# Patient Record
Sex: MALE | Race: WHITE | Employment: FULL TIME | URBAN - METROPOLITAN AREA
[De-identification: names, ages, dates, MRNs, and addresses within clinical notes are randomized per-mention and may not be internally consistent; named-entity substitution may affect disease eponyms.]

---

## 2017-01-22 ASSESSMENT — ACTIVITIES OF DAILY LIVING (ADL)
ARE_THERE_FIREARMS_IN_YOUR_HOME?: Y
DO_MEMBERS_OF_YOUR_HOUSEHOLD_USE_SAFETY_HELMETS?: Y
ARE_THERE_SMOKE_DETECTORS_IN_YOUR_HOME?: Y
ARE_THERE_CARBON_MONOXIDE_DETECTORS_IN_YOUR_HOME?: Y
DO_MEMBERS_OF_YOUR_HOUSEHOLD_USE_SUNSCREEN?: Y
DO_MEMBERS_OF_YOUR_HOUSEHOLD_WEAR_SEAT_BELTS?: Y

## 2017-01-23 ENCOUNTER — HOSPITAL ENCOUNTER (OUTPATIENT)
Dept: PHYSICAL THERAPY | Facility: CLINIC | Age: 37
Setting detail: THERAPIES SERIES
End: 2017-01-23
Attending: INTERNAL MEDICINE
Payer: COMMERCIAL

## 2017-01-23 ENCOUNTER — OFFICE VISIT (OUTPATIENT)
Dept: PULMONOLOGY | Facility: CLINIC | Age: 37
End: 2017-01-23
Attending: INTERNAL MEDICINE
Payer: COMMERCIAL

## 2017-01-23 VITALS
OXYGEN SATURATION: 96 % | WEIGHT: 205.25 LBS | HEART RATE: 105 BPM | DIASTOLIC BLOOD PRESSURE: 74 MMHG | BODY MASS INDEX: 25.87 KG/M2 | SYSTOLIC BLOOD PRESSURE: 106 MMHG | RESPIRATION RATE: 16 BRPM

## 2017-01-23 DIAGNOSIS — E84.0 CYSTIC FIBROSIS WITH PULMONARY MANIFESTATIONS (H): Primary | ICD-10-CM

## 2017-01-23 DIAGNOSIS — R73.02 GLUCOSE INTOLERANCE (IMPAIRED GLUCOSE TOLERANCE): Primary | ICD-10-CM

## 2017-01-23 DIAGNOSIS — E84.0 CYSTIC FIBROSIS WITH PULMONARY MANIFESTATIONS (H): ICD-10-CM

## 2017-01-23 LAB
EXPTIME-PRE: 12.97 SEC
FEF2575-%PRED-PRE: 24 %
FEF2575-PRE: 1.2 L/SEC
FEF2575-PRED: 4.86 L/SEC
FEFMAX-%PRED-PRE: 82 %
FEFMAX-PRE: 9.39 L/SEC
FEFMAX-PRED: 11.34 L/SEC
FEV1-%PRED-PRE: 65 %
FEV1-PRE: 3.28 L
FEV1FEV6-PRE: 58 %
FEV1FEV6-PRED: 83 %
FEV1FVC-PRE: 52 %
FEV1FVC-PRED: 81 %
FIFMAX-PRE: 9.63 L/SEC
FVC-%PRED-PRE: 100 %
FVC-PRE: 6.28 L
FVC-PRED: 6.27 L

## 2017-01-23 PROCEDURE — 87070 CULTURE OTHR SPECIMN AEROBIC: CPT | Performed by: INTERNAL MEDICINE

## 2017-01-23 PROCEDURE — 99212 OFFICE O/P EST SF 10 MIN: CPT | Mod: ZF

## 2017-01-23 PROCEDURE — 87186 SC STD MICRODIL/AGAR DIL: CPT | Performed by: INTERNAL MEDICINE

## 2017-01-23 PROCEDURE — 87107 FUNGI IDENTIFICATION MOLD: CPT | Performed by: INTERNAL MEDICINE

## 2017-01-23 PROCEDURE — 87077 CULTURE AEROBIC IDENTIFY: CPT | Performed by: INTERNAL MEDICINE

## 2017-01-23 RX ORDER — TOBRAMYCIN INHALATION SOLUTION 300 MG/5ML
300 INHALANT RESPIRATORY (INHALATION) 2 TIMES DAILY
Qty: 300 ML | Refills: 5 | Status: SHIPPED | OUTPATIENT
Start: 2017-01-23 | End: 2017-02-16

## 2017-01-23 ASSESSMENT — PAIN SCALES - GENERAL: PAINLEVEL: NO PAIN (0)

## 2017-01-23 NOTE — MR AVS SNAPSHOT
After Visit Summary   1/23/2017    Telly Leal    MRN: [id number]           Patient Information     Date Of Birth          1980        Visit Information        Provider Department      1/23/2017 2:20 PM Rocael Miller MD [facility]        Today's Diagnoses     Glucose intolerance (impaired glucose tolerance)    -  1     Cystic fibrosis with pulmonary manifestations (H)           Care Instructions    Cystic Fibrosis Self-Care Plan       MRN: [id number]   Clinic Date: January 23, 2017   Patient: Telly Leal     Annual Studies:   IGG   Date Value Ref Range Status   12/11/2015 1160 695 - 1620 mg/dL Final     INSULIN   Date Value Ref Range Status   12/16/2016 73 mU/L Final     Comment:     Reference Range:  0-20     There are no preventive care reminders to display for this patient.      Pulmonary Function Tests  FEV1: amount of air you can blow out in 1 second  FVC: total amount of air you can take in and blow out    Your Goals:         PFT 1/23/2017   FVC 6.28   FEV1 3.28   FVC% 100   FEV1% 65          Airway Clearance: The Most Important Way to Keep Your Lungs Healthy  Vest Settings:    Hill-Rom Frequencies: 8, 9, 10 Pressure 10 Then, Frequencies 18, 19, 20 Pressure 6      RespirTech: Quick Start with Pressure of     Do each frequency for 5 minutes; Deflate vest after each frequency & cough 3 times before beginning the next setting.    Vest and Neb Therapy should be done 2-3 times/day.    Good Nutrition Can Improve Lung Function and Overall Health     Take ALL of your vitamins with food     Take 1/2 of your enzymes before EVERY meal/snack and the other 1/2 mid-meal/snack    Wt Readings from Last 3 Encounters:   12/16/16 90.719 kg (200 lb)   10/18/16 91.5 kg (201 lb 11.5 oz)   08/16/16 91.2 kg (201 lb 1 oz)       There is no weight on file to calculate BMI.         National CF Foundation Recommendations for BMI in CF Adults: Women: at least 22  Men: at least 23        Controlling Blood Sugars Helps Prevent Lung Infections & Improves Nutrition  Test blood sugar:     In the morning before eating (goal is )     2 hours after a meal (goal is less than 150)     When pre-meal glucose is greater than 150 add correction     At bedtime (if less than 100 eat a snack with 15 grams of carbohydrates  Last A1C Results:   HEMOGLOBIN A1C   Date Value Ref Range Status   12/16/2016 6.2* 4.3 - 6.0 % Final         If diabetic, measure A1C every 6 months. Goal: Under 7%    Staying Healthy    Research:  If you are interested in learning about research opportunities or have questions, please contact Evie Fairbanks at [phone] or [email or web address].      CF Foundation:  Compass is a personalized resource service to help you with the insurance, financial, legal and other issues you are facing.  It's free, confidential and available to anyone with CF.  Ask your  for more information or contact Compass directly at [facility] ([phone]) or compass@cff.org, or learn more at cff.org/compass.          RECOMMENDATIONS:   Check blood sugars for the next week at the following times: fasting, before meals, 2 hours after meals and before bed. Record results. A prescription for more test strips has been sent to your local pharmacy.     YOUR GOAL:      CF Nurse Line:  Brice Reveles: [phone]   Sary Hassan, RT: [phone]     Leona Marie: [phone] or Karissa Ashraf, Dieticians: [phone]   Mercedes Brooks, Diabetes Nurse: [phone]      Yani Georges: [phone] or Yoselin Abel [phone], Social Workers   [email or web address]                   Follow-ups after your visit        Follow-up notes from your care team     Return in about 2 months (around 3/23/2017).      Your next 10 appointments already scheduled     Apr 03, 2017  1:30 PM   PFT VISIT with  PFL D   M [facility] Pulmonary Function Testing ([facility])    [address]  [address] [facility] [id number] [phone]            Apr 03, 2017  2:20 PM   (Arrive by 2:05 PM)   RETURN CYSTIC FIBROSIS VISIT with Rocael Miller MD   [facility] ([facility])    [address] [id number] [phone]              Future tests that were ordered for you today     Open Future Orders        Priority Expected Expires Ordered    RESPIRATORY FLOW VOLUME LOOP Routine 3/24/2017 7/22/2017 1/23/2017    RESPIRATORY FLOW VOLUME LOOP Routine 3/24/2017 7/22/2017 1/23/2017            Who to contact     If you have questions or need follow up information about today's clinic visit or your schedule please contact [facility] directly at [phone].  Normal or non-critical lab and imaging results will be communicated to you by MyChart, letter or phone within 4 business days after the clinic has received the results. If you do not hear from us within 7 days, please contact the clinic through [employer] or phone. If you have a critical or abnormal lab result, we will notify you by phone as soon as possible.  Submit refill requests through [email or web address] or call your pharmacy and they will forward the refill request to us. Please allow 3 business days for your refill to be completed.          Additional Information About Your Visit        MyChart Information     [email or web address] gives you secure access to your electronic health record. If you see a primary care provider, you can also send messages to your care team and make appointments. If you have questions, please call your primary care clinic.  If you do not have a primary care provider, please call [phone] and they will assist you.        Care EveryWhere ID     This is your Care EveryWhere ID. This could be used by other organizations to access your [facility] medical records  [id number]        Your Vitals  Were     Pulse Respirations Pulse Oximetry             105 16 96%          Blood Pressure from Last 3 Encounters:   01/23/17 106/74   12/16/16 130/72   10/18/16 132/81    Weight from Last 3 Encounters:   01/23/17 93.1 kg (205 lb 4 oz)   12/16/16 90.719 kg (200 lb)   10/18/16 91.5 kg (201 lb 11.5 oz)              We Performed the Following     Cystic Fibrosis Culture Aerob Bacterial          Today's Medication Changes          These changes are accurate as of: 1/23/17  4:54 PM.  If you have any questions, ask your nurse or doctor.               Start taking these medicines.        Dose/Directions    blood glucose monitoring test strip   Commonly known as:  no brand specified   Used for:  Cystic fibrosis with pulmonary manifestations (H), Glucose intolerance (impaired glucose tolerance)   Started by:  Rocael Miller MD        Use to test blood sugars 4-6 times daily or as directed.   Quantity:  100 strip   Refills:  1       tobramycin (PF) 300 MG/5ML neb solution   Commonly known as:  SOO   Used for:  Cystic fibrosis with pulmonary manifestations (H)   Replaces:  tobramycin 28 MG in caps   Started by:  Rocael Miller MD        Dose:  300 mg   Take 5 mLs (300 mg) by nebulization 2 times daily   Quantity:  300 mL   Refills:  5         Stop taking these medicines if you haven't already. Please contact your care team if you have questions.     tobramycin 28 MG in caps   Commonly known as:  SOO PODHALER   Replaced by:  tobramycin (PF) 300 MG/5ML neb solution   Stopped by:  Rocael Miller MD                Where to get your medicines      These medications were sent to [facility] [address]  [address], [facility] [address]     Phone:  [phone]    - tobramycin (PF) 300 MG/5ML neb solution      These medications were sent to [facility] [address] [place], [place] - [address] AT [facility] [address]  [address],  [facility] [address]    Hours:  24-hours Phone:  [phone]    - blood glucose monitoring test strip             Primary Care Provider Office Phone # Fax #    Adriel Corrigan [phone]       [facility] [address] [place] [address]  [facility] [address]        Thank you!     Thank you for choosing [facility]  for your care. Our goal is always to provide you with excellent care. Hearing back from our patients is one way we can continue to improve our services. Please take a few minutes to complete the written survey that you may receive in the mail after your visit with us. Thank you!             Your Updated Medication List - Protect others around you: Learn how to safely use, store and throw away your medicines at www.disposemymeds.org.          This list is accurate as of: 1/23/17  4:54 PM.  Always use your most recent med list.                   Brand Name Dispense Instructions for use    acetylcysteine 20 % injection    MUCOMYST    180 mL    Inhale 2 mLs into the lungs 3 times daily       * albuterol 108 (90 BASE) MCG/ACT Inhaler    albuterol    1 Inhaler    Inhale 1-2 puffs into the lungs every 6 hours as needed for shortness of breath / dyspnea Every 4-6 hours as needed       * albuterol (2.5 MG/3ML) 0.083% neb solution     360 mL    Take 1 vial (2.5 mg) by nebulization 3 times daily       azithromycin 250 MG tablet    ZITHROMAX    30 tablet    Take 1 tablet (250 mg) by mouth daily       aztreonam 200 MG/ML    AZACTAM    56 each    Take 5 mLs (1 g) by nebulization 2 times daily       aztreonam lysine 75 MG Solr    CAYSTON    90 mL    Take 1 mL (75 mg) by nebulization 3 times daily 28days on 28days off       blood glucose monitoring test strip    no brand specified    100 strip    Use to test blood sugars 4-6 times daily or as directed.       Cholecalciferol 4000 UNITS Caps     90 capsule    Take 1 capsule by mouth daily       ciprofloxacin 750 MG  tablet    CIPRO    56 tablet    Take 1 tablet (750 mg) by mouth 2 times daily       CREON 15946 UNITS Cpep per EC capsule   Generic drug:  amylase-lipase-protease     1000 capsule    Take 5-6 capsules (120,000-144,000 Units) by mouth See Admin Instructions 5-6 with meals, 1-5 with snacks,7-8 with tube feedings       cromolyn 20 MG/2ML neb solution    INTAL    180 mL    Take 2 mLs (20 mg) by nebulization 3 times daily 1 vial three times daily       fluticasone 50 MCG/ACT spray    FLONASE    1 Package    Spray 2 sprays into both nostrils as needed       MEPHYTON 5 MG tablet   Generic drug:  phytonadione     12 tablet    TAKE 1 TABLET (5 MG) BY MOUTH 3 TIMES A WEEK       MULTIVITAMINS PO      Take 1 tablet by mouth daily.       NUTREN 2.0      Take 4-5 Cans by mouth. 168/ml/hr 7 days/week       oseltamivir 75 MG capsule    TAMIFLU    10 capsule    Take 1 capsule (75 mg) by mouth 2 times daily For 5 days       ranitidine 150 MG tablet    ZANTAC    180 tablet    Take 1 tablet (150 mg) by mouth 2 times daily       tobramycin (PF) 300 MG/5ML neb solution    SOO    300 mL    Take 5 mLs (300 mg) by nebulization 2 times daily       water for injection sterile Soln     250 mL    Inject 4 mLs as directed 2 times daily       * Notice:  This list has 2 medication(s) that are the same as other medications prescribed for you. Read the directions carefully, and ask your doctor or other care provider to review them with you.

## 2017-01-23 NOTE — PATIENT INSTRUCTIONS
Cystic Fibrosis Self-Care Plan       MRN: [id number]   Clinic Date: January 23, 2017   Patient: Telly Leal     Annual Studies:   IGG   Date Value Ref Range Status   12/11/2015 1160 695 - 1620 mg/dL Final     INSULIN   Date Value Ref Range Status   12/16/2016 73 mU/L Final     Comment:     Reference Range:  0-20     There are no preventive care reminders to display for this patient.      Pulmonary Function Tests  FEV1: amount of air you can blow out in 1 second  FVC: total amount of air you can take in and blow out    Your Goals:         PFT 1/23/2017   FVC 6.28   FEV1 3.28   FVC% 100   FEV1% 65          Airway Clearance: The Most Important Way to Keep Your Lungs Healthy  Vest Settings:    Hill-Rom Frequencies: 8, 9, 10 Pressure 10 Then, Frequencies 18, 19, 20 Pressure 6      RespirTech: Quick Start with Pressure of     Do each frequency for 5 minutes; Deflate vest after each frequency & cough 3 times before beginning the next setting.    Vest and Neb Therapy should be done 2-3 times/day.    Good Nutrition Can Improve Lung Function and Overall Health     Take ALL of your vitamins with food     Take 1/2 of your enzymes before EVERY meal/snack and the other 1/2 mid-meal/snack    Wt Readings from Last 3 Encounters:   12/16/16 90.719 kg (200 lb)   10/18/16 91.5 kg (201 lb 11.5 oz)   08/16/16 91.2 kg (201 lb 1 oz)       There is no weight on file to calculate BMI.         National CF Foundation Recommendations for BMI in CF Adults: Women: at least 22 Men: at least 23        Controlling Blood Sugars Helps Prevent Lung Infections & Improves Nutrition  Test blood sugar:     In the morning before eating (goal is )     2 hours after a meal (goal is less than 150)     When pre-meal glucose is greater than 150 add correction     At bedtime (if less than 100 eat a snack with 15 grams of carbohydrates  Last A1C Results:   HEMOGLOBIN A1C   Date Value Ref Range Status   12/16/2016 6.2* 4.3 - 6.0 % Final         If  diabetic, measure A1C every 6 months. Goal: Under 7%    Staying Healthy    Research:  If you are interested in learning about research opportunities or have questions, please contact Evie Fairbanks at [phone] or [email or web address].      CF Foundation:  Compass is a personalized resource service to help you with the insurance, financial, legal and other issues you are facing.  It's free, confidential and available to anyone with CF.  Ask your  for more information or contact Compass directly at [phone] ([phone]) or compass@[email or web address], or learn more at cff.org/compass.          RECOMMENDATIONS:   Check blood sugars for the next week at the following times: fasting, before meals, 2 hours after meals and before bed. Record results. A prescription for more test strips has been sent to your local pharmacy.     YOUR GOAL:      CF Nurse Line:  Brice Reveles: [phone]   Sary Hassan, RT: [phone]     Leona Marie: [phone] or Karissa Ashraf, Dieticians: [phone]   Mercedes Brooks, Diabetes Nurse: [phone]      Yani Georges: [phone] or Yoselin Abel [phone], Social Workers   [email or web address]

## 2017-01-23 NOTE — Clinical Note
1/23/2017       RE: Telly Leal  [address] [address]     Dear Colleague,    Thank you for referring your patient, Telly Leal, to the [facility] at [facility]. Please see a copy of my visit note below.    Reason for Visit  Telly Leal is a 35 year old year old male who is being seen for Cystic Fibrosis    CF HPI  The patient was seen and examined by Rocael Miller   Patient last seen 3 months ago at which time at baseline. Reports feeling at baseline although notes less exercise than usual and also drop in vest to about 10 times per week. Stopped cromolyn last visit and feels perhaps not as good since then. No formal exercise but on feet at work, going for walks and baseline exertional dyspnea. Volume and color of sputum at baseline. No hemoptysis or chest pain.      Current Outpatient Prescriptions   Medication     tobramycin, PF, (SOO) 300 MG/5ML neb solution     blood glucose monitoring (NO BRAND SPECIFIED) test strip     CREON 57257 UNITS CPEP     albuterol (ALBUTEROL) 108 (90 BASE) MCG/ACT inhaler     albuterol (2.5 MG/3ML) 0.083% nebulizer solution     azithromycin (ZITHROMAX) 250 MG tablet     acetylcysteine (MUCOMYST) 20 % nebulizer solution     MEPHYTON 5 MG tablet     aztreonam (AZACTAM) 200 MG/ML     water for injection sterile SOLN     cromolyn (INTAL) 20 MG/2ML nebulizer solution     ranitidine (ZANTAC) 150 MG tablet     Cholecalciferol 4000 UNITS CAPS     fluticasone (FLONASE) 50 MCG/ACT nasal spray     Multiple Vitamin (MULTIVITAMINS PO)     Nutritional Supplements (NUTREN 2.0) LIQD     aztreonam lysine (CAYSTON) 75 MG SOLR     oseltamivir (TAMIFLU) 75 MG capsule     ciprofloxacin (CIPRO) 750 MG tablet     No current facility-administered medications for this visit.     No Known Allergies  Past Medical History   Diagnosis Date     Cystic fibrosis with pulmonary manifestations (H)       GERD (gastroesophageal reflux disease)      Malnutrition (H)      Exocrine pancreatic insufficiency      Chronic sinusitis      Hemoptysis        Past Surgical History   Procedure Laterality Date     Tubes       Lobectomy       right upper lobe       Social History     Social History     Marital Status: Single     Spouse Name: N/A     Number of Children: N/A     Years of Education: N/A     Occupational History      [employer]     Social History Main Topics     Smoking status: Never Smoker      Smokeless tobacco: Never Used     Alcohol Use: 0.0 oz/week     0 Standard drinks or equivalent per week     Drug Use: Not on file     Sexual Activity: Not on file     Other Topics Concern     Not on file     Social History Narrative    Patient works for the [employer] as an .  He does not have stable living conditions at this time.  He has a Thai guillen.   Update: living with wife Bre in [facility].    ROS Pulmonary  Const: no fever, chills, fatigue. ENT: no sinus pain or drainage. GI: no diarrhea, constipation, grease in stools, or gerd sx with h2 blocker. Using 2 cans TF nightly.    A complete ROS was otherwise negative except as noted in the HPI.  /74 mmHg  Pulse 105  Resp 16  Wt 93.1 kg (205 lb 4 oz)  SpO2 96%  Exam:   GENERAL APPEARANCE: Well developed, well nourished, alert, and in no apparent distress.  EYES: anicteric  HENT: Nasal mucosa with no edema and no hyperemia. No nasal polyps.  EARS: Canals clear, TMs with normal landmarks and light reflex bilaterally   MOUTH: Oral mucosa is moist, without any lesions, no tonsillar enlargement, no oropharyngeal exudate.  NECK: supple, no masses, no thyromegaly.  LYMPHATICS: No significant  cervical, or supraclavicular nodes.  RESP:  good air flow throughout except right base as before.  Clear in all lung fields.  CV: Normal S1, S2, regular rhythm, normal rate. No murmur.  No rub. No gallop. No LE edema.   ABDOMEN:  Bowel  sounds normal, soft, nontender, no HSM or masses.   MS: extremities normal. No cyanosis.  SKIN: no rash on limited exam. PEG site without erythema or discharge. Chest scar like lesion upper sternum unchanged. Diffuse freckles.   NEURO: Mentation intact, speech normal, normal gait and stance  PSYCH: mentation appears normal. and affect normal/bright  Results:  Recent Results (from the past 168 hour(s))   General PFT Lab (Please always keep checked)    Collection Time: 01/23/17  1:30 PM   Result Value Ref Range    FVC-Pred 6.27 L    FVC-Pre 6.28 L    FVC-%Pred-Pre 100 %    FEV1-Pre 3.28 L    FEV1-%Pred-Pre 65 %    FEV1FVC-Pred 81 %    FEV1FVC-Pre 52 %    FEFMax-Pred 11.34 L/sec    FEFMax-Pre 9.39 L/sec    FEFMax-%Pred-Pre 82 %    FEF2575-Pred 4.86 L/sec    FEF2575-Pre 1.20 L/sec    TNI0954-%Pred-Pre 24 %    ExpTime-Pre 12.97 sec    FIFMax-Pre 9.63 L/sec    FEV1FEV6-Pred 83 %    FEV1FEV6-Pre 58 %     Spirometry interpretation: personally reviewed. Valid maneuver. Moderate obstruction. FEV1 without significant change from last visit. Within baseline of past 2 years    Assessment and plan:  1. CF lung disease: PFTs down only slightly and feeling well with good clinical course over past several months but consensus is for patient to resume vest bid consistently. He would like to switch back to alice nebs from [facility] and new Rx sent. He has a new treadmill and encouraged him to start regular use. Favor keeping him off cromolyn to promote better adherence to other CF meds. Tolerating reconstituted aztreonam bid alternating with Alice inhaler on chronic azithromycin.   2. Skin lesion: looks like old scar without clear hx of trauma. Patient has not scheduled derm eval - also helpful to get derm input on need for annual skin exams given large numerous freckles.  3.  Pancreatic insufficiency with a history of malnutrition: Adequate enzyme replacement by history. Some concern excess calories could be contributing to glucose  intolerance.  BMI of 25.9 and cutting back on TF a reasonable option although patient reticent. Will revisit over time.    4. CF sinus disease: at baseline not needing nasal steroid.  5. GERD: Infrequent symptoms. Adequate control with ranitidine.   6. Low vitamin D level: On replacement.  7. Impaired glucose tolerance: 2 hour glucose 160 on GTT and A1C higher than previous at 6.2. Pt will do glucose monitoring qid for a week. Leona, nutrition, consulted and reviewed cutting out simple sugars in diet. This, along with exercise may help. A lot of inertia with pursuing insulin a couple years ago when glucose intolerance also an issue.     8. Healthcare maintenance: Annuals due December, 2016. Obtained flu vaccine at work.   9. Elevated triglycerides: normal in December  10.Family planning: he and wife, Bre, are expecting in late August.  Patient will follow up in 2 months   40 minutes spent in conjunction with this visit with > 50% of time spent counseling and coordinating care of following issues: adherence to airway clearance regimen and lung treatments, status of pancreatic insufficiency and history of malnutrition including counseling on use of tube feeds, control of sinus disease, control of GERD, evaluation of skin lesion on chest, family planning, impaired glucose intolerance.    Rocael Miller

## 2017-01-23 NOTE — NURSING NOTE
Chief Complaint   Patient presents with     Cystic Fibrosis     Patient is being seen for CF follow up      Milagros Hernandez CMA at 1:54 PM on 1/23/2017

## 2017-01-24 NOTE — PROGRESS NOTES
Reason for Visit  Telly Leal is a 35 year old year old male who is being seen for Cystic Fibrosis    CF HPI  The patient was seen and examined by Rocael Miller   Patient last seen 3 months ago at which time at baseline. Reports feeling at baseline although notes less exercise than usual and also drop in vest to about 10 times per week. Stopped cromolyn last visit and feels perhaps not as good since then. No formal exercise but on feet at work, going for walks and baseline exertional dyspnea. Volume and color of sputum at baseline. No hemoptysis or chest pain.      Current Outpatient Prescriptions   Medication     tobramycin, PF, (SOO) 300 MG/5ML neb solution     blood glucose monitoring (NO BRAND SPECIFIED) test strip     CREON 52220 UNITS CPEP     albuterol (ALBUTEROL) 108 (90 BASE) MCG/ACT inhaler     albuterol (2.5 MG/3ML) 0.083% nebulizer solution     azithromycin (ZITHROMAX) 250 MG tablet     acetylcysteine (MUCOMYST) 20 % nebulizer solution     MEPHYTON 5 MG tablet     aztreonam (AZACTAM) 200 MG/ML     water for injection sterile SOLN     cromolyn (INTAL) 20 MG/2ML nebulizer solution     ranitidine (ZANTAC) 150 MG tablet     Cholecalciferol 4000 UNITS CAPS     fluticasone (FLONASE) 50 MCG/ACT nasal spray     Multiple Vitamin (MULTIVITAMINS PO)     Nutritional Supplements (NUTREN 2.0) LIQD     aztreonam lysine (CAYSTON) 75 MG SOLR     oseltamivir (TAMIFLU) 75 MG capsule     ciprofloxacin (CIPRO) 750 MG tablet     No current facility-administered medications for this visit.     No Known Allergies  Past Medical History   Diagnosis Date     Cystic fibrosis with pulmonary manifestations (H)      GERD (gastroesophageal reflux disease)      Malnutrition (H)      Exocrine pancreatic insufficiency      Chronic sinusitis      Hemoptysis        Past Surgical History   Procedure Laterality Date     Tubes       Lobectomy       right upper lobe       Social History     Social History     Marital Status: Single      Spouse Name: N/A     Number of Children: N/A     Years of Education: N/A     Occupational History      [employer]     Social History Main Topics     Smoking status: Never Smoker      Smokeless tobacco: Never Used     Alcohol Use: 0.0 oz/week     0 Standard drinks or equivalent per week     Drug Use: Not on file     Sexual Activity: Not on file     Other Topics Concern     Not on file     Social History Narrative    Patient works for the [employer] as an .  He does not have stable living conditions at this time.  He has a Malagasy guillen.   Update: living with wife Bre in [facility].    ROS Pulmonary  Const: no fever, chills, fatigue. ENT: no sinus pain or drainage. GI: no diarrhea, constipation, grease in stools, or gerd sx with h2 blocker. Using 2 cans TF nightly.    A complete ROS was otherwise negative except as noted in the HPI.  /74 mmHg  Pulse 105  Resp 16  Wt 93.1 kg (205 lb 4 oz)  SpO2 96%  Exam:   GENERAL APPEARANCE: Well developed, well nourished, alert, and in no apparent distress.  EYES: anicteric  HENT: Nasal mucosa with no edema and no hyperemia. No nasal polyps.  EARS: Canals clear, TMs with normal landmarks and light reflex bilaterally   MOUTH: Oral mucosa is moist, without any lesions, no tonsillar enlargement, no oropharyngeal exudate.  NECK: supple, no masses, no thyromegaly.  LYMPHATICS: No significant  cervical, or supraclavicular nodes.  RESP:  good air flow throughout except right base as before.  Clear in all lung fields.  CV: Normal S1, S2, regular rhythm, normal rate. No murmur.  No rub. No gallop. No LE edema.   ABDOMEN:  Bowel sounds normal, soft, nontender, no HSM or masses.   MS: extremities normal. No cyanosis.  SKIN: no rash on limited exam. PEG site without erythema or discharge. Chest scar like lesion upper sternum unchanged. Diffuse freckles.   NEURO: Mentation intact, speech normal, normal gait and stance  PSYCH: mentation appears  normal. and affect normal/bright  Results:  Recent Results (from the past 168 hour(s))   General PFT Lab (Please always keep checked)    Collection Time: 01/23/17  1:30 PM   Result Value Ref Range    FVC-Pred 6.27 L    FVC-Pre 6.28 L    FVC-%Pred-Pre 100 %    FEV1-Pre 3.28 L    FEV1-%Pred-Pre 65 %    FEV1FVC-Pred 81 %    FEV1FVC-Pre 52 %    FEFMax-Pred 11.34 L/sec    FEFMax-Pre 9.39 L/sec    FEFMax-%Pred-Pre 82 %    FEF2575-Pred 4.86 L/sec    FEF2575-Pre 1.20 L/sec    MBH8358-%Pred-Pre 24 %    ExpTime-Pre 12.97 sec    FIFMax-Pre 9.63 L/sec    FEV1FEV6-Pred 83 %    FEV1FEV6-Pre 58 %     Spirometry interpretation: personally reviewed. Valid maneuver. Moderate obstruction. FEV1 without significant change from last visit. Within baseline of past 2 years    Assessment and plan:  1. CF lung disease: PFTs down only slightly and feeling well with good clinical course over past several months but consensus is for patient to resume vest bid consistently. [facility]. He has a new treadmill and encouraged him to start regular use. Favor keeping him off cromolyn to promote better adherence to other CF meds. Tolerating reconstituted aztreonam bid alternating with Alice inhaler on chronic azithromycin.   2. Skin lesion: looks like old scar without clear hx of trauma. Patient has not scheduled derm eval - also helpful to get derm input on need for annual skin exams given large numerous freckles.  3.  Pancreatic insufficiency with a history of malnutrition: Adequate enzyme replacement by history. Some concern excess calories could be contributing to glucose intolerance.  BMI of 25.9 and cutting back on TF a reasonable option although patient reticent. Will revisit over time.    4. CF sinus disease: at baseline not needing nasal steroid.  5. GERD: Infrequent symptoms. Adequate control with ranitidine.   6. Low vitamin D level: On replacement.  7. Impaired glucose tolerance:  2 hour glucose 160 on GTT and A1C higher than previous at 6.2. Pt will do glucose monitoring qid for a week. Leona, nutrition, consulted and reviewed cutting out simple sugars in diet. This, along with exercise may help. A lot of inertia with pursuing insulin a couple years ago when glucose intolerance also an issue.     8. Healthcare maintenance: Annuals due December, 2016. Obtained flu vaccine at work.   9. Elevated triglycerides: normal in December  10.Family planning: he and wife, Bre, are expecting in late August.  Patient will follow up in 2 months   40 minutes spent in conjunction with this visit with > 50% of time spent counseling and coordinating care of following issues: adherence to airway clearance regimen and lung treatments, status of pancreatic insufficiency and history of malnutrition including counseling on use of tube feeds, control of sinus disease, control of GERD, evaluation of skin lesion on chest, family planning, impaired glucose intolerance.    Rocael Miller

## 2017-01-24 NOTE — PROGRESS NOTES
Nutrition Services - Brief Note  Visited with Telly per provider request regarding tube feeding regimen and OGTT.     Nutrition History: Pt continues to eat 2 meals per day + snacks, typically skips breakfast in the morning. Previously reliant on G-tube feeds but has been working on cutting back TF with ability to maintain weight >200 lb in the last year. Currently infuses 2 cans Nutren 2.0 overnight @ 135 ml/hr x ~4 hrs that provides 1000 kcal/day, 42 gm protein. If PO intake is poor, pt infuses 3 cans (rare). Pt has been encouraged at previous visits to further decrease TF to 1 can per night and monitor weight trends as pt acknowledges that if he does less TF, he eats more during the day.     Wt Readings from Last 10 Encounters:   01/23/17 93.1 kg (205 lb 4 oz)   12/16/16 90.719 kg (200 lb)   10/18/16 91.5 kg (201 lb 11.5 oz)   08/16/16 91.2 kg (201 lb 1 oz)   06/06/16 92.5 kg (203 lb 14.8 oz)   04/04/16 95 kg (209 lb 7 oz)   12/11/15 92.987 kg (205 lb)   11/03/15 93.2 kg (205 lb 7.5 oz)   10/12/15 92.625 kg (204 lb 3.2 oz)   09/22/15 93.6 kg (206 lb 5.6 oz)     Labs:  Impaired glucose tolerance with annual studies Dec 2016. HgA1c 6.2 (H), trending up. Per provider, pt to monitor BGs for 1 week.     Intervention  1) Reviewed oral intake and ability to further decrease TFs. Discussed ability to make up for TFs overnight with oral intake during the day and importance of not skipping meals. Encouraged pt to decrease infusion to 1 can overnight and monitor weight trends at home.   2) Discussed impaired glucose tolerance and reviewed tips to avoid large spikes in blood sugar. Encouraged pt to avoid excessive simple sugars (pop, cereal in the evening, etc) and to spread carbohydrates throughout the day. Encouraged balancing meals/snacks with CHO+PRO+FAT and replacing refined grains with whole grains. Pt acknowledges that he has been educated on these recommendations in the past.     CF dietitian will continue to  follow.     Leona Marie (Radha) RD, LD  Pager [phone]

## 2017-01-25 NOTE — PROGRESS NOTES
01/23/17 1500   Quick Adds   Type of Visit Initial PT Evaluation  (Screen only)   Self-Care   Regular Exercise no   Activity/Exercise/Self-Care Comment States he used to be involved in sports (hockey) but recently is not engaged in formal aerobic ex program.  Used to exercise with .  Would like to increase participation in aerobic and resistance activity.  Only occasionally engages in recreational sports.     Functional Level Prior   Ambulation 0-->independent   Transferring 0-->independent   Toileting 0-->independent   Bathing 0-->independent   Dressing 0-->independent   Eating 0-->independent   Communication 0-->understands/communicates without difficulty   Swallowing 0-->swallows foods/liquids without difficulty   Cognition 0 - no cognition issues reported   Fall history within last six months no   Which of the above functional risks had a recent onset or change? none   General Information   Referring Physician Rocael Miller   General Observations Pt is very pleasant.     Cognitive Status Examination   Orientation orientation to person, place and time   Level of Consciousness alert   Follows Commands and Answers Questions 100% of the time   Personal Safety and Judgment intact   Memory intact   Posture    Posture Not impaired   Posture Comments In sitting and standing, pt's posture is appropriate.  Discussed correct sitting and standing posture.  Pt understands   Range of Motion (ROM)   ROM Comment Functional shoulder ROM performed without impairmet: standing B shoulder flexion and ABD with appropriate lumbar curve (no excessive lordosis present).  Forward bend with knee locked in full extension: ~7inch from floor to finger tips.      Strength   Strength Comments Plank test: 1.5 min.  PT max out test.   Total Evaluation Time   Total Evaluation Time (Minutes) 15

## 2017-01-31 LAB
BACTERIA SPEC CULT: ABNORMAL
MICRO REPORT STATUS: ABNORMAL
MICROORGANISM SPEC CULT: ABNORMAL
MICROORGANISM SPEC CULT: ABNORMAL
SPECIMEN SOURCE: ABNORMAL

## 2017-04-03 ENCOUNTER — OFFICE VISIT (OUTPATIENT)
Dept: PHARMACY | Facility: CLINIC | Age: 37
End: 2017-04-03
Payer: COMMERCIAL

## 2017-04-03 ENCOUNTER — OFFICE VISIT (OUTPATIENT)
Dept: PULMONOLOGY | Facility: CLINIC | Age: 37
End: 2017-04-03
Attending: INTERNAL MEDICINE
Payer: COMMERCIAL

## 2017-04-03 VITALS
HEART RATE: 101 BPM | TEMPERATURE: 97.8 F | DIASTOLIC BLOOD PRESSURE: 85 MMHG | BODY MASS INDEX: 25.93 KG/M2 | RESPIRATION RATE: 16 BRPM | HEIGHT: 75 IN | SYSTOLIC BLOOD PRESSURE: 125 MMHG | WEIGHT: 208.56 LBS | OXYGEN SATURATION: 94 %

## 2017-04-03 DIAGNOSIS — E84.9 CF (CYSTIC FIBROSIS) (H): Primary | ICD-10-CM

## 2017-04-03 DIAGNOSIS — E84.0 CYSTIC FIBROSIS WITH PULMONARY MANIFESTATIONS (H): Primary | ICD-10-CM

## 2017-04-03 DIAGNOSIS — E84.0 CYSTIC FIBROSIS WITH PULMONARY MANIFESTATIONS (H): ICD-10-CM

## 2017-04-03 LAB
EXPTIME-PRE: 12.06 SEC
FEF2575-%PRED-PRE: 22 %
FEF2575-PRE: 1.11 L/SEC
FEF2575-PRED: 4.85 L/SEC
FEFMAX-%PRED-PRE: 81 %
FEFMAX-PRE: 9.21 L/SEC
FEFMAX-PRED: 11.34 L/SEC
FEV1-%PRED-PRE: 62 %
FEV1-PRE: 3.16 L
FEV1FEV6-PRE: 57 %
FEV1FEV6-PRED: 83 %
FEV1FVC-PRE: 51 %
FEV1FVC-PRED: 81 %
FIFMAX-PRE: 10.4 L/SEC
FVC-%PRED-PRE: 97 %
FVC-PRE: 6.13 L
FVC-PRED: 6.26 L

## 2017-04-03 PROCEDURE — 87107 FUNGI IDENTIFICATION MOLD: CPT | Performed by: INTERNAL MEDICINE

## 2017-04-03 PROCEDURE — 99605 MTMS BY PHARM NP 15 MIN: CPT | Performed by: PHARMACIST

## 2017-04-03 PROCEDURE — 87077 CULTURE AEROBIC IDENTIFY: CPT | Performed by: INTERNAL MEDICINE

## 2017-04-03 PROCEDURE — 97803 MED NUTRITION INDIV SUBSEQ: CPT | Mod: ZF | Performed by: DIETITIAN, REGISTERED

## 2017-04-03 PROCEDURE — 87070 CULTURE OTHR SPECIMN AEROBIC: CPT | Performed by: INTERNAL MEDICINE

## 2017-04-03 PROCEDURE — 87186 SC STD MICRODIL/AGAR DIL: CPT | Performed by: INTERNAL MEDICINE

## 2017-04-03 PROCEDURE — 99212 OFFICE O/P EST SF 10 MIN: CPT | Mod: ZF

## 2017-04-03 ASSESSMENT — ACTIVITIES OF DAILY LIVING (ADL)
ARE_THERE_FIREARMS_IN_YOUR_HOME?: Y
ARE_THERE_CARBON_MONOXIDE_DETECTORS_IN_YOUR_HOME?: Y
DO_MEMBERS_OF_YOUR_HOUSEHOLD_WEAR_SEAT_BELTS?: Y
ARE_THERE_SMOKE_DETECTORS_IN_YOUR_HOME?: Y
DO_MEMBERS_OF_YOUR_HOUSEHOLD_USE_SUNSCREEN?: Y
DO_MEMBERS_OF_YOUR_HOUSEHOLD_USE_SAFETY_HELMETS?: Y

## 2017-04-03 ASSESSMENT — PAIN SCALES - GENERAL: PAINLEVEL: NO PAIN (0)

## 2017-04-03 NOTE — PROGRESS NOTES
Reason for Visit  Telly Leal is a 35 year old year old male who is being seen for Cystic Fibrosis (Follow up on Yobany and his CF)    CF HPI  The patient was seen and examined by Rocael Miller   Patient has felt near baseline from resp standpoint since last visit in January. Had a 4-day stretch where he cut back vest to daily a month ago but otherwise consistent bid vest. Had a URI a month ago as well but resolved quickly. Currently frequency of cough and volume of sputum at baseline with usual yellow color. No hemoptysis or chest pain. Not getting to gym to workout but waking dog and reports baseline exercise tolerance. Using cromolyn most days. Currently on aztreonam reconstituted.     Current Outpatient Prescriptions   Medication     tobramycin, PF, (SOO) 300 MG/5ML neb solution     blood glucose monitoring (NO BRAND SPECIFIED) test strip     CREON 72715 UNITS CPEP     albuterol (ALBUTEROL) 108 (90 BASE) MCG/ACT inhaler     albuterol (2.5 MG/3ML) 0.083% nebulizer solution     azithromycin (ZITHROMAX) 250 MG tablet     acetylcysteine (MUCOMYST) 20 % nebulizer solution     MEPHYTON 5 MG tablet     aztreonam (AZACTAM) 200 MG/ML     water for injection sterile SOLN     cromolyn (INTAL) 20 MG/2ML nebulizer solution     ranitidine (ZANTAC) 150 MG tablet     Cholecalciferol 4000 UNITS CAPS     oseltamivir (TAMIFLU) 75 MG capsule     Multiple Vitamin (MULTIVITAMINS PO)     Nutritional Supplements (NUTREN 2.0) LIQD     fluticasone (FLONASE) 50 MCG/ACT nasal spray     No current facility-administered medications for this visit.      No Known Allergies  Past Medical History:   Diagnosis Date     Chronic sinusitis      Cystic fibrosis with pulmonary manifestations (H)      Exocrine pancreatic insufficiency      GERD (gastroesophageal reflux disease)      Hemoptysis      Malnutrition (H)        Past Surgical History:   Procedure Laterality Date     LOBECTOMY      right upper lobe     TUBES         Social History  "    Social History     Marital status: Single     Spouse name: N/A     Number of children: N/A     Years of education: N/A     Occupational History      [employer]     Social History Main Topics     Smoking status: Never Smoker     Smokeless tobacco: Never Used     Alcohol use 0.0 oz/week     0 Standard drinks or equivalent per week     Drug use: Not on file     Sexual activity: Not on file     Other Topics Concern     Not on file     Social History Narrative    Patient works for the [employer] as an .  He does not have stable living conditions at this time.  He has a Uzbek guillen.   Update: living with wife Bre in [facility].    ROS Pulmonary  Const: no fever, chills, fatigue. ENT: no sinus pain or drainage. GI: no diarrhea, constipation, grease in stools, or gerd sx with h2 blocker. Using 2 cans TF nightly.  Used 1 can for a 2-week period since last visit. Musc: intermittent sore neck.  A complete ROS was otherwise negative except as noted in the HPI.  /85  Pulse 101  Temp 97.8  F (36.6  C) (Oral)  Resp 16  Ht 1.895 m (6' 2.6\")  Wt 94.6 kg (208 lb 8.9 oz)  SpO2 94%  BMI 26.35 kg/m2  Exam:   GENERAL APPEARANCE: Well developed, well nourished, alert, and in no apparent distress.  EYES: anicteric  HENT: Nasal mucosa with no edema and no hyperemia. No nasal polyps.  EARS: Canals clear, TMs with normal landmarks and light reflex on right, occluded by cerumen on left.  MOUTH: Oral mucosa is moist, without any lesions, no tonsillar enlargement, no oropharyngeal exudate.  NECK: supple, no masses, no thyromegaly.  LYMPHATICS: No significant  cervical, or supraclavicular nodes.  RESP:  good air flow throughout except right base as before.  Clear in all lung fields. Normal chest wall excursion.  CV: Normal S1, S2, regular rhythm, normal rate. No murmur.  No rub. No gallop. No LE edema.   ABDOMEN:  Bowel sounds normal, soft, nontender, no HSM or masses.   MS: extremities " normal. No cyanosis.  SKIN:  PEG site without erythema or discharge. Chest scar-like lesion upper sternum unchanged. Diffuse freckles.   NEURO: Mentation intact, speech normal, normal gait and stance  PSYCH: mentation appears normal. and affect normal/bright  Results:  Recent Results (from the past 168 hour(s))   General PFT Lab (Please always keep checked)    Collection Time: 04/03/17  1:26 PM   Result Value Ref Range    FVC-Pred 6.26 L    FVC-Pre 6.13 L    FVC-%Pred-Pre 97 %    FEV1-Pre 3.16 L    FEV1-%Pred-Pre 62 %    FEV1FVC-Pred 81 %    FEV1FVC-Pre 51 %    FEFMax-Pred 11.34 L/sec    FEFMax-Pre 9.21 L/sec    FEFMax-%Pred-Pre 81 %    FEF2575-Pred 4.85 L/sec    FEF2575-Pre 1.11 L/sec    [id number]-%Pred-Pre 22 %    ExpTime-Pre 12.06 sec    FIFMax-Pre 10.40 L/sec    FEV1FEV6-Pred 83 %    FEV1FEV6-Pre 57 %     Spirometry interpretation: personally reviewed. Valid maneuver. Moderate obstruction. FEV1 down over 5% over past couple visits. Slightly below baseline.   Sputum last visit: PA x 2    Assessment and plan:  1. Mild exacerbation of CF lung disease: PFTs down again slightly but still no overt symptoms to indicate a more severe exacerbation. Will defer abx in favor of close follow up and ongoing aggressive airway clearance. Will see if he can escalate frequency and intensity of exercise. Advised him to call cf office if any worsening of his sx in which case will start oral abx. Continue alternating alice and reconstituted aztreonam nebs along with chronic azithro.   2. Impaired glucose tolerance: 2 hour glucose 12/16 160 on GTT and A1C higher than previous at 6.2. Leona, nutrition, consulted and will review blood sugar monitoring. Previouisly reviewed cutting out simple sugars in diet. A lot of inertia with pursuing insulin a couple years ago when glucose intolerance also an issue.    3.  Pancreatic insufficiency with a history of malnutrition: Adequate enzyme replacement by history. Ongoing concern excess calories  could be contributing to glucose intolerance.  BMI up further to 26.3 but patient remains reticent to cut back TF until after assured not dropping over summer months which had been an in issue again. Will plan on cutting TF if weigh up further today.  4. CF sinus disease: at baseline not needing nasal steroid.  5. GERD: Infrequent symptoms. Adequate control with ranitidine.   6. Low vitamin D level: On replacement.  7. Skin lesion: looks like old scar without clear hx of trauma. Patient has not scheduled derm eval - also helpful to get derm input on need for annual skin exams given large numerous freckles. Reminded him to consider this again today.    8. Healthcare maintenance: Annuals due December, 2017.   9. Family planning: he and wife, Bre, are expecting in late August.  Patient will follow up in 6 weeks.   40 minutes spent in conjunction with this visit with > 50% of time spent counseling and coordinating care of following issues: adherence to airway clearance regimen and lung treatments, status of pancreatic insufficiency and history of malnutrition including counseling on use of tube feeds, control of sinus disease, control of GERD, evaluation of skin lesion on chest, family planning, impaired glucose intolerance.    Rocael Miller

## 2017-04-03 NOTE — MR AVS SNAPSHOT
After Visit Summary   4/3/2017    Telly Leal    MRN: [id number]           Patient Information     Date Of Birth          1980        Visit Information        Provider Department      4/3/2017 2:00 PM Nelida Gordillo RPH [facility]        Today's Diagnoses     CF (cystic fibrosis) (H)    -  1      Care Instructions    Continue current medications.        Follow-ups after your visit        Your next 10 appointments already scheduled     May 09, 2017  1:30 PM CDT   PFT VISIT with EVAN PFL B   [facility] Pulmonary Function Testing ([facility])    [address] [place] [id number] [phone]            May 09, 2017  2:20 PM CDT   (Arrive by 2:05 PM)   RETURN CYSTIC FIBROSIS VISIT with Rocael Miller MD   [facility] ([facility])    [address] [place] [id number] [phone]              Future tests that were ordered for you today     Open Future Orders        Priority Expected Expires Ordered    RESPIRATORY FLOW VOLUME LOOP Routine 5/3/2017 9/30/2017 4/3/2017            Who to contact     If you have questions or need follow up information about today's clinic visit or your schedule please contact [facility] CYSTIC FIBROSIS [facility] ADULT CLINIC directly at [phone].  Normal or non-critical lab and imaging results will be communicated to you by MyChart, letter or phone within 4 business days after the clinic has received the results. If you do not hear from us within 7 days, please contact the clinic through MyChart or phone. If you have a critical or abnormal lab result, we will notify you by phone as soon as possible.  Submit refill requests through [employer] or call your pharmacy and they will forward the refill request to us. Please allow 3 business days for your refill to be completed.           Additional Information About Your Visit        "[employer]hart Information     [employer] gives you secure access to your electronic health record. If you see a primary care provider, you can also send messages to your care team and make appointments. If you have questions, please call your primary care clinic.  If you do not have a primary care provider, please call [phone] and they will assist you.        Care EveryWhere ID     This is your Care EveryWhere ID. This could be used by other organizations to access your [place] medical records  [id number]         Blood Pressure from Last 3 Encounters:   04/03/17 125/85   01/23/17 106/74   12/16/16 130/72    Weight from Last 3 Encounters:   04/03/17 208 lb 8.9 oz (94.6 kg)   01/23/17 205 lb 4 oz (93.1 kg)   12/16/16 200 lb (90.7 kg)              Today, you had the following     No orders found for display       Primary Care Provider Office Phone # Fax #    Adriel Corrigan [phone]       [facility] [address] [place] [id number]        Thank you!     Thank you for choosing [facility]ST[facility]IBROSIS [facility]  for your care. Our goal is always to provide you with excellent care. Hearing back from our patients is one way we can continue to improve our services. Please take a few minutes to complete the written survey that you may receive in the mail after your visit with us. Thank you!             Your Updated Medication List - Protect others around you: Learn how to safely use, store and throw away your medicines at www.disposemymeds.org.          This list is accurate as of: 4/3/17  4:17 PM.  Always use your most recent med list.                   Brand Name Dispense Instructions for use    acetylcysteine 20 % nebulizer solution    MUCOMYST    180 mL    Inhale 2 mLs into the lungs 3 times daily       * albuterol 108 (90 BASE) MCG/ACT Inhaler    albuterol    1 Inhaler    Inhale 1-2 puffs into the  lungs every 6 hours as needed for shortness of breath / dyspnea Every 4-6 hours as needed       * albuterol (2.5 MG/3ML) 0.083% neb solution     360 mL    Take 1 vial (2.5 mg) by nebulization 3 times daily       azithromycin 250 MG tablet    ZITHROMAX    30 tablet    Take 1 tablet (250 mg) by mouth daily       aztreonam 200 MG/ML    AZACTAM    56 each    Take 5 mLs (1 g) by nebulization 2 times daily       blood glucose monitoring test strip    no brand specified    100 strip    Use to test blood sugars 4-6 times daily or as directed.       Cholecalciferol 4000 UNITS Caps     90 capsule    Take 1 capsule by mouth daily       CREON 35745 UNITS Cpep per EC capsule   Generic drug:  amylase-lipase-protease     1000 capsule    Take 5-6 capsules (120,000-144,000 Units) by mouth See Admin Instructions 5-6 with meals, 1-5 with snacks,7-8 with tube feedings       cromolyn 20 MG/2ML neb solution    INTAL    180 mL    Take 2 mLs (20 mg) by nebulization 3 times daily 1 vial three times daily       fluticasone 50 MCG/ACT spray    FLONASE    1 Package    Spray 2 sprays into both nostrils as needed       MEPHYTON 5 MG tablet   Generic drug:  phytonadione     12 tablet    TAKE 1 TABLET (5 MG) BY MOUTH 3 TIMES A WEEK       MULTIVITAMINS PO      Take 1 tablet by mouth daily.       NUTREN 2.0      Take 4-5 Cans by mouth. 168/ml/hr 7 days/week       oseltamivir 75 MG capsule    TAMIFLU    10 capsule    Take 1 capsule (75 mg) by mouth 2 times daily For 5 days       ranitidine 150 MG tablet    ZANTAC    180 tablet    Take 1 tablet (150 mg) by mouth 2 times daily       tobramycin (PF) 300 MG/5ML neb solution    SOO    300 mL    Take 5 mLs (300 mg) by nebulization 2 times daily       water for injection sterile Soln     250 mL    Inject 4 mLs as directed 2 times daily       * Notice:  This list has 2 medication(s) that are the same as other medications prescribed for you. Read the directions carefully, and ask your doctor or other care  provider to review them with you.

## 2017-04-03 NOTE — LETTER
"4/3/2017       RE: Telly Leal  [address] [address]     Dear Colleague,    Thank you for referring your patient, Telly Leal, to the [facility] at [facility]. Please see a copy of my visit note below.    CF Annual Nutrition Assessment    Reason for Assessment  Assessed during [facility] r/t increased nutrition risk with diagnosis of CF per protocol    Nutrition Significant PMH  Mild Lung Disease   Pancreatic Insufficient  G-tube for nutrition support  Impaired Glucose Tolerance    Social Assessment  Living situation: with wife  Work/School/Disability: full-time for [place] [employer]  Food Insecurity:  None    Anthropometric Assessment  Height: 189.5 cm  IBW based on BMI 22 for females and 23 for males per CF Foundation recs: 82.8 kg  Today's Weight: 94.6 kg (actual weight)   %IBW: 114%  BMI: Body mass index is  Body mass index is 26.35 kg/(m^2).   Current weight is considered: Current weight is considered \"overweight\" per general BMI standards, however above goal and at a good weight for CF.    Weight ranges 200-210 lbs. Pt states he tends to gain more weight during the winter and loses during the summer with increased activity.     Wt Readings from Last 5 Encounters:   04/03/17 94.6 kg (208 lb 8.9 oz)   01/23/17 93.1 kg (205 lb 4 oz)   12/16/16 90.7 kg (200 lb)   10/18/16 91.5 kg (201 lb 11.5 oz)   08/16/16 91.2 kg (201 lb 1 oz)     Physical Activity/Exercise: during summer plans to increase walking, golfing, and some running    MALNUTRITION  % Intake:  No decreased intake noted  % Weight Loss:  None noted  Subcutaneous Fat Loss:  None observed  Muscle Loss:  None observed  Handgrip Strength:  Not Applicable  Fluid Accumulation/Edema:  None noted  Malnutrition Diagnosis: Patient does not meet two of the above criteria necessary for diagnosing malnutrition     Pancreatic Enzymes  Brand:  Creon " 24561  Dosin with meals, 1-5 with snacks + 11 w/ tube feeding (2 cans)  Are you taking enzymes as recommended:Yes     High dose providing 2021 units lipase/kg/meal which is within the recommended range per CF Foundation to inhibit fibrosing colonopathy    Signs of Malabsorption:  No  Enzyme Program:  None    Diet History and Assessment  Diet Preferences/Allergies/Intolerances: Regular diet  Appetite Stimulant Rx:  No  Intake Recall/Comments:  Eating 2-3 meals/day + snacks. Appetite is fair but eats consistently. Does not think he would be able to eat enough to maintain weight without use of TF. Due to impaired glucose tolerance, pt makes greater effort to combine simple sugars and soda with meals and to balance CHO intake throughout the day.     B: skips or small snack (fruit or muffin)  L: peanut butter and jelly sandwich, chips, cookie  D: protein with starch and vegetable  Eats out 3-4x/week, such as [employer], Noodles, or [employer]    Calcium: gallon whole milk/week + yogurt, string cheese  Salt: likely adequate from foods  Hydration: Water, Gatorade, whole milk; drinks Regular Coke with meals    Supplements:  No    Enteral Nutrition  Type of Feeding Tube: G-tube (button)  Formula: Nutren 2.0  Rate/Frequency: 135 ml/hr x 2 cans nightly (~4 hrs infusion time)  Nutrition: 500 mls, 1000 kcals, 42 g protein, 46 g fat, 108 g CHO  Enzymes: taking 11 caps before infusion, providing 5739 units lipase/gm fat in feeds which is above recommended ranges with decrease in TF volume.     Pt has been working on titrating TF volume and increasing oral intake. Since previous RD visit, pt tried infusing 1 can overnight for 1-2 weeks but lately has increased back to 2 cans.     Estimated Energy and Protein Needs  Estimation based on weight maintenance with Mild lung disease and pancreatic insufficient.    BEE: 2100   1841-9922 kcals/day =  175-200% BEE  140-185 g protein/day = 1.5-2 g/kg    Laboratory Assessment    Vitamin  A   Date Value Ref Range Status   12/16/2016 0.54  Final     Comment:     Reference range: 0.30 to 1.20  Unit: mg/L       Vitamin D Deficiency screening   Date Value Ref Range Status   12/16/2016 35 20 - 75 ug/L Final     Comment:     Season, race, dietary intake, and treatment affect the concentration of   25-hydroxy-Vitamin D. Values may decrease during winter months and increase   during summer months. Values 20-29 ug/L may indicate Vitamin D insufficiency   and values <20 ug/L may indicate Vitamin D deficiency.   Vitamin D determination is routinely performed by an immunoassay specific for   25 hydroxyvitamin D3.  If an individual is on vitamin D2 (ergocalciferol)   supplementation, please specify 25 OH vitamin D2 and D3 level determination   by   LCMSMS test VITD23.       Vitamin E   Date Value Ref Range Status   12/16/2016 8.2  Final     Comment:     Reference range: 5.5 to 18.0  Unit: mg/L  (Note)  Test developed and characteristics determined by [employer]. See Compliance Statement B: [email or web address]       Iron   Date Value Ref Range Status   12/16/2016 117 35 - 180 ug/dL Final     Cholesterol   Date Value Ref Range Status   12/16/2016 145 <200 mg/dL Final     Triglycerides   Date Value Ref Range Status   12/16/2016 110 <150 mg/dL Final     HDL Cholesterol   Date Value Ref Range Status   12/16/2016 34 (L) >39 mg/dL Final     LDL Cholesterol Calculated   Date Value Ref Range Status   12/16/2016 89 <100 mg/dL Final     Comment:     Desirable:       <100 mg/dl     VLDL-Cholesterol   Date Value Ref Range Status   12/23/2014 33 (H) 0 - 30 mg/dL Final     Cholesterol/HDL Ratio   Date Value Ref Range Status   12/23/2014 3.9 0.0 - 5.0 Final     Current Vitamin/Mineral Prescription R/T deficiency/malabsorption: Multivitamin and Vitamin D 2000 units, Vitamin E 400 units  Comments:  Variable vitamin/mineral intake, typically 50% of the time    FOLLOW-UP FROM RECENT VISITS  See intake section above    NUTRITION  DIAGNOSIS  Impaired nutrient utilization r/t CF pancreatic insufficiency and  CF hypermetabolism as evidenced by pt requires enzyme replacement therapy, enteral nutrition support, and high kcal/protein diet to maintain nutrition and health status.   INTERVENTIONS/RECOMMENDATIONS  1) Reviewed weight trends, PO, and TF use. Discussed ability to maintain weight with titration of TF and encouraged pt to continue experimenting further and either decrease to just 1 can per night or infuse 2 cans for 3-4 nights/week and allow for some days off. Encouraged pt to monitor changes in oral intake and appetite with decreasing TF. Pt voiced concerns that during the summer he typically does not eat as much and becomes more active, tends to lose weight. Will continue to monitor TF infusion/intake for ability to further adjust regimen.   2) Provided information about [facility] enzyme program and benefits including co-pay assistance, nutrition supplements, and CF-specific MVIs. Educated pt how to use the program and enroll online. Encouraged pt to contact RD if any questions/concerns with using the program.     Patient Understanding: Good  Expected Compliance: Good  Follow-Up Plans: Per protocol    GOALS:  1) Maintain BMI 23-26 kg/m2  2) Take enzymes and vitamins as prescribed  3) Increase PO intake with decrease in TF    FOLLOW-UP/MONITORING:  Visit patient within 6-12 month(s) or sooner to check-in TF decrease during summer, changes in weight/PO    Time Spent In Face-to-Face Patient Interactions: 15 minutes    Leona Marie RD, LD  Pager [phone]      Reason for Visit  Telly Leal is a 35 year old year old male who is being seen for Cystic Fibrosis (Follow up on Yobany and his CF)    CF HPI  The patient was seen and examined by Rocael Miller   Patient has felt near baseline from resp standpoint since last visit in January. Had a 4-day stretch where he cut back vest to daily a month ago but otherwise consistent  bid vest. Had a URI a month ago as well but resolved quickly. Currently frequency of cough and volume of sputum at baseline with usual yellow color. No hemoptysis or chest pain. Not getting to gym to workout but waking dog and reports baseline exercise tolerance. Using cromolyn most days. Currently on aztreonam reconstituted.     Current Outpatient Prescriptions   Medication     tobramycin, PF, (SOO) 300 MG/5ML neb solution     blood glucose monitoring (NO BRAND SPECIFIED) test strip     CREON 49179 UNITS CPEP     albuterol (ALBUTEROL) 108 (90 BASE) MCG/ACT inhaler     albuterol (2.5 MG/3ML) 0.083% nebulizer solution     azithromycin (ZITHROMAX) 250 MG tablet     acetylcysteine (MUCOMYST) 20 % nebulizer solution     MEPHYTON 5 MG tablet     aztreonam (AZACTAM) 200 MG/ML     water for injection sterile SOLN     cromolyn (INTAL) 20 MG/2ML nebulizer solution     ranitidine (ZANTAC) 150 MG tablet     Cholecalciferol 4000 UNITS CAPS     oseltamivir (TAMIFLU) 75 MG capsule     Multiple Vitamin (MULTIVITAMINS PO)     Nutritional Supplements (NUTREN 2.0) LIQD     fluticasone (FLONASE) 50 MCG/ACT nasal spray     No current facility-administered medications for this visit.      No Known Allergies  Past Medical History:   Diagnosis Date     Chronic sinusitis      Cystic fibrosis with pulmonary manifestations (H)      Exocrine pancreatic insufficiency      GERD (gastroesophageal reflux disease)      Hemoptysis      Malnutrition (H)        Past Surgical History:   Procedure Laterality Date     LOBECTOMY      right upper lobe     TUBES         Social History     Social History     Marital status: Single     Spouse name: N/A     Number of children: N/A     Years of education: N/A     Occupational History      [employer]     Social History Main Topics     Smoking status: Never Smoker     Smokeless tobacco: Never Used     Alcohol use 0.0 oz/week     0 Standard drinks or equivalent per week     Drug use: Not on  "file     Sexual activity: Not on file     Other Topics Concern     Not on file     Social History Narrative    Patient works for the [employer] as an .  He does not have stable living conditions at this time.  He has a Amharic guillen.   Update: [place] [facility].    ROS Pulmonary  Const: no fever, chills, fatigue. ENT: no sinus pain or drainage. GI: no diarrhea, constipation, grease in stools, or gerd sx with h2 blocker. Using 2 cans TF nightly.  Used 1 can for a 2-week period since last visit. Musc: intermittent sore neck.  A complete ROS was otherwise negative except as noted in the HPI.  /85  Pulse 101  Temp 97.8  F (36.6  C) (Oral)  Resp 16  Ht 1.895 m (6' 2.6\")  Wt 94.6 kg (208 lb 8.9 oz)  SpO2 94%  BMI 26.35 kg/m2  Exam:   GENERAL APPEARANCE: Well developed, well nourished, alert, and in no apparent distress.  EYES: anicteric  HENT: Nasal mucosa with no edema and no hyperemia. No nasal polyps.  EARS: Canals clear, TMs with normal landmarks and light reflex on right, occluded by cerumen on left.  MOUTH: Oral mucosa is moist, without any lesions, no tonsillar enlargement, no oropharyngeal exudate.  NECK: supple, no masses, no thyromegaly.  LYMPHATICS: No significant  cervical, or supraclavicular nodes.  RESP:  good air flow throughout except right base as before.  Clear in all lung fields. Normal chest wall excursion.  CV: Normal S1, S2, regular rhythm, normal rate. No murmur.  No rub. No gallop. No LE edema.   ABDOMEN:  Bowel sounds normal, soft, nontender, no HSM or masses.   MS: extremities normal. No cyanosis.  SKIN:  PEG site without erythema or discharge. Chest scar-like lesion upper sternum unchanged. Diffuse freckles.   NEURO: Mentation intact, speech normal, normal gait and stance  PSYCH: mentation appears normal. and affect normal/bright  Results:  Recent Results (from the past 168 hour(s))   General PFT Lab (Please always keep checked)    Collection " Time: 04/03/17  1:26 PM   Result Value Ref Range    FVC-Pred 6.26 L    FVC-Pre 6.13 L    FVC-%Pred-Pre 97 %    FEV1-Pre 3.16 L    FEV1-%Pred-Pre 62 %    FEV1FVC-Pred 81 %    FEV1FVC-Pre 51 %    FEFMax-Pred 11.34 L/sec    FEFMax-Pre 9.21 L/sec    FEFMax-%Pred-Pre 81 %    FEF2575-Pred 4.85 L/sec    FEF2575-Pre 1.11 L/sec    SZF1418-%Pred-Pre 22 %    ExpTime-Pre 12.06 sec    FIFMax-Pre 10.40 L/sec    FEV1FEV6-Pred 83 %    FEV1FEV6-Pre 57 %     Spirometry interpretation: personally reviewed. Valid maneuver. Moderate obstruction. FEV1 down over 5% over past couple visits. Slightly below baseline.   Sputum last visit: PA x 2    Assessment and plan:  1. Mild exacerbation of CF lung disease: PFTs down again slightly but still no overt symptoms to indicate a more severe exacerbation. Will defer abx in favor of close follow up and ongoing aggressive airway clearance. Will see if he can escalate frequency and intensity of exercise. Advised him to call cf office if any worsening of his sx in which case will start oral abx. Continue alternating alice and reconstituted aztreonam nebs along with chronic azithro.   2. Impaired glucose tolerance: 2 hour glucose 12/16 160 on GTT and A1C higher than previous at 6.2. Leona, nutrition, consulted and will review blood sugar monitoring. Previouisly reviewed cutting out simple sugars in diet. A lot of inertia with pursuing insulin a couple years ago when glucose intolerance also an issue.    3.  Pancreatic insufficiency with a history of malnutrition: Adequate enzyme replacement by history. Ongoing concern excess calories could be contributing to glucose intolerance.  BMI up further to 26.3 but patient remains reticent to cut back TF until after assured not dropping over summer months which had been an in issue again. Will plan on cutting TF if weigh up further today.  4. CF sinus disease: at baseline not needing nasal steroid.  5. GERD: Infrequent symptoms. Adequate control with  ranitidine.   6. Low vitamin D level: On replacement.  7. Skin lesion: looks like old scar without clear hx of trauma. Patient has not scheduled derm eval - also helpful to get derm input on need for annual skin exams given large numerous freckles. Reminded him to consider this again today.    8. Healthcare maintenance: Annuals due December, 2017.   9. Family planning: he and wife, Bre, are expecting in late August.  Patient will follow up in 6 weeks.   40 minutes spent in conjunction with this visit with > 50% of time spent counseling and coordinating care of following issues: adherence to airway clearance regimen and lung treatments, status of pancreatic insufficiency and history of malnutrition including counseling on use of tube feeds, control of sinus disease, control of GERD, evaluation of skin lesion on chest, family planning, impaired glucose intolerance.          Again, thank you for allowing me to participate in the care of your patient.      Sincerely,    Rocael Miller MD

## 2017-04-03 NOTE — MR AVS SNAPSHOT
After Visit Summary   4/3/2017    Telly Leal    MRN: [id number]           Patient Information     Date Of Birth          1980        Visit Information        Provider Department      4/3/2017 2:20 PM Rocael Miller MD [facility]        Today's Diagnoses     Cystic fibrosis with pulmonary manifestations (H)    -  1       Follow-ups after your visit        Follow-up notes from your care team     Return in about 6 weeks (around 5/15/2017).      Your next 10 appointments already scheduled     May 09, 2017  1:30 PM CDT   PFT VISIT with  PFL B   [facility] ([facility])    [address] [place] [address] [id number] [phone]            May 09, 2017  2:20 PM CDT   (Arrive by 2:05 PM)   RETURN CYSTIC FIBROSIS VISIT with Rocael Miller MD   [facility] ([facility])    [address] [place] [id number] [phone]              Future tests that were ordered for you today     Open Future Orders        Priority Expected Expires Ordered    RESPIRATORY FLOW VOLUME LOOP Routine 5/3/2017 9/30/2017 4/3/2017            Who to contact     If you have questions or need follow up information about today's clinic visit or your schedule please contact [facility] directly at [phone].  Normal or non-critical lab and imaging results will be communicated to you by MyChart, letter or phone within 4 business days after the clinic has received the results. If you do not hear from us within 7 days, please contact the clinic through MyChart or phone. If you have a critical or abnormal lab result, we will notify you by phone as soon as possible.  Submit refill requests through [employer] or call your pharmacy and they will forward the refill request to us. Please allow 3 business days for  "your refill to be completed.          Additional Information About Your Visit        [email or web address] Information     [employer] gives you secure access to your electronic health record. If you see a primary care provider, you can also send messages to your care team and make appointments. If you have questions, please call your primary care clinic.  If you do not have a primary care provider, please call [phone] and they will assist you.        Care EveryWhere ID     This is your Care EveryWhere ID. This could be used by other organizations to access your [facility] medical records  [id number]        Your Vitals Were     Pulse Temperature Respirations Height Pulse Oximetry BMI (Body Mass Index)    101 97.8  F (36.6  C) (Oral) 16 1.895 m (6' 2.6\") 94% 26.35 kg/m2       Blood Pressure from Last 3 Encounters:   04/03/17 125/85   01/23/17 106/74   12/16/16 130/72    Weight from Last 3 Encounters:   04/03/17 94.6 kg (208 lb 8.9 oz)   01/23/17 93.1 kg (205 lb 4 oz)   12/16/16 90.7 kg (200 lb)               Primary Care Provider Office Phone # Fax #    Adriel Corrigan [phone]       [facility] [address] [place] [phone]        Thank you!     Thank you for choosing [facility]  for your care. Our goal is always to provide you with excellent care. Hearing back from our patients is one way we can continue to improve our services. Please take a few minutes to complete the written survey that you may receive in the mail after your visit with us. Thank you!             Your Updated Medication List - Protect others around you: Learn how to safely use, store and throw away your medicines at www.disposemymeds.org.          This list is accurate as of: 4/3/17  3:13 PM.  Always use your most recent med list.                   Brand Name Dispense Instructions for use    acetylcysteine 20 % nebulizer solution    MUCOMYST    180 mL    Inhale 2 mLs into the lungs " 3 times daily       * albuterol 108 (90 BASE) MCG/ACT Inhaler    albuterol    1 Inhaler    Inhale 1-2 puffs into the lungs every 6 hours as needed for shortness of breath / dyspnea Every 4-6 hours as needed       * albuterol (2.5 MG/3ML) 0.083% neb solution     360 mL    Take 1 vial (2.5 mg) by nebulization 3 times daily       azithromycin 250 MG tablet    ZITHROMAX    30 tablet    Take 1 tablet (250 mg) by mouth daily       aztreonam 200 MG/ML    AZACTAM    56 each    Take 5 mLs (1 g) by nebulization 2 times daily       blood glucose monitoring test strip    no brand specified    100 strip    Use to test blood sugars 4-6 times daily or as directed.       Cholecalciferol 4000 UNITS Caps     90 capsule    Take 1 capsule by mouth daily       CREON 63726 UNITS Cpep per EC capsule   Generic drug:  amylase-lipase-protease     1000 capsule    Take 5-6 capsules (120,000-144,000 Units) by mouth See Admin Instructions 5-6 with meals, 1-5 with snacks,7-8 with tube feedings       cromolyn 20 MG/2ML neb solution    INTAL    180 mL    Take 2 mLs (20 mg) by nebulization 3 times daily 1 vial three times daily       fluticasone 50 MCG/ACT spray    FLONASE    1 Package    Spray 2 sprays into both nostrils as needed       MEPHYTON 5 MG tablet   Generic drug:  phytonadione     12 tablet    TAKE 1 TABLET (5 MG) BY MOUTH 3 TIMES A WEEK       MULTIVITAMINS PO      Take 1 tablet by mouth daily.       NUTREN 2.0      Take 4-5 Cans by mouth. 168/ml/hr 7 days/week       oseltamivir 75 MG capsule    TAMIFLU    10 capsule    Take 1 capsule (75 mg) by mouth 2 times daily For 5 days       ranitidine 150 MG tablet    ZANTAC    180 tablet    Take 1 tablet (150 mg) by mouth 2 times daily       tobramycin (PF) 300 MG/5ML neb solution    SOO    300 mL    Take 5 mLs (300 mg) by nebulization 2 times daily       water for injection sterile Soln     250 mL    Inject 4 mLs as directed 2 times daily       * Notice:  This list has 2 medication(s) that are  the same as other medications prescribed for you. Read the directions carefully, and ask your doctor or other care provider to review them with you.

## 2017-04-03 NOTE — PROGRESS NOTES
SUBJECTIVE/OBJECTIVE:                                                    Telly Leal is a 36 year old male coming in for an initial visit for Medication Therapy Management.  He was referred to me from Dr. Miller.     Chief Complaint: routine clinic follow-up    Allergies/ADRs: Reviewed in Epic  Tobacco: No tobacco use   Alcohol: not currently using  PMH: Reviewed in Epic    Medication Adherence: no issues reported.  Patient uses [facility] for his tobramycin nebs and [facility] for all his other mediations.  Denies any barriers to medication access at this time.    CF:    Chronic inhaled medications include: albuterol, acetylcysteine, and cromolyn nebs BID; alternates tobramycin and aztreonam every other month, currently on aztreonam (prefers injectable aztreonam over [place]).  He has used fluticasone nasal spray in the past for sinusitis but is not currently using  Chronic oral medications include: azithromycin, mephyton (3 times weekly), ranitidine, mutivitamin, cholecalciferol, Creon  Pulmonary symptoms are stable  PFTs are decreased, FEV1 trending slightly down but symptomatically stable  Cultures: Sputum cultures positive for Pseudomonas  Exacerbation status: no exacerbation      Current labs include:  BP Readings from Last 3 Encounters:   04/03/17 125/85   01/23/17 106/74   12/16/16 130/72     Today's Vitals: There were no vitals taken for this visit.  See provider note for vitals  Lab Results   Component Value Date    A1C 6.2 12/16/2016   .  Lab Results   Component Value Date    CHOL 145 12/16/2016     Lab Results   Component Value Date    TRIG 110 12/16/2016     Lab Results   Component Value Date    HDL 34 12/16/2016     Lab Results   Component Value Date    LDL 89 12/16/2016       Liver Function Studies -   Recent Labs   Lab Test  12/16/16   0808   01/14/11   1006   PROTTOTAL  7.7   < >  7.8   ALBUMIN  3.9   < >  4.1   BILITOTAL   --    --   1.2   ALKPHOS  84   <  >  74   AST  32   < >  37   ALT   --    --   46    < > = values in this interval not displayed.       Lab Results   Component Value Date    UCRR 175 12/16/2016    MICROL 14 12/16/2016    UMALCR 8.06 12/16/2016       Last Basic Metabolic Panel:  Lab Results   Component Value Date     12/16/2016      Lab Results   Component Value Date    POTASSIUM 3.7 12/16/2016     Lab Results   Component Value Date    CHLORIDE 107 12/16/2016     Lab Results   Component Value Date    BUN 18 12/16/2016     Lab Results   Component Value Date    CR 0.92 12/16/2016     GFR Estimate   Date Value Ref Range Status   12/16/2016 >90  Non  GFR Calc   >60 mL/min/1.7m2 Final   12/11/2015 >90  Non  GFR Calc   >60 mL/min/1.7m2 Final   12/23/2014 87 >60 mL/min/1.7m2 Final     Comment:     Non  GFR Calc     GFR Estimate If Black   Date Value Ref Range Status   12/16/2016 >90   GFR Calc   >60 mL/min/1.7m2 Final   12/11/2015 >90   GFR Calc   >60 mL/min/1.7m2 Final   12/23/2014 >90   GFR Calc   >60 mL/min/1.7m2 Final     TSH   Date Value Ref Range Status   10/26/2009 3.10 0.4 - 5.0 mU/L Final   ]    Most Recent Immunizations   Administered Date(s) Administered     Influenza (IIV3) 10/26/2012     TDAP Vaccine (Boostrix) 10/26/2012     ASSESSMENT:                                                       Current medications were reviewed today.     Medication Adherence: no issues identified    CF: Stable. Patient would benefit from no medication changes at this time.  Patient advised to increase airway clearance given slight downward recent trend in PFTs.      PLAN:                                                      Continue current medications, no changes recommended at this time.    I spent 15 minutes with this patient today. A copy of the visit note was provided to the patient's primary care provider.    Will follow up in 1 year or sooner if  needed    The patient declined a summary of these recommendations as an after visit summary.     Cecilia Gordillo, PharmD  CF Medication Therapy Management Pharmacist  [phone]  [email or web address]

## 2017-04-03 NOTE — NURSING NOTE
Chief Complaint   Patient presents with     Cystic Fibrosis     Follow up on Yobany and his CF     Alex Iglesias CMA at 1:45 PM on 4/3/2017

## 2017-04-04 NOTE — PROGRESS NOTES
"CF Annual Nutrition Assessment    Reason for Assessment  Assessed during [facility]. [facility] r/t increased nutrition risk with diagnosis of CF per protocol    Nutrition Significant PMH  Mild Lung Disease   Pancreatic Insufficient  G-tube for nutrition support  Impaired Glucose Tolerance    Social Assessment  Living situation: with wife  Work/School/Disability: full-time for [place] [employer]  Food Insecurity:  None    Anthropometric Assessment  Height: 189.5 cm  IBW based on BMI 22 for females and 23 for males per CF Foundation recs: 82.8 kg  Today's Weight: 94.6 kg (actual weight)   %IBW: 114%  BMI: Body mass index is  Body mass index is 26.35 kg/(m^2).   Current weight is considered: Current weight is considered \"overweight\" per general BMI standards, however above goal and at a good weight for CF.    Weight ranges 200-210 lbs. Pt states he tends to gain more weight during the winter and loses during the summer with increased activity.     Wt Readings from Last 5 Encounters:   17 94.6 kg (208 lb 8.9 oz)   17 93.1 kg (205 lb 4 oz)   16 90.7 kg (200 lb)   10/18/16 91.5 kg (201 lb 11.5 oz)   16 91.2 kg (201 lb 1 oz)     Physical Activity/Exercise: during summer plans to increase walking, golfing, and some running    MALNUTRITION  % Intake:  No decreased intake noted  % Weight Loss:  None noted  Subcutaneous Fat Loss:  None observed  Muscle Loss:  None observed  Handgrip Strength:  Not Applicable  Fluid Accumulation/Edema:  None noted  Malnutrition Diagnosis: Patient does not meet two of the above criteria necessary for diagnosing malnutrition     Pancreatic Enzymes  Brand:  Creon [id number]  Dosin with meals, 1-5 with snacks + 11 w/ tube feeding (2 cans)  Are you taking enzymes as recommended:Yes     High dose providing 2021 units lipase/kg/meal which is within the recommended range per CF Foundation to inhibit fibrosing colonopathy    Signs of Malabsorption:  No  Enzyme Program:  " None    Diet History and Assessment  Diet Preferences/Allergies/Intolerances: Regular diet  Appetite Stimulant Rx:  No  Intake Recall/Comments:  Eating 2-3 meals/day + snacks. Appetite is fair but eats consistently. Does not think he would be able to eat enough to maintain weight without use of TF. Due to impaired glucose tolerance, pt makes greater effort to combine simple sugars and soda with meals and to balance CHO intake throughout the day.     B: skips or small snack (fruit or muffin)  L: peanut butter and jelly sandwich, chips, cookie  D: protein with starch and vegetable  Eats out 3-4x/week, such as [employer], Noodles, or [employer]    Calcium: gallon whole milk/week + yogurt, string cheese  Salt: likely adequate from foods  Hydration: Water, Gatorade, whole milk; drinks Regular Coke with meals    Supplements:  No    Enteral Nutrition  Type of Feeding Tube: G-tube (button)  Formula: Nutren 2.0  Rate/Frequency: 135 ml/hr x 2 cans nightly (~4 hrs infusion time)  Nutrition: 500 mls, 1000 kcals, 42 g protein, 46 g fat, 108 g CHO  Enzymes: taking 11 caps before infusion, providing 5739 units lipase/gm fat in feeds which is above recommended ranges with decrease in TF volume.     Pt has been working on titrating TF volume and increasing oral intake. Since previous RD visit, pt tried infusing 1 can overnight for 1-2 weeks but lately has increased back to 2 cans.     Estimated Energy and Protein Needs  Estimation based on weight maintenance with Mild lung disease and pancreatic insufficient.    BEE: 2100   7484-4598 kcals/day =  175-200% BEE  140-185 g protein/day = 1.5-2 g/kg    Laboratory Assessment    Vitamin A   Date Value Ref Range Status   12/16/2016 0.54  Final     Comment:     Reference range: 0.30 to 1.20  Unit: mg/L       Vitamin D Deficiency screening   Date Value Ref Range Status   12/16/2016 35 20 - 75 ug/L Final     Comment:     Season, race, dietary intake, and treatment affect the concentration  of   25-hydroxy-Vitamin D. Values may decrease during winter months and increase   during summer months. Values 20-29 ug/L may indicate Vitamin D insufficiency   and values <20 ug/L may indicate Vitamin D deficiency.   Vitamin D determination is routinely performed by an immunoassay specific for   25 hydroxyvitamin D3.  If an individual is on vitamin D2 (ergocalciferol)   supplementation, please specify 25 OH vitamin D2 and D3 level determination   by   LCMSMS test VITD23.       Vitamin E   Date Value Ref Range Status   12/16/2016 8.2  Final     Comment:     Reference range: 5.5 to 18.0  Unit: mg/L  (Note)  Test developed and characteristics determined by [employer]. See Compliance Statement B: [employer]/CS       Iron   Date Value Ref Range Status   12/16/2016 117 35 - 180 ug/dL Final     Cholesterol   Date Value Ref Range Status   12/16/2016 145 <200 mg/dL Final     Triglycerides   Date Value Ref Range Status   12/16/2016 110 <150 mg/dL Final     HDL Cholesterol   Date Value Ref Range Status   12/16/2016 34 (L) >39 mg/dL Final     LDL Cholesterol Calculated   Date Value Ref Range Status   12/16/2016 89 <100 mg/dL Final     Comment:     Desirable:       <100 mg/dl     VLDL-Cholesterol   Date Value Ref Range Status   12/23/2014 33 (H) 0 - 30 mg/dL Final     Cholesterol/HDL Ratio   Date Value Ref Range Status   12/23/2014 3.9 0.0 - 5.0 Final     Current Vitamin/Mineral Prescription R/T deficiency/malabsorption: Multivitamin and Vitamin D 2000 units, Vitamin E 400 units  Comments:  Variable vitamin/mineral intake, typically 50% of the time    FOLLOW-UP FROM RECENT VISITS  See intake section above    NUTRITION DIAGNOSIS  Impaired nutrient utilization r/t CF pancreatic insufficiency and  CF hypermetabolism as evidenced by pt requires enzyme replacement therapy, enteral nutrition support, and high kcal/protein diet to maintain nutrition and health status.   INTERVENTIONS/RECOMMENDATIONS  1) Reviewed weight  trends, PO, and TF use. Discussed ability to maintain weight with titration of TF and encouraged pt to continue experimenting further and either decrease to just 1 can per night or infuse 2 cans for 3-4 nights/week and allow for some days off. Encouraged pt to monitor changes in oral intake and appetite with decreasing TF. Pt voiced concerns that during the summer he typically does not eat as much and becomes more active, tends to lose weight. Will continue to monitor TF infusion/intake for ability to further adjust regimen.   2) Provided information about [facility] Forward enzyme program and benefits including co-pay assistance, nutrition supplements, and CF-specific MVIs. Educated pt how to use the program and enroll online. Encouraged pt to contact RD if any questions/concerns with using the program.     Patient Understanding: Good  Expected Compliance: Good  Follow-Up Plans: Per protocol    GOALS:  1) Maintain BMI 23-26 kg/m2  2) Take enzymes and vitamins as prescribed  3) Increase PO intake with decrease in TF    FOLLOW-UP/MONITORING:  Visit patient within 6-12 month(s) or sooner to check-in TF decrease during summer, changes in weight/PO    Time Spent In Face-to-Face Patient Interactions: 15 minutes    Leona Marie RD, LD  Pager [phone]

## 2017-05-16 DIAGNOSIS — E84.0 CYSTIC FIBROSIS WITH PULMONARY MANIFESTATIONS (H): ICD-10-CM

## 2017-05-16 RX ORDER — CROMOLYN SODIUM 20 MG/2ML
20 INHALANT INTRABRONCHIAL 3 TIMES DAILY
Qty: 180 ML | Refills: 12 | Status: SHIPPED | OUTPATIENT
Start: 2017-05-16 | End: 2017-11-13

## 2017-08-27 ASSESSMENT — ACTIVITIES OF DAILY LIVING (ADL)
ARE_THERE_FIREARMS_IN_YOUR_HOME?: Y
ARE_THERE_SMOKE_DETECTORS_IN_YOUR_HOME?: Y
DO_MEMBERS_OF_YOUR_HOUSEHOLD_WEAR_SEAT_BELTS?: Y
DO_MEMBERS_OF_YOUR_HOUSEHOLD_USE_SUNSCREEN?: Y
DO_MEMBERS_OF_YOUR_HOUSEHOLD_USE_SAFETY_HELMETS?: Y
ARE_THERE_CARBON_MONOXIDE_DETECTORS_IN_YOUR_HOME?: Y

## 2017-08-28 ENCOUNTER — OFFICE VISIT (OUTPATIENT)
Dept: PULMONOLOGY | Facility: CLINIC | Age: 37
End: 2017-08-28
Attending: INTERNAL MEDICINE
Payer: COMMERCIAL

## 2017-08-28 VITALS
HEART RATE: 94 BPM | RESPIRATION RATE: 18 BRPM | SYSTOLIC BLOOD PRESSURE: 137 MMHG | BODY MASS INDEX: 25.07 KG/M2 | WEIGHT: 201.6 LBS | DIASTOLIC BLOOD PRESSURE: 87 MMHG | HEIGHT: 75 IN | TEMPERATURE: 97.9 F | OXYGEN SATURATION: 95 %

## 2017-08-28 DIAGNOSIS — E84.9 CF (CYSTIC FIBROSIS) (H): Primary | ICD-10-CM

## 2017-08-28 DIAGNOSIS — E84.0 CYSTIC FIBROSIS WITH PULMONARY MANIFESTATIONS (H): ICD-10-CM

## 2017-08-28 DIAGNOSIS — E84.0 CYSTIC FIBROSIS WITH PULMONARY MANIFESTATIONS (H): Primary | ICD-10-CM

## 2017-08-28 LAB
EXPTIME-PRE: 13.59 SEC
FEF2575-%PRED-PRE: 20 %
FEF2575-PRE: 0.98 L/SEC
FEF2575-PRED: 4.83 L/SEC
FEFMAX-%PRED-PRE: 75 %
FEFMAX-PRE: 8.53 L/SEC
FEFMAX-PRED: 11.34 L/SEC
FEV1-%PRED-PRE: 63 %
FEV1-PRE: 3.18 L
FEV1FEV6-PRE: 60 %
FEV1FEV6-PRED: 82 %
FEV1FVC-PRE: 52 %
FEV1FVC-PRED: 81 %
FIFMAX-PRE: 9.97 L/SEC
FVC-%PRED-PRE: 97 %
FVC-PRE: 6.13 L
FVC-PRED: 6.26 L

## 2017-08-28 PROCEDURE — 99212 OFFICE O/P EST SF 10 MIN: CPT | Mod: ZF

## 2017-08-28 PROCEDURE — 87077 CULTURE AEROBIC IDENTIFY: CPT | Performed by: INTERNAL MEDICINE

## 2017-08-28 PROCEDURE — 87186 SC STD MICRODIL/AGAR DIL: CPT | Performed by: INTERNAL MEDICINE

## 2017-08-28 PROCEDURE — 87107 FUNGI IDENTIFICATION MOLD: CPT | Performed by: INTERNAL MEDICINE

## 2017-08-28 PROCEDURE — 87070 CULTURE OTHR SPECIMN AEROBIC: CPT | Performed by: INTERNAL MEDICINE

## 2017-08-28 ASSESSMENT — PAIN SCALES - GENERAL: PAINLEVEL: NO PAIN (0)

## 2017-08-28 NOTE — NURSING NOTE
Respiratory Therapist Note:    Vest    Brand: Hill-Rom - Model 105   Settings: Hill Rom: Frequencies 8, 9, 10 at pressure 10 then frequencies 18, 19, 20 at pressure 6.   Cough Pause: Yes. Frequency:  Duration:  Vest Garment Size: Adult Medium   Last Fitting Date: 2017   Frequency of therapy: 2 times per day   Concerns: none    Alternative Airway Clearance:     Nebulized Medications   Bronchodilators: Albuterol   Mucolytic: Mucomyst   Antibiotics: SOO and Aztreonam   Additional Inhaled Medications: 0.9% Normal Saline    Review Cleaning: Yes. Countertop bottle sterilizer.    Education and Transition Information   Correct order of inhaled medications: Yes   Mechanism of Action of inhaled medications: Yes   Frequency of inhaled medications: Yes   Dosage of inhaled medications: Yes   Other:     Home Care   Nebulizer Compressor    Year Purchased: 2016   Home Care Company:     [facility], Phone: [phone], Fax: [phone]    Oxygen:     [facility]   Site:    Date Completed:     Other Comments:     Goals   Next Visit: keep up the good work   Next Year: as above

## 2017-08-28 NOTE — MR AVS SNAPSHOT
After Visit Summary   8/28/2017    Telly Leal    MRN: [id number]           Patient Information     Date Of Birth          1980        Visit Information        Provider Department      8/28/2017 1:40 PM Rocael Miller MD [facility]        Today's Diagnoses     Cystic fibrosis with pulmonary manifestations (H)    -  1      Care Instructions    Cystic Fibrosis Self-Care Plan  1. Continue vest twice a day or once a day plus 30 minutes of aerobic exercise  2. As time allows, do vest twice a day plus aerobic exercise.  3. Call if increasing lung symptoms - will give cipro if this happens  4. Come back to clinic in 2 months       MRN: [id number]   Clinic Date: August 28, 2017   Patient: Telly Leal     Annual Studies:   IGG   Date Value Ref Range Status   12/11/2015 1160 695 - 1620 mg/dL Final     Insulin   Date Value Ref Range Status   12/16/2016 73 mU/L Final     Comment:     Reference Range:  0-20     There are no preventive care reminders to display for this patient.      Pulmonary Function Tests  FEV1: amount of air you can blow out in 1 second  FVC: total amount of air you can take in and blow out    Your Goals:         PFT Latest Ref Rng & Units 8/28/2017   FVC L 6.13   FEV1 L 3.18   FVC% % 97   FEV1% % 63          Airway Clearance: The Most Important Way to Keep Your Lungs Healthy  Vest Settings:    Hill-Rom Frequencies: 8, 9, 10 Pressure 10 Then, Frequencies 18, 19, 20 Pressure 6      RespirTech: Quick Start with Pressure of     Do each frequency for 5 minutes; Deflate vest after each frequency & cough 3 times before beginning the next setting.    Vest and Neb Therapy should be done 2-3 times/day.    Good Nutrition Can Improve Lung Function and Overall Health     Take ALL of your vitamins with food     Take 1/2 of your enzymes before EVERY meal/snack and the other 1/2 mid-meal/snack    Wt Readings from Last 3 Encounters:   04/03/17 94.6  kg (208 lb 8.9 oz)   01/23/17 93.1 kg (205 lb 4 oz)   12/16/16 90.7 kg (200 lb)       There is no height or weight on file to calculate BMI.         National CF Foundation Recommendations for BMI in CF Adults: Women: at least 22 Men: at least 23        Controlling Blood Sugars Helps Prevent Lung Infections & Improves Nutrition  Test blood sugar:     In the morning before eating (goal is )     2 hours after a meal (goal is less than 150)     When pre-meal glucose is greater than 150 add correction     At bedtime (if less than 100 eat a snack with 15 grams of carbohydrates  Last A1C Results:   Hemoglobin A1C   Date Value Ref Range Status   12/16/2016 6.2 (H) 4.3 - 6.0 % Final         If diabetic, measure A1C every 6 months. Goal: Under 7%    Staying Healthy    Research:  If you are interested in learning about research opportunities or have questions, please contact Evie Fairbanks at [phone] or [email or web address].      CF Foundation:  Compass is a personalized resource service to help you with the insurance, financial, legal and other issues you are facing.  It's free, confidential and available to anyone with CF.  Ask your  for more information or contact Compass directly at [facility] ([phone]) or compass@cff.org, or learn more at [email or web address].[email or web address].          RECOMMENDATIONS:     YOUR GOAL:      CF Nurse Line:  Brice Reveles: [phone]   Sary Hassan, RT: [phone]     Leona Marie: [phone] or Karissa Ashraf, Dieticians: [phone]   Mercedes Brooks, Diabetes Nurse: [phone]      Yani Georges: [phone] or Yoselin Abel [phone], Social Workers   [email or web address]                   Follow-ups after your visit        Follow-up notes from your care team     Return in about 2 months (around 10/28/2017).      Your next 10 appointments already scheduled     Nov 13, 2017  1:30 PM CST   CF LOOP with UC PFL CF   [facility] Pulmonary Function Testing ([facility])    [address]  [address] [facility] [id number] [phone]            Nov 13, 2017  2:20 PM CST   (Arrive by 2:05 PM)   RETURN CYSTIC FIBROSIS VISIT with Rocael Miller MD   [facility] ([facility])    [address] [id number] [phone]              Future tests that were ordered for you today     Open Future Orders        Priority Expected Expires Ordered    RESPIRATORY FLOW VOLUME LOOP Routine 10/27/2017 2/24/2018 8/28/2017            Who to contact     If you have questions or need follow up information about today's clinic visit or your schedule please contact [facility] directly at [phone].  Normal or non-critical lab and imaging results will be communicated to you by [employer], letter or phone within 4 business days after the clinic has received the results. If you do not hear from us within 7 days, please contact the clinic through [employer] or phone. If you have a critical or abnormal lab result, we will notify you by phone as soon as possible.  Submit refill requests through [employer] or call your pharmacy and they will forward the refill request to us. Please allow 3 business days for your refill to be completed.          Additional Information About Your Visit        [employer] Information     [employer] gives you secure access to your electronic health record. If you see a primary care provider, you can also send messages to your care team and make appointments. If you have questions, please call your primary care clinic.  If you do not have a primary care provider, please call [phone] and they will assist you.        Care EveryWhere ID     This is your Care EveryWhere ID. This could be used by other organizations to access your [place] medical records  [id number]        Your Vitals Were     Pulse Temperature Respirations  "Height Pulse Oximetry BMI (Body Mass Index)    94 97.9  F (36.6  C) (Oral) 18 1.897 m (6' 2.69\") 95% 25.41 kg/m2       Blood Pressure from Last 3 Encounters:   08/28/17 137/87   04/03/17 125/85   01/23/17 106/74    Weight from Last 3 Encounters:   08/28/17 91.4 kg (201 lb 9.6 oz)   04/03/17 94.6 kg (208 lb 8.9 oz)   01/23/17 93.1 kg (205 lb 4 oz)               Primary Care Provider Office Phone # Fax #    Adriel Corrigan [phone]       [address] [address]        Equal Access to Services     MATTHEW HURD : Geno root Soisela, waaxda luqadaha, qaybta kaalmada adeegyada, james jhaveri . So [facility] [phone].    ATENCIÓN: Si habla español, tiene a cardozo disposición servicios gratuitos de asistencia lingüística. [facility] [phone].    We comply with applicable federal civil rights laws and [place] laws. We do not discriminate on the basis of race, color, national origin, age, disability sex, sexual orientation or gender identity.            Thank you!     Thank you for choosing [facility]  for your care. Our goal is always to provide you with excellent care. Hearing back from our patients is one way we can continue to improve our services. Please take a few minutes to complete the written survey that you may receive in the mail after your visit with us. Thank you!             Your Updated Medication List - Protect others around you: Learn how to safely use, store and throw away your medicines at www.disposemymeds.org.          This list is accurate as of: 8/28/17  2:18 PM.  Always use your most recent med list.                   Brand Name Dispense Instructions for use Diagnosis    acetylcysteine 20 % nebulizer solution    MUCOMYST    180 mL    Inhale 2 mLs into the lungs 3 times daily    Cystic fibrosis with pulmonary manifestations (H), Vitamin B12 deficiency disease       * albuterol 108 (90 BASE) " MCG/ACT Inhaler    PROAIR HFA    1 Inhaler    Inhale 1-2 puffs into the lungs every 6 hours as needed for shortness of breath / dyspnea Every 4-6 hours as needed    Cystic fibrosis with pulmonary manifestations (H), Vitamin B12 deficiency disease       * albuterol (2.5 MG/3ML) 0.083% neb solution     360 mL    Take 1 vial (2.5 mg) by nebulization 3 times daily    Cystic fibrosis with pulmonary manifestations (H)       azithromycin 250 MG tablet    ZITHROMAX    30 tablet    Take 1 tablet (250 mg) by mouth daily    CF (cystic fibrosis) (H)       aztreonam 200 MG/ML    AZACTAM    56 each    Take 5 mLs (1 g) by nebulization 2 times daily    Cystic fibrosis with pulmonary manifestations (H)       blood glucose monitoring test strip    no brand specified    100 strip    Use to test blood sugars 4-6 times daily or as directed.    Cystic fibrosis with pulmonary manifestations (H), Glucose intolerance (impaired glucose tolerance)       Cholecalciferol 4000 UNITS Caps     90 capsule    Take 1 capsule by mouth daily    Cystic fibrosis with pulmonary manifestations (H)       CREON 34642 UNITS Cpep per EC capsule   Generic drug:  amylase-lipase-protease     1000 capsule    Take 5-6 capsules (120,000-144,000 Units) by mouth See Admin Instructions 5-6 with meals, 1-5 with snacks,7-8 with tube feedings    Cystic fibrosis with pulmonary manifestations (H)       cromolyn 20 MG/2ML neb solution    INTAL    180 mL    Take 2 mLs (20 mg) by nebulization 3 times daily 1 vial three times daily    Cystic fibrosis with pulmonary manifestations (H)       fluticasone 50 MCG/ACT spray    FLONASE    16 g    Spray 2 sprays into both nostrils as needed    Cystic fibrosis with pulmonary manifestations (H)       MEPHYTON 5 MG tablet   Generic drug:  phytonadione     12 tablet    TAKE 1 TABLET (5 MG) BY MOUTH 3 TIMES A WEEK    CF (cystic fibrosis) (H), Exocrine pancreatic insufficiency       MULTIVITAMINS PO      Take 1 tablet by mouth daily.         NUTREN 2.0      Take 4-5 Cans by mouth. 168/ml/hr 7 days/week        oseltamivir 75 MG capsule    TAMIFLU    10 capsule    Take 1 capsule (75 mg) by mouth 2 times daily For 5 days    Cystic fibrosis with pulmonary manifestations (H), Vitamin B12 deficiency disease       ranitidine 150 MG tablet    ZANTAC    180 tablet    Take 1 tablet (150 mg) by mouth 2 times daily    GERD (gastroesophageal reflux disease)       tobramycin (PF) 300 MG/5ML neb solution    SOO    300 mL    Take 5 mLs (300 mg) by nebulization 2 times daily    Cystic fibrosis with pulmonary manifestations (H)       water for injection sterile Soln     250 mL    Inject 4 mLs as directed 2 times daily    Cystic fibrosis with pulmonary manifestations (H)       * Notice:  This list has 2 medication(s) that are the same as other medications prescribed for you. Read the directions carefully, and ask your doctor or other care provider to review them with you.

## 2017-08-28 NOTE — PATIENT INSTRUCTIONS
Cystic Fibrosis Self-Care Plan  1. Continue vest twice a day or once a day plus 30 minutes of aerobic exercise  2. As time allows, do vest twice a day plus aerobic exercise.  3. Call if increasing lung symptoms - will give cipro if this happens  4. Come back to clinic in 2 months       MRN: [id number]   Clinic Date: August 28, 2017   Patient: Telly Leal     Annual Studies:   IGG   Date Value Ref Range Status   12/11/2015 1160 695 - 1620 mg/dL Final     Insulin   Date Value Ref Range Status   12/16/2016 73 mU/L Final     Comment:     Reference Range:  0-20     There are no preventive care reminders to display for this patient.      Pulmonary Function Tests  FEV1: amount of air you can blow out in 1 second  FVC: total amount of air you can take in and blow out    Your Goals:         PFT Latest Ref Rng & Units 8/28/2017   FVC L 6.13   FEV1 L 3.18   FVC% % 97   FEV1% % 63          Airway Clearance: The Most Important Way to Keep Your Lungs Healthy  Vest Settings:    Hill-Rom Frequencies: 8, 9, 10 Pressure 10 Then, Frequencies 18, 19, 20 Pressure 6      RespirTech: Quick Start with Pressure of     Do each frequency for 5 minutes; Deflate vest after each frequency & cough 3 times before beginning the next setting.    Vest and Neb Therapy should be done 2-3 times/day.    Good Nutrition Can Improve Lung Function and Overall Health     Take ALL of your vitamins with food     Take 1/2 of your enzymes before EVERY meal/snack and the other 1/2 mid-meal/snack    Wt Readings from Last 3 Encounters:   04/03/17 94.6 kg (208 lb 8.9 oz)   01/23/17 93.1 kg (205 lb 4 oz)   12/16/16 90.7 kg (200 lb)       There is no height or weight on file to calculate BMI.         National CF Foundation Recommendations for BMI in CF Adults: Women: at least 22 Men: at least 23        Controlling Blood Sugars Helps Prevent Lung Infections & Improves Nutrition  Test blood sugar:     In the morning before eating (goal is )     2 hours  after a meal (goal is less than 150)     When pre-meal glucose is greater than 150 add correction     At bedtime (if less than 100 eat a snack with 15 grams of carbohydrates  Last A1C Results:   Hemoglobin A1C   Date Value Ref Range Status   12/16/2016 6.2 (H) 4.3 - 6.0 % Final         If diabetic, measure A1C every 6 months. Goal: Under 7%    Staying Healthy    Research:  If you are interested in learning about research opportunities or have questions, please contact Evie Fairbanks at [phone] or [email or web address].       Foundation:  Compass is a personalized resource service to help you with the insurance, financial, legal and other issues you are facing.  It's free, confidential and available to anyone with CF.  Ask your  for more information or contact Compass directly at 9[facility] ([phone]) or compass@cff.org, or learn more at cff.org/compass.          RECOMMENDATIONS:     YOUR GOAL:      CF Nurse Line:  Brice Reveles: [phone]   Sary Hassan, RT: [phone]     Leona Marie: [phone] or Karissa Ashraf, [employer]: [phone]   Mercedes Brooks, Diabetes Nurse: [phone]      Yani Georges: [phone] or Yoselin Abel [phone], Social Workers   [email or web address]

## 2017-08-28 NOTE — NURSING NOTE
Chief Complaint   Patient presents with     Cystic Fibrosis     RETURN vISIT FOR F/U TODAY   Alex Iglesias CMA at 1:15 PM on 8/28/2017

## 2017-08-28 NOTE — LETTER
8/28/2017       RE: Telly Leal  [address] [address]     Dear Colleague,    Thank you for referring your patient, Telly Leal, to the [facility] at [facility]. Please see a copy of my visit note below.    Reason for Visit  Telly Leal is a 35 year old year old male who is being seen for Cystic Fibrosis (RETURN vISIT FOR F/U TODAY)    CF HPI  The patient was seen and examined by Rocael Miller   Patient with some blood streaking in his sputum 1 week ago. Not feeling ill at the time with baseline frequency of cough and sputum production. Lasted a few days and resolved since switched from Soo to aztreonam nebs.  Reports missing vest for a few days in a row last week in conjunction with birth of his daughter but otherwise completing vest bid 5 to 6 days per week and otherwise always at least once a day. Getting out for walks this summer with good exercise tolerance. Currently frequency of cough and volume of sputum at baseline with usual yellow color. No chest pain. Currently on aztreonam reconstituted.     Current Outpatient Prescriptions   Medication     fluticasone (FLONASE) 50 MCG/ACT spray     ranitidine (ZANTAC) 150 MG tablet     cromolyn (INTAL) 20 MG/2ML neb solution     tobramycin, PF, (SOO) 300 MG/5ML neb solution     blood glucose monitoring (NO BRAND SPECIFIED) test strip     CREON 19737 UNITS CPEP     albuterol (ALBUTEROL) 108 (90 BASE) MCG/ACT inhaler     albuterol (2.5 MG/3ML) 0.083% nebulizer solution     azithromycin (ZITHROMAX) 250 MG tablet     acetylcysteine (MUCOMYST) 20 % nebulizer solution     MEPHYTON 5 MG tablet     aztreonam (AZACTAM) 200 MG/ML     water for injection sterile SOLN     Cholecalciferol 4000 UNITS CAPS     oseltamivir (TAMIFLU) 75 MG capsule     Multiple Vitamin (MULTIVITAMINS PO)     Nutritional Supplements (NUTREN 2.0) LIQD     No current facility-administered  "medications for this visit.      No Known Allergies  Past Medical History:   Diagnosis Date     Chronic sinusitis      Cystic fibrosis with pulmonary manifestations (H)      Exocrine pancreatic insufficiency      GERD (gastroesophageal reflux disease)      Hemoptysis      Malnutrition (H)        Past Surgical History:   Procedure Laterality Date     LOBECTOMY      right upper lobe     TUBES         Social History     Social History     Marital status: Single     Spouse name: N/A     Number of children: N/A     Years of education: N/A     Occupational History      [employer]     Social History Main Topics     Smoking status: Never Smoker     Smokeless tobacco: Never Used     Alcohol use 0.0 oz/week     0 Standard drinks or equivalent per week     Drug use: Not on file     Sexual activity: Not on file     Other Topics Concern     Not on file     Social History Narrative    Patient works for the [employer] as an .  He does not have stable living conditions at this time.  He has a Divehi guillen.   Update: living with wife Bre in [facility]. Birth of daughter Nieves Rebolledo August, 2017    ROS Pulmonary  Const: no fever, chills, fatigue. ENT: increased congestion x 2 weeks improving with resumption of flonase. GI: no diarrhea, constipation, grease in stools, or gerd sx with h2 blocker. Using 2 cans TF nightly.  Musc: low back sore past couple days  A complete ROS was otherwise negative except as noted in the HPI.  /87  Pulse 94  Temp 97.9  F (36.6  C) (Oral)  Resp 18  Ht 1.897 m (6' 2.69\")  Wt 91.4 kg (201 lb 9.6 oz)  SpO2 95%  BMI 25.41 kg/m2  Exam:   GENERAL APPEARANCE: Well developed, well nourished, alert, and in no apparent distress.  EYES: anicteric  HENT: Nasal mucosa with no edema and no hyperemia. No nasal polyps.  EARS: TMs with normal landmarks and light reflex on right, occluded by cerumen on left. Unchanged.  MOUTH: Oral mucosa is moist, without any " lesions, no tonsillar enlargement, no oropharyngeal exudate.  NECK: supple, no masses, no thyromegaly.  LYMPHATICS: No significant  cervical, or supraclavicular nodes.  RESP:  good air flow throughout.  Clear in all lung fields. Normal chest wall excursion.  CV: Normal S1, S2, regular rhythm, normal rate. No murmur.  No rub. No gallop. No LE edema.   ABDOMEN:  Bowel sounds normal, soft, nontender, no HSM or masses.   MS: extremities normal. No cyanosis.  SKIN:  PEG site without erythema or discharge. Chest scar-like lesion upper sternum unchanged. Diffuse freckles.   NEURO: Mentation intact, speech normal, normal gait and stance  PSYCH: mentation appears normal. and affect normal/bright  Results:  Recent Results (from the past 168 hour(s))   General PFT Lab (Please always keep checked)    Collection Time: 08/28/17 12:56 PM   Result Value Ref Range    FVC-Pred 6.26 L    FVC-Pre 6.13 L    FVC-%Pred-Pre 97 %    FEV1-Pre 3.18 L    FEV1-%Pred-Pre 63 %    FEV1FVC-Pred 81 %    FEV1FVC-Pre 52 %    FEFMax-Pred 11.34 L/sec    FEFMax-Pre 8.53 L/sec    FEFMax-%Pred-Pre 75 %    FEF2575-Pred 4.83 L/sec    FEF2575-Pre 0.98 L/sec    CFG4698-%Pred-Pre 20 %    ExpTime-Pre 13.59 sec    FIFMax-Pre 9.97 L/sec    FEV1FEV6-Pred 82 %    FEV1FEV6-Pre 60 %     Spirometry interpretation: personally reviewed. Valid maneuver. Moderate obstruction. FEV1 unchanged from 4/17 but remains down over 5% over past couple visits. Slightly below baseline.   Sputum last visit: PA x 2    Assessment and plan:  1. Mild exacerbation of CF lung disease: PFTs remain down slightly but still no overt symptoms to indicate a more severe exacerbation. Will defer abx in favor of more frequent intense aerobic exercise. In past, cipro in these situations hasn't translated into sustained improvement in PFTs but will have low threshold to tx if worsens. Continue alternating alice and reconstituted aztreonam nebs along with chronic azithro. Sary, RT, consulted today to  review ACT. We discussed at length importance of establishing a routine for his airway clearance now that a  at home which could make this more challenging.  2. Impaired glucose tolerance: 2 hour glucose  160 on GTT and A1C higher than previous at 6.2 in . Currently no symptoms to suggest hyperglycemia but possible this could be contributing to slightly lower PFTs. Previouisly reviewed cutting out simple sugars in diet. A lot of inertia with pursuing insulin a couple years ago when glucose intolerance also an issue. Repeat oral GTT and A1C in December.  3.  Pancreatic insufficiency with a history of malnutrition: Adequate enzyme replacement by history. Ongoing concern excess calories could be contributing to glucose intolerance.  BMI down today with weight loss of 7 lb. Typically drops a little weight in summer. Pt has been reluctant to cut back TF although recently will miss occasionally and not as worried about it. Will plan on cutting TF if weigh up further next visit.   4. CF sinus disease: mild flare possibly related to allergic rhinitis. Will see how he responds to flonase.  5. GERD: Adequate control with ranitidine.   6. Low vitamin D level: On replacement.  7. Skin lesions: Seen by outside Dermatologist since last visit. Central chest scar could be treated by steroid injections per pt but not actively pursuing. Plan in place for annual exam of his many freckles.  8. Healthcare maintenance: Annuals due . Will plan on giving flu vaccine next visit.  9. Family planning: he and wife Bre now with  at home.   Patient will follow up in 2 months  40 minutes spent in conjunction with this visit with > 50% of time spent counseling and coordinating care of following issues: adherence to airway clearance regimen and lung treatments, status of pancreatic insufficiency and history of malnutrition including counseling on use of tube feeds, control of sinus disease, control of GERD,  evaluation of skin lesion on chest, impaired glucose intolerance.            Again, thank you for allowing me to participate in the care of your patient.      Sincerely,    Rocael Miller MD

## 2017-08-28 NOTE — PROGRESS NOTES
Reason for Visit  eTlly Leal is a 35 year old year old male who is being seen for Cystic Fibrosis (RETURN vISIT FOR F/U TODAY)    CF HPI  The patient was seen and examined by Rocael Miller   Patient with some blood streaking in his sputum 1 week ago. Not feeling ill at the time with baseline frequency of cough and sputum production. Lasted a few days and resolved since switched from Soo to aztreonam nebs.  Reports missing vest for a few days in a row last week in conjunction with birth of his daughter but otherwise completing vest bid 5 to 6 days per week and otherwise always at least once a day. Getting out for walks this summer with good exercise tolerance. Currently frequency of cough and volume of sputum at baseline with usual yellow color. No chest pain. Currently on aztreonam reconstituted.     Current Outpatient Prescriptions   Medication     fluticasone (FLONASE) 50 MCG/ACT spray     ranitidine (ZANTAC) 150 MG tablet     cromolyn (INTAL) 20 MG/2ML neb solution     tobramycin, PF, (SOO) 300 MG/5ML neb solution     blood glucose monitoring (NO BRAND SPECIFIED) test strip     CREON 94307 UNITS CPEP     albuterol (ALBUTEROL) 108 (90 BASE) MCG/ACT inhaler     albuterol (2.5 MG/3ML) 0.083% nebulizer solution     azithromycin (ZITHROMAX) 250 MG tablet     acetylcysteine (MUCOMYST) 20 % nebulizer solution     MEPHYTON 5 MG tablet     aztreonam (AZACTAM) 200 MG/ML     water for injection sterile SOLN     Cholecalciferol 4000 UNITS CAPS     oseltamivir (TAMIFLU) 75 MG capsule     Multiple Vitamin (MULTIVITAMINS PO)     Nutritional Supplements (NUTREN 2.0) LIQD     No current facility-administered medications for this visit.      No Known Allergies  Past Medical History:   Diagnosis Date     Chronic sinusitis      Cystic fibrosis with pulmonary manifestations (H)      Exocrine pancreatic insufficiency      GERD (gastroesophageal reflux disease)      Hemoptysis      Malnutrition (H)        Past Surgical  "History:   Procedure Laterality Date     LOBECTOMY      right upper lobe     TUBES         Social History     Social History     Marital status: Single     Spouse name: N/A     Number of children: N/A     Years of education: N/A     Occupational History      [employer]     Social History Main Topics     Smoking status: Never Smoker     Smokeless tobacco: Never Used     Alcohol use 0.0 oz/week     0 Standard drinks or equivalent per week     Drug use: Not on file     Sexual activity: Not on file     Other Topics Concern     Not on file     Social History Narrative    Patient works for the [employer] as an .  He does not have stable living conditions at this time.  He has a Ukrainian guillen.   Update: living with wife Bre in [facility]. Birth of daughter Nieves Rebolledo August, 2017    ROS Pulmonary  Const: no fever, chills, fatigue. ENT: increased congestion x 2 weeks improving with resumption of flonase. GI: no diarrhea, constipation, grease in stools, or gerd sx with h2 blocker. Using 2 cans TF nightly.  Musc: low back sore past couple days  A complete ROS was otherwise negative except as noted in the HPI.  /87  Pulse 94  Temp 97.9  F (36.6  C) (Oral)  Resp 18  Ht 1.897 m (6' 2.69\")  Wt 91.4 kg (201 lb 9.6 oz)  SpO2 95%  BMI 25.41 kg/m2  Exam:   GENERAL APPEARANCE: Well developed, well nourished, alert, and in no apparent distress.  EYES: anicteric  HENT: Nasal mucosa with no edema and no hyperemia. No nasal polyps.  EARS: TMs with normal landmarks and light reflex on right, occluded by cerumen on left. Unchanged.  MOUTH: Oral mucosa is moist, without any lesions, no tonsillar enlargement, no oropharyngeal exudate.  NECK: supple, no masses, no thyromegaly.  LYMPHATICS: No significant  cervical, or supraclavicular nodes.  RESP:  good air flow throughout.  Clear in all lung fields. Normal chest wall excursion.  CV: Normal S1, S2, regular rhythm, normal rate. No murmur. "  No rub. No gallop. No LE edema.   ABDOMEN:  Bowel sounds normal, soft, nontender, no HSM or masses.   MS: extremities normal. No cyanosis.  SKIN:  PEG site without erythema or discharge. Chest scar-like lesion upper sternum unchanged. Diffuse freckles.   NEURO: Mentation intact, speech normal, normal gait and stance  PSYCH: mentation appears normal. and affect normal/bright  Results:  Recent Results (from the past 168 hour(s))   General PFT Lab (Please always keep checked)    Collection Time: 17 12:56 PM   Result Value Ref Range    FVC-Pred 6.26 L    FVC-Pre 6.13 L    FVC-%Pred-Pre 97 %    FEV1-Pre 3.18 L    FEV1-%Pred-Pre 63 %    FEV1FVC-Pred 81 %    FEV1FVC-Pre 52 %    FEFMax-Pred 11.34 L/sec    FEFMax-Pre 8.53 L/sec    FEFMax-%Pred-Pre 75 %    FEF2575-Pred 4.83 L/sec    FEF2575-Pre 0.98 L/sec    DQQ7538-%Pred-Pre 20 %    ExpTime-Pre 13.59 sec    FIFMax-Pre 9.97 L/sec    FEV1FEV6-Pred 82 %    FEV1FEV6-Pre 60 %     Spirometry interpretation: personally reviewed. Valid maneuver. Moderate obstruction. FEV1 unchanged from  but remains down over 5% over past couple visits. Slightly below baseline.   Sputum last visit: PA x 2    Assessment and plan:  1. Mild exacerbation of CF lung disease: PFTs remain down slightly but still no overt symptoms to indicate a more severe exacerbation. Will defer abx in favor of more frequent intense aerobic exercise. In past, cipro in these situations hasn't translated into sustained improvement in PFTs but will have low threshold to tx if worsens. Continue alternating alice and reconstituted aztreonam nebs along with chronic azithro. Sary, RT, consulted today to review ACT. We discussed at length importance of establishing a routine for his airway clearance now that a  at home which could make this more challenging.  2. Impaired glucose tolerance: 2 hour glucose  160 on GTT and A1C higher than previous at 6.2 in . Currently no symptoms to suggest hyperglycemia  but possible this could be contributing to slightly lower PFTs. Previouisly reviewed cutting out simple sugars in diet. A lot of inertia with pursuing insulin a couple years ago when glucose intolerance also an issue. Repeat oral GTT and A1C in December.  3.  Pancreatic insufficiency with a history of malnutrition: Adequate enzyme replacement by history. Ongoing concern excess calories could be contributing to glucose intolerance.  BMI down today with weight loss of 7 lb. Typically drops a little weight in summer. Pt has been reluctant to cut back TF although recently will miss occasionally and not as worried about it. Will plan on cutting TF if weigh up further next visit.   4. CF sinus disease: mild flare possibly related to allergic rhinitis. Will see how he responds to flonase.  5. GERD: Adequate control with ranitidine.   6. Low vitamin D level: On replacement.  7. Skin lesions: Seen by outside Dermatologist since last visit. Central chest scar could be treated by steroid injections per pt but not actively pursuing. Plan in place for annual exam of his many freckles.  8. Healthcare maintenance: Annuals due . Will plan on giving flu vaccine next visit.  9. Family planning: he and wife Bre now with  at home.   Patient will follow up in 2 months  40 minutes spent in conjunction with this visit with > 50% of time spent counseling and coordinating care of following issues: adherence to airway clearance regimen and lung treatments, status of pancreatic insufficiency and history of malnutrition including counseling on use of tube feeds, control of sinus disease, control of GERD, evaluation of skin lesion on chest, impaired glucose intolerance.    Rocael Miller

## 2017-09-05 LAB
BACTERIA SPEC CULT: ABNORMAL
SPECIMEN SOURCE: ABNORMAL

## 2017-09-11 DIAGNOSIS — E84.0 CYSTIC FIBROSIS WITH PULMONARY MANIFESTATIONS (H): ICD-10-CM

## 2017-09-11 RX ORDER — AZTREONAM 1 G/1
1 INJECTION, POWDER, LYOPHILIZED, FOR SOLUTION INTRAMUSCULAR; INTRAVENOUS 2 TIMES DAILY
Qty: 56 EACH | Refills: 6 | Status: SHIPPED | OUTPATIENT
Start: 2017-09-11 | End: 2017-11-13

## 2017-09-18 ENCOUNTER — MYC REFILL (OUTPATIENT)
Dept: PULMONOLOGY | Facility: CLINIC | Age: 37
End: 2017-09-18

## 2017-09-18 DIAGNOSIS — E84.9 CF (CYSTIC FIBROSIS) (H): ICD-10-CM

## 2017-09-19 RX ORDER — AZITHROMYCIN 250 MG/1
250 TABLET, FILM COATED ORAL DAILY
Qty: 30 TABLET | Refills: 11 | Status: SHIPPED | OUTPATIENT
Start: 2017-09-19 | End: 2017-10-26

## 2017-09-19 NOTE — TELEPHONE ENCOUNTER
Message from m2fxt:  Original authorizing provider: MD Telly Alvarado would like a refill of the following medications:  azithromycin (ZITHROMAX) 250 MG tablet [Rocael Miller MD]    Preferred pharmacy: [facility] [address] [place] [address] [address] AT [facility] [address]    Comment:

## 2017-10-26 DIAGNOSIS — E53.8 VITAMIN B12 DEFICIENCY DISEASE: ICD-10-CM

## 2017-10-26 DIAGNOSIS — E84.0 CYSTIC FIBROSIS WITH PULMONARY MANIFESTATIONS (H): ICD-10-CM

## 2017-10-26 DIAGNOSIS — E84.9 CF (CYSTIC FIBROSIS) (H): ICD-10-CM

## 2017-10-26 RX ORDER — ALBUTEROL SULFATE 0.83 MG/ML
2.5 SOLUTION RESPIRATORY (INHALATION) 3 TIMES DAILY
Qty: 360 ML | Refills: 11 | Status: SHIPPED | OUTPATIENT
Start: 2017-10-26 | End: 2017-11-13

## 2017-10-26 RX ORDER — AZITHROMYCIN 250 MG/1
250 TABLET, FILM COATED ORAL DAILY
Qty: 30 TABLET | Refills: 11 | Status: SHIPPED | OUTPATIENT
Start: 2017-10-26 | End: 2017-11-13

## 2017-10-26 RX ORDER — ACETYLCYSTEINE 200 MG/ML
2 SOLUTION ORAL; RESPIRATORY (INHALATION) 3 TIMES DAILY
Qty: 180 ML | Refills: 12 | Status: SHIPPED | OUTPATIENT
Start: 2017-10-26 | End: 2017-11-13

## 2017-10-26 NOTE — TELEPHONE ENCOUNTER
Drug: Acetylcysteine  Last Fill Date: 8/16/2017  Quantity: 180        Drug: Albuterol neb  Last Fill Date: 8/16/2017  Quantity: 360        Drug: Azithromycin  Last Fill Date: 8/16/2017  Quantity: 30

## 2017-10-27 ENCOUNTER — TELEPHONE (OUTPATIENT)
Dept: PULMONOLOGY | Facility: CLINIC | Age: 37
End: 2017-10-27

## 2017-10-27 NOTE — TELEPHONE ENCOUNTER
[facility] Prior Authorization Team   Phone: [phone]  Fax: [phone]    PA Initiation    Medication: aztreonam 200 MG/ML   Insurance Company: [employer] - Phone [phone] Fax [phone]  [facility] the [facility] - [place], [place] - [address]  Filling Pharmacy Phone: [phone]  Filling Pharmacy Fax: [phone]  Start Date: 10/27/2017

## 2017-11-01 NOTE — TELEPHONE ENCOUNTER
[facility] Prior Authorization Team   Phone: [phone]  Fax: [phone]    Prior Authorization Approval    Authorization Effective Date: 10/31/2017  Authorization Expiration Date: 10/31/2018  Medication: aztreonam 200 MG/ML   Approved Dose/Quantity: Take 5 mLs (1 g) by nebulization 2 times daily / #56  Reference #: CMM KEY#: [id number]   Insurance Company: [employer] - Phone [phone] Fax [phone]  Expected CoPay: $0.00     CoPay Card Available: No   Foundation Assistance Needed: N/A  Which Pharmacy is filling the prescription (Not needed for infusion/clinic administered): [facility] - [place], [place] - [facility] [address]  Pharmacy Notified: Yes  Patient Notified: Yes

## 2017-11-13 ENCOUNTER — ALLIED HEALTH/NURSE VISIT (OUTPATIENT)
Dept: CARE COORDINATION | Facility: CLINIC | Age: 37
End: 2017-11-13

## 2017-11-13 ENCOUNTER — OFFICE VISIT (OUTPATIENT)
Dept: PULMONOLOGY | Facility: CLINIC | Age: 37
End: 2017-11-13
Attending: INTERNAL MEDICINE
Payer: COMMERCIAL

## 2017-11-13 VITALS
OXYGEN SATURATION: 94 % | BODY MASS INDEX: 25.16 KG/M2 | HEIGHT: 75 IN | RESPIRATION RATE: 18 BRPM | HEART RATE: 97 BPM | SYSTOLIC BLOOD PRESSURE: 112 MMHG | DIASTOLIC BLOOD PRESSURE: 79 MMHG | WEIGHT: 202.38 LBS

## 2017-11-13 DIAGNOSIS — K86.81 EXOCRINE PANCREATIC INSUFFICIENCY: ICD-10-CM

## 2017-11-13 DIAGNOSIS — E53.8 VITAMIN B12 DEFICIENCY DISEASE: ICD-10-CM

## 2017-11-13 DIAGNOSIS — E84.0 CYSTIC FIBROSIS WITH PULMONARY MANIFESTATIONS (H): ICD-10-CM

## 2017-11-13 DIAGNOSIS — R73.02 GLUCOSE INTOLERANCE (IMPAIRED GLUCOSE TOLERANCE): ICD-10-CM

## 2017-11-13 DIAGNOSIS — E84.9 CF (CYSTIC FIBROSIS) (H): ICD-10-CM

## 2017-11-13 DIAGNOSIS — E84.9 CF (CYSTIC FIBROSIS) (H): Primary | ICD-10-CM

## 2017-11-13 DIAGNOSIS — Z13.9 RISK AND FUNCTIONAL ASSESSMENT: Primary | ICD-10-CM

## 2017-11-13 DIAGNOSIS — K21.9 GASTROESOPHAGEAL REFLUX DISEASE WITHOUT ESOPHAGITIS: ICD-10-CM

## 2017-11-13 LAB
EXPTIME-PRE: 12.77 SEC
FEF2575-%PRED-PRE: 20 %
FEF2575-PRE: 1.01 L/SEC
FEF2575-PRED: 4.82 L/SEC
FEFMAX-%PRED-PRE: 79 %
FEFMAX-PRE: 9.01 L/SEC
FEFMAX-PRED: 11.33 L/SEC
FEV1-%PRED-PRE: 60 %
FEV1-PRE: 3.06 L
FEV1FEV6-PRE: 58 %
FEV1FEV6-PRED: 82 %
FEV1FVC-PRE: 51 %
FEV1FVC-PRED: 81 %
FIFMAX-PRE: 10.6 L/SEC
FVC-%PRED-PRE: 96 %
FVC-PRE: 6.05 L
FVC-PRED: 6.25 L

## 2017-11-13 PROCEDURE — 87186 SC STD MICRODIL/AGAR DIL: CPT | Performed by: INTERNAL MEDICINE

## 2017-11-13 PROCEDURE — 87070 CULTURE OTHR SPECIMN AEROBIC: CPT | Performed by: INTERNAL MEDICINE

## 2017-11-13 PROCEDURE — 87107 FUNGI IDENTIFICATION MOLD: CPT | Performed by: INTERNAL MEDICINE

## 2017-11-13 PROCEDURE — 99212 OFFICE O/P EST SF 10 MIN: CPT | Mod: ZF

## 2017-11-13 PROCEDURE — 87077 CULTURE AEROBIC IDENTIFY: CPT | Performed by: INTERNAL MEDICINE

## 2017-11-13 RX ORDER — ACETYLCYSTEINE 200 MG/ML
2 SOLUTION ORAL; RESPIRATORY (INHALATION) 3 TIMES DAILY
Qty: 180 ML | Refills: 12 | Status: SHIPPED | OUTPATIENT
Start: 2017-11-13 | End: 2018-11-19

## 2017-11-13 RX ORDER — OSELTAMIVIR PHOSPHATE 75 MG/1
75 CAPSULE ORAL 2 TIMES DAILY
Qty: 10 CAPSULE | Refills: 1 | Status: SHIPPED | OUTPATIENT
Start: 2017-11-13 | End: 2019-11-26

## 2017-11-13 RX ORDER — CIPROFLOXACIN 750 MG/1
750 TABLET, FILM COATED ORAL 2 TIMES DAILY
Qty: 42 TABLET | Refills: 0 | Status: SHIPPED | OUTPATIENT
Start: 2017-11-13 | End: 2017-12-04

## 2017-11-13 RX ORDER — ALBUTEROL SULFATE 90 UG/1
1-2 AEROSOL, METERED RESPIRATORY (INHALATION) EVERY 6 HOURS PRN
Qty: 1 INHALER | Refills: 12 | Status: SHIPPED | OUTPATIENT
Start: 2017-11-13 | End: 2023-06-29

## 2017-11-13 RX ORDER — AZTREONAM 1 G/1
1 INJECTION, POWDER, LYOPHILIZED, FOR SOLUTION INTRAMUSCULAR; INTRAVENOUS 2 TIMES DAILY
Qty: 56 EACH | Refills: 6 | Status: SHIPPED | OUTPATIENT
Start: 2017-11-13 | End: 2020-01-13

## 2017-11-13 RX ORDER — FLUTICASONE PROPIONATE 50 MCG
2 SPRAY, SUSPENSION (ML) NASAL PRN
Qty: 16 G | Refills: 3 | Status: SHIPPED | OUTPATIENT
Start: 2017-11-13 | End: 2019-10-14

## 2017-11-13 RX ORDER — PANCRELIPASE 24000; 76000; 120000 [USP'U]/1; [USP'U]/1; [USP'U]/1
5-6 CAPSULE, DELAYED RELEASE PELLETS ORAL SEE ADMIN INSTRUCTIONS
Qty: 1000 CAPSULE | Refills: 11 | Status: SHIPPED | OUTPATIENT
Start: 2017-11-13 | End: 2018-02-01

## 2017-11-13 RX ORDER — PHYTONADIONE 5 MG/1
TABLET ORAL
Qty: 12 TABLET | Refills: 11 | Status: SHIPPED | OUTPATIENT
Start: 2017-11-13 | End: 2023-06-26

## 2017-11-13 RX ORDER — CROMOLYN SODIUM 20 MG/2ML
20 INHALANT INTRABRONCHIAL 3 TIMES DAILY
Qty: 180 ML | Refills: 12 | Status: SHIPPED | OUTPATIENT
Start: 2017-11-13 | End: 2018-11-19

## 2017-11-13 RX ORDER — AZITHROMYCIN 250 MG/1
250 TABLET, FILM COATED ORAL DAILY
Qty: 30 TABLET | Refills: 11 | Status: SHIPPED | OUTPATIENT
Start: 2017-11-13 | End: 2018-11-19

## 2017-11-13 RX ORDER — ALBUTEROL SULFATE 0.83 MG/ML
2.5 SOLUTION RESPIRATORY (INHALATION) 3 TIMES DAILY
Qty: 360 ML | Refills: 11 | Status: SHIPPED | OUTPATIENT
Start: 2017-11-13 | End: 2018-11-19

## 2017-11-13 ASSESSMENT — PAIN SCALES - GENERAL: PAINLEVEL: NO PAIN (0)

## 2017-11-13 NOTE — NURSING NOTE
Chief Complaint   Patient presents with     Cystic Fibrosis     Follow up on Yobany and his CF     Alex Iglesias CMA at 2:18 PM on 11/13/2017

## 2017-11-13 NOTE — PATIENT INSTRUCTIONS
1. Ciprofloxacin twice a day for 3 weeks  2. Stop azithromycin while on cipro  3. Extra vest and treadmill as time permits  4. Begin Tamiflu if you have influenza (flu) symptoms. This is usually fever above 100.5, body aches and increased cough.  If you start tamiflu, please call the CF office.      Cystic Fibrosis Self-Care Plan    RECOMMENDATIONS:   Congratulations Dad!    YOUR GOAL:    CF Nurse Line:  Brice Reveles: [phone]   Sary Hassan, RT: [phone]     Leona Marie: [phone] or Karissa Ashraf Dieticians: [phone]   Mercedes Brooks, Diabetes Nurse: [phone]      Yani Georges: [phone] or Yoselin Abel [phone], Social Workers   [email or web address]     MRN: [id number]   Clinic Date: November 13, 2017   Patient: Telly Leal     Annual Studies:   IGG   Date Value Ref Range Status   12/11/2015 1160 695 - 1620 mg/dL Final     Insulin   Date Value Ref Range Status   12/16/2016 73 mU/L Final     Comment:     Reference Range:  0-20     There are no preventive care reminders to display for this patient.      Pulmonary Function Tests  FEV1: amount of air you can blow out in 1 second  FVC: total amount of air you can take in and blow out    Your Goals:         PFT Latest Ref Rng & Units 11/13/2017   FVC L 6.05   FEV1 L 3.06   FVC% % 96   FEV1% % 60          Airway Clearance: The Most Important Way to Keep Your Lungs Healthy  Vest Settings:    Hill-Rom Frequencies: 8, 9, 10 Pressure 10 Then, Frequencies 18, 19, 20 Pressure 6      RespirTech: Quick Start with Pressure of     Do each frequency for 5 minutes; Deflate vest after each frequency & cough 3 times before beginning the next setting.    Vest and Neb Therapy should be done 2-3 times/day.    Good Nutrition Can Improve Lung Function and Overall Health     Take ALL of your vitamins with food     Take 1/2 of your enzymes before EVERY meal/snack and the other 1/2 mid-meal/snack    Wt Readings from Last 3  Encounters:   11/13/17 91.8 kg (202 lb 6.1 oz)   08/28/17 91.4 kg (201 lb 9.6 oz)   04/03/17 94.6 kg (208 lb 8.9 oz)       Body mass index is 25.51 kg/(m^2).         National CF Foundation Recommendations for BMI in CF Adults: Women: at least 22 Men: at least 23        Controlling Blood Sugars Helps Prevent Lung Infections & Improves Nutrition  Test blood sugar:     In the morning before eating (goal is )     2 hours after a meal (goal is less than 150)     When pre-meal glucose is greater than 150 add correction     At bedtime (if less than 100 eat a snack with 15 grams of carbohydrates  Last A1C Results:   Hemoglobin A1C   Date Value Ref Range Status   12/16/2016 6.2 (H) 4.3 - 6.0 % Final         If diabetic, measure A1C every 6 months. Goal: Under 7%    Staying Healthy    Research:  If you are interested in learning about research opportunities or have questions, please contact Evie Fairbanks at [phone] or [email or web address].      CF Foundation:  Compass is a personalized resource service to help you with the insurance, financial, legal and other issues you are facing.  It's free, confidential and available to anyone with CF.  Ask your  for more information or contact Compass directly at 957-CLAKPMH ([phone]) or compass@[email or web address], or learn more at cff.[email or web address].

## 2017-11-13 NOTE — MR AVS SNAPSHOT
After Visit Summary   11/13/2017    Telly Leal    MRN: [id number]           Patient Information     Date Of Birth          1980        Visit Information        Provider Department      11/13/2017 2:20 PM Rocael Miller MD [facility]        Today's Diagnoses     Cystic fibrosis with pulmonary manifestations (H)          Care Instructions    Cystic Fibrosis Self-Care Plan    RECOMMENDATIONS:   Congratulations Dad!    YOUR GOAL:    CF Nurse Line:  Brice Reveles: [phone]   Sary Hassan, RT: [phone]     Leona Marie: [phone] or Karissa Ashraf, Dieticians: [phone]   Mercedes Brooks, Diabetes Nurse: [phone]      Yani Georges: [phone] or Yoselin Abel [phone], Social Workers   [email or web address]     MRN: [id number]   Clinic Date: November 13, 2017   Patient: Telly Leal     Annual Studies:   IGG   Date Value Ref Range Status   12/11/2015 1160 695 - 1620 mg/dL Final     Insulin   Date Value Ref Range Status   12/16/2016 73 mU/L Final     Comment:     Reference Range:  0-20     There are no preventive care reminders to display for this patient.      Pulmonary Function Tests  FEV1: amount of air you can blow out in 1 second  FVC: total amount of air you can take in and blow out    Your Goals:         PFT Latest Ref Rng & Units 11/13/2017   FVC L 6.05   FEV1 L 3.06   FVC% % 96   FEV1% % 60          Airway Clearance: The Most Important Way to Keep Your Lungs Healthy  Vest Settings:    Hill-Rom Frequencies: 8, 9, 10 Pressure 10 Then, Frequencies 18, 19, 20 Pressure 6      RespirTech: Quick Start with Pressure of     Do each frequency for 5 minutes; Deflate vest after each frequency & cough 3 times before beginning the next setting.    Vest and Neb Therapy should be done 2-3 times/day.    Good Nutrition Can Improve Lung Function and Overall Health     Take ALL of your vitamins with food     Take  1/2 of your enzymes before EVERY meal/snack and the other 1/2 mid-meal/snack    Wt Readings from Last 3 Encounters:   11/13/17 91.8 kg (202 lb 6.1 oz)   08/28/17 91.4 kg (201 lb 9.6 oz)   04/03/17 94.6 kg (208 lb 8.9 oz)       Body mass index is 25.51 kg/(m^2).         National CF Foundation Recommendations for BMI in CF Adults: Women: at least 22 Men: at least 23        Controlling Blood Sugars Helps Prevent Lung Infections & Improves Nutrition  Test blood sugar:     In the morning before eating (goal is )     2 hours after a meal (goal is less than 150)     When pre-meal glucose is greater than 150 add correction     At bedtime (if less than 100 eat a snack with 15 grams of carbohydrates  Last A1C Results:   Hemoglobin A1C   Date Value Ref Range Status   12/16/2016 6.2 (H) 4.3 - 6.0 % Final         If diabetic, measure A1C every 6 months. Goal: Under 7%    Staying Healthy    Research:  If you are interested in learning about research opportunities or have questions, please contact Evie Fairbanks at [phone] or [email or web address].      CF Foundation:  Compass is a personalized resource service to help you with the insurance, financial, legal and other issues you are facing.  It's free, confidential and available to anyone with CF.  Ask your  for more information or contact Compass directly at [facility] ([phone]) or compass@cff.org, or learn more at cff.org/compass.                                 Follow-ups after your visit        Who to contact     If you have questions or need follow up information about today's clinic visit or your schedule please contact [facility] directly at [phone].  Normal or non-critical lab and imaging results will be communicated to you by MyChart, letter or phone within 4 business days after the clinic has received the results. If you do not hear from us within 7 days, please contact the clinic through MyChart or phone. If you  "have a critical or abnormal lab result, we will notify you by phone as soon as possible.  Submit refill requests through [employer] or call your pharmacy and they will forward the refill request to us. Please allow 3 business days for your refill to be completed.          Additional Information About Your Visit        Viamediahart Information     [employer] gives you secure access to your electronic health record. If you see a primary care provider, you can also send messages to your care team and make appointments. If you have questions, please call your primary care clinic.  If you do not have a primary care provider, please call [phone] and they will assist you.        Care EveryWhere ID     This is your Care EveryWhere ID. This could be used by other organizations to access your [place] medical records  [id number]        Your Vitals Were     Pulse Respirations Height Pulse Oximetry BMI (Body Mass Index)       97 18 1.897 m (6' 2.69\") 94% 25.51 kg/m2        Blood Pressure from Last 3 Encounters:   11/13/17 112/79   08/28/17 137/87   04/03/17 125/85    Weight from Last 3 Encounters:   11/13/17 91.8 kg (202 lb 6.1 oz)   08/28/17 91.4 kg (201 lb 9.6 oz)   04/03/17 94.6 kg (208 lb 8.9 oz)              We Performed the Following     Cystic Fibrosis Culture Aerob Bacterial        Primary Care Provider    Physician No Ref-Primary       NO REF-PRIMARY PHYSICIAN        Equal Access to Services     [facility]: Hadii aad ku hadasho Soomaali, waaxda luqadaha, qaybta kaalmada adeegyada, james jhaveri . So [facility] [phone].    ATENCIÓN: Si habla español, tiene a cardozo disposición servicios gratuitos de asistencia lingüística. Llame al [phone].    We comply with applicable federal civil rights laws and [place] laws. We do not discriminate on the basis of race, color, national origin, age, disability, sex, sexual orientation, or gender identity.            Thank you!     Thank you for choosing [facility] " [facility]  for your care. Our goal is always to provide you with excellent care. Hearing back from our patients is one way we can continue to improve our services. Please take a few minutes to complete the written survey that you may receive in the mail after your visit with us. Thank you!             Your Updated Medication List - Protect others around you: Learn how to safely use, store and throw away your medicines at www.disposemymeds.org.          This list is accurate as of: 11/13/17  2:49 PM.  Always use your most recent med list.                   Brand Name Dispense Instructions for use Diagnosis    acetylcysteine 20 % nebulizer solution    MUCOMYST    180 mL    Inhale 2 mLs into the lungs 3 times daily    Cystic fibrosis with pulmonary manifestations (H), Vitamin B12 deficiency disease       * albuterol 108 (90 BASE) MCG/ACT Inhaler    PROAIR HFA    1 Inhaler    Inhale 1-2 puffs into the lungs every 6 hours as needed for shortness of breath / dyspnea Every 4-6 hours as needed    Cystic fibrosis with pulmonary manifestations (H), Vitamin B12 deficiency disease       * albuterol (2.5 MG/3ML) 0.083% neb solution     360 mL    Take 1 vial (2.5 mg) by nebulization 3 times daily    Cystic fibrosis with pulmonary manifestations (H)       azithromycin 250 MG tablet    ZITHROMAX    30 tablet    Take 1 tablet (250 mg) by mouth daily    CF (cystic fibrosis) (H)       aztreonam 200 MG/ML    AZACTAM    56 each    Take 5 mLs (1 g) by nebulization 2 times daily    Cystic fibrosis with pulmonary manifestations (H)       blood glucose monitoring test strip    no brand specified    100 strip    Use to test blood sugars 4-6 times daily or as directed.    Cystic fibrosis with pulmonary manifestations (H), Glucose intolerance (impaired glucose tolerance)       Cholecalciferol 4000 UNITS Caps     90 capsule    Take 1 capsule by mouth daily    Cystic fibrosis with pulmonary manifestations (H)        CREON 00462-55347 UNITS Cpep per EC capsule   Generic drug:  amylase-lipase-protease     1000 capsule    Take 5-6 capsules (120,000-144,000 Units) by mouth See Admin Instructions 5-6 with meals, 1-5 with snacks,7-8 with tube feedings    Cystic fibrosis with pulmonary manifestations (H)       cromolyn 20 MG/2ML neb solution    INTAL    180 mL    Take 2 mLs (20 mg) by nebulization 3 times daily 1 vial three times daily    Cystic fibrosis with pulmonary manifestations (H)       fluticasone 50 MCG/ACT spray    FLONASE    16 g    Spray 2 sprays into both nostrils as needed    Cystic fibrosis with pulmonary manifestations (H)       MEPHYTON 5 MG tablet   Generic drug:  phytonadione     12 tablet    TAKE 1 TABLET (5 MG) BY MOUTH 3 TIMES A WEEK    CF (cystic fibrosis) (H), Exocrine pancreatic insufficiency       MULTIVITAMINS PO      Take 1 tablet by mouth daily.        NUTREN 2.0      Take 4-5 Cans by mouth. 168/ml/hr 7 days/week        oseltamivir 75 MG capsule    TAMIFLU    10 capsule    Take 1 capsule (75 mg) by mouth 2 times daily For 5 days    Cystic fibrosis with pulmonary manifestations (H), Vitamin B12 deficiency disease       ranitidine 150 MG tablet    ZANTAC    180 tablet    Take 1 tablet (150 mg) by mouth 2 times daily    GERD (gastroesophageal reflux disease)       tobramycin (PF) 300 MG/5ML neb solution    SOO    300 mL    Take 5 mLs (300 mg) by nebulization 2 times daily    Cystic fibrosis with pulmonary manifestations (H)       water for injection sterile Soln     250 mL    Inject 4 mLs as directed 2 times daily    Cystic fibrosis with pulmonary manifestations (H)       * Notice:  This list has 2 medication(s) that are the same as other medications prescribed for you. Read the directions carefully, and ask your doctor or other care provider to review them with you.

## 2017-11-13 NOTE — LETTER
"11/13/2017       RE: Telly Leal  [address] [address]     Dear Colleague,    Thank you for referring your patient, Telly Leal, to the [facility] FOR LUNG SCIENCE AND HEALTH at [facility]. Please see a copy of my visit note below.    Reason for Visit  Telly Leal is a 35 year old year old male who is being seen for Cystic Fibrosis (Follow up on Yobany and his CF)    CF HPI  The patient was seen and examined by Rocael Miller   Patient with mild exacerbation last visit after missing some therapy with birth of daughter. Today reports good adherence to his regimen with vest at least 11 times per week. Recently vest not generating as much pressure and \"sputtering\".  Sputum volume and color at baseline. No dyspnea with ADLs. No hemoptysis or chest pain.     Current Outpatient Prescriptions   Medication     ciprofloxacin (CIPRO) 750 MG tablet     acetylcysteine (MUCOMYST) 20 % nebulizer solution     albuterol (2.5 MG/3ML) 0.083% neb solution     albuterol (PROAIR HFA) 108 (90 BASE) MCG/ACT Inhaler     azithromycin (ZITHROMAX) 250 MG tablet     aztreonam (AZACTAM) 200 MG/ML     blood glucose monitoring (NO BRAND SPECIFIED) test strip     CREON 33970-75425 UNITS CPEP per EC capsule     cromolyn (INTAL) 20 MG/2ML neb solution     fluticasone (FLONASE) 50 MCG/ACT spray     phytonadione (MEPHYTON) 5 MG tablet     oseltamivir (TAMIFLU) 75 MG capsule     ranitidine (ZANTAC) 150 MG tablet     water for injection sterile SOLN     tobramycin, PF, (SOO) 300 MG/5ML neb solution     Cholecalciferol 4000 UNITS CAPS     Multiple Vitamin (MULTIVITAMINS PO)     Nutritional Supplements (NUTREN 2.0) LIQD     [DISCONTINUED] albuterol (2.5 MG/3ML) 0.083% neb solution     [DISCONTINUED] albuterol (ALBUTEROL) 108 (90 BASE) MCG/ACT inhaler     [DISCONTINUED] oseltamivir (TAMIFLU) 75 MG capsule     No current facility-administered medications for this visit.  " "    No Known Allergies  Past Medical History:   Diagnosis Date     Chronic sinusitis      Cystic fibrosis with pulmonary manifestations (H)      Exocrine pancreatic insufficiency      GERD (gastroesophageal reflux disease)      Hemoptysis      Malnutrition (H)        Past Surgical History:   Procedure Laterality Date     LOBECTOMY      right upper lobe     TUBES         Social History     Social History     Marital status: Single     Spouse name: N/A     Number of children: N/A     Years of education: N/A     Occupational History      [employer]     Social History Main Topics     Smoking status: Never Smoker     Smokeless tobacco: Never Used     Alcohol use 0.0 oz/week     0 Standard drinks or equivalent per week     Drug use: Not on file     Sexual activity: Not on file     Other Topics Concern     Not on file     Social History Narrative    Patient works for the [employer] as an .  He does not have stable living conditions at this time.  He has a Turkish guillen.   Update: living with wife Bre in [facility]. Birth of daughter Nieves Rebolledo August, 2017    ROS Pulmonary  Const: no fever, chills, fatigue. ENT: no sinus pain or drainage. GI: no diarrhea, constipation, grease in stools, or gerd sx with h2 blocker. Using 2 cans TF nightly.  Endo: no polyuria or polydipsia.  A complete ROS was otherwise negative except as noted in the HPI.  /79  Pulse 97  Resp 18  Ht 1.897 m (6' 2.69\")  Wt 91.8 kg (202 lb 6.1 oz)  SpO2 94%  BMI 25.51 kg/m2  Exam:   GENERAL APPEARANCE: Well developed, well nourished, alert, and in no apparent distress.  EYES: anicteric  HENT: Nasal mucosa with no edema and no hyperemia. No nasal polyps.  EARS: TMs with normal landmarks and light reflex   MOUTH: Oral mucosa is moist, without any lesions, no tonsillar enlargement, no oropharyngeal exudate.  NECK: supple, no masses, no thyromegaly.  LYMPHATICS: No significant  cervical, or supraclavicular " nodes.  RESP:  good air flow throughout.  Clear in all lung fields except rhonchi at right posterior base. Normal chest wall excursion.  CV: Normal S1, S2, regular rhythm, normal rate. No murmur.  No rub. No gallop. No LE edema.   ABDOMEN:  Bowel sounds normal, soft, nontender, no HSM or masses.   MS: extremities normal. No cyanosis.  SKIN:  PEG site without erythema or discharge. Chest scar-like lesion upper sternum unchanged. Diffuse freckles.   NEURO: Mentation intact, speech normal, normal gait and stance  PSYCH: mentation appears normal. and affect normal/bright  Results:  Recent Results (from the past 168 hour(s))   General PFT Lab (Please always keep checked)    Collection Time: 11/13/17  1:17 PM   Result Value Ref Range    FVC-Pred 6.25 L    FVC-Pre 6.05 L    FVC-%Pred-Pre 96 %    FEV1-Pre 3.06 L    FEV1-%Pred-Pre 60 %    FEV1FVC-Pred 81 %    FEV1FVC-Pre 51 %    FEFMax-Pred 11.33 L/sec    FEFMax-Pre 9.01 L/sec    FEFMax-%Pred-Pre 79 %    FEF2575-Pred 4.82 L/sec    FEF2575-Pre 1.01 L/sec    RRX2891-%Pred-Pre 20 %    ExpTime-Pre 12.77 sec    FIFMax-Pre 10.60 L/sec    FEV1FEV6-Pred 82 %    FEV1FEV6-Pre 58 %     Spirometry interpretation: personally reviewed. Valid maneuver. Moderate obstruction. FEV1 down slightly now about 10% below recent best.   Sputum last visit: PA x 2    Assessment and plan:  1. Mild exacerbation of CF lung disease: Continues to feel well despite further decline in PFTs. Already with reasonably good adherence. Therefore favor trial of cipro. Discussed possibility to IV abx if PFTs continue downward trend. Sary, RT, consulted and she will facilitate replacement of what sounds like malfunctioning vest. Continue alternating alice and reconstituted aztreonam nebs along with chronic azithro (but will hold azithro while on cipro). Encouraged him to follow through on plan to get treadmill placed in insulated garage so he can more easily keep up with aerobic exercise to augment airway clearance.    2. Impaired glucose tolerance: 2 hour glucose 12/16 160 on GTT and A1C higher than previous at 6.2. Still no symptoms to suggest hyperglycemia but possible this could be contributing to slightly lower PFTs. Previouisly reviewed cutting out simple sugars in diet. A lot of inertia with pursuing insulin a couple years ago when glucose intolerance also an issue. Repeat oral GTT and A1C in December.  3.  Pancreatic insufficiency with a history of malnutrition: Adequate enzyme replacement by history. On chronic tube feeds with decline in volume over past few years due to generous weight. Today weight stable, at target. Continue 2 cans per night.  4. CF sinus disease: has flonase prn - more prominent when seasonal allergies present.  5. GERD: Adequate control with ranitidine.   6. Low vitamin D level: On replacement.  7. Skin lesions: Seen by outside Dermatologist. Central chest scar could be treated by steroid injections per pt but not actively pursuing. Plan in place for annual exam of his many freckles.  8. Healthcare maintenance: Annuals due December, 2017. Ordered today. Flu vaccine given today. Changed most of Rx to [facility] per pt request.   9. Family planning: he and wife Bre now with 2 month girl at home and adjusting well.   Patient will follow up in 1 month  40 minutes spent in conjunction with this visit with > 50% of time spent counseling and coordinating care of following issues: treatment of CF respiratory exacerbation, adherence to airway clearance regimen and lung treatments, status of pancreatic insufficiency and history of malnutrition including counseling on use of tube feeds, control of sinus disease, control of GERD, impaired glucose intolerance], HCM.    Rocael Miller

## 2017-11-14 NOTE — PROGRESS NOTES
"Reason for Visit  Telly Leal is a 35 year old year old male who is being seen for Cystic Fibrosis (Follow up on Yobany and his CF)    CF HPI  The patient was seen and examined by Rocael Miller   Patient with mild exacerbation last visit after missing some therapy with birth of daughter. Today reports good adherence to his regimen with vest at least 11 times per week. Recently vest not generating as much pressure and \"sputtering\".  Sputum volume and color at baseline. No dyspnea with ADLs. No hemoptysis or chest pain.     Current Outpatient Prescriptions   Medication     ciprofloxacin (CIPRO) 750 MG tablet     acetylcysteine (MUCOMYST) 20 % nebulizer solution     albuterol (2.5 MG/3ML) 0.083% neb solution     albuterol (PROAIR HFA) 108 (90 BASE) MCG/ACT Inhaler     azithromycin (ZITHROMAX) 250 MG tablet     aztreonam (AZACTAM) 200 MG/ML     blood glucose monitoring (NO BRAND SPECIFIED) test strip     CREON [id number] UNITS CPEP per EC capsule     cromolyn (INTAL) 20 MG/2ML neb solution     fluticasone (FLONASE) 50 MCG/ACT spray     phytonadione (MEPHYTON) 5 MG tablet     oseltamivir (TAMIFLU) 75 MG capsule     ranitidine (ZANTAC) 150 MG tablet     water for injection sterile SOLN     tobramycin, PF, (SOO) 300 MG/5ML neb solution     Cholecalciferol 4000 UNITS CAPS     Multiple Vitamin (MULTIVITAMINS PO)     Nutritional Supplements (NUTREN 2.0) LIQD     [DISCONTINUED] albuterol (2.5 MG/3ML) 0.083% neb solution     [DISCONTINUED] albuterol (ALBUTEROL) 108 (90 BASE) MCG/ACT inhaler     [DISCONTINUED] oseltamivir (TAMIFLU) 75 MG capsule     No current facility-administered medications for this visit.      No Known Allergies  Past Medical History:   Diagnosis Date     Chronic sinusitis      Cystic fibrosis with pulmonary manifestations (H)      Exocrine pancreatic insufficiency      GERD (gastroesophageal reflux disease)      Hemoptysis      Malnutrition (H)        Past Surgical History:   Procedure " "Laterality Date     LOBECTOMY      right upper lobe     TUBES         Social History     Social History     Marital status: Single     Spouse name: N/A     Number of children: N/A     Years of education: N/A     Occupational History      [employer]     Social History Main Topics     Smoking status: Never Smoker     Smokeless tobacco: Never Used     Alcohol use 0.0 oz/week     0 Standard drinks or equivalent per week     Drug use: Not on file     Sexual activity: Not on file     Other Topics Concern     Not on file     Social History Narrative    Patient works for the [employer] as an .  He does not have stable living conditions at this time.  He has a Swedish guillen.   Update: living with wife Bre in [facility]. Birth of daughter Nieves Rebolledo August, 2017    ROS Pulmonary  Const: no fever, chills, fatigue. ENT: no sinus pain or drainage. GI: no diarrhea, constipation, grease in stools, or gerd sx with h2 blocker. Using 2 cans TF nightly.  Endo: no polyuria or polydipsia.  A complete ROS was otherwise negative except as noted in the HPI.  /79  Pulse 97  Resp 18  Ht 1.897 m (6' 2.69\")  Wt 91.8 kg (202 lb 6.1 oz)  SpO2 94%  BMI 25.51 kg/m2  Exam:   GENERAL APPEARANCE: Well developed, well nourished, alert, and in no apparent distress.  EYES: anicteric  HENT: Nasal mucosa with no edema and no hyperemia. No nasal polyps.  EARS: TMs with normal landmarks and light reflex   MOUTH: Oral mucosa is moist, without any lesions, no tonsillar enlargement, no oropharyngeal exudate.  NECK: supple, no masses, no thyromegaly.  LYMPHATICS: No significant  cervical, or supraclavicular nodes.  RESP:  good air flow throughout.  Clear in all lung fields except rhonchi at right posterior base. Normal chest wall excursion.  CV: Normal S1, S2, regular rhythm, normal rate. No murmur.  No rub. No gallop. No LE edema.   ABDOMEN:  Bowel sounds normal, soft, nontender, no HSM or masses.   MS: " extremities normal. No cyanosis.  SKIN:  PEG site without erythema or discharge. Chest scar-like lesion upper sternum unchanged. Diffuse freckles.   NEURO: Mentation intact, speech normal, normal gait and stance  PSYCH: mentation appears normal. and affect normal/bright  Results:  Recent Results (from the past 168 hour(s))   General PFT Lab (Please always keep checked)    Collection Time: 11/13/17  1:17 PM   Result Value Ref Range    FVC-Pred 6.25 L    FVC-Pre 6.05 L    FVC-%Pred-Pre 96 %    FEV1-Pre 3.06 L    FEV1-%Pred-Pre 60 %    FEV1FVC-Pred 81 %    FEV1FVC-Pre 51 %    FEFMax-Pred 11.33 L/sec    FEFMax-Pre 9.01 L/sec    FEFMax-%Pred-Pre 79 %    FEF2575-Pred 4.82 L/sec    FEF2575-Pre 1.01 L/sec    TKR1294-%Pred-Pre 20 %    ExpTime-Pre 12.77 sec    FIFMax-Pre 10.60 L/sec    FEV1FEV6-Pred 82 %    FEV1FEV6-Pre 58 %     Spirometry interpretation: personally reviewed. Valid maneuver. Moderate obstruction. FEV1 down slightly now about 10% below recent best.   Sputum last visit: PA x 2    Assessment and plan:  1. Mild exacerbation of CF lung disease: Continues to feel well despite further decline in PFTs. Already with reasonably good adherence. Therefore favor trial of cipro. Discussed possibility to IV abx if PFTs continue downward trend. Sary, RT, consulted and she will facilitate replacement of what sounds like malfunctioning vest. Continue alternating alice and reconstituted aztreonam nebs along with chronic azithro (but will hold azithro while on cipro). Encouraged him to follow through on plan to get treadmill placed in insulated garage so he can more easily keep up with aerobic exercise to augment airway clearance.   2. Impaired glucose tolerance: 2 hour glucose 12/16 160 on GTT and A1C higher than previous at 6.2. Still no symptoms to suggest hyperglycemia but possible this could be contributing to slightly lower PFTs. Previouisly reviewed cutting out simple sugars in diet. A lot of inertia with pursuing insulin a  couple years ago when glucose intolerance also an issue. Repeat oral GTT and A1C in December.  3.  Pancreatic insufficiency with a history of malnutrition: Adequate enzyme replacement by history. On chronic tube feeds with decline in volume over past few years due to generous weight. Today weight stable, at target. Continue 2 cans per night.  4. CF sinus disease: has flonase prn - more prominent when seasonal allergies present.  5. GERD: Adequate control with ranitidine.   6. Low vitamin D level: On replacement.  7. Skin lesions: Seen by outside Dermatologist. Central chest scar could be treated by steroid injections per pt but not actively pursuing. Plan in place for annual exam of his many freckles.  8. Healthcare maintenance: Annuals due December, 2017. Ordered today. Flu vaccine given today. Changed most of Rx to [facility] per pt request.   9. Family planning: he and wife Bre now with 2 month girl at home and adjusting well.   Patient will follow up in 1 month  40 minutes spent in conjunction with this visit with > 50% of time spent counseling and coordinating care of following issues: treatment of CF respiratory exacerbation, adherence to airway clearance regimen and lung treatments, status of pancreatic insufficiency and history of malnutrition including counseling on use of tube feeds, control of sinus disease, control of GERD, impaired glucose intolerance], HCM.    Rocael Miller

## 2017-11-15 ASSESSMENT — ANXIETY QUESTIONNAIRES
1. FEELING NERVOUS, ANXIOUS, OR ON EDGE: NOT AT ALL
7. FEELING AFRAID AS IF SOMETHING AWFUL MIGHT HAPPEN: NOT AT ALL
2. NOT BEING ABLE TO STOP OR CONTROL WORRYING: NOT AT ALL
6. BECOMING EASILY ANNOYED OR IRRITABLE: NOT AT ALL
5. BEING SO RESTLESS THAT IT IS HARD TO SIT STILL: NOT AT ALL
3. WORRYING TOO MUCH ABOUT DIFFERENT THINGS: NOT AT ALL
GAD7 TOTAL SCORE: 0

## 2017-11-15 ASSESSMENT — PATIENT HEALTH QUESTIONNAIRE - PHQ9
5. POOR APPETITE OR OVEREATING: NOT AT ALL
SUM OF ALL RESPONSES TO PHQ QUESTIONS 1-9: 0

## 2017-11-15 NOTE — PROGRESS NOTES
[facility], Adult Program   Annual Psychosocial Assessment     Presenting Information:  TELLY is a 37-year-old man with cystic fibrosis, presenting in CF clinic for a regular follow up with primary CF provider, Dr Hung Miller.  Met with Telly for annual psychosocial assessment.    Living situation:   Telly is currently living with his wife, Bre, and daughter, Nieves Rebolledo who was born in August 2017, in [place] in a home that they own. They also have 1 dog. He denies concerns about his living situation.     Family Constellation:   Telly was initially raised by both biological parents, who  when he was around 14 years old. He subsequently lived with his mother but maintained regular contact with his father. Telly has an older brother and older sister. His mother has a house in [place], [place] and [place] in [place]. His father, brother and sister live in [place]. His siblings are both  and each has a ~6-year-old child. Yobany's extended family also lives in the [place] area and operate a large local [email or web address]. 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     Support Network:   Telly describes strong social support. His immediate and extended family are involved and supportive and have been especially helpful with his new daughter. He draws additional support from friends and co-workers. He is happy to be  and he describes a close relationship with Bre. They met on [email or web address]. They were  May 16, 2015, at [facility], with a reception at [employer] Yobany does not stay in contact with anyone who has CF, but he knew other people with CF when he was growing up.     Adjustment to Illness:   Yobany was diagnosed with CF during infancy. He remembers struggling with weight issues as a child. He notes significant past health events in 2000, when he got a G-tube, but continued to struggle with gaining  "weight, and in 2003, when he experienced a pneumothorax and lobectomy. Since that event, he notes that he has successfully gained weight.     Yobany describes his current health status as \"feeling fine.\" Clinically, he has mild lung disease, a history of malnutrition and glucose intolerance. He denies any physical health symptoms that interfere with his daily activities. He generally completes 2 vest treatments a day. He used to exercise through playing hockey and softball, however since the birth of his daughter he has exercised less. He is hoping to  hockey again in the winter. He has done nightly tube-feedings for many years and denies related concerns.       Advance Directive:   This SW reviewed Advance Directive education. He requested SW send a copy of both the long and short version of the HCD via email following this appointment.    Education;   Yobany completed a Bachelor's Degree in Accounting and Business Administration at [employer] in 2007. He does not currently plan to pursue further education.     Employment:   Yobany works full-time as an  for the [employer]. He has 6 weeks of parental leave as a part of his benefits package. Now that his wife has returned to work (today was her first day back after maternity leave) he plans to spread his time out by taking every Friday off for the next 7 months to spend with his daughter. He enjoys his job overall. He has access to full benefits.     Yobany's wife Bre works as a  at a company called [employer].    Mental Health/Coping:   Yobany coped with depressive/anxious symptoms in 2013, due to stressful transitions. He did brief counseling, which he did not find especially helpful.  The issues then resolved.  Yobany denies any current symptoms indicative of mood, anxiety, or other mental health disorder. He describes his current mood as \"great\" recently. His ARCHANA-7 score today was a 0 and his PHQ-9 score was also " a 0, indicating no symptoms of anxiety and depression. Yobany describes his current stress level as manageable. He generally ayana with stressors through working out, walking, going for a drive or distracting himself. Yobany was raised Taoist and describes ariana as important to him, noting that his brother in law is a .     Chemical Health   Yobany denies use of tobacco or illicit drugs. He drinks 1-2 drinks per month at most. He does not have any history of chemical dependency or psychosocial impairment related to substance use.     Finances:   Yobany is able to afford his daily living expenses and denies any financial concerns.  Insurance:   Yobany has employer-based insurance (BCBS). He notes that costs are affordable for him and he denies related concerns.     Recreation/Leisure Interests:   Telly enjoys playing hockey, working out, golfing, spending time with family/friends, and traveling.     Intervention:   -Psychosocial assessment   -Resources provided: HCD    Assessment:   Yobany was open in his responses. He remains psychosocially stable overall with access to relevant resources and supports.  He is excited about being a new father. He is considering completing a health care directive and asked for a copy today. He is comfortable in his job and is stable from a financial and insurance perspective. No concerns expressed/noted.     PLAN OF INTERVENTION:   - Complete psychosocial assessment annually.   - Continue to follow for any psychosocial needs as they arise.      OANH Sharma, [facility]  Adult Cystic Fibrosis   (Pager) [phone]  (Phone) [phone]

## 2017-11-15 NOTE — PROGRESS NOTES
Nutrition Note    Reason for Visit:  F/u on tube feeds, weight trends    Pt noted some weight loss in early summer down to 91 kg d/t busy schedule with new baby at home and tends to be more active during the summer. Has since maintained x 3 months.     Pt reports he continues to infuse 2 cans TF every night although will occasionally shut off early or miss a night. PO intake remains at baseline.      Wt Readings from Last 5 Encounters:   11/13/17 91.8 kg (202 lb 6.1 oz)   08/28/17 91.4 kg (201 lb 9.6 oz)   04/03/17 94.6 kg (208 lb 8.9 oz)   01/23/17 93.1 kg (205 lb 4 oz)   12/16/16 90.7 kg (200 lb)     Interventions/Recommendations:  1) Plan to continue with current regimen at this time. Reinforced goal for weight maintenance at this time, rather than additional gains. If weight trends >94 kg, would recommend decreasing TF infusion to 2 cans for 3x/week rather than daily.     Will follow.       Leona Marie RD, LD  Cystic Fibrosis/Lung Transplant Dietitian  Pager [phone]  On weekends/holidays contact coverage RD at [phone] (inpatient use only)

## 2017-11-16 ASSESSMENT — ANXIETY QUESTIONNAIRES: GAD7 TOTAL SCORE: 0

## 2017-11-21 LAB
BACTERIA SPEC CULT: ABNORMAL
SPECIMEN SOURCE: ABNORMAL

## 2018-01-12 DIAGNOSIS — E84.9 CF (CYSTIC FIBROSIS) (H): Primary | ICD-10-CM

## 2018-01-22 ENCOUNTER — RADIANT APPOINTMENT (OUTPATIENT)
Dept: GENERAL RADIOLOGY | Facility: CLINIC | Age: 38
End: 2018-01-22
Attending: INTERNAL MEDICINE
Payer: COMMERCIAL

## 2018-01-22 ENCOUNTER — INFUSION THERAPY VISIT (OUTPATIENT)
Dept: INFUSION THERAPY | Facility: CLINIC | Age: 38
End: 2018-01-22
Attending: INTERNAL MEDICINE
Payer: COMMERCIAL

## 2018-01-22 ENCOUNTER — OFFICE VISIT (OUTPATIENT)
Dept: PULMONOLOGY | Facility: CLINIC | Age: 38
End: 2018-01-22
Attending: INTERNAL MEDICINE
Payer: COMMERCIAL

## 2018-01-22 ENCOUNTER — RADIANT APPOINTMENT (OUTPATIENT)
Dept: BONE DENSITY | Facility: CLINIC | Age: 38
End: 2018-01-22
Attending: INTERNAL MEDICINE
Payer: COMMERCIAL

## 2018-01-22 VITALS
WEIGHT: 202.6 LBS | HEART RATE: 92 BPM | SYSTOLIC BLOOD PRESSURE: 124 MMHG | OXYGEN SATURATION: 96 % | BODY MASS INDEX: 25.54 KG/M2 | DIASTOLIC BLOOD PRESSURE: 87 MMHG | TEMPERATURE: 97.5 F

## 2018-01-22 VITALS
OXYGEN SATURATION: 96 % | BODY MASS INDEX: 25.54 KG/M2 | HEART RATE: 92 BPM | TEMPERATURE: 97.5 F | DIASTOLIC BLOOD PRESSURE: 87 MMHG | WEIGHT: 202.6 LBS | SYSTOLIC BLOOD PRESSURE: 124 MMHG

## 2018-01-22 DIAGNOSIS — E84.0 CYSTIC FIBROSIS WITH PULMONARY MANIFESTATIONS (H): ICD-10-CM

## 2018-01-22 DIAGNOSIS — E84.0 CYSTIC FIBROSIS WITH PULMONARY MANIFESTATIONS (H): Primary | ICD-10-CM

## 2018-01-22 DIAGNOSIS — E84.9 CF (CYSTIC FIBROSIS) (H): ICD-10-CM

## 2018-01-22 DIAGNOSIS — E84.9 CF (CYSTIC FIBROSIS) (H): Primary | ICD-10-CM

## 2018-01-22 LAB
ALBUMIN SERPL-MCNC: 3.8 G/DL (ref 3.4–5)
ALBUMIN UR-MCNC: NEGATIVE MG/DL
ALP SERPL-CCNC: 73 U/L (ref 40–150)
ALT SERPL W P-5'-P-CCNC: 47 U/L (ref 0–70)
ANION GAP SERPL CALCULATED.3IONS-SCNC: 7 MMOL/L (ref 3–14)
APPEARANCE UR: CLEAR
AST SERPL W P-5'-P-CCNC: 31 U/L (ref 0–45)
BASOPHILS # BLD AUTO: 0 10E9/L (ref 0–0.2)
BASOPHILS NFR BLD AUTO: 0.5 %
BILIRUB UR QL STRIP: NEGATIVE
BUN SERPL-MCNC: 19 MG/DL (ref 7–30)
CALCIUM SERPL-MCNC: 8.8 MG/DL (ref 8.5–10.1)
CHLORIDE SERPL-SCNC: 108 MMOL/L (ref 94–109)
CHOLEST SERPL-MCNC: 126 MG/DL
CO2 SERPL-SCNC: 25 MMOL/L (ref 20–32)
COLOR UR AUTO: YELLOW
CREAT SERPL-MCNC: 0.78 MG/DL (ref 0.66–1.25)
CREAT UR-MCNC: 135 MG/DL
DEPRECATED CALCIDIOL+CALCIFEROL SERPL-MC: 28 UG/L (ref 20–75)
DIFFERENTIAL METHOD BLD: NORMAL
EOSINOPHIL # BLD AUTO: 0.2 10E9/L (ref 0–0.7)
EOSINOPHIL NFR BLD AUTO: 2.4 %
ERYTHROCYTE [DISTWIDTH] IN BLOOD BY AUTOMATED COUNT: 12.6 % (ref 10–15)
ERYTHROCYTE [SEDIMENTATION RATE] IN BLOOD BY WESTERGREN METHOD: 5 MM/H (ref 0–15)
EXPTIME-PRE: 13.36 SEC
FEF2575-%PRED-PRE: 23 %
FEF2575-PRE: 1.15 L/SEC
FEF2575-PRED: 4.81 L/SEC
FEFMAX-%PRED-PRE: 79 %
FEFMAX-PRE: 8.96 L/SEC
FEFMAX-PRED: 11.33 L/SEC
FEV1-%PRED-PRE: 62 %
FEV1-PRE: 3.15 L
FEV1FEV6-PRE: 59 %
FEV1FEV6-PRED: 82 %
FEV1FVC-PRE: 52 %
FEV1FVC-PRED: 81 %
FIFMAX-PRE: 9.46 L/SEC
FVC-%PRED-PRE: 96 %
FVC-PRE: 6.02 L
FVC-PRED: 6.25 L
GFR SERPL CREATININE-BSD FRML MDRD: >90 ML/MIN/1.7M2
GLUCOSE BLDC GLUCOMTR-MCNC: 121 MG/DL (ref 70–99)
GLUCOSE SERPL-MCNC: 106 MG/DL (ref 70–99)
GLUCOSE SERPL-MCNC: 107 MG/DL (ref 70–99)
GLUCOSE SERPL-MCNC: 127 MG/DL (ref 70–99)
GLUCOSE SERPL-MCNC: 161 MG/DL (ref 70–99)
GLUCOSE SERPL-MCNC: 200 MG/DL (ref 70–99)
GLUCOSE SERPL-MCNC: 205 MG/DL (ref 70–99)
GLUCOSE UR STRIP-MCNC: 50 MG/DL
HBA1C MFR BLD: 5.9 % (ref 4.3–6)
HCT VFR BLD AUTO: 45.5 % (ref 40–53)
HDLC SERPL-MCNC: 35 MG/DL
HGB BLD-MCNC: 15.3 G/DL (ref 13.3–17.7)
HGB UR QL STRIP: NEGATIVE
IMM GRANULOCYTES # BLD: 0 10E9/L (ref 0–0.4)
IMM GRANULOCYTES NFR BLD: 0.2 %
INR PPP: 1.02 (ref 0.86–1.14)
INSULIN SERPL-ACNC: 107 MU/L (ref 3–25)
INSULIN SERPL-ACNC: 33.2 MU/L (ref 3–25)
INSULIN SERPL-ACNC: 59.1 MU/L (ref 3–25)
INSULIN SERPL-ACNC: 97.6 MU/L (ref 3–25)
INSULIN SERPL-ACNC: NORMAL MU/L (ref 3–25)
IRON SERPL-MCNC: 72 UG/DL (ref 35–180)
KETONES UR STRIP-MCNC: NEGATIVE MG/DL
LDLC SERPL CALC-MCNC: 70 MG/DL
LEUKOCYTE ESTERASE UR QL STRIP: NEGATIVE
LYMPHOCYTES # BLD AUTO: 1.9 10E9/L (ref 0.8–5.3)
LYMPHOCYTES NFR BLD AUTO: 30.6 %
MAGNESIUM SERPL-MCNC: 2.2 MG/DL (ref 1.6–2.3)
MCH RBC QN AUTO: 30 PG (ref 26.5–33)
MCHC RBC AUTO-ENTMCNC: 33.6 G/DL (ref 31.5–36.5)
MCV RBC AUTO: 89 FL (ref 78–100)
MICROALBUMIN UR-MCNC: 6 MG/L
MICROALBUMIN/CREAT UR: 4.4 MG/G CR (ref 0–17)
MONOCYTES # BLD AUTO: 0.4 10E9/L (ref 0–1.3)
MONOCYTES NFR BLD AUTO: 6.2 %
MUCOUS THREADS #/AREA URNS LPF: PRESENT /LPF
NEUTROPHILS # BLD AUTO: 3.8 10E9/L (ref 1.6–8.3)
NEUTROPHILS NFR BLD AUTO: 60.1 %
NITRATE UR QL: NEGATIVE
NONHDLC SERPL-MCNC: 91 MG/DL
NRBC # BLD AUTO: 0 10*3/UL
NRBC BLD AUTO-RTO: 0 /100
PH UR STRIP: 6 PH (ref 5–7)
PHOSPHATE SERPL-MCNC: 3.9 MG/DL (ref 2.5–4.5)
PLATELET # BLD AUTO: 267 10E9/L (ref 150–450)
POTASSIUM SERPL-SCNC: 3.9 MMOL/L (ref 3.4–5.3)
PROT SERPL-MCNC: 7.6 G/DL (ref 6.8–8.8)
RBC # BLD AUTO: 5.1 10E12/L (ref 4.4–5.9)
RBC #/AREA URNS AUTO: 0 /HPF (ref 0–2)
SODIUM SERPL-SCNC: 139 MMOL/L (ref 133–144)
SOURCE: ABNORMAL
SP GR UR STRIP: 1.02 (ref 1–1.03)
TRIGL SERPL-MCNC: 104 MG/DL
UROBILINOGEN UR STRIP-MCNC: 0 MG/DL (ref 0–2)
WBC # BLD AUTO: 6.3 10E9/L (ref 4–11)
WBC #/AREA URNS AUTO: <1 /HPF (ref 0–2)

## 2018-01-22 PROCEDURE — 82947 ASSAY GLUCOSE BLOOD QUANT: CPT | Mod: 91 | Performed by: INTERNAL MEDICINE

## 2018-01-22 PROCEDURE — 82306 VITAMIN D 25 HYDROXY: CPT | Performed by: INTERNAL MEDICINE

## 2018-01-22 PROCEDURE — 85652 RBC SED RATE AUTOMATED: CPT | Performed by: INTERNAL MEDICINE

## 2018-01-22 PROCEDURE — 85610 PROTHROMBIN TIME: CPT | Performed by: INTERNAL MEDICINE

## 2018-01-22 PROCEDURE — 83735 ASSAY OF MAGNESIUM: CPT | Performed by: INTERNAL MEDICINE

## 2018-01-22 PROCEDURE — 84155 ASSAY OF PROTEIN SERUM: CPT | Performed by: INTERNAL MEDICINE

## 2018-01-22 PROCEDURE — 25000125 ZZHC RX 250: Mod: ZF | Performed by: INTERNAL MEDICINE

## 2018-01-22 PROCEDURE — 83540 ASSAY OF IRON: CPT | Performed by: INTERNAL MEDICINE

## 2018-01-22 PROCEDURE — 84590 ASSAY OF VITAMIN A: CPT | Performed by: INTERNAL MEDICINE

## 2018-01-22 PROCEDURE — 85025 COMPLETE CBC W/AUTO DIFF WBC: CPT | Performed by: INTERNAL MEDICINE

## 2018-01-22 PROCEDURE — 80061 LIPID PANEL: CPT | Performed by: INTERNAL MEDICINE

## 2018-01-22 PROCEDURE — 82043 UR ALBUMIN QUANTITATIVE: CPT | Performed by: INTERNAL MEDICINE

## 2018-01-22 PROCEDURE — 84403 ASSAY OF TOTAL TESTOSTERONE: CPT | Performed by: INTERNAL MEDICINE

## 2018-01-22 PROCEDURE — 83525 ASSAY OF INSULIN: CPT | Performed by: INTERNAL MEDICINE

## 2018-01-22 PROCEDURE — 83036 HEMOGLOBIN GLYCOSYLATED A1C: CPT | Performed by: INTERNAL MEDICINE

## 2018-01-22 PROCEDURE — 82785 ASSAY OF IGE: CPT | Performed by: INTERNAL MEDICINE

## 2018-01-22 PROCEDURE — 84450 TRANSFERASE (AST) (SGOT): CPT | Performed by: INTERNAL MEDICINE

## 2018-01-22 PROCEDURE — 84446 ASSAY OF VITAMIN E: CPT | Performed by: INTERNAL MEDICINE

## 2018-01-22 PROCEDURE — 81001 URINALYSIS AUTO W/SCOPE: CPT | Performed by: INTERNAL MEDICINE

## 2018-01-22 PROCEDURE — 82962 GLUCOSE BLOOD TEST: CPT

## 2018-01-22 PROCEDURE — 80069 RENAL FUNCTION PANEL: CPT | Performed by: INTERNAL MEDICINE

## 2018-01-22 PROCEDURE — 84460 ALANINE AMINO (ALT) (SGPT): CPT | Performed by: INTERNAL MEDICINE

## 2018-01-22 PROCEDURE — 84075 ASSAY ALKALINE PHOSPHATASE: CPT | Performed by: INTERNAL MEDICINE

## 2018-01-22 RX ADMIN — ALCOHOL 75 G: 65 GEL TOPICAL at 08:54

## 2018-01-22 ASSESSMENT — PAIN SCALES - GENERAL: PAINLEVEL: NO PAIN (0)

## 2018-01-22 NOTE — LETTER
1/22/2018       RE: Telly Leal  [address] [address]     Dear Colleague,    Thank you for referring your patient, Telly Leal, to the [facility] at [facility]. Please see a copy of my visit note below.    Reason for Visit  Telly Leal is a 35 year old year old male who is being seen for Cystic Fibrosis (Patient is being seen for CF follow up)    CF HPI  The patient was seen and examined by Rocael Miller   Patient with persistent drop in PFTs without increase in symptoms which prompted cipro treatment last visit in November. Does not recall feeling any better with this. Currenlty with baseline volume and color of sputum. No hemoptysis. Vest 11 to 14 times per week. No regular exercise but uses stairs at work. No dyspnea with ADLs. No hemoptysis or chest pain.     Current Outpatient Prescriptions   Medication     colistimethate/colistin-base activity (COLYMYCIN) 150 mg/2mL SOLR neb solution     acetylcysteine (MUCOMYST) 20 % nebulizer solution     albuterol (2.5 MG/3ML) 0.083% neb solution     albuterol (PROAIR HFA) 108 (90 BASE) MCG/ACT Inhaler     azithromycin (ZITHROMAX) 250 MG tablet     aztreonam (AZACTAM) 200 MG/ML     blood glucose monitoring (NO BRAND SPECIFIED) test strip     CREON 11005-22854 UNITS CPEP per EC capsule     cromolyn (INTAL) 20 MG/2ML neb solution     fluticasone (FLONASE) 50 MCG/ACT spray     phytonadione (MEPHYTON) 5 MG tablet     oseltamivir (TAMIFLU) 75 MG capsule     ranitidine (ZANTAC) 150 MG tablet     water for injection sterile SOLN     tobramycin, PF, (SOO) 300 MG/5ML neb solution     Cholecalciferol 4000 UNITS CAPS     Multiple Vitamin (MULTIVITAMINS PO)     Nutritional Supplements (NUTREN 2.0) LIQD     No current facility-administered medications for this visit.      No Known Allergies  Past Medical History:   Diagnosis Date     Chronic sinusitis      Cystic fibrosis  with pulmonary manifestations (H)      Exocrine pancreatic insufficiency      GERD (gastroesophageal reflux disease)      Hemoptysis      Malnutrition (H)        Past Surgical History:   Procedure Laterality Date     LOBECTOMY      right upper lobe     TUBES         Social History     Social History     Marital status: Single     Spouse name: N/A     Number of children: N/A     Years of education: N/A     Occupational History      [employer]     Social History Main Topics     Smoking status: Never Smoker     Smokeless tobacco: Never Used     Alcohol use 0.0 oz/week     0 Standard drinks or equivalent per week     Drug use: Not on file     Sexual activity: Not on file     Other Topics Concern     Not on file     Social History Narrative    Patient works for the [employer] as an .  He does not have stable living conditions at this time.  He has a [employer].   Update: living with wife Bre in [facility]. Birth of daughter Nieves Rebolledo August, 2017    ROS Pulmonary  Const: no fever, chills, fatigue. ENT: no sinus pain or drainage. GI: no diarrhea, constipation, grease in stools, or gerd sx with h2 blocker. Using 2 cans TF nightly.  Endo: no polyuria or polydipsia.  A complete ROS was otherwise negative except as noted in the HPI.  /87  Pulse 92  Temp 97.5  F (36.4  C) (Oral)  Wt 91.9 kg (202 lb 9.6 oz)  SpO2 96%  BMI 25.54 kg/m2  Exam:   GENERAL APPEARANCE: Well developed, well nourished, alert, and in no apparent distress.  EYES: anicteric  HENT: Nasal mucosa with no edema and no hyperemia. No nasal polyps.  EARS: TMs with normal landmarks and light reflex   MOUTH: Oral mucosa is moist, without any lesions, no tonsillar enlargement, no oropharyngeal exudate.  NECK: supple, no masses, no thyromegaly.  LYMPHATICS: No significant  cervical, or supraclavicular nodes.  RESP:  good air flow throughout.  Clear in all lung fields except rhonchi at right posterior base.  Normal chest wall excursion.  CV: Normal S1, S2, regular rhythm, normal rate. No murmur.  No rub. No gallop. No LE edema.   ABDOMEN:  Bowel sounds normal, soft, nontender, no HSM or masses.   MS: extremities normal. No cyanosis.  SKIN:  PEG site without erythema or discharge. Chest scar-like lesion upper sternum unchanged. Diffuse freckles.   NEURO: Mentation intact, speech normal, normal gait and stance  PSYCH: mentation appears normal. and affect normal/bright  Results:  Recent Results (from the past 168 hour(s))   ALT    Collection Time: 01/22/18  8:45 AM   Result Value Ref Range    ALT 47 0 - 70 U/L   Basic metabolic panel    Collection Time: 01/22/18  8:45 AM   Result Value Ref Range    Sodium 139 133 - 144 mmol/L    Potassium 3.9 3.4 - 5.3 mmol/L    Chloride 108 94 - 109 mmol/L    Carbon Dioxide 25 20 - 32 mmol/L    Anion Gap 7 3 - 14 mmol/L    Glucose 107 (H) 70 - 99 mg/dL    Urea Nitrogen 19 7 - 30 mg/dL    Creatinine 0.78 0.66 - 1.25 mg/dL    GFR Estimate >90 >60 mL/min/1.7m2    GFR Estimate If Black >90 >60 mL/min/1.7m2    Calcium 8.8 8.5 - 10.1 mg/dL   Lipid Profile    Collection Time: 01/22/18  8:45 AM   Result Value Ref Range    Cholesterol 126 <200 mg/dL    Triglycerides 104 <150 mg/dL    HDL Cholesterol 35 (L) >39 mg/dL    LDL Cholesterol Calculated 70 <100 mg/dL    Non HDL Cholesterol 91 <130 mg/dL   Albumin level    Collection Time: 01/22/18  8:45 AM   Result Value Ref Range    Albumin 3.8 3.4 - 5.0 g/dL   Alkaline phosphatase    Collection Time: 01/22/18  8:45 AM   Result Value Ref Range    Alkaline Phosphatase 73 40 - 150 U/L   AST    Collection Time: 01/22/18  8:45 AM   Result Value Ref Range    AST 31 0 - 45 U/L   Iron    Collection Time: 01/22/18  8:45 AM   Result Value Ref Range    Iron 72 35 - 180 ug/dL   Hemoglobin A1c    Collection Time: 01/22/18  8:45 AM   Result Value Ref Range    Hemoglobin A1C 5.9 4.3 - 6.0 %   INR    Collection Time: 01/22/18  8:45 AM   Result Value Ref Range    INR  1.02 0.86 - 1.14   Magnesium    Collection Time: 01/22/18  8:45 AM   Result Value Ref Range    Magnesium 2.2 1.6 - 2.3 mg/dL   Phosphorus    Collection Time: 01/22/18  8:45 AM   Result Value Ref Range    Phosphorus 3.9 2.5 - 4.5 mg/dL   Protein total    Collection Time: 01/22/18  8:45 AM   Result Value Ref Range    Protein Total 7.6 6.8 - 8.8 g/dL   Vitamin D Deficiency    Collection Time: 01/22/18  8:45 AM   Result Value Ref Range    Vitamin D Deficiency screening 28 20 - 75 ug/L   CBC with platelets differential    Collection Time: 01/22/18  8:45 AM   Result Value Ref Range    WBC 6.3 4.0 - 11.0 10e9/L    RBC Count 5.10 4.4 - 5.9 10e12/L    Hemoglobin 15.3 13.3 - 17.7 g/dL    Hematocrit 45.5 40.0 - 53.0 %    MCV 89 78 - 100 fl    MCH 30.0 26.5 - 33.0 pg    MCHC 33.6 31.5 - 36.5 g/dL    RDW 12.6 10.0 - 15.0 %    Platelet Count 267 150 - 450 10e9/L    Diff Method Automated Method     % Neutrophils 60.1 %    % Lymphocytes 30.6 %    % Monocytes 6.2 %    % Eosinophils 2.4 %    % Basophils 0.5 %    % Immature Granulocytes 0.2 %    Nucleated RBCs 0 0 /100    Absolute Neutrophil 3.8 1.6 - 8.3 10e9/L    Absolute Lymphocytes 1.9 0.8 - 5.3 10e9/L    Absolute Monocytes 0.4 0.0 - 1.3 10e9/L    Absolute Eosinophils 0.2 0.0 - 0.7 10e9/L    Absolute Basophils 0.0 0.0 - 0.2 10e9/L    Abs Immature Granulocytes 0.0 0 - 0.4 10e9/L    Absolute Nucleated RBC 0.0    Erythrocyte sedimentation rate auto    Collection Time: 01/22/18  8:45 AM   Result Value Ref Range    Sed Rate 5 0 - 15 mm/h   Glucose in a Series: Draw Time Zero    Collection Time: 01/22/18  8:45 AM   Result Value Ref Range    Glucose 106 (H) 70 - 99 mg/dL   Insulin in a Series: Draw Time Zero    Collection Time: 01/22/18  8:45 AM   Result Value Ref Range    Insulin Unsatisfactory specimen - hemolyzed 3 - 25 mU/L   Glucose in a Series: Draw +30 minutes    Collection Time: 01/22/18  9:25 AM   Result Value Ref Range    Glucose 161 (H) 70 - 99 mg/dL   Insulin in a Series:  Draw +30 minutes    Collection Time: 01/22/18  9:25 AM   Result Value Ref Range    Insulin 33.2 (H) 3 - 25 mU/L   Glucose in a Series: Draw +60 minutes    Collection Time: 01/22/18  9:55 AM   Result Value Ref Range    Glucose 205 (H) 70 - 99 mg/dL   Insulin in a Series: Draw +60 minutes    Collection Time: 01/22/18  9:55 AM   Result Value Ref Range    Insulin 59.1 (H) 3 - 25 mU/L   Glucose in a Series: Draw +90 minutes    Collection Time: 01/22/18 10:25 AM   Result Value Ref Range    Glucose 200 (H) 70 - 99 mg/dL   Insulin in a Series: Draw +90 minutes    Collection Time: 01/22/18 10:25 AM   Result Value Ref Range    Insulin 97.6 (H) 3 - 25 mU/L   Routine UA with microscopic    Collection Time: 01/22/18 10:55 AM   Result Value Ref Range    Color Urine Yellow     Appearance Urine Clear     Glucose Urine 50 (A) NEG^Negative mg/dL    Bilirubin Urine Negative NEG^Negative    Ketones Urine Negative NEG^Negative mg/dL    Specific Gravity Urine 1.019 1.003 - 1.035    Blood Urine Negative NEG^Negative    pH Urine 6.0 5.0 - 7.0 pH    Protein Albumin Urine Negative NEG^Negative mg/dL    Urobilinogen mg/dL 0.0 0.0 - 2.0 mg/dL    Nitrite Urine Negative NEG^Negative    Leukocyte Esterase Urine Negative NEG^Negative    Source Midstream Urine     WBC Urine <1 0 - 2 /HPF    RBC Urine 0 0 - 2 /HPF    Mucous Urine Present (A) NEG^Negative /LPF   Albumin Random Urine Quantitative with Creat Ratio    Collection Time: 01/22/18 10:55 AM   Result Value Ref Range    Creatinine Urine 135 mg/dL    Albumin Urine mg/L 6 mg/L    Albumin Urine mg/g Cr 4.40 0 - 17 mg/g Cr   Glucose in a Series: Draw +120 minutes    Collection Time: 01/22/18 10:55 AM   Result Value Ref Range    Glucose 127 (H) 70 - 99 mg/dL   Insulin in a Series: Draw +120 minutes    Collection Time: 01/22/18 10:55 AM   Result Value Ref Range    Insulin 107.0 (H) 3 - 25 mU/L   Glucose by meter    Collection Time: 01/22/18 10:58 AM   Result Value Ref Range    Glucose 121 (H) 70 -  99 mg/dL   General PFT Lab (Please always keep checked)    Collection Time: 01/22/18 12:05 PM   Result Value Ref Range    FVC-Pred 6.25 L    FVC-Pre 6.02 L    FVC-%Pred-Pre 96 %    FEV1-Pre 3.15 L    FEV1-%Pred-Pre 62 %    FEV1FVC-Pred 81 %    FEV1FVC-Pre 52 %    FEFMax-Pred 11.33 L/sec    FEFMax-Pre 8.96 L/sec    FEFMax-%Pred-Pre 79 %    FEF2575-Pred 4.81 L/sec    FEF2575-Pre 1.15 L/sec    CRF6742-%Pred-Pre 23 %    ExpTime-Pre 13.36 sec    FIFMax-Pre 9.46 L/sec    FEV1FEV6-Pred 82 %    FEV1FEV6-Pre 59 %     Spirometry interpretation: personally reviewed. Valid maneuver. Moderate obstruction. FEV1 up slightly and at baseline of past year but 5 to 10% below 2 years ago.   Sputum last visit: PA x 3, aspergillus  Chest x-ray today personally reviewed. Mild volume loss right s/p upper lobectomy. Mild bronchiectatic changes with mild mucus plugging. No interval change.    Assessment and plan:  1. CF lung disease: No overt change with trial of cipro. Still feels well. Will defer IV abx. This may represent new baseline. Chest film argues against a lot of retained secretions. Will try coly nebs instead of aztreonam nebs as feels the latter don't work as well. Will continue alternating with alice nebs and stay on chronic azithromycin. Doing a good job with vest therapy. Encouraged him to follow through on plan to get treadmill placed in insulated garage so he can more easily keep up with aerobic exercise to augment airway clearance. Did not feel symbicort helped.  2. Impaired glucose tolerance: Fasting > 100 and peak > 200 and 2 hour value OK. A1C under 6. Consensus is to defer another evaluation in diabetes clinic given limited follow through/interest on patients part now and in past.   3.  Pancreatic insufficiency with a history of malnutrition: Adequate enzyme replacement by history. On chronic tube feeds with decline in volume over past few years due to generous weight. Today weight stable, at target. Continue 2 cans per  night.  4. CF sinus disease: has flonase prn - more prominent when seasonal allergies present.  5. GERD: Adequate control with ranitidine.   6. Low vitamin D level: On replacement but has not been taking. Encouraged him to resume this. Level from today pending.  7. Skin lesions: Seen by outside Dermatologist. Central chest scar could be treated by steroid injections per pt but not actively pursuing. Plan in place for annual exam of his many freckles.  8. Healthcare maintenance: Annuals due January, 2018. Ordered today. Flu vaccine given.   9. Family planning: he and wife Bre now with 5 month girl Nieves and adjusting well.   Patient will follow up in 2 months  40 minutes spent in conjunction with this visit with > 50% of time spent counseling and coordinating care of above issues as well as detailed review of his annual studies with patient.     Rocael Miller

## 2018-01-22 NOTE — PROGRESS NOTES
Reason for Visit  Telly Leal is a 35 year old year old male who is being seen for Cystic Fibrosis (Patient is being seen for CF follow up)    CF HPI  The patient was seen and examined by Rocael Miller   Patient with persistent drop in PFTs without increase in symptoms which prompted cipro treatment last visit in November. Does not recall feeling any better with this. Currenlty with baseline volume and color of sputum. No hemoptysis. Vest 11 to 14 times per week. No regular exercise but uses stairs at work. No dyspnea with ADLs. No hemoptysis or chest pain.     Current Outpatient Prescriptions   Medication     colistimethate/colistin-base activity (COLYMYCIN) 150 mg/2mL SOLR neb solution     acetylcysteine (MUCOMYST) 20 % nebulizer solution     albuterol (2.5 MG/3ML) 0.083% neb solution     albuterol (PROAIR HFA) 108 (90 BASE) MCG/ACT Inhaler     azithromycin (ZITHROMAX) 250 MG tablet     aztreonam (AZACTAM) 200 MG/ML     blood glucose monitoring (NO BRAND SPECIFIED) test strip     CREON 55000-09767 UNITS CPEP per EC capsule     cromolyn (INTAL) 20 MG/2ML neb solution     fluticasone (FLONASE) 50 MCG/ACT spray     phytonadione (MEPHYTON) 5 MG tablet     oseltamivir (TAMIFLU) 75 MG capsule     ranitidine (ZANTAC) 150 MG tablet     water for injection sterile SOLN     tobramycin, PF, (SOO) 300 MG/5ML neb solution     Cholecalciferol 4000 UNITS CAPS     Multiple Vitamin (MULTIVITAMINS PO)     Nutritional Supplements (NUTREN 2.0) LIQD     No current facility-administered medications for this visit.      No Known Allergies  Past Medical History:   Diagnosis Date     Chronic sinusitis      Cystic fibrosis with pulmonary manifestations (H)      Exocrine pancreatic insufficiency      GERD (gastroesophageal reflux disease)      Hemoptysis      Malnutrition (H)        Past Surgical History:   Procedure Laterality Date     LOBECTOMY      right upper lobe     TUBES         Social History     Social History      Marital status: Single     Spouse name: N/A     Number of children: N/A     Years of education: N/A     Occupational History      [employer]     Social History Main Topics     Smoking status: Never Smoker     Smokeless tobacco: Never Used     Alcohol use 0.0 oz/week     0 Standard drinks or equivalent per week     Drug use: Not on file     Sexual activity: Not on file     Other Topics Concern     Not on file     Social History Narrative    Patient works for the [employer] as an .  He does not have stable living conditions at this time.  He has a [place] guillen.   Update: living with wife Bre in [facility]. Birth of daughter Nieves Rebolledo August, 2017    ROS Pulmonary  Const: no fever, chills, fatigue. ENT: no sinus pain or drainage. GI: no diarrhea, constipation, grease in stools, or gerd sx with h2 blocker. Using 2 cans TF nightly.  Endo: no polyuria or polydipsia.  A complete ROS was otherwise negative except as noted in the HPI.  /87  Pulse 92  Temp 97.5  F (36.4  C) (Oral)  Wt 91.9 kg (202 lb 9.6 oz)  SpO2 96%  BMI 25.54 kg/m2  Exam:   GENERAL APPEARANCE: Well developed, well nourished, alert, and in no apparent distress.  EYES: anicteric  HENT: Nasal mucosa with no edema and no hyperemia. No nasal polyps.  EARS: TMs with normal landmarks and light reflex   MOUTH: Oral mucosa is moist, without any lesions, no tonsillar enlargement, no oropharyngeal exudate.  NECK: supple, no masses, no thyromegaly.  LYMPHATICS: No significant  cervical, or supraclavicular nodes.  RESP:  good air flow throughout.  Clear in all lung fields except rhonchi at right posterior base. Normal chest wall excursion.  CV: Normal S1, S2, regular rhythm, normal rate. No murmur.  No rub. No gallop. No LE edema.   ABDOMEN:  Bowel sounds normal, soft, nontender, no HSM or masses.   MS: extremities normal. No cyanosis.  SKIN:  PEG site without erythema or discharge. Chest scar-like lesion upper  sternum unchanged. Diffuse freckles.   NEURO: Mentation intact, speech normal, normal gait and stance  PSYCH: mentation appears normal. and affect normal/bright  Results:  Recent Results (from the past 168 hour(s))   ALT    Collection Time: 01/22/18  8:45 AM   Result Value Ref Range    ALT 47 0 - 70 U/L   Basic metabolic panel    Collection Time: 01/22/18  8:45 AM   Result Value Ref Range    Sodium 139 133 - 144 mmol/L    Potassium 3.9 3.4 - 5.3 mmol/L    Chloride 108 94 - 109 mmol/L    Carbon Dioxide 25 20 - 32 mmol/L    Anion Gap 7 3 - 14 mmol/L    Glucose 107 (H) 70 - 99 mg/dL    Urea Nitrogen 19 7 - 30 mg/dL    Creatinine 0.78 0.66 - 1.25 mg/dL    GFR Estimate >90 >60 mL/min/1.7m2    GFR Estimate If Black >90 >60 mL/min/1.7m2    Calcium 8.8 8.5 - 10.1 mg/dL   Lipid Profile    Collection Time: 01/22/18  8:45 AM   Result Value Ref Range    Cholesterol 126 <200 mg/dL    Triglycerides 104 <150 mg/dL    HDL Cholesterol 35 (L) >39 mg/dL    LDL Cholesterol Calculated 70 <100 mg/dL    Non HDL Cholesterol 91 <130 mg/dL   Albumin level    Collection Time: 01/22/18  8:45 AM   Result Value Ref Range    Albumin 3.8 3.4 - 5.0 g/dL   Alkaline phosphatase    Collection Time: 01/22/18  8:45 AM   Result Value Ref Range    Alkaline Phosphatase 73 40 - 150 U/L   AST    Collection Time: 01/22/18  8:45 AM   Result Value Ref Range    AST 31 0 - 45 U/L   Iron    Collection Time: 01/22/18  8:45 AM   Result Value Ref Range    Iron 72 35 - 180 ug/dL   Hemoglobin A1c    Collection Time: 01/22/18  8:45 AM   Result Value Ref Range    Hemoglobin A1C 5.9 4.3 - 6.0 %   INR    Collection Time: 01/22/18  8:45 AM   Result Value Ref Range    INR 1.02 0.86 - 1.14   Magnesium    Collection Time: 01/22/18  8:45 AM   Result Value Ref Range    Magnesium 2.2 1.6 - 2.3 mg/dL   Phosphorus    Collection Time: 01/22/18  8:45 AM   Result Value Ref Range    Phosphorus 3.9 2.5 - 4.5 mg/dL   Protein total    Collection Time: 01/22/18  8:45 AM   Result Value Ref  Range    Protein Total 7.6 6.8 - 8.8 g/dL   Vitamin D Deficiency    Collection Time: 01/22/18  8:45 AM   Result Value Ref Range    Vitamin D Deficiency screening 28 20 - 75 ug/L   CBC with platelets differential    Collection Time: 01/22/18  8:45 AM   Result Value Ref Range    WBC 6.3 4.0 - 11.0 10e9/L    RBC Count 5.10 4.4 - 5.9 10e12/L    Hemoglobin 15.3 13.3 - 17.7 g/dL    Hematocrit 45.5 40.0 - 53.0 %    MCV 89 78 - 100 fl    MCH 30.0 26.5 - 33.0 pg    MCHC 33.6 31.5 - 36.5 g/dL    RDW 12.6 10.0 - 15.0 %    Platelet Count 267 150 - 450 10e9/L    Diff Method Automated Method     % Neutrophils 60.1 %    % Lymphocytes 30.6 %    % Monocytes 6.2 %    % Eosinophils 2.4 %    % Basophils 0.5 %    % Immature Granulocytes 0.2 %    Nucleated RBCs 0 0 /100    Absolute Neutrophil 3.8 1.6 - 8.3 10e9/L    Absolute Lymphocytes 1.9 0.8 - 5.3 10e9/L    Absolute Monocytes 0.4 0.0 - 1.3 10e9/L    Absolute Eosinophils 0.2 0.0 - 0.7 10e9/L    Absolute Basophils 0.0 0.0 - 0.2 10e9/L    Abs Immature Granulocytes 0.0 0 - 0.4 10e9/L    Absolute Nucleated RBC 0.0    Erythrocyte sedimentation rate auto    Collection Time: 01/22/18  8:45 AM   Result Value Ref Range    Sed Rate 5 0 - 15 mm/h   Glucose in a Series: Draw Time Zero    Collection Time: 01/22/18  8:45 AM   Result Value Ref Range    Glucose 106 (H) 70 - 99 mg/dL   Insulin in a Series: Draw Time Zero    Collection Time: 01/22/18  8:45 AM   Result Value Ref Range    Insulin Unsatisfactory specimen - hemolyzed 3 - 25 mU/L   Glucose in a Series: Draw +30 minutes    Collection Time: 01/22/18  9:25 AM   Result Value Ref Range    Glucose 161 (H) 70 - 99 mg/dL   Insulin in a Series: Draw +30 minutes    Collection Time: 01/22/18  9:25 AM   Result Value Ref Range    Insulin 33.2 (H) 3 - 25 mU/L   Glucose in a Series: Draw +60 minutes    Collection Time: 01/22/18  9:55 AM   Result Value Ref Range    Glucose 205 (H) 70 - 99 mg/dL   Insulin in a Series: Draw +60 minutes    Collection Time:  01/22/18  9:55 AM   Result Value Ref Range    Insulin 59.1 (H) 3 - 25 mU/L   Glucose in a Series: Draw +90 minutes    Collection Time: 01/22/18 10:25 AM   Result Value Ref Range    Glucose 200 (H) 70 - 99 mg/dL   Insulin in a Series: Draw +90 minutes    Collection Time: 01/22/18 10:25 AM   Result Value Ref Range    Insulin 97.6 (H) 3 - 25 mU/L   Routine UA with microscopic    Collection Time: 01/22/18 10:55 AM   Result Value Ref Range    Color Urine Yellow     Appearance Urine Clear     Glucose Urine 50 (A) NEG^Negative mg/dL    Bilirubin Urine Negative NEG^Negative    Ketones Urine Negative NEG^Negative mg/dL    Specific Gravity Urine 1.019 1.003 - 1.035    Blood Urine Negative NEG^Negative    pH Urine 6.0 5.0 - 7.0 pH    Protein Albumin Urine Negative NEG^Negative mg/dL    Urobilinogen mg/dL 0.0 0.0 - 2.0 mg/dL    Nitrite Urine Negative NEG^Negative    Leukocyte Esterase Urine Negative NEG^Negative    Source Midstream Urine     WBC Urine <1 0 - 2 /HPF    RBC Urine 0 0 - 2 /HPF    Mucous Urine Present (A) NEG^Negative /LPF   Albumin Random Urine Quantitative with Creat Ratio    Collection Time: 01/22/18 10:55 AM   Result Value Ref Range    Creatinine Urine 135 mg/dL    Albumin Urine mg/L 6 mg/L    Albumin Urine mg/g Cr 4.40 0 - 17 mg/g Cr   Glucose in a Series: Draw +120 minutes    Collection Time: 01/22/18 10:55 AM   Result Value Ref Range    Glucose 127 (H) 70 - 99 mg/dL   Insulin in a Series: Draw +120 minutes    Collection Time: 01/22/18 10:55 AM   Result Value Ref Range    Insulin 107.0 (H) 3 - 25 mU/L   Glucose by meter    Collection Time: 01/22/18 10:58 AM   Result Value Ref Range    Glucose 121 (H) 70 - 99 mg/dL   General PFT Lab (Please always keep checked)    Collection Time: 01/22/18 12:05 PM   Result Value Ref Range    FVC-Pred 6.25 L    FVC-Pre 6.02 L    FVC-%Pred-Pre 96 %    FEV1-Pre 3.15 L    FEV1-%Pred-Pre 62 %    FEV1FVC-Pred 81 %    FEV1FVC-Pre 52 %    FEFMax-Pred 11.33 L/sec    FEFMax-Pre 8.96  L/sec    FEFMax-%Pred-Pre 79 %    FEF2575-Pred 4.81 L/sec    FEF2575-Pre 1.15 L/sec    PFI1860-%Pred-Pre 23 %    ExpTime-Pre 13.36 sec    FIFMax-Pre 9.46 L/sec    FEV1FEV6-Pred 82 %    FEV1FEV6-Pre 59 %     Spirometry interpretation: personally reviewed. Valid maneuver. Moderate obstruction. FEV1 up slightly and at baseline of past year but 5 to 10% below 2 years ago.   Sputum last visit: PA x 3, aspergillus  Chest x-ray today personally reviewed. Mild volume loss right s/p upper lobectomy. Mild bronchiectatic changes with mild mucus plugging. No interval change.    Assessment and plan:  1. CF lung disease: No overt change with trial of cipro. Still feels well. Will defer IV abx. This may represent new baseline. Chest film argues against a lot of retained secretions. Will try coly nebs instead of aztreonam nebs as feels the latter don't work as well. Will continue alternating with alice nebs and stay on chronic azithromycin. Doing a good job with vest therapy. Encouraged him to follow through on plan to get treadmill placed in insulated garage so he can more easily keep up with aerobic exercise to augment airway clearance. Did not feel symbicort helped.  2. Impaired glucose tolerance: Fasting > 100 and peak > 200 and 2 hour value OK. A1C under 6. Consensus is to defer another evaluation in diabetes clinic given limited follow through/interest on patients part now and in past.   3.  Pancreatic insufficiency with a history of malnutrition: Adequate enzyme replacement by history. On chronic tube feeds with decline in volume over past few years due to generous weight. Today weight stable, at target. Continue 2 cans per night.  4. CF sinus disease: has flonase prn - more prominent when seasonal allergies present.  5. GERD: Adequate control with ranitidine.   6. Low vitamin D level: On replacement but has not been taking. Encouraged him to resume this. Level from today pending.  7. Skin lesions: Seen by outside  Dermatologist. Central chest scar could be treated by steroid injections per pt but not actively pursuing. Plan in place for annual exam of his many freckles.  8. Healthcare maintenance: Annuals due January, 2018. Ordered today. Flu vaccine given.   9. Family planning: he and wife Bre now with 5 month girl Nieves and adjusting well.   Patient will follow up in 2 months  40 minutes spent in conjunction with this visit with > 50% of time spent counseling and coordinating care of above issues as well as detailed review of his annual studies with patient.     Rocael Miller

## 2018-01-22 NOTE — MR AVS SNAPSHOT
After Visit Summary   1/22/2018    Telly Leal    MRN: [id number]           Patient Information     Date Of Birth          1980        Visit Information        Provider Department      1/22/2018 1:40 PM Rocael Miller MD [facility]        Today's Diagnoses     Cystic fibrosis with pulmonary manifestations (H)    -  1      Care Instructions    Cystic Fibrosis Self-Care Plan    RECOMMENDATIONS:     YOUR GOAL:      CF Nurse line:          Brice Reveles   [phone]            CF Resp Therapist: Sary Hassan            [phone]   CF Dietitians:           Leona Marie            [phone]                       Karissa sAhraf                       [phone]   CF Diabetes Nurse: Rissa Alvarez             [phone]    CF Social Workers:  Tika Gutierrez             [phone]                Yoselin Abel            [phone]  CF Pharmacist:        Cecilia Gordillo                              [phone]  [email or web address]         MRN: [id number]   Clinic Date: January 22, 2018   Patient: Telly Leal     Annual Studies:   IGG   Date Value Ref Range Status   12/11/2015 1160 695 - 1620 mg/dL Final     Insulin   Date Value Ref Range Status   01/22/2018 107.0 (H) 3 - 25 mU/L Final     Comment:     Starting 7/13/2017, insulin results will decrease by approximately 37%   compared to insulin results reported in EPIC between (12/16/2016-7/12/2017),   and should be interpreted accordingly. Insulin results reported prior to   12/16/16 are comparable to current insulin results and therefore no adjustment   is needed.       There are no preventive care reminders to display for this patient.      Pulmonary Function Tests  FEV1: amount of air you can blow out in 1 second  FVC: total amount of air you can take in and blow out    Your Goals:         PFT Latest Ref Rng & Units 1/22/2018   FVC L 6.02   FEV1 L 3.15   FVC% % 96   FEV1% % 62           Airway Clearance: The Most Important Way to Keep Your Lungs Healthy  Vest Settings:    Hill-Rom Frequencies: 8, 9, 10 Pressure 10 Then, Frequencies 18, 19, 20 Pressure 6      RespirTech: Quick Start with Pressure of     Do each frequency for 5 minutes; Deflate vest after each frequency & cough 3 times before beginning the next setting.    Vest and Neb Therapy should be done 2-3 times/day.    Good Nutrition Can Improve Lung Function and Overall Health     Take ALL of your vitamins with food     Take 1/2 of your enzymes before EVERY meal/snack and the other 1/2 mid-meal/snack    Wt Readings from Last 3 Encounters:   01/22/18 91.9 kg (202 lb 9.6 oz)   01/22/18 91.9 kg (202 lb 9.6 oz)   11/13/17 91.8 kg (202 lb 6.1 oz)       Body mass index is 25.54 kg/(m^2).         National CF Foundation Recommendations for BMI in CF Adults: Women: at least 22 Men: at least 23        Controlling Blood Sugars Helps Prevent Lung Infections & Improves Nutrition  Test blood sugar:     In the morning before eating (goal is )     2 hours after a meal (goal is less than 150)     When pre-meal glucose is greater than 150 add correction     At bedtime (if less than 100 eat a snack with 15 grams of carbohydrates  Last A1C Results:   Hemoglobin A1C   Date Value Ref Range Status   01/22/2018 5.9 4.3 - 6.0 % Final         If diabetic, measure A1C every 6 months. Goal: Under 7%    Staying Healthy    Research:  If you are interested in learning about research opportunities or have questions, please contact Evie Fairbanks at [phone] or [email or web address].      CF Foundation:  Compass is a personalized resource service to help you with the insurance, financial, legal and other issues you are facing.  It's free, confidential and available to anyone with CF.  Ask your  for more information or contact Compass directly at 328-ZPXZTMF ([phone]) or compass@cff.org, or learn more at cff.org/compass.                              Follow-ups after your visit        Follow-up notes from your care team     Return in about 2 months (around 3/22/2018).      Future tests that were ordered for you today     Open Standing Orders        Priority Remaining Interval Expires Ordered    Cystic Fibrosis Culture Aerob Bacterial Routine 99/100  1/22/2019 1/22/2018    PFT General Lab Testing Routine 50/50  1/22/2019 1/22/2018    AFB Culture Non Blood Routine 5/5  1/22/2019 1/22/2018    AFB Stain Non Blood Routine 5/5  1/22/2019 1/22/2018          Open Future Orders        Priority Expected Expires Ordered    RESPIRATORY FLOW VOLUME LOOP Routine 3/23/2018 7/21/2018 1/22/2018            Who to contact     If you have questions or need follow up information about today's clinic visit or your schedule please contact [facility] directly at [phone].  Normal or non-critical lab and imaging results will be communicated to you by Newscronhart, letter or phone within 4 business days after the clinic has received the results. If you do not hear from us within 7 days, please contact the clinic through Newscronhart or phone. If you have a critical or abnormal lab result, we will notify you by phone as soon as possible.  Submit refill requests through [employer] or call your pharmacy and they will forward the refill request to us. Please allow 3 business days for your refill to be completed.          Additional Information About Your Visit        MyChart Information     [employer] gives you secure access to your electronic health record. If you see a primary care provider, you can also send messages to your care team and make appointments. If you have questions, please call your primary care clinic.  If you do not have a primary care provider, please call [phone] and they will assist you.        Care EveryWhere ID     This is your Care EveryWhere ID. This could be used by other organizations to access your [facility]  records  [id number]        Your Vitals Were     Pulse Temperature Pulse Oximetry BMI (Body Mass Index)          92 97.5  F (36.4  C) (Oral) 96% 25.54 kg/m2         Blood Pressure from Last 3 Encounters:   01/22/18 124/87   01/22/18 124/87   11/13/17 112/79    Weight from Last 3 Encounters:   01/22/18 91.9 kg (202 lb 9.6 oz)   01/22/18 91.9 kg (202 lb 9.6 oz)   11/13/17 91.8 kg (202 lb 6.1 oz)                 Today's Medication Changes          These changes are accurate as of: 1/22/18  1:53 PM.  If you have any questions, ask your nurse or doctor.               Start taking these medicines.        Dose/Directions    colistimethate/colistin-base activity 150 mg/2mL Solr neb solution   Commonly known as:  COLYMYCIN   Used for:  Cystic fibrosis with pulmonary manifestations (H)   Started by:  Rocael Miller MD        Dose:  150 mg   Take 150 mg by nebulization 2 times daily   Quantity:  9000 mg   Refills:  6            Where to get your medicines      These medications were sent to [facility] - [place], [place] - [address] [place] [address]  [address] [facility], [facility] [address]    Hours:  Mon-Fri 8:30am-5:00pm Toll Free [phone] Phone:  [phone]     colistimethate/colistin-base activity 150 mg/2mL Solr neb solution                Primary Care Provider Fax #    Physician No Ref-Primary [phone]       No address on file        Equal Access to Services     MATTHEW HURD : Geno root Soisela, waaxda luqadaha, qaybta kaalmada adeegyajey, waxay idiin hayaan adeeg kharash la'aan . So [facility] [phone].    ATENCIÓN: Si habla español, tiene a cardozo disposición servicios gratuitos de asistencia lingüística. Llame al [phone].    We comply with applicable federal civil rights laws and [place] laws. We do not discriminate on the basis of race, color, national origin, age, disability, sex, sexual orientation, or gender identity.            Thank you!     Thank you for  [facility]  for your care. Our goal is always to provide you with excellent care. Hearing back from our patients is one way we can continue to improve our services. Please take a few minutes to complete the written survey that you may receive in the mail after your visit with us. Thank you!             Your Updated Medication List - Protect others around you: Learn how to safely use, store and throw away your medicines at www.disposemymeds.org.          This list is accurate as of: 1/22/18  1:53 PM.  Always use your most recent med list.                   Brand Name Dispense Instructions for use Diagnosis    acetylcysteine 20 % nebulizer solution    MUCOMYST    180 mL    Inhale 2 mLs into the lungs 3 times daily    Cystic fibrosis with pulmonary manifestations (H), Vitamin B12 deficiency disease       * albuterol (2.5 MG/3ML) 0.083% neb solution     360 mL    Take 1 vial (2.5 mg) by nebulization 3 times daily    Cystic fibrosis with pulmonary manifestations (H)       * albuterol 108 (90 BASE) MCG/ACT Inhaler    PROAIR HFA    1 Inhaler    Inhale 1-2 puffs into the lungs every 6 hours as needed for shortness of breath / dyspnea Every 4-6 hours as needed    Cystic fibrosis with pulmonary manifestations (H), Vitamin B12 deficiency disease       azithromycin 250 MG tablet    ZITHROMAX    30 tablet    Take 1 tablet (250 mg) by mouth daily    CF (cystic fibrosis) (H)       aztreonam 200 MG/ML    AZACTAM    56 each    Take 5 mLs (1 g) by nebulization 2 times daily    Cystic fibrosis with pulmonary manifestations (H)       blood glucose monitoring test strip    no brand specified    100 strip    Use to test blood sugars 4-6 times daily or as directed.    Cystic fibrosis with pulmonary manifestations (H), Glucose intolerance (impaired glucose tolerance)       Cholecalciferol 4000 UNITS Caps     90 capsule    Take 1 capsule by mouth daily    Cystic fibrosis with pulmonary  manifestations (H)       colistimethate/colistin-base activity 150 mg/2mL Solr neb solution    COLYMYCIN    9000 mg    Take 150 mg by nebulization 2 times daily    Cystic fibrosis with pulmonary manifestations (H)       CREON [address] UNITS Cpep per EC capsule   Generic drug:  amylase-lipase-protease     1000 capsule    Take 5-6 capsules (120,000-144,000 Units) by mouth See Admin Instructions 5-6 with meals, 1-5 with snacks,7-8 with tube feedings    Cystic fibrosis with pulmonary manifestations (H)       cromolyn 20 MG/2ML neb solution    INTAL    180 mL    Take 2 mLs (20 mg) by nebulization 3 times daily 1 vial three times daily    Cystic fibrosis with pulmonary manifestations (H)       fluticasone 50 MCG/ACT spray    FLONASE    16 g    Spray 2 sprays into both nostrils as needed    Cystic fibrosis with pulmonary manifestations (H)       MULTIVITAMINS PO      Take 1 tablet by mouth daily.        NUTREN 2.0      Take 4-5 Cans by mouth. 168/ml/hr 7 days/week        oseltamivir 75 MG capsule    TAMIFLU    10 capsule    Take 1 capsule (75 mg) by mouth 2 times daily For 5 days    Cystic fibrosis with pulmonary manifestations (H), Vitamin B12 deficiency disease       phytonadione 5 MG tablet    MEPHYTON    12 tablet    TAKE 1 TABLET (5 MG) BY MOUTH 3 TIMES A WEEK    CF (cystic fibrosis) (H), Exocrine pancreatic insufficiency       ranitidine 150 MG tablet    ZANTAC    180 tablet    Take 1 tablet (150 mg) by mouth 2 times daily    Gastroesophageal reflux disease without esophagitis       tobramycin (PF) 300 MG/5ML neb solution    SOO    300 mL    Take 5 mLs (300 mg) by nebulization 2 times daily    Cystic fibrosis with pulmonary manifestations (H)       water for injection sterile Soln     250 mL    Inject 4 mLs as directed 2 times daily    Cystic fibrosis with pulmonary manifestations (H)       * Notice:  This list has 2 medication(s) that are the same as other medications prescribed for you. Read the directions  carefully, and ask your doctor or other care provider to review them with you.

## 2018-01-22 NOTE — PROGRESS NOTES
Telly is here for a glucose tolerance test for a history of Cystic Fibrosis.  Orders written by Dr. Miller on 01/22/18.  Telly arrived NPO, last eating at midnight last night.  IV placed left AC without difficulty by GRACIE Soria LPN.  Requested labs drawn.  Glucose and Insulin levels drawn at -0- 845 am, +30 925 am, +60 955 am, +90 1025 am, and + 120 1055 am.  Telly started drinking the Glucola at 855 am and completed within 10 minutes.  Post blood sugar tested and was 121.  Snack was offered to patient prior to discharge, but declined is going to cafe.   PIV discontinued without difficulty.    Telly will follow up, as planned, with his CF team for test results and all future appointments.       GRACIE Soria LPN    Administrations This Visit     glucose tolerence test (GLUCOLA) 25% oral liquid 75 g     Admin Date Action Dose Route Administered By             01/22/2018 Given 75 g Oral Flaquito Soria LPN

## 2018-01-22 NOTE — MR AVS SNAPSHOT
After Visit Summary   1/22/2018    Telly Leal    MRN: [id number]           Patient Information     Date Of Birth          1980        Visit Information        Provider Department      1/22/2018 9:00 AM  47 ATC;  SPEC INFUSION [facility] Specialty and Procedure        Today's Diagnoses     Cystic fibrosis with pulmonary manifestations (H)    -  1    CF (cystic fibrosis) (H)           Follow-ups after your visit        Your next 10 appointments already scheduled     Jan 22, 2018 12:00 PM CST   (Arrive by 11:45 AM)   XR CHEST 2 VIEWS with XR1   [facility] Xray ([facility])    [address] [id number] [phone]           Please bring a list of your current medicines to your exam. (Include vitamins, minerals and over-thecounter medicines.) Leave your valuables at home.  Tell your doctor if there is a chance you may be pregnant.  You do not need to do anything special for this exam.            Jan 22, 2018 12:30 PM CST   DX HIP/PELVIS/SPINE with [address] [place] [address] [facility] ([facility])    [address] [id number] [phone]           Please do not take any of the following 24 hours prior to the day of your exam: vitamins, calcium tablets, antacids.  If possible, please wear clothes without metal (snaps, zippers). A sweatsuit works well.            Jan 22, 2018  1:00 PM CST   CF LOOP with  PFL CF   [facility] Pulmonary Function Testing ([facility])    [address] [id number] [phone]            Jan 22, 2018  1:40 PM CST   (Arrive by 1:25 PM)   RETURN CYSTIC FIBROSIS VISIT with Rocael Miller MD   [facility] ([facility])    [address] [place] [id number] [phone]             Jan 22, 2018  1:40 PM CST   (Arrive by 1:25 PM)   Evaluation with Sundeep Brennan PT   [facility] Physical Therapy Outpatient C[facility]rosis [facility] ([facility])    [address] [place] [id number] [phone]              Who to contact     If you have questions or need follow up information about today's clinic visit or your schedule please contact [facility] SPECIAL[facility] AND PROCEDURE directly at [phone].  Normal or non-critical lab and imaging results will be communicated to you by Energy Microhart, letter or phone within 4 business days after the clinic has received the results. If you do not hear from us within 7 days, please contact the clinic through LoveThist or phone. If you have a critical or abnormal lab result, we will notify you by phone as soon as possible.  Submit refill requests through [email or web address] or call your pharmacy and they will forward the refill request to us. Please allow 3 business days for your refill to be completed.          Additional Information About Your Visit        [email or web address] Information     [email or web address] gives you secure access to your electronic health record. If you see a primary care provider, you can also send messages to your care team and make appointments. If you have questions, please call your primary care clinic.  If you do not have a primary care provider, please call [phone] and they will assist you.        Care EveryWhere ID     This is your Care EveryWhere ID. This could be used by other organizations to access your [place] medical records  [id number]        Your Vitals Were     Pulse Temperature Pulse Oximetry BMI (Body Mass Index)          92 97.5  F (36.4  C) (Oral) 96% 25.54 kg/m2         Blood Pressure from Last 3 Encounters:   01/22/18 124/87   11/13/17 112/79   08/28/17 137/87    Weight from Last 3 Encounters:   01/22/18 91.9 kg (202 lb 9.6 oz)   11/13/17 91.8 kg (202 lb 6.1 oz)   08/28/17 91.4 kg  (201 lb 9.6 oz)              We Performed the Following     Albumin level     Albumin Random Urine Quantitative with Creat Ratio     Alkaline phosphatase     ALT     AST     Basic metabolic panel     CBC with platelets differential     Erythrocyte sedimentation rate auto     Glucose by meter     Glucose in a Series: Draw +120 minutes     Glucose in a Series: Draw +30 minutes     Glucose in a Series: Draw +60 minutes     Glucose in a Series: Draw +90 minutes     Glucose in a Series: Draw Time Zero     Hemoglobin A1c     IgE     INR     Insulin in a Series: Draw +120 minutes     Insulin in a Series: Draw +30 minutes     Insulin in a Series: Draw +60 minutes     Insulin in a Series: Draw +90 minutes     Insulin in a Series: Draw Time Zero     Iron     Lipid Profile     Magnesium     Phosphorus     Protein total     Routine UA with microscopic     Testosterone total      Vitamin A     Vitamin D Deficiency     Vitamin E        Primary Care Provider Fax #    Physician No Ref-Primary [phone]       No address on file        Equal Access to Services     MATTHEW HURD : Geno root Soisela, waaxda luqadaha, qaybta kaalmada adelewisyajey, ajmes jhaveri . So [facility] [phone].    ATENCIÓN: Si habla español, tiene a cardozo disposición servicios gratuitos de asistencia lingüística. Llame al [phone].    We comply with applicable federal civil rights laws and [place] laws. We do not discriminate on the basis of race, color, national origin, age, disability, sex, sexual orientation, or gender identity.            Thank you!     Thank you for choosing [facility] SPECIALTY AND PROCEDURE  for your care. Our goal is always to provide you with excellent care. Hearing back from our patients is one way we can continue to improve our services. Please take a few minutes to complete the written survey that you may receive in the mail after your visit with us. Thank you!              Your Updated Medication List - Protect others around you: Learn how to safely use, store and throw away your medicines at www.disposemymeds.org.          This list is accurate as of: 1/22/18 11:11 AM.  Always use your most recent med list.                   Brand Name Dispense Instructions for use Diagnosis    acetylcysteine 20 % nebulizer solution    MUCOMYST    180 mL    Inhale 2 mLs into the lungs 3 times daily    Cystic fibrosis with pulmonary manifestations (H), Vitamin B12 deficiency disease       * albuterol (2.5 MG/3ML) 0.083% neb solution     360 mL    Take 1 vial (2.5 mg) by nebulization 3 times daily    Cystic fibrosis with pulmonary manifestations (H)       * albuterol 108 (90 BASE) MCG/ACT Inhaler    PROAIR HFA    1 Inhaler    Inhale 1-2 puffs into the lungs every 6 hours as needed for shortness of breath / dyspnea Every 4-6 hours as needed    Cystic fibrosis with pulmonary manifestations (H), Vitamin B12 deficiency disease       azithromycin 250 MG tablet    ZITHROMAX    30 tablet    Take 1 tablet (250 mg) by mouth daily    CF (cystic fibrosis) (H)       aztreonam 200 MG/ML    AZACTAM    56 each    Take 5 mLs (1 g) by nebulization 2 times daily    Cystic fibrosis with pulmonary manifestations (H)       blood glucose monitoring test strip    no brand specified    100 strip    Use to test blood sugars 4-6 times daily or as directed.    Cystic fibrosis with pulmonary manifestations (H), Glucose intolerance (impaired glucose tolerance)       Cholecalciferol 4000 UNITS Caps     90 capsule    Take 1 capsule by mouth daily    Cystic fibrosis with pulmonary manifestations (H)       CREON 586[address]-42[address] UNITS Cpep per EC capsule   Generic drug:  amylase-lipase-protease     1000 capsule    Take 5-6 capsules (120,000-144,000 Units) by mouth See Admin Instructions 5-6 with meals, 1-5 with snacks,7-8 with tube feedings    Cystic fibrosis with pulmonary manifestations (H)       cromolyn 20 MG/2ML neb solution     INTAL    180 mL    Take 2 mLs (20 mg) by nebulization 3 times daily 1 vial three times daily    Cystic fibrosis with pulmonary manifestations (H)       fluticasone 50 MCG/ACT spray    FLONASE    16 g    Spray 2 sprays into both nostrils as needed    Cystic fibrosis with pulmonary manifestations (H)       MULTIVITAMINS PO      Take 1 tablet by mouth daily.        NUTREN 2.0      Take 4-5 Cans by mouth. 168/ml/hr 7 days/week        oseltamivir 75 MG capsule    TAMIFLU    10 capsule    Take 1 capsule (75 mg) by mouth 2 times daily For 5 days    Cystic fibrosis with pulmonary manifestations (H), Vitamin B12 deficiency disease       phytonadione 5 MG tablet    MEPHYTON    12 tablet    TAKE 1 TABLET (5 MG) BY MOUTH 3 TIMES A WEEK    CF (cystic fibrosis) (H), Exocrine pancreatic insufficiency       ranitidine 150 MG tablet    ZANTAC    180 tablet    Take 1 tablet (150 mg) by mouth 2 times daily    Gastroesophageal reflux disease without esophagitis       tobramycin (PF) 300 MG/5ML neb solution    SOO    300 mL    Take 5 mLs (300 mg) by nebulization 2 times daily    Cystic fibrosis with pulmonary manifestations (H)       water for injection sterile Soln     250 mL    Inject 4 mLs as directed 2 times daily    Cystic fibrosis with pulmonary manifestations (H)       * Notice:  This list has 2 medication(s) that are the same as other medications prescribed for you. Read the directions carefully, and ask your doctor or other care provider to review them with you.

## 2018-01-22 NOTE — PATIENT INSTRUCTIONS
Cystic Fibrosis Self-Care Plan    RECOMMENDATIONS:     YOUR GOAL:      CF Nurse line:          Reyna Reveles and Polina   [phone]            CF Resp Therapist: Sary Hassan            [phone]   CF Dietitians:           Leona Marie            [phone]                       Karissa Ashraf                       [phone]   CF Diabetes Nurse: Rissa Alvarez             [phone]    CF Social Workers:  Tika Gutierrez             [phone]                Yoselin Abel            [phone]  CF Pharmacist:        Cecilia Gordillo                              [phone]  [email or web address]         MRN: [id number]   Clinic Date: January 22, 2018   Patient: Telly Leal     Annual Studies:   IGG   Date Value Ref Range Status   12/11/2015 1160 695 - 1620 mg/dL Final     Insulin   Date Value Ref Range Status   01/22/2018 107.0 (H) 3 - 25 mU/L Final     Comment:     Starting 7/13/2017, insulin results will decrease by approximately 37%   compared to insulin results reported in EPIC between (12/16/2016-7/12/2017),   and should be interpreted accordingly. Insulin results reported prior to   12/16/16 are comparable to current insulin results and therefore no adjustment   is needed.       There are no preventive care reminders to display for this patient.      Pulmonary Function Tests  FEV1: amount of air you can blow out in 1 second  FVC: total amount of air you can take in and blow out    Your Goals:         PFT Latest Ref Rng & Units 1/22/2018   FVC L 6.02   FEV1 L 3.15   FVC% % 96   FEV1% % 62          Airway Clearance: The Most Important Way to Keep Your Lungs Healthy  Vest Settings:    Hill-Rom Frequencies: 8, 9, 10 Pressure 10 Then, Frequencies 18, 19, 20 Pressure 6      RespirTech: Quick Start with Pressure of     Do each frequency for 5 minutes; Deflate vest after each frequency & cough 3 times before beginning the next setting.    Vest and Neb Therapy should be done 2-3 times/day.    Good  Nutrition Can Improve Lung Function and Overall Health     Take ALL of your vitamins with food     Take 1/2 of your enzymes before EVERY meal/snack and the other 1/2 mid-meal/snack    Wt Readings from Last 3 Encounters:   01/22/18 91.9 kg (202 lb 9.6 oz)   01/22/18 91.9 kg (202 lb 9.6 oz)   11/13/17 91.8 kg (202 lb 6.1 oz)       Body mass index is 25.54 kg/(m^2).         National CF Foundation Recommendations for BMI in CF Adults: Women: at least 22 Men: at least 23        Controlling Blood Sugars Helps Prevent Lung Infections & Improves Nutrition  Test blood sugar:     In the morning before eating (goal is )     2 hours after a meal (goal is less than 150)     When pre-meal glucose is greater than 150 add correction     At bedtime (if less than 100 eat a snack with 15 grams of carbohydrates  Last A1C Results:   Hemoglobin A1C   Date Value Ref Range Status   01/22/2018 5.9 4.3 - 6.0 % Final         If diabetic, measure A1C every 6 months. Goal: Under 7%    Staying Healthy    Research:  If you are interested in learning about research opportunities or have questions, please contact Evie Fairbanks at [phone] or [email or web address].      CF Foundation:  Compass is a personalized resource service to help you with the insurance, financial, legal and other issues you are facing.  It's free, confidential and available to anyone with CF.  Ask your  for more information or contact Compass directly at 545-COMPASS ([phone]) or compass@cff.org, or learn more at cff.org/compass.

## 2018-01-23 LAB
IGE SERPL-ACNC: 5 KIU/L (ref 0–114)
TESTOST SERPL-MCNC: 392 NG/DL (ref 240–950)

## 2018-01-25 LAB
A-TOCOPHEROL VIT E SERPL-MCNC: 5.7 MG/L (ref 5.5–18)
ANNOTATION COMMENT IMP: NORMAL
BETA+GAMMA TOCOPHEROL SERPL-MCNC: 0.4 MG/L (ref 0–6)
RETINYL PALMITATE SERPL-MCNC: <0.02 MG/L (ref 0–0.1)
VIT A SERPL-MCNC: 0.6 MG/L (ref 0.3–1.2)

## 2018-01-29 LAB
BACTERIA SPEC CULT: ABNORMAL
SPECIMEN SOURCE: ABNORMAL

## 2018-02-01 DIAGNOSIS — E84.0 CYSTIC FIBROSIS WITH PULMONARY MANIFESTATIONS (H): ICD-10-CM

## 2018-02-01 RX ORDER — PANCRELIPASE 24000; 76000; 120000 [USP'U]/1; [USP'U]/1; [USP'U]/1
5-6 CAPSULE, DELAYED RELEASE PELLETS ORAL SEE ADMIN INSTRUCTIONS
Qty: 1000 CAPSULE | Refills: 11 | Status: SHIPPED | OUTPATIENT
Start: 2018-02-01 | End: 2019-01-16

## 2018-03-05 ENCOUNTER — TELEPHONE (OUTPATIENT)
Dept: PULMONOLOGY | Facility: CLINIC | Age: 38
End: 2018-03-05

## 2018-03-05 DIAGNOSIS — E84.0 CYSTIC FIBROSIS WITH PULMONARY MANIFESTATIONS (H): ICD-10-CM

## 2018-03-05 RX ORDER — TOBRAMYCIN INHALATION SOLUTION 300 MG/5ML
300 INHALANT RESPIRATORY (INHALATION) 2 TIMES DAILY
Qty: 300 ML | Refills: 5 | Status: SHIPPED | OUTPATIENT
Start: 2018-03-05 | End: 2018-03-12

## 2018-03-05 NOTE — TELEPHONE ENCOUNTER
PA Initiation    Medication: TOBRAMYCIN  Insurance Company: [employer] - Phone [phone] Fax [phone]  Pharmacy Filling the Rx: [facility] [address]  Filling Pharmacy Phone:    Filling Pharmacy Fax:    Start Date: 3/5/2018

## 2018-03-06 NOTE — TELEPHONE ENCOUNTER
Prior Authorization Approval    Authorization Effective Date: 3/5/2018  Authorization Expiration Date: 3/5/2020  Medication: TOBRAMYCIN (approved)  Approved Dose/Quantity: 280 per 28 days  Reference #:     Insurance Company: [employer] - Phone [phone] Fax [phone]  Expected CoPay:       CoPay Card Available:      Foundation Assistance Needed:    Which Pharmacy is filling the prescription (Not needed for infusion/clinic administered): [facility]  Pharmacy Notified:  yes  Patient Notified:

## 2018-03-12 DIAGNOSIS — E84.0 CYSTIC FIBROSIS WITH PULMONARY MANIFESTATIONS (H): ICD-10-CM

## 2018-03-12 RX ORDER — TOBRAMYCIN INHALATION SOLUTION 300 MG/5ML
300 INHALANT RESPIRATORY (INHALATION) 2 TIMES DAILY
Qty: 300 ML | Refills: 5 | Status: SHIPPED | OUTPATIENT
Start: 2018-03-12 | End: 2019-07-17

## 2018-03-13 ENCOUNTER — TELEPHONE (OUTPATIENT)
Dept: NUTRITION | Facility: CLINIC | Age: 38
End: 2018-03-13

## 2018-03-13 NOTE — TELEPHONE ENCOUNTER
Nutrition Services    Attempted to contact pt regarding most recent annual studies. Left voicemails x2 requesting pt to call RD.     Will follow-up with pt in CF clinic as able.       Leona Marie RD, LD  Cystic Fibrosis/Lung Transplant Dietitian  Pager [phone]  On weekends/holidays contact coverage RD at [phone] (inpatient use only)

## 2018-04-09 ENCOUNTER — OFFICE VISIT (OUTPATIENT)
Dept: PHARMACY | Facility: CLINIC | Age: 38
End: 2018-04-09
Payer: COMMERCIAL

## 2018-04-09 ENCOUNTER — OFFICE VISIT (OUTPATIENT)
Dept: PULMONOLOGY | Facility: CLINIC | Age: 38
End: 2018-04-09
Attending: INTERNAL MEDICINE
Payer: COMMERCIAL

## 2018-04-09 VITALS
WEIGHT: 203.48 LBS | OXYGEN SATURATION: 94 % | RESPIRATION RATE: 16 BRPM | BODY MASS INDEX: 25.65 KG/M2 | SYSTOLIC BLOOD PRESSURE: 126 MMHG | HEART RATE: 101 BPM | DIASTOLIC BLOOD PRESSURE: 84 MMHG

## 2018-04-09 DIAGNOSIS — E84.9 CF (CYSTIC FIBROSIS) (H): ICD-10-CM

## 2018-04-09 DIAGNOSIS — E84.0 CYSTIC FIBROSIS WITH PULMONARY MANIFESTATIONS (H): Primary | ICD-10-CM

## 2018-04-09 DIAGNOSIS — K86.89 PANCREATIC INSUFFICIENCY: ICD-10-CM

## 2018-04-09 DIAGNOSIS — E10.9 TYPE 1 DIABETES MELLITUS WITHOUT COMPLICATION (H): ICD-10-CM

## 2018-04-09 DIAGNOSIS — E84.9 CF (CYSTIC FIBROSIS) (H): Primary | ICD-10-CM

## 2018-04-09 LAB
EXPTIME-PRE: 16.04 SEC
FEF2575-%PRED-PRE: 18 %
FEF2575-PRE: 0.91 L/SEC
FEF2575-PRED: 4.8 L/SEC
FEFMAX-%PRED-PRE: 81 %
FEFMAX-PRE: 9.25 L/SEC
FEFMAX-PRED: 11.33 L/SEC
FEV1-%PRED-PRE: 60 %
FEV1-PRE: 3.03 L
FEV1FEV6-PRE: 58 %
FEV1FEV6-PRED: 82 %
FEV1FVC-PRE: 49 %
FEV1FVC-PRED: 81 %
FIFMAX-PRE: 10.07 L/SEC
FVC-%PRED-PRE: 99 %
FVC-PRE: 6.19 L
FVC-PRED: 6.24 L

## 2018-04-09 PROCEDURE — 87186 SC STD MICRODIL/AGAR DIL: CPT | Performed by: INTERNAL MEDICINE

## 2018-04-09 PROCEDURE — 99606 MTMS BY PHARM EST 15 MIN: CPT | Performed by: PHARMACIST

## 2018-04-09 PROCEDURE — 87107 FUNGI IDENTIFICATION MOLD: CPT | Performed by: INTERNAL MEDICINE

## 2018-04-09 PROCEDURE — 87070 CULTURE OTHR SPECIMN AEROBIC: CPT | Performed by: INTERNAL MEDICINE

## 2018-04-09 PROCEDURE — 87077 CULTURE AEROBIC IDENTIFY: CPT | Performed by: INTERNAL MEDICINE

## 2018-04-09 PROCEDURE — 97803 MED NUTRITION INDIV SUBSEQ: CPT | Mod: ZF | Performed by: DIETITIAN, REGISTERED

## 2018-04-09 PROCEDURE — G0463 HOSPITAL OUTPT CLINIC VISIT: HCPCS

## 2018-04-09 ASSESSMENT — PAIN SCALES - GENERAL: PAINLEVEL: NO PAIN (0)

## 2018-04-09 NOTE — PROGRESS NOTES
SUBJECTIVE/OBJECTIVE:                           Telly Leal is a 37 year old male coming in for routine clinic visit.  His/her primary pulmonologist is Dr. Miller.    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    Patient is responsible for his/her own medications.   Patient estimated adherence level: %  Pharmacy MPR is not available  Barriers to medication adherence include: no issues identified  Facilitators to medication adherence include: schedule/routine    Medication Access  Number of pharmacies used: 2  The patient fills specialty prescriptions at [facility], except tobramycin which is filled at [facility] per insurance restrictions  Medication access barriers: no issues reported by patient.  Has the patient been offered to fill at [place]? Yes  Programs or coupons used: none, not needed per patient    CF  Inhaled medications   Bronchodilator: albuterol   Mucolytic: acetylcysteine  Antibiotic: tobramycin and colistimethate  Other: Cromolyn nebs, Flonase nasal spray  Oral medications   Azithromycin: taking  CFTR modulator: not indicated   Other: Tamiflu if needed for influenza  Pulmonary symptoms are stable  PFTs are decreased  Current FEV1 60%  Cultures: sputum cultures grow Pseudomonas and Aspergillus  Current exacerbation: no    Pancreatic Insufficiency/Nutrition: Pancreatic enzyme replacement with Creon 2400 units.  Patient is taking 5-6 capsules with meals and 1-5 capsules with snacks and 7-8 with tube feeds.  Acid Reducer: Zantac (ranitidine) 150 mg BID  Bowel regimen: none  Weight and BMI are stable  Vitamins include: vitamin D 4000 units, MVI, Mephyton    Lab Results   Component Value Date    VITDT 28 01/22/2018    VITDT 35 12/16/2016     PFTs:      Weight/BMI:      ASSESSMENT:                              Current medications were reviewed today.     Medication Adherence: no issues identified    Medication Access: no issues identified    CF: Stable. Patient would benefit from no changes at this time    Pancreatic Insufficiency/Nutrition: Stable.  Patient would benefit from no changes at this time      PLAN:                            Continue current medications and nebulizers.  Keep up the good work!    I spent 15 minutes with this patient today.      Will follow up in 1 year or sooner if needed.    The patient was provided a summary of these recommendations in the AVS from CF care team visit.    Cecilia Gordillo PharmD  CF Medication Therapy Management Pharmacist  [facility]  [phone]

## 2018-04-09 NOTE — MR AVS SNAPSHOT
After Visit Summary   4/9/2018    Telly Leal    MRN: [id number]           Patient Information     Date Of Birth          1980        Visit Information        Provider Department      4/9/2018 2:30 PM Nelida Gordillo RPH [facility]        Today's Diagnoses     CF (cystic fibrosis) (H)    -  1    Pancreatic insufficiency           Follow-ups after your visit        Future tests that were ordered for you today     Open Future Orders        Priority Expected Expires Ordered    RESPIRATORY FLOW VOLUME LOOP Routine 6/8/2018 10/6/2018 4/9/2018            Who to contact     If you have questions or need follow up information about today's clinic visit or your schedule please contact [facility] directly at [phone].  Normal or non-critical lab and imaging results will be communicated to you by Synferencehart, letter or phone within 4 business days after the clinic has received the results. If you do not hear from us within 7 days, please contact the clinic through Synferencehart or phone. If you have a critical or abnormal lab result, we will notify you by phone as soon as possible.  Submit refill requests through Online [employer] Platform or call your pharmacy and they will forward the refill request to us. Please allow 3 business days for your refill to be completed.          Additional Information About Your Visit        MyChart Information     [employer] Platform gives you secure access to your electronic health record. If you see a primary care provider, you can also send messages to your care team and make appointments. If you have questions, please call your primary care clinic.  If you do not have a primary care provider, please call [phone] and they will assist you.        Care EveryWhere ID     This is your Care EveryWhere ID. This could be used by other organizations to access your [place] medical records  [id number]          Blood Pressure from Last 3 Encounters:   04/09/18 126/84   01/22/18 124/87   01/22/18 124/87    Weight from Last 3 Encounters:   04/09/18 203 lb 7.8 oz (92.3 kg)   01/22/18 202 lb 9.6 oz (91.9 kg)   01/22/18 202 lb 9.6 oz (91.9 kg)              Today, you had the following     No orders found for display       Primary Care Provider Fax #    Physician No Ref-Primary [phone]       No address on file        Equal Access to Services     HERMINIO HURD : Hadii eva root Soisela, waaxda luqadaha, qaybta kaalmada winston, james jhaveri . So [facility] [phone].    ATENCIÓN: Si habla español, tiene a cardozo disposición servicios gratuitos de asistencia lingüística. Llame al [phone].    We comply with applicable federal civil rights laws and [place] laws. We do not discriminate on the basis of race, color, national origin, age, disability, sex, sexual orientation, or gender identity.            Thank you!     Thank you for choosing [facility]  for your care. Our goal is always to provide you with excellent care. Hearing back from our patients is one way we can continue to improve our services. Please take a few minutes to complete the written survey that you may receive in the mail after your visit with us. Thank you!             Your Updated Medication List - Protect others around you: Learn how to safely use, store and throw away your medicines at www.disposemymeds.org.          This list is accurate as of 4/9/18  4:01 PM.  Always use your most recent med list.                   Brand Name Dispense Instructions for use Diagnosis    acetylcysteine 20 % nebulizer solution    MUCOMYST    180 mL    Inhale 2 mLs into the lungs 3 times daily    Cystic fibrosis with pulmonary manifestations (H), Vitamin B12 deficiency disease       * albuterol (2.5 MG/3ML) 0.083% neb solution     360 mL    Take 1 vial (2.5 mg) by nebulization 3 times daily     Cystic fibrosis with pulmonary manifestations (H)       * albuterol 108 (90 BASE) MCG/ACT Inhaler    PROAIR HFA    1 Inhaler    Inhale 1-2 puffs into the lungs every 6 hours as needed for shortness of breath / dyspnea Every 4-6 hours as needed    Cystic fibrosis with pulmonary manifestations (H), Vitamin B12 deficiency disease       azithromycin 250 MG tablet    ZITHROMAX    30 tablet    Take 1 tablet (250 mg) by mouth daily    CF (cystic fibrosis) (H)       aztreonam 200 MG/ML    AZACTAM    56 each    Take 5 mLs (1 g) by nebulization 2 times daily    Cystic fibrosis with pulmonary manifestations (H)       blood glucose monitoring test strip    no brand specified    100 strip    Use to test blood sugars 4-6 times daily or as directed.    Cystic fibrosis with pulmonary manifestations (H), Glucose intolerance (impaired glucose tolerance)       Cholecalciferol 4000 UNITS Caps     90 capsule    Take 1 capsule by mouth daily    Cystic fibrosis with pulmonary manifestations (H)       colistimethate/colistin-base activity 150 mg/2mL Solr neb solution    COLYMYCIN    9000 mg    Take 150 mg by nebulization 2 times daily    Cystic fibrosis with pulmonary manifestations (H)       CREON 14506-59888 UNITS Cpep per EC capsule   Generic drug:  amylase-lipase-protease     1000 capsule    Take 5-6 capsules (120,000-144,000 Units) by mouth See Admin Instructions 5-6 with meals, 1-5 with snacks,7-8 with tube feedings    Cystic fibrosis with pulmonary manifestations (H)       cromolyn 20 MG/2ML neb solution    INTAL    180 mL    Take 2 mLs (20 mg) by nebulization 3 times daily 1 vial three times daily    Cystic fibrosis with pulmonary manifestations (H)       fluticasone 50 MCG/ACT spray    FLONASE    16 g    Spray 2 sprays into both nostrils as needed    Cystic fibrosis with pulmonary manifestations (H)       MULTIVITAMINS PO      Take 1 tablet by mouth daily.        NUTREN 2.0      Take 4-5 Cans by mouth. 168/ml/hr 7 days/week         oseltamivir 75 MG capsule    TAMIFLU    10 capsule    Take 1 capsule (75 mg) by mouth 2 times daily For 5 days    Cystic fibrosis with pulmonary manifestations (H), Vitamin B12 deficiency disease       phytonadione 5 MG tablet    MEPHYTON    12 tablet    TAKE 1 TABLET (5 MG) BY MOUTH 3 TIMES A WEEK    CF (cystic fibrosis) (H), Exocrine pancreatic insufficiency       ranitidine 150 MG tablet    ZANTAC    180 tablet    Take 1 tablet (150 mg) by mouth 2 times daily    Gastroesophageal reflux disease without esophagitis       tobramycin (PF) 300 MG/5ML neb solution    SOO    300 mL    Take 5 mLs (300 mg) by nebulization 2 times daily 28 days on 28 days off    Cystic fibrosis with pulmonary manifestations (H)       water for injection sterile Soln     250 mL    Inject 4 mLs as directed 2 times daily    Cystic fibrosis with pulmonary manifestations (H)       * Notice:  This list has 2 medication(s) that are the same as other medications prescribed for you. Read the directions carefully, and ask your doctor or other care provider to review them with you.

## 2018-04-09 NOTE — PATIENT INSTRUCTIONS
Cystic Fibrosis Self-Care Plan       MRN: [id number]   Clinic Date: April 9, 2018   Patient: eTlly Leal     RECOMMENDATIONS:   The sputum culture is ok if kept at room temperature for a couple hours and for 1 week if kept in the refrigerator. Please bring the sample to your next clinic visit.    YOUR GOAL:    CF Nurse Line:  Reyna Reveles and Polina: [phone]   Sary Hassan, RT: [phone]     Leona Marie: [phone] or Karissa Ashraf, Dieticians: [phone]   Rissa Alvarez, Diabetes Nurse: [phone]      Tika Gutierrez: [phone] or Yoselin Abel [phone], Social Workers   [email or web address]    Annual Studies:   IGG   Date Value Ref Range Status   12/11/2015 1160 695 - 1620 mg/dL Final     Insulin   Date Value Ref Range Status   01/22/2018 107.0 (H) 3 - 25 mU/L Final     Comment:     Starting 7/13/2017, insulin results will decrease by approximately 37%   compared to insulin results reported in EPIC between (12/16/2016-7/12/2017),   and should be interpreted accordingly. Insulin results reported prior to   12/16/16 are comparable to current insulin results and therefore no adjustment   is needed.       There are no preventive care reminders to display for this patient.      Pulmonary Function Tests  FEV1: amount of air you can blow out in 1 second  FVC: total amount of air you can take in and blow out    Your Goals:         PFT Latest Ref Rng & Units 4/9/2018   FVC L 6.19   FEV1 L 3.03   FVC% % 99   FEV1% % 60          Airway Clearance: The Most Important Way to Keep Your Lungs Healthy  Vest Settings:    Hill-Rom Frequencies: 8, 9, 10 Pressure 10 Then, Frequencies 18, 19, 20 Pressure 6      RespirTech: Quick Start with Pressure of     Do each frequency for 5 minutes; Deflate vest after each frequency & cough 3 times before beginning the next setting.    Vest and Neb Therapy should be done 2 times/day.    Good Nutrition Can Improve Lung Function and Overall Health     Take  ALL of your vitamins with food     Take 1/2 of your enzymes before EVERY meal/snack and the other 1/2 mid-meal/snack    Wt Readings from Last 3 Encounters:   04/09/18 92.3 kg (203 lb 7.8 oz)   01/22/18 91.9 kg (202 lb 9.6 oz)   01/22/18 91.9 kg (202 lb 9.6 oz)       Body mass index is 25.65 kg/(m^2).         National CF Foundation Recommendations for BMI in CF Adults: Women: at least 22 Men: at least 23        Controlling Blood Sugars Helps Prevent Lung Infections & Improves Nutrition  Test blood sugar:     In the morning before eating (goal is )     2 hours after a meal (goal is less than 150)     When pre-meal glucose is greater than 150 add correction     At bedtime (if less than 100 eat a snack with 15 grams of carbohydrates  Last A1C Results:   Hemoglobin A1C   Date Value Ref Range Status   01/22/2018 5.9 4.3 - 6.0 % Final         If diabetic, measure A1C every 6 months. Goal: Under 7%    Staying Healthy    Research:  If you are interested in learning about research opportunities or have questions, please contact Evie Fairbanks at [phone] or [email or web address].      CF Foundation:  Compass is a personalized resource service to help you with the insurance, financial, legal and other issues you are facing.  It's free, confidential and available to anyone with CF.  Ask your  for more information or contact Compass directly at 454-COMPASS ([phone]) or compass@cff.org, or learn more at cff.org/compass.

## 2018-04-09 NOTE — MR AVS SNAPSHOT
After Visit Summary   4/9/2018    Telly Leal    MRN: [id number]           Patient Information     Date Of Birth          1980        Visit Information        Provider Department      4/9/2018 1:40 PM Rocael Miller MD [facility]        Today's Diagnoses     Cystic fibrosis with pulmonary manifestations (H)    -  1      Care Instructions    Cystic Fibrosis Self-Care Plan       MRN: [id number]   Clinic Date: April 9, 2018   Patient: Telly Leal     RECOMMENDATIONS:   The sputum culture is ok if kept at room temperature for a couple hours and for 1 week if kept in the refrigerator. Please bring the sample to your next clinic visit.    YOUR GOAL:    CF Nurse Line:  Brice Reveles: [phone]   Sary Hassan, RT: [phone]     Leona Marie: [phone] or Karissa Ashraf, Dieticians: [phone]   Rissa Alvarez, Diabetes Nurse: [phone]      Tika Gutierrez: [phone] or Yoselin Abel [phone], Social Workers   [email or web address]    Annual Studies:   IGG   Date Value Ref Range Status   12/11/2015 1160 695 - 1620 mg/dL Final     Insulin   Date Value Ref Range Status   01/22/2018 107.0 (H) 3 - 25 mU/L Final     Comment:     Starting 7/13/2017, insulin results will decrease by approximately 37%   compared to insulin results reported in EPIC between (12/16/2016-7/12/2017),   and should be interpreted accordingly. Insulin results reported prior to   12/16/16 are comparable to current insulin results and therefore no adjustment   is needed.       There are no preventive care reminders to display for this patient.      Pulmonary Function Tests  FEV1: amount of air you can blow out in 1 second  FVC: total amount of air you can take in and blow out    Your Goals:         PFT Latest Ref Rng & Units 4/9/2018   FVC L 6.19   FEV1 L 3.03   FVC% % 99   FEV1% % 60          Airway Clearance: The Most Important Way to Keep Your Lungs  Healthy  Vest Settings:    Hill-Rom Frequencies: 8, 9, 10 Pressure 10 Then, Frequencies 18, 19, 20 Pressure 6      RespirTech: Quick Start with Pressure of     Do each frequency for 5 minutes; Deflate vest after each frequency & cough 3 times before beginning the next setting.    Vest and Neb Therapy should be done 2 times/day.    Good Nutrition Can Improve Lung Function and Overall Health     Take ALL of your vitamins with food     Take 1/2 of your enzymes before EVERY meal/snack and the other 1/2 mid-meal/snack    Wt Readings from Last 3 Encounters:   04/09/18 92.3 kg (203 lb 7.8 oz)   01/22/18 91.9 kg (202 lb 9.6 oz)   01/22/18 91.9 kg (202 lb 9.6 oz)       Body mass index is 25.65 kg/(m^2).         National CF Foundation Recommendations for BMI in CF Adults: Women: at least 22 Men: at least 23        Controlling Blood Sugars Helps Prevent Lung Infections & Improves Nutrition  Test blood sugar:     In the morning before eating (goal is )     2 hours after a meal (goal is less than 150)     When pre-meal glucose is greater than 150 add correction     At bedtime (if less than 100 eat a snack with 15 grams of carbohydrates  Last A1C Results:   Hemoglobin A1C   Date Value Ref Range Status   01/22/2018 5.9 4.3 - 6.0 % Final         If diabetic, measure A1C every 6 months. Goal: Under 7%    Staying Healthy    Research:  If you are interested in learning about research opportunities or have questions, please contact Evie Fairbanks at [phone] or [email or web address].      CF Foundation:  Compass is a personalized resource service to help you with the insurance, financial, legal and other issues you are facing.  It's free, confidential and available to anyone with CF.  Ask your  for more information or contact Compass directly at 326-DWDRVEW ([phone]) or compass@cff.org, or learn more at cff.org/compass.                             Follow-ups after your visit        Follow-up notes from your care  team     Return in about 2 months (around 6/9/2018).      Future tests that were ordered for you today     Open Future Orders        Priority Expected Expires Ordered    RESPIRATORY FLOW VOLUME LOOP Routine 6/8/2018 10/6/2018 4/9/2018            Who to contact     If you have questions or need follow up information about today's clinic visit or your schedule please contact [facility] directly at [phone].  Normal or non-critical lab and imaging results will be communicated to you by Videostriphart, letter or phone within 4 business days after the clinic has received the results. If you do not hear from us within 7 days, please contact the clinic through [facility] or phone. If you have a critical or abnormal lab result, we will notify you by phone as soon as possible.  Submit refill requests through Next[facility] or call your pharmacy and they will forward the refill request to us. Please allow 3 business days for your refill to be completed.          Additional Information About Your Visit        [facility] Information     [facility] gives you secure access to your electronic health record. If you see a primary care provider, you can also send messages to your care team and make appointments. If you have questions, please call your primary care clinic.  If you do not have a primary care provider, please call [phone] and they will assist you.        Care EveryWhere ID     This is your Care EveryWhere ID. This could be used by other organizations to access your [facility] medical records  [id number]        Your Vitals Were     Pulse Respirations Pulse Oximetry BMI (Body Mass Index)          101 16 94% 25.65 kg/m2         Blood Pressure from Last 3 Encounters:   04/09/18 126/84   01/22/18 124/87   01/22/18 124/87    Weight from Last 3 Encounters:   04/09/18 92.3 kg (203 lb 7.8 oz)   01/22/18 91.9 kg (202 lb 9.6 oz)   01/22/18 91.9 kg (202 lb 9.6 oz)               Primary Care Provider Fax #     Physician No Ref-Primary [phone]       No address on file        Equal Access to Services     VAISHALIHERMINIO VANNESSA : Hadii aad ku hadpavanrenetta Reyes, tracyda katerina, minniedonnie hugginsbilljey montero, james bello anandajorge fritzaubreejose manuel pollack. So [facility] [phone].    ATENCIÓN: Si habla español, tiene a cardozo disposición servicios gratuitos de asistencia lingüística. Llame al [phone].    We comply with applicable federal civil rights laws and [place] laws. We do not discriminate on the basis of race, color, national origin, age, disability, sex, sexual orientation, or gender identity.            Thank you!     Thank you for choosing [facility]  for your care. Our goal is always to provide you with excellent care. Hearing back from our patients is one way we can continue to improve our services. Please take a few minutes to complete the written survey that you may receive in the mail after your visit with us. Thank you!             Your Updated Medication List - Protect others around you: Learn how to safely use, store and throw away your medicines at www.disposemymeds.org.          This list is accurate as of 4/9/18  2:26 PM.  Always use your most recent med list.                   Brand Name Dispense Instructions for use Diagnosis    acetylcysteine 20 % nebulizer solution    MUCOMYST    180 mL    Inhale 2 mLs into the lungs 3 times daily    Cystic fibrosis with pulmonary manifestations (H), Vitamin B12 deficiency disease       * albuterol (2.5 MG/3ML) 0.083% neb solution     360 mL    Take 1 vial (2.5 mg) by nebulization 3 times daily    Cystic fibrosis with pulmonary manifestations (H)       * albuterol 108 (90 BASE) MCG/ACT Inhaler    PROAIR HFA    1 Inhaler    Inhale 1-2 puffs into the lungs every 6 hours as needed for shortness of breath / dyspnea Every 4-6 hours as needed    Cystic fibrosis with pulmonary manifestations (H), Vitamin B12 deficiency disease       azithromycin 250 MG  tablet    ZITHROMAX    30 tablet    Take 1 tablet (250 mg) by mouth daily    CF (cystic fibrosis) (H)       aztreonam 200 MG/ML    AZACTAM    56 each    Take 5 mLs (1 g) by nebulization 2 times daily    Cystic fibrosis with pulmonary manifestations (H)       blood glucose monitoring test strip    no brand specified    100 strip    Use to test blood sugars 4-6 times daily or as directed.    Cystic fibrosis with pulmonary manifestations (H), Glucose intolerance (impaired glucose tolerance)       Cholecalciferol 4000 UNITS Caps     90 capsule    Take 1 capsule by mouth daily    Cystic fibrosis with pulmonary manifestations (H)       colistimethate/colistin-base activity 150 mg/2mL Solr neb solution    COLYMYCIN    9000 mg    Take 150 mg by nebulization 2 times daily    Cystic fibrosis with pulmonary manifestations (H)       CREON 06202-84721 UNITS Cpep per EC capsule   Generic drug:  amylase-lipase-protease     1000 capsule    Take 5-6 capsules (120,000-144,000 Units) by mouth See Admin Instructions 5-6 with meals, 1-5 with snacks,7-8 with tube feedings    Cystic fibrosis with pulmonary manifestations (H)       cromolyn 20 MG/2ML neb solution    INTAL    180 mL    Take 2 mLs (20 mg) by nebulization 3 times daily 1 vial three times daily    Cystic fibrosis with pulmonary manifestations (H)       fluticasone 50 MCG/ACT spray    FLONASE    16 g    Spray 2 sprays into both nostrils as needed    Cystic fibrosis with pulmonary manifestations (H)       MULTIVITAMINS PO      Take 1 tablet by mouth daily.        NUTREN 2.0      Take 4-5 Cans by mouth. 168/ml/hr 7 days/week        oseltamivir 75 MG capsule    TAMIFLU    10 capsule    Take 1 capsule (75 mg) by mouth 2 times daily For 5 days    Cystic fibrosis with pulmonary manifestations (H), Vitamin B12 deficiency disease       phytonadione 5 MG tablet    MEPHYTON    12 tablet    TAKE 1 TABLET (5 MG) BY MOUTH 3 TIMES A WEEK    CF (cystic fibrosis) (H), Exocrine pancreatic  insufficiency       ranitidine 150 MG tablet    ZANTAC    180 tablet    Take 1 tablet (150 mg) by mouth 2 times daily    Gastroesophageal reflux disease without esophagitis       tobramycin (PF) 300 MG/5ML neb solution    SOO    300 mL    Take 5 mLs (300 mg) by nebulization 2 times daily 28 days on 28 days off    Cystic fibrosis with pulmonary manifestations (H)       water for injection sterile Soln     250 mL    Inject 4 mLs as directed 2 times daily    Cystic fibrosis with pulmonary manifestations (H)       * Notice:  This list has 2 medication(s) that are the same as other medications prescribed for you. Read the directions carefully, and ask your doctor or other care provider to review them with you.

## 2018-04-09 NOTE — LETTER
4/9/2018       RE: Telly Leal  [address] [phone]     Dear Colleague,    Thank you for referring your patient, Telly Leal, to the [facility] at [facility]. Please see a copy of my visit note below.    Reason for Visit  Telly Leal is a 35 year old year old male who is being seen for Cystic Fibrosis (Patient is being seen for CF follow up)    CF HPI  The patient was seen and examined by Rocael Miller   At last visit in January, the patient had only a modest improvement in his spirometry despite having undergone a course of ciprofloxacin.  The patient has also been treated with inhaled steroids and has overall been more consistent in using his vest therapy.  Despite this, his pulmonary function tests have remained below baseline although he symptomatically has been feeling well.  Today, the patient again reports doing well from a respiratory standpoint since his last visit.  He does not have any period of sustained increase in cough or lung congestion.  He has been on SOO nebs for the past week.  He is doing vest therapy at least 10 times per week.  He has not, however, yet been consistent in getting aerobic exercise.  Last visit, we discontinued aztreonam nebs as he did not feel they were helping and resumed Chelly nebs which she had not been on for a few years.  He does not feel this switch made a difference.    Current Outpatient Prescriptions   Medication     tobramycin, PF, (SOO) 300 MG/5ML neb solution     CREON [id number] UNITS CPEP per EC capsule     colistimethate/colistin-base activity (COLYMYCIN) 150 mg/2mL SOLR neb solution     acetylcysteine (MUCOMYST) 20 % nebulizer solution     albuterol (2.5 MG/3ML) 0.083% neb solution     albuterol (PROAIR HFA) 108 (90 BASE) MCG/ACT Inhaler     azithromycin (ZITHROMAX) 250 MG tablet     aztreonam (AZACTAM) 200 MG/ML     blood glucose monitoring (NO  BRAND SPECIFIED) test strip     cromolyn (INTAL) 20 MG/2ML neb solution     fluticasone (FLONASE) 50 MCG/ACT spray     phytonadione (MEPHYTON) 5 MG tablet     ranitidine (ZANTAC) 150 MG tablet     water for injection sterile SOLN     Cholecalciferol 4000 UNITS CAPS     Multiple Vitamin (MULTIVITAMINS PO)     Nutritional Supplements (NUTREN 2.0) LIQD     oseltamivir (TAMIFLU) 75 MG capsule     No current facility-administered medications for this visit.      No Known Allergies  Past Medical History:   Diagnosis Date     Chronic sinusitis      Cystic fibrosis with pulmonary manifestations (H)      Exocrine pancreatic insufficiency      GERD (gastroesophageal reflux disease)      Hemoptysis      Malnutrition (H)        Past Surgical History:   Procedure Laterality Date     LOBECTOMY      right upper lobe     TUBES         Social History     Social History     Marital status: Single     Spouse name: N/A     Number of children: N/A     Years of education: N/A     Occupational History      [employer]     Social History Main Topics     Smoking status: Never Smoker     Smokeless tobacco: Never Used     Alcohol use 0.0 oz/week     0 Standard drinks or equivalent per week     Drug use: Not on file     Sexual activity: Not on file     Other Topics Concern     Not on file     Social History Narrative    Patient works for the [employer] as an .  He does not have stable living conditions at this time.  He has a Maori guillen.   Update: living with wife Bre in [facility]. Birth of daughter Nieves Rebolledo August, 2017    ROS Pulmonary  Const: no fever, chills, fatigue. ENT: no sinus pain or drainage. GI: no diarrhea, constipation, grease in stools, or gerd sx with h2 blocker. Using 2 cans TF nightly.  Endo: no polyuria or polydipsia.  A complete ROS was otherwise negative except as noted in the HPI.  /84 (BP Location: Right arm, Patient Position: Chair, Cuff Size: Adult Regular)  Pulse  101  Resp 16  Wt 92.3 kg (203 lb 7.8 oz)  SpO2 94%  BMI 25.65 kg/m2  Exam:   GENERAL APPEARANCE: Well developed, well nourished, alert, and in no apparent distress.  EYES: anicteric  HENT: Nasal mucosa with no edema and no hyperemia. No nasal polyps.  EARS: TMs with normal landmarks and light reflex on left with tympanic membrane occluded by cerumen on the right.  MOUTH: Oral mucosa is moist, without any lesions, no tonsillar enlargement, no oropharyngeal exudate.  NECK: supple, no masses, no thyromegaly.  LYMPHATICS: No significant  cervical, or supraclavicular nodes.  RESP:  good air flow throughout.  Clear in all lung fields. Normal chest wall excursion.  CV: Normal S1, S2, regular rhythm, normal rate. No murmur.  No rub. No gallop. No LE edema.   ABDOMEN:  Bowel sounds normal, soft, nontender, no HSM or masses.   MS: extremities normal. No cyanosis.  SKIN:  PEG site without erythema or discharge. Chest scar-like lesion upper sternum unchanged. Diffuse freckles without overt change.  NEURO: Mentation intact, speech normal, normal gait and stance  PSYCH: mentation appears normal. and affect normal/bright  Results:  Recent Results (from the past 168 hour(s))   General PFT Lab (Please always keep checked)    Collection Time: 04/09/18  1:18 PM   Result Value Ref Range    FVC-Pred 6.24 L    FVC-Pre 6.19 L    FVC-%Pred-Pre 99 %    FEV1-Pre 3.03 L    FEV1-%Pred-Pre 60 %    FEV1FVC-Pred 81 %    FEV1FVC-Pre 49 %    FEFMax-Pred 11.33 L/sec    FEFMax-Pre 9.25 L/sec    FEFMax-%Pred-Pre 81 %    FEF2575-Pred 4.80 L/sec    FEF2575-Pre 0.91 L/sec    ZRS3037-%Pred-Pre 18 %    ExpTime-Pre 16.04 sec    FIFMax-Pre 10.07 L/sec    FEV1FEV6-Pred 82 %    FEV1FEV6-Pre 58 %     Spirometry interpretation: personally reviewed. Valid maneuver. Moderate obstruction. FEV1 down slightly and remains 10% below 2 years ago.   Sputum last visit: PA x 2, aspergillus  Last AFB study June 2016 negative  Chest x-ray January 2018 previously  reviewed. Mild volume loss right s/p upper lobectomy. Mild bronchiectatic changes with mild mucus plugging. No interval change.    Assessment and plan:  1.  Presumed mild exacerbation of CF lung disease: The patient has had steadily declining pulmonary function tests over the past several months despite feeling at baseline.  He has not responded to trials of oral antibiotics, change in his inhaled antibiotics, or inhaled steroids.  He overall has improved adherence to airway clearance compared to a couple years ago.  However, even though he is doing more vest therapy he overall may not be getting is much aerobic exercise as previous.  Consensus today is that the patient will increase vest therapy to at least once a day but also begin regular aerobic exercise with a goal of 3-4 times per week minimum.  We discussed gradually increasing the intensity of exercise to avoid injury and discomfort.  The tentative plan is to pursue a course of intravenous antibiotics if he does not respond to this by next visit.  Will add sputum AFB study to today's culture.  We will send home with sputum cup in the future if clinic sample insufficient volume.  2. Impaired glucose tolerance: Fasting > 100 and peak > 200 and 2 hour value OK 1/18. A1C under 6. Consensus is to defer another evaluation in diabetes clinic given limited follow through/interest on patients part now and in past.   3.  Pancreatic insufficiency with a history of malnutrition: Adequate enzyme replacement by history.  Weight is up slightly and BMI now at 25.6.  There is room for him to lose weight if this is a byproduct of increased exercise.  He will continue 2 cans of tube feed nightly.  Will revisit cutting back on this if he has further weight gain.  4. CF sinus disease: has flonase prn - more prominent when seasonal allergies present.  5. GERD: Adequate control with ranitidine.   6. Low vitamin D level: On replacement but has not been taking consistently.  Encouraged him to resume this. Level from January 2018 slightly low at 28.  7. Skin lesions: Seen by outside Dermatologist. Central chest scar could be treated by steroid injections per pt but not actively pursuing. Plan in place for annual exam of his many freckles.  Patient reports being due for her next exam August 2018.  8. Healthcare maintenance: Annuals due January, 2019.   9. Family planning: he and wife Bre now with 7 month girl Nieves and adjusting well.   Patient will follow up in 2 months  40 minutes spent in conjunction with this visit with > 50% of time spent counseling and coordinating care of above issues as well as detailed review of his annual studies with patient.       Again, thank you for allowing me to participate in the care of your patient.      Sincerely,    Rocael Miller MD

## 2018-04-10 NOTE — PROGRESS NOTES
Reason for Visit  Telly Leal is a 35 year old year old male who is being seen for Cystic Fibrosis (Patient is being seen for CF follow up)    CF HPI  The patient was seen and examined by Rocael Miller   At last visit in January, the patient had only a modest improvement in his spirometry despite having undergone a course of ciprofloxacin.  The patient has also been treated with inhaled steroids and has overall been more consistent in using his vest therapy.  Despite this, his pulmonary function tests have remained below baseline although he symptomatically has been feeling well.  Today, the patient again reports doing well from a respiratory standpoint since his last visit.  He does not have any period of sustained increase in cough or lung congestion.  He has been on SOO nebs for the past week.  He is doing vest therapy at least 10 times per week.  He has not, however, yet been consistent in getting aerobic exercise.  Last visit, we discontinued aztreonam nebs as he did not feel they were helping and resumed Chelly nebs which she had not been on for a few years.  He does not feel this switch made a difference.    Current Outpatient Prescriptions   Medication     tobramycin, PF, (SOO) 300 MG/5ML neb solution     CREON 97101-41803 UNITS CPEP per EC capsule     colistimethate/colistin-base activity (COLYMYCIN) 150 mg/2mL SOLR neb solution     acetylcysteine (MUCOMYST) 20 % nebulizer solution     albuterol (2.5 MG/3ML) 0.083% neb solution     albuterol (PROAIR HFA) 108 (90 BASE) MCG/ACT Inhaler     azithromycin (ZITHROMAX) 250 MG tablet     aztreonam (AZACTAM) 200 MG/ML     blood glucose monitoring (NO BRAND SPECIFIED) test strip     cromolyn (INTAL) 20 MG/2ML neb solution     fluticasone (FLONASE) 50 MCG/ACT spray     phytonadione (MEPHYTON) 5 MG tablet     ranitidine (ZANTAC) 150 MG tablet     water for injection sterile SOLN     Cholecalciferol 4000 UNITS CAPS     Multiple Vitamin (MULTIVITAMINS PO)      Nutritional Supplements (NUTREN 2.0) LIQD     oseltamivir (TAMIFLU) 75 MG capsule     No current facility-administered medications for this visit.      No Known Allergies  Past Medical History:   Diagnosis Date     Chronic sinusitis      Cystic fibrosis with pulmonary manifestations (H)      Exocrine pancreatic insufficiency      GERD (gastroesophageal reflux disease)      Hemoptysis      Malnutrition (H)        Past Surgical History:   Procedure Laterality Date     LOBECTOMY      right upper lobe     TUBES         Social History     Social History     Marital status: Single     Spouse name: N/A     Number of children: N/A     Years of education: N/A     Occupational History      [employer]     Social History Main Topics     Smoking status: Never Smoker     Smokeless tobacco: Never Used     Alcohol use 0.0 oz/week     0 Standard drinks or equivalent per week     Drug use: Not on file     Sexual activity: Not on file     Other Topics Concern     Not on file     Social History Narrative    Patient works for the [employer] as an .  He does not have stable living conditions at this time.  He has a Bengali guillen.   Update: living with wife Bre in [facility]. Birth of daughter Nieves Rebolledo August, 2017    ROS Pulmonary  Const: no fever, chills, fatigue. ENT: no sinus pain or drainage. GI: no diarrhea, constipation, grease in stools, or gerd sx with h2 blocker. Using 2 cans TF nightly.  Endo: no polyuria or polydipsia.  A complete ROS was otherwise negative except as noted in the HPI.  /84 (BP Location: Right arm, Patient Position: Chair, Cuff Size: Adult Regular)  Pulse 101  Resp 16  Wt 92.3 kg (203 lb 7.8 oz)  SpO2 94%  BMI 25.65 kg/m2  Exam:   GENERAL APPEARANCE: Well developed, well nourished, alert, and in no apparent distress.  EYES: anicteric  HENT: Nasal mucosa with no edema and no hyperemia. No nasal polyps.  EARS: TMs with normal landmarks and light reflex on  left with tympanic membrane occluded by cerumen on the right.  MOUTH: Oral mucosa is moist, without any lesions, no tonsillar enlargement, no oropharyngeal exudate.  NECK: supple, no masses, no thyromegaly.  LYMPHATICS: No significant  cervical, or supraclavicular nodes.  RESP:  good air flow throughout.  Clear in all lung fields. Normal chest wall excursion.  CV: Normal S1, S2, regular rhythm, normal rate. No murmur.  No rub. No gallop. No LE edema.   ABDOMEN:  Bowel sounds normal, soft, nontender, no HSM or masses.   MS: extremities normal. No cyanosis.  SKIN:  PEG site without erythema or discharge. Chest scar-like lesion upper sternum unchanged. Diffuse freckles without overt change.  NEURO: Mentation intact, speech normal, normal gait and stance  PSYCH: mentation appears normal. and affect normal/bright  Results:  Recent Results (from the past 168 hour(s))   General PFT Lab (Please always keep checked)    Collection Time: 04/09/18  1:18 PM   Result Value Ref Range    FVC-Pred 6.24 L    FVC-Pre 6.19 L    FVC-%Pred-Pre 99 %    FEV1-Pre 3.03 L    FEV1-%Pred-Pre 60 %    FEV1FVC-Pred 81 %    FEV1FVC-Pre 49 %    FEFMax-Pred 11.33 L/sec    FEFMax-Pre 9.25 L/sec    FEFMax-%Pred-Pre 81 %    FEF2575-Pred 4.80 L/sec    FEF2575-Pre 0.91 L/sec    RWF0822-%Pred-Pre 18 %    ExpTime-Pre 16.04 sec    FIFMax-Pre 10.07 L/sec    FEV1FEV6-Pred 82 %    FEV1FEV6-Pre 58 %     Spirometry interpretation: personally reviewed. Valid maneuver. Moderate obstruction. FEV1 down slightly and remains 10% below 2 years ago.   Sputum last visit: PA x 2, aspergillus  Last AFB study June 2016 negative  Chest x-ray January 2018 previously reviewed. Mild volume loss right s/p upper lobectomy. Mild bronchiectatic changes with mild mucus plugging. No interval change.    Assessment and plan:  1.  Presumed mild exacerbation of CF lung disease: The patient has had steadily declining pulmonary function tests over the past several months despite feeling at  baseline.  He has not responded to trials of oral antibiotics, change in his inhaled antibiotics, or inhaled steroids.  He overall has improved adherence to airway clearance compared to a couple years ago.  However, even though he is doing more vest therapy he overall may not be getting is much aerobic exercise as previous.  Consensus today is that the patient will increase vest therapy to at least once a day but also begin regular aerobic exercise with a goal of 3-4 times per week minimum.  We discussed gradually increasing the intensity of exercise to avoid injury and discomfort.  The tentative plan is to pursue a course of intravenous antibiotics if he does not respond to this by next visit.  Will add sputum AFB study to today's culture.  We will send home with sputum cup in the future if clinic sample insufficient volume.  2. Impaired glucose tolerance: Fasting > 100 and peak > 200 and 2 hour value OK 1/18. A1C under 6. Consensus is to defer another evaluation in diabetes clinic given limited follow through/interest on patients part now and in past.   3.  Pancreatic insufficiency with a history of malnutrition: Adequate enzyme replacement by history.  Weight is up slightly and BMI now at 25.6.  There is room for him to lose weight if this is a byproduct of increased exercise.  He will continue 2 cans of tube feed nightly.  Will revisit cutting back on this if he has further weight gain.  4. CF sinus disease: has flonase prn - more prominent when seasonal allergies present.  5. GERD: Adequate control with ranitidine.   6. Low vitamin D level: On replacement but has not been taking consistently. Encouraged him to resume this. Level from January 2018 slightly low at 28.  7. Skin lesions: Seen by outside Dermatologist. Central chest scar could be treated by steroid injections per pt but not actively pursuing. Plan in place for annual exam of his many freckles.  Patient reports being due for her next exam August  2018.  8. Healthcare maintenance: Annuals due January, 2019.   9. Family planning: he and wife Bre now with 7 month girl Nieves and adjusting well.   Patient will follow up in 2 months  40 minutes spent in conjunction with this visit with > 50% of time spent counseling and coordinating care of above issues as well as detailed review of his annual studies with patient.     Rocael Miller

## 2018-04-11 NOTE — PROGRESS NOTES
"CF Annual Nutrition Assessment    Reason for Assessment  Assessed during [facility] r/t increased nutrition risk with diagnosis of CF per protocol    Nutrition Significant PMH  Mild Lung Disease   Pancreatic Insufficient  G-tube for nutrition support  Impaired Glucose Tolerance    Social Assessment  Living situation: with wife + 7 month daughter  Work/School/Disability: full-time for [place] [employer]  Food Insecurity:  None    Anthropometric Assessment  Height: 189.7 cm  IBW based on BMI 22 for females and 23 for males per CF Foundation recs: 82.8 kg / 182#  Today's Weight: 92.3 kg (actual weight)   %IBW: 111%   Body mass index is 25.65 kg/(m^2).   Current weight is considered: Current weight is considered \"overweight\" per general BMI standards, however above goal and at a good weight for CF. No malnutrition.     Noted some weight increases up to 94.6kg/208# a year ago, has since trended back down and now maintaining within -205#. Pt states he tends to gain more weight during the winter and loses during the summer with increased activity. Also acknowledges that he has been much busier in the last year with the new baby.     Wt Readings from Last 5 Encounters:   18 92.3 kg (203 lb 7.8 oz)   18 91.9 kg (202 lb 9.6 oz)   18 91.9 kg (202 lb 9.6 oz)   17 91.8 kg (202 lb 6.1 oz)   17 91.4 kg (201 lb 9.6 oz)     Physical Activity/Exercise: during summer plans to increase walking, golfing, and some running    Pancreatic Enzymes  Brand:  Creon [id number]  Dosin with meals, 1-5 with snacks + 11 w/ tube feeding (2 cans)  Estimated Daily Intake: 30 caps with meals + 11 with TF = 41 caps/day (10,700 units lipase/kg/day)  Are you taking enzymes as recommended:Yes     High dose providing 2090 units lipase/kg/meal which is within the recommended range per CF Foundation to inhibit fibrosing colonopathy    Signs of Malabsorption: No  Enzyme Program:  None - information provided at " "last visit    Diet History and Assessment  Diet Preferences/Allergies/Intolerances: Regular diet  Appetite Stimulant Rx:  No  Intake Recall/Comments:  Eating 2-3 meals/day + snacks. Appetite is fair but eats consistently. Does not think he would be able to eat enough to maintain weight without use of TF. Due to impaired glucose tolerance, pt makes greater effort to combine simple sugars and soda with meals and to balance CHO intake throughout the day.     Thinks he has been eating less over the last year d/t busy schedule and baby at home. Continues to eat out 3-4x/week at places he can pick-up and take home. Trying to pick \"healthier\" places with less fried food such as [employer]. Also does [employer], [employer], Noodles, or [employer].     B: skips or small snack (fruit or muffin)  L: peanut butter and jelly sandwich, chips, cookie  D: protein with starch and vegetable    Calcium: gallon whole milk/week + yogurt, string cheese  Salt: likely adequate from foods  Hydration: Water, Gatorade, whole milk; drinks Regular Coke with meals    Supplements:  No    Enteral Nutrition  Type of Feeding Tube: G-tube (button)  Formula: Nutren 2.0  Rate/Frequency: 135 ml/hr x 2 cans nightly (~4 hrs infusion time)  Nutrition: 500 mls, 1000 kcals, 42 g protein, 46 g fat, 108 g CHO  Enzymes: taking 11 caps before infusion, providing 5739 units lipase/gm fat in feeds which is above recommended ranges with decrease in TF volume.     Estimated Energy and Protein Needs  Estimation based on weight maintenance with Mild lung disease and pancreatic insufficient.    BEE: 2100   2766-8455 kcals/day =  175-200% BEE  140-185 g protein/day = 1.5-2 g/kg    Laboratory Assessment    Vitamin A   Date Value Ref Range Status   01/22/2018 0.60 0.30 - 1.20 mg/L Final     Vitamin D Deficiency screening   Date Value Ref Range Status   01/22/2018 28 20 - 75 ug/L Final     Comment:     Season, race, dietary intake, and treatment affect the concentration of "   25-hydroxy-Vitamin D. Values may decrease during winter months and increase   during summer months. Values 20-29 ug/L may indicate Vitamin D insufficiency   and values <20 ug/L may indicate Vitamin D deficiency.  Vitamin D determination is routinely performed by an immunoassay specific for   25 hydroxyvitamin D3.  If an individual is on vitamin D2 (ergocalciferol)   supplementation, please specify 25 OH vitamin D2 and D3 level determination by   LCMSMS test VITD23.       Vitamin E   Date Value Ref Range Status   01/22/2018 5.7 5.5 - 18.0 mg/L Final     Comment:     (Note)  Test developed and characteristics determined by [facility]. See Compliance Statement B: [email or web address]/CS       Iron   Date Value Ref Range Status   01/22/2018 72 35 - 180 ug/dL Final     Cholesterol   Date Value Ref Range Status   01/22/2018 126 <200 mg/dL Final     Triglycerides   Date Value Ref Range Status   01/22/2018 104 <150 mg/dL Final     HDL Cholesterol   Date Value Ref Range Status   01/22/2018 35 (L) >39 mg/dL Final     LDL Cholesterol Calculated   Date Value Ref Range Status   01/22/2018 70 <100 mg/dL Final     Comment:     Desirable:       <100 mg/dl     VLDL-Cholesterol   Date Value Ref Range Status   12/23/2014 33 (H) 0 - 30 mg/dL Final     Cholesterol/HDL Ratio   Date Value Ref Range Status   12/23/2014 3.9 0.0 - 5.0 Final     OGTT 2018: indeterminate glucose tolerance  DEXA 2018: lowest Z-score 0 at L1-L4.     Current Vitamin/Mineral Prescription R/T deficiency/malabsorption: None currently for ~6 months;   Most recent recommended regimen: MVI, Vitamin D 2000 units, and Vitamin E 400 units  Comments: Variable vitamin/mineral intake, typically 50% of the time    NUTRITION DIAGNOSIS  Impaired nutrient utilization r/t CF pancreatic insufficiency and  CF hypermetabolism as evidenced by pt requires enzyme replacement therapy, enteral nutrition support, and high kcal/protein diet to maintain nutrition and health status.    INTERVENTIONS/RECOMMENDATIONS  1) Reviewed weight trends, PO, and TF use. Goal for weight maintenance at this time. Continue to encourage balanced, healthy diet. If weight trends >94 kg (206#) would recommend decrease TF infusion to 2 cans for 3x/week.     2) Reviewed results of annual studies from January including low Vitamin D level and low-end of normal Vitamin E level. Encouraged pt to add back his Vitamin D 2000 units and Vitamin E 400 units. Pt agreeable to restarting supplementation.     Patient Understanding: Good  Expected Compliance: Good  Follow-Up Plans: Per protocol    GOALS:  1) Maintain BMI 23-26 kg/m2  2) Start Vitamin D 2000 units daily    FOLLOW-UP/MONITORING:  Visit patient within 12 month(s) or sooner per pt/MD request    Time Spent In Face-to-Face Patient Interactions: 15 minutes      Leona Marie RD, LD  Cystic Fibrosis/Lung Transplant Dietitian  Pager [phone]  On weekends/holidays contact coverage RD at [phone] (inpatient use only)

## 2018-04-17 LAB
BACTERIA SPEC CULT: ABNORMAL
SPECIMEN SOURCE: ABNORMAL

## 2018-06-18 ENCOUNTER — OFFICE VISIT (OUTPATIENT)
Dept: PULMONOLOGY | Facility: CLINIC | Age: 38
End: 2018-06-18
Attending: INTERNAL MEDICINE
Payer: COMMERCIAL

## 2018-06-18 VITALS
DIASTOLIC BLOOD PRESSURE: 78 MMHG | HEIGHT: 75 IN | OXYGEN SATURATION: 97 % | HEART RATE: 88 BPM | WEIGHT: 199.3 LBS | RESPIRATION RATE: 17 BRPM | SYSTOLIC BLOOD PRESSURE: 119 MMHG | BODY MASS INDEX: 24.78 KG/M2

## 2018-06-18 DIAGNOSIS — E84.0 CYSTIC FIBROSIS WITH PULMONARY MANIFESTATIONS (H): Primary | ICD-10-CM

## 2018-06-18 DIAGNOSIS — E84.0 CYSTIC FIBROSIS WITH PULMONARY MANIFESTATIONS (H): ICD-10-CM

## 2018-06-18 LAB
EXPTIME-PRE: 16.04 SEC
FEF2575-%PRED-PRE: 16 %
FEF2575-PRE: 0.78 L/SEC
FEF2575-PRED: 4.79 L/SEC
FEFMAX-%PRED-PRE: 79 %
FEFMAX-PRE: 9.06 L/SEC
FEFMAX-PRED: 11.33 L/SEC
FEV1-%PRED-PRE: 60 %
FEV1-PRE: 3.05 L
FEV1FEV6-PRE: 59 %
FEV1FEV6-PRED: 82 %
FEV1FVC-PRE: 49 %
FEV1FVC-PRED: 81 %
FIFMAX-PRE: 10.23 L/SEC
FVC-%PRED-PRE: 100 %
FVC-PRE: 6.27 L
FVC-PRED: 6.24 L

## 2018-06-18 PROCEDURE — 87181 SC STD AGAR DILUTION PER AGT: CPT | Performed by: INTERNAL MEDICINE

## 2018-06-18 PROCEDURE — 87070 CULTURE OTHR SPECIMN AEROBIC: CPT | Performed by: INTERNAL MEDICINE

## 2018-06-18 PROCEDURE — 87015 SPECIMEN INFECT AGNT CONCNTJ: CPT | Performed by: INTERNAL MEDICINE

## 2018-06-18 PROCEDURE — 87116 MYCOBACTERIA CULTURE: CPT | Performed by: INTERNAL MEDICINE

## 2018-06-18 PROCEDURE — G0463 HOSPITAL OUTPT CLINIC VISIT: HCPCS | Mod: ZF

## 2018-06-18 PROCEDURE — 87206 SMEAR FLUORESCENT/ACID STAI: CPT | Performed by: INTERNAL MEDICINE

## 2018-06-18 PROCEDURE — 87077 CULTURE AEROBIC IDENTIFY: CPT | Performed by: INTERNAL MEDICINE

## 2018-06-18 PROCEDURE — 87186 SC STD MICRODIL/AGAR DIL: CPT | Performed by: INTERNAL MEDICINE

## 2018-06-18 PROCEDURE — 87107 FUNGI IDENTIFICATION MOLD: CPT | Performed by: INTERNAL MEDICINE

## 2018-06-18 ASSESSMENT — PAIN SCALES - GENERAL: PAINLEVEL: NO PAIN (0)

## 2018-06-18 NOTE — PROGRESS NOTES
Reason for Visit  Telly Leal is a 35 year old year old male who is being seen for RECHECK (Cystic Fibrosis)    CF HPI  The patient was seen and examined by Rocael Miller   At the patient's last clinic visit in early April he had slight further decline in his pulmonary function tests but continued to be completely asymptomatic.  This is part of the overall trend over the past 1-2 years.  The plan at that point was for the patient to initiate regular aerobic exercise to augment his airway clearance as previously planned on past visits.  Today, the patient unfortunately has not yet initiated the exercise plan.  He has been very good about completing vest therapy doing approximately 12 therapies per week.  He continues to feel well from a respiratory standpoint with sputum production predominantly limited to his morning therapy.  No dyspnea with his activities of daily living and when he does occasionally exercise reports baseline tolerance.  He continues to alternate SOO and Chelly nebs and has 1 week left of Chelly.  No hemoptysis or chest pain.    Current Outpatient Prescriptions   Medication     acetylcysteine (MUCOMYST) 20 % nebulizer solution     albuterol (2.5 MG/3ML) 0.083% neb solution     albuterol (PROAIR HFA) 108 (90 BASE) MCG/ACT Inhaler     azithromycin (ZITHROMAX) 250 MG tablet     aztreonam (AZACTAM) 200 MG/ML     blood glucose monitoring (NO BRAND SPECIFIED) test strip     Cholecalciferol 4000 UNITS CAPS     colistimethate/colistin-base activity (COLYMYCIN) 150 mg/2mL SOLR neb solution     CREON 29515-70328 UNITS CPEP per EC capsule     cromolyn (INTAL) 20 MG/2ML neb solution     fluticasone (FLONASE) 50 MCG/ACT spray     Multiple Vitamin (MULTIVITAMINS PO)     Nutritional Supplements (NUTREN 2.0) LIQD     oseltamivir (TAMIFLU) 75 MG capsule     phytonadione (MEPHYTON) 5 MG tablet     ranitidine (ZANTAC) 150 MG tablet     tobramycin, PF, (SOO) 300 MG/5ML neb solution     water for injection  "sterile SOLN     No current facility-administered medications for this visit.      No Known Allergies  Past Medical History:   Diagnosis Date     Chronic sinusitis      Cystic fibrosis with pulmonary manifestations (H)      Exocrine pancreatic insufficiency      GERD (gastroesophageal reflux disease)      Hemoptysis      Malnutrition (H)        Past Surgical History:   Procedure Laterality Date     LOBECTOMY      right upper lobe     TUBES         Social History     Social History     Marital status: Single     Spouse name: N/A     Number of children: N/A     Years of education: N/A     Occupational History      [employer]     Social History Main Topics     Smoking status: Never Smoker     Smokeless tobacco: Never Used     Alcohol use 0.0 oz/week     0 Standard drinks or equivalent per week     Drug use: Not on file     Sexual activity: Not on file     Other Topics Concern     Not on file     Social History Narrative    Patient works for the [employer] as an .  He does not have stable living conditions at this time.  He has a Vietnamese guillen.   Update: living with wife Bre in [facility]. Birth of daughter Nieves Rebolledo August, 2017    ROS Pulmonary  Const: no fever, chills, fatigue. ENT: no sinus pain. Mild intermittent clear drainage. GI: no diarrhea, constipation, grease in stools, or gerd sx with h2 blocker. Using 2 cans TF nightly.  Endo: no polyuria or polydipsia.  A complete ROS was otherwise negative except as noted in the HPI.  /78  Pulse 88  Resp 17  Ht 1.897 m (6' 2.69\")  Wt 90.4 kg (199 lb 4.7 oz)  SpO2 97%  BMI 25.12 kg/m2  Exam:   GENERAL APPEARANCE: Well developed, well nourished, alert, and in no apparent distress.  EYES: anicteric  HENT: Nasal mucosa with no edema and no hyperemia. No nasal polyps.  EARS: TMs with normal landmarks and light reflex bilaterally.  MOUTH: Oral mucosa is moist, without any lesions, no tonsillar enlargement, no " oropharyngeal exudate.  NECK: supple, no masses, no thyromegaly.  LYMPHATICS: No significant  cervical, or supraclavicular nodes.  RESP:  good air flow throughout.  Clear in all lung fields. Normal chest wall excursion.  CV: Normal S1, S2, regular rhythm, normal rate. No murmur.  No rub. No gallop. No LE edema.   ABDOMEN:  Bowel sounds normal, soft, nontender, no HSM or masses.   MS: extremities normal. No cyanosis.  SKIN:  PEG site without erythema or discharge. Chest scar-like lesion upper sternum unchanged. Diffuse freckles without overt change.  NEURO: Mentation intact, speech normal, normal gait and stance  PSYCH: mentation appears normal. and affect normal/bright  Results:  Recent Results (from the past 168 hour(s))   General PFT Lab (Please always keep checked)    Collection Time: 06/18/18  2:25 PM   Result Value Ref Range    FVC-Pred 6.24 L    FVC-Pre 6.27 L    FVC-%Pred-Pre 100 %    FEV1-Pre 3.05 L    FEV1-%Pred-Pre 60 %    FEV1FVC-Pred 81 %    FEV1FVC-Pre 49 %    FEFMax-Pred 11.33 L/sec    FEFMax-Pre 9.06 L/sec    FEFMax-%Pred-Pre 79 %    FEF2575-Pred 4.79 L/sec    FEF2575-Pre 0.78 L/sec    BQZ9072-%Pred-Pre 16 %    ExpTime-Pre 16.04 sec    FIFMax-Pre 10.23 L/sec    FEV1FEV6-Pred 82 %    FEV1FEV6-Pre 59 %     Spirometry interpretation: personally reviewed. Valid maneuver. Moderate obstruction. FEV1 unchanged and remains 10% below 2 years ago.   Sputum last visit: PA x 2, aspergillus  Last AFB study June 2016 negative  Chest x-ray January 2018 previously reviewed. Mild volume loss right s/p upper lobectomy. Mild bronchiectatic changes with mild mucus plugging. No interval change.    Assessment and plan:  1.  Possible mild exacerbation of CF lung disease: The patient had steadily declining pulmonary function tests over the past several months despite feeling at baseline.  He has not responded to trials of oral antibiotics, change in his inhaled antibiotics, or inhaled steroids.  He overall has improved  adherence to airway clearance compared to a couple years ago.  A course of intravenous antibiotics remains an option admission.  However, the consensus is to defer this given he continues to feel quite well, there is no further decline in his pulmonary function tests today, and the patient today expresses greater commitment to initiating a trial of exercise.  He will continue on chronic azithromycin and alternate SOO and Chelly nebs on a monthly basis.  He brought in a sputum sample today from home for AFB testing.  2. Impaired glucose tolerance: Fasting > 100 and peak > 200 and 2 hour value OK 1/18. A1C under 6. Consensus is to defer another evaluation in diabetes clinic given limited follow through/interest on patients part now and in past.   3.  Pancreatic insufficiency with a history of malnutrition: Adequate enzyme replacement by history.  He has lost 4 pounds since last visit.  Some of this may be due to increased activity.  His BMI is still well within target at 25.1 and if anything a mild further degree and weight loss may be helpful in terms of vest therapy efficacy. Continue 2 cans TF nightly.  4. CF sinus disease: has flonase prn - more prominent when seasonal allergies present.  5. GERD: Adequate control with ranitidine.   6. Low vitamin D level: Improved adherence to replacement. Level from January 2018 slightly low at 28.  7. Skin lesions: Seen by outside Dermatologist. Central chest scar could be treated by steroid injections per pt but not actively pursuing. Plan in place for annual exam of his many freckles.  He is due for her next exam August 2018.  8. Healthcare maintenance: Annuals due January, 2019.   9. Family planning: he and wife Bre now with 10 month girl Nieves and adjusting well.   Patient will follow up in 2 months  40 minutes spent in conjunction with this visit with > 50% of time spent counseling and coordinating care of above issues.     Rocael Miller

## 2018-06-18 NOTE — MR AVS SNAPSHOT
After Visit Summary   6/18/2018    Telly Leal    MRN: [id number]           Patient Information     Date Of Birth          1980        Visit Information        Provider Department      6/18/2018 3:00 PM Rocael Miller MD [facility]        Today's Diagnoses     Cystic fibrosis with pulmonary manifestations (H)           Follow-ups after your visit        Follow-up notes from your care team     Return in about 2 months (around 8/18/2018).      Your next 10 appointments already scheduled     Sep 04, 2018  1:00 PM CDT   CF LOOP with  PFL CF   [facility] Pulmonary Function Testing ([facility])    [address]  3rd Floor  [facility] [id number] [phone]            Sep 04, 2018  1:40 PM CDT   (Arrive by 1:25 PM)   RETURN CYSTIC FIBROSIS VISIT with Rocael Miller MD   [facility] ([facility])    [address]  [address] [place] [id number] [phone]              Future tests that were ordered for you today     Open Future Orders        Priority Expected Expires Ordered    RESPIRATORY FLOW VOLUME LOOP Routine 8/17/2018 12/15/2018 6/18/2018            Who to contact     If you have questions or need follow up information about today's clinic visit or your schedule please contact [facility] directly at [phone].  Normal or non-critical lab and imaging results will be communicated to you by MyChart, letter or phone within 4 business days after the clinic has received the results. If you do not hear from us within 7 days, please contact the clinic through MyChart or phone. If you have a critical or abnormal lab result, we will notify you by phone as soon as possible.  Submit refill requests through [employer] or call your pharmacy and they will forward the refill request to us. Please allow 3 business days for  "your refill to be completed.          Additional Information About Your Visit        MyChart Information     [employer] gives you secure access to your electronic health record. If you see a primary care provider, you can also send messages to your care team and make appointments. If you have questions, please call your primary care clinic.  If you do not have a primary care provider, please call [phone] and they will assist you.        Care EveryWhere ID     This is your Care EveryWhere ID. This could be used by other organizations to access your [facility] medical records  [id number]        Your Vitals Were     Pulse Respirations Height Pulse Oximetry BMI (Body Mass Index)       88 17 1.897 m (6' 2.69\") 97% 25.12 kg/m2        Blood Pressure from Last 3 Encounters:   06/18/18 119/78   04/09/18 126/84   01/22/18 124/87    Weight from Last 3 Encounters:   06/18/18 90.4 kg (199 lb 4.7 oz)   04/09/18 92.3 kg (203 lb 7.8 oz)   01/22/18 91.9 kg (202 lb 9.6 oz)              We Performed the Following     AFB Culture Non Blood     AFB Stain Non Blood     Cystic Fibrosis Culture Aerob Bacterial        Primary Care Provider Office Phone # Fax #    Rcoael Miller [phone]        [address] [address]        Equal Access to Services     MATTHEW HURD : Hadii aad ku hadasho Soomaali, waaxda luqadaha, qaybta kaalmada james montero. So [facility] [phone].    ATENCIÓN: Si habla español, tiene a cardozo disposición servicios gratuitos de asistencia lingüística. Llame al [phone].    We comply with applicable federal civil rights laws and [place] laws. We do not discriminate on the basis of race, color, national origin, age, disability, sex, sexual orientation, or gender identity.            Thank you!     Thank you for choosing [facility]  for your care. Our goal is always to provide you with excellent care. " Hearing back from our patients is one way we can continue to improve our services. Please take a few minutes to complete the written survey that you may receive in the mail after your visit with us. Thank you!             Your Updated Medication List - Protect others around you: Learn how to safely use, store and throw away your medicines at www.disposemymeds.org.          This list is accurate as of 6/18/18  6:17 PM.  Always use your most recent med list.                   Brand Name Dispense Instructions for use Diagnosis    acetylcysteine 20 % nebulizer solution    MUCOMYST    180 mL    Inhale 2 mLs into the lungs 3 times daily    Cystic fibrosis with pulmonary manifestations (H), Vitamin B12 deficiency disease       * albuterol (2.5 MG/3ML) 0.083% neb solution     360 mL    Take 1 vial (2.5 mg) by nebulization 3 times daily    Cystic fibrosis with pulmonary manifestations (H)       * albuterol 108 (90 Base) MCG/ACT Inhaler    PROAIR HFA    1 Inhaler    Inhale 1-2 puffs into the lungs every 6 hours as needed for shortness of breath / dyspnea Every 4-6 hours as needed    Cystic fibrosis with pulmonary manifestations (H), Vitamin B12 deficiency disease       azithromycin 250 MG tablet    ZITHROMAX    30 tablet    Take 1 tablet (250 mg) by mouth daily    CF (cystic fibrosis) (H)       aztreonam 200 MG/ML    AZACTAM    56 each    Take 5 mLs (1 g) by nebulization 2 times daily    Cystic fibrosis with pulmonary manifestations (H)       blood glucose monitoring test strip    no brand specified    100 strip    Use to test blood sugars 4-6 times daily or as directed.    Cystic fibrosis with pulmonary manifestations (H), Glucose intolerance (impaired glucose tolerance)       Cholecalciferol 4000 units Caps     90 capsule    Take 1 capsule by mouth daily    Cystic fibrosis with pulmonary manifestations (H)       colistimethate/colistin-base activity 150 mg/2mL Solr neb solution    COLYMYCIN    9000 mg    Take 150 mg by  nebulization 2 times daily    Cystic fibrosis with pulmonary manifestations (H)       CREON 16863-70685 units Cpep per EC capsule   Generic drug:  amylase-lipase-protease     1000 capsule    Take 5-6 capsules (120,000-144,000 Units) by mouth See Admin Instructions 5-6 with meals, 1-5 with snacks,7-8 with tube feedings    Cystic fibrosis with pulmonary manifestations (H)       cromolyn 20 MG/2ML neb solution    INTAL    180 mL    Take 2 mLs (20 mg) by nebulization 3 times daily 1 vial three times daily    Cystic fibrosis with pulmonary manifestations (H)       fluticasone 50 MCG/ACT spray    FLONASE    16 g    Spray 2 sprays into both nostrils as needed    Cystic fibrosis with pulmonary manifestations (H)       MULTIVITAMINS PO      Take 1 tablet by mouth daily.        NUTREN 2.0      Take 4-5 Cans by mouth. 168/ml/hr 7 days/week        oseltamivir 75 MG capsule    TAMIFLU    10 capsule    Take 1 capsule (75 mg) by mouth 2 times daily For 5 days    Cystic fibrosis with pulmonary manifestations (H), Vitamin B12 deficiency disease       phytonadione 5 MG tablet    MEPHYTON    12 tablet    TAKE 1 TABLET (5 MG) BY MOUTH 3 TIMES A WEEK    CF (cystic fibrosis) (H), Exocrine pancreatic insufficiency       ranitidine 150 MG tablet    ZANTAC    180 tablet    Take 1 tablet (150 mg) by mouth 2 times daily    Gastroesophageal reflux disease without esophagitis       tobramycin (PF) 300 MG/5ML neb solution    SOO    300 mL    Take 5 mLs (300 mg) by nebulization 2 times daily 28 days on 28 days off    Cystic fibrosis with pulmonary manifestations (H)       water for injection sterile Soln     250 mL    Inject 4 mLs as directed 2 times daily    Cystic fibrosis with pulmonary manifestations (H)       * Notice:  This list has 2 medication(s) that are the same as other medications prescribed for you. Read the directions carefully, and ask your doctor or other care provider to review them with you.

## 2018-06-18 NOTE — LETTER
6/18/2018       RE: Telly Leal  [address] [address]     Dear Colleague,    Thank you for referring your patient, Telly Leal, to the [facility] at [facility]. Please see a copy of my visit note below.    Reason for Visit  Telly Leal is a 35 year old year old male who is being seen for RECHECK (Cystic Fibrosis)    CF HPI  The patient was seen and examined by Rocael Miller   At the patient's last clinic visit in early April he had slight further decline in his pulmonary function tests but continued to be completely asymptomatic.  This is part of the overall trend over the past 1-2 years.  The plan at that point was for the patient to initiate regular aerobic exercise to augment his airway clearance as previously planned on past visits.  Today, the patient unfortunately has not yet initiated the exercise plan.  He has been very good about completing vest therapy doing approximately 12 therapies per week.  He continues to feel well from a respiratory standpoint with sputum production predominantly limited to his morning therapy.  No dyspnea with his activities of daily living and when he does occasionally exercise reports baseline tolerance.  He continues to alternate SOO and Chelly nebs and has 1 week left of Chelly.  No hemoptysis or chest pain.    Current Outpatient Prescriptions   Medication     acetylcysteine (MUCOMYST) 20 % nebulizer solution     albuterol (2.5 MG/3ML) 0.083% neb solution     albuterol (PROAIR HFA) 108 (90 BASE) MCG/ACT Inhaler     azithromycin (ZITHROMAX) 250 MG tablet     aztreonam (AZACTAM) 200 MG/ML     blood glucose monitoring (NO BRAND SPECIFIED) test strip     Cholecalciferol 4000 UNITS CAPS     colistimethate/colistin-base activity (COLYMYCIN) 150 mg/2mL SOLR neb solution     CREON 03864-75626 UNITS CPEP per EC capsule     cromolyn (INTAL) 20 MG/2ML neb solution      "fluticasone (FLONASE) 50 MCG/ACT spray     Multiple Vitamin (MULTIVITAMINS PO)     Nutritional Supplements (NUTREN 2.0) LIQD     oseltamivir (TAMIFLU) 75 MG capsule     phytonadione (MEPHYTON) 5 MG tablet     ranitidine (ZANTAC) 150 MG tablet     tobramycin, PF, (SOO) 300 MG/5ML neb solution     water for injection sterile SOLN     No current facility-administered medications for this visit.      No Known Allergies  Past Medical History:   Diagnosis Date     Chronic sinusitis      Cystic fibrosis with pulmonary manifestations (H)      Exocrine pancreatic insufficiency      GERD (gastroesophageal reflux disease)      Hemoptysis      Malnutrition (H)        Past Surgical History:   Procedure Laterality Date     LOBECTOMY      right upper lobe     TUBES         Social History     Social History     Marital status: Single     Spouse name: N/A     Number of children: N/A     Years of education: N/A     Occupational History      [employer]     Social History Main Topics     Smoking status: Never Smoker     Smokeless tobacco: Never Used     Alcohol use 0.0 oz/week     0 Standard drinks or equivalent per week     Drug use: Not on file     Sexual activity: Not on file     Other Topics Concern     Not on file     Social History Narrative    Patient works for the [employer] as an .  He does not have stable living conditions at this time.  He has a French guillen.   Update: living with wife Bre in [facility]. Birth of daughter Nieves Rebolledo August, 2017    ROS Pulmonary  Const: no fever, chills, fatigue. ENT: no sinus pain. Mild intermittent clear drainage. GI: no diarrhea, constipation, grease in stools, or gerd sx with h2 blocker. Using 2 cans TF nightly.  Endo: no polyuria or polydipsia.  A complete ROS was otherwise negative except as noted in the HPI.  /78  Pulse 88  Resp 17  Ht 1.897 m (6' 2.69\")  Wt 90.4 kg (199 lb 4.7 oz)  SpO2 97%  BMI 25.12 kg/m2  Exam:   GENERAL " APPEARANCE: Well developed, well nourished, alert, and in no apparent distress.  EYES: anicteric  HENT: Nasal mucosa with no edema and no hyperemia. No nasal polyps.  EARS: TMs with normal landmarks and light reflex bilaterally.  MOUTH: Oral mucosa is moist, without any lesions, no tonsillar enlargement, no oropharyngeal exudate.  NECK: supple, no masses, no thyromegaly.  LYMPHATICS: No significant  cervical, or supraclavicular nodes.  RESP:  good air flow throughout.  Clear in all lung fields. Normal chest wall excursion.  CV: Normal S1, S2, regular rhythm, normal rate. No murmur.  No rub. No gallop. No LE edema.   ABDOMEN:  Bowel sounds normal, soft, nontender, no HSM or masses.   MS: extremities normal. No cyanosis.  SKIN:  PEG site without erythema or discharge. Chest scar-like lesion upper sternum unchanged. Diffuse freckles without overt change.  NEURO: Mentation intact, speech normal, normal gait and stance  PSYCH: mentation appears normal. and affect normal/bright  Results:  Recent Results (from the past 168 hour(s))   General PFT Lab (Please always keep checked)    Collection Time: 06/18/18  2:25 PM   Result Value Ref Range    FVC-Pred 6.24 L    FVC-Pre 6.27 L    FVC-%Pred-Pre 100 %    FEV1-Pre 3.05 L    FEV1-%Pred-Pre 60 %    FEV1FVC-Pred 81 %    FEV1FVC-Pre 49 %    FEFMax-Pred 11.33 L/sec    FEFMax-Pre 9.06 L/sec    FEFMax-%Pred-Pre 79 %    FEF2575-Pred 4.79 L/sec    FEF2575-Pre 0.78 L/sec    BWJ4372-%Pred-Pre 16 %    ExpTime-Pre 16.04 sec    FIFMax-Pre 10.23 L/sec    FEV1FEV6-Pred 82 %    FEV1FEV6-Pre 59 %     Spirometry interpretation: personally reviewed. Valid maneuver. Moderate obstruction. FEV1 unchanged and remains 10% below 2 years ago.   Sputum last visit: PA x 2, aspergillus  Last AFB study June 2016 negative  Chest x-ray January 2018 previously reviewed. Mild volume loss right s/p upper lobectomy. Mild bronchiectatic changes with mild mucus plugging. No interval change.    Assessment and  plan:  1.  Possible mild exacerbation of CF lung disease: The patient had steadily declining pulmonary function tests over the past several months despite feeling at baseline.  He has not responded to trials of oral antibiotics, change in his inhaled antibiotics, or inhaled steroids.  He overall has improved adherence to airway clearance compared to a couple years ago.  A course of intravenous antibiotics remains an option admission.  However, the consensus is to defer this given he continues to feel quite well, there is no further decline in his pulmonary function tests today, and the patient today expresses greater commitment to initiating a trial of exercise.  He will continue on chronic azithromycin and alternate SOO and Chelly nebs on a monthly basis.  He brought in a sputum sample today from home for AFB testing.  2. Impaired glucose tolerance: Fasting > 100 and peak > 200 and 2 hour value OK 1/18. A1C under 6. Consensus is to defer another evaluation in diabetes clinic given limited follow through/interest on patients part now and in past.   3.  Pancreatic insufficiency with a history of malnutrition: Adequate enzyme replacement by history.  He has lost 4 pounds since last visit.  Some of this may be due to increased activity.  His BMI is still well within target at 25.1 and if anything a mild further degree and weight loss may be helpful in terms of vest therapy efficacy. Continue 2 cans TF nightly.  4. CF sinus disease: has flonase prn - more prominent when seasonal allergies present.  5. GERD: Adequate control with ranitidine.   6. Low vitamin D level: Improved adherence to replacement. Level from January 2018 slightly low at 28.  7. Skin lesions: Seen by outside Dermatologist. Central chest scar could be treated by steroid injections per pt but not actively pursuing. Plan in place for annual exam of his many freckles.  He is due for her next exam August 2018.  8. Healthcare maintenance: Annuals due  January, 2019.   9. Family planning: he and wife Bre now with 10 month girl Nieves and adjusting well.   Patient will follow up in 2 months  40 minutes spent in conjunction with this visit with > 50% of time spent counseling and coordinating care of above issues.     Rocael Miller                   Again, thank you for allowing me to participate in the care of your patient.      Sincerely,    Rocael Miller MD

## 2018-06-20 LAB
ACID FAST STN SPEC QL: NORMAL
SPECIMEN SOURCE: NORMAL

## 2018-06-26 LAB
BACTERIA SPEC CULT: ABNORMAL
SPECIMEN SOURCE: ABNORMAL

## 2018-08-13 LAB
MYCOBACTERIUM SPEC CULT: NORMAL
MYCOBACTERIUM SPEC CULT: NORMAL
SPECIMEN SOURCE: NORMAL

## 2018-09-24 ENCOUNTER — OFFICE VISIT (OUTPATIENT)
Dept: PULMONOLOGY | Facility: CLINIC | Age: 38
End: 2018-09-24
Attending: INTERNAL MEDICINE
Payer: COMMERCIAL

## 2018-09-24 VITALS
SYSTOLIC BLOOD PRESSURE: 121 MMHG | WEIGHT: 194.45 LBS | OXYGEN SATURATION: 95 % | RESPIRATION RATE: 16 BRPM | BODY MASS INDEX: 24.18 KG/M2 | HEIGHT: 75 IN | HEART RATE: 106 BPM | DIASTOLIC BLOOD PRESSURE: 81 MMHG

## 2018-09-24 DIAGNOSIS — E84.0 CYSTIC FIBROSIS WITH PULMONARY MANIFESTATIONS (H): Primary | ICD-10-CM

## 2018-09-24 DIAGNOSIS — E84.0 CYSTIC FIBROSIS WITH PULMONARY MANIFESTATIONS (H): ICD-10-CM

## 2018-09-24 LAB
EXPTIME-PRE: 12.18 SEC
FEF2575-%PRED-PRE: 24 %
FEF2575-PRE: 1.16 L/SEC
FEF2575-PRED: 4.78 L/SEC
FEFMAX-%PRED-PRE: 74 %
FEFMAX-PRE: 8.44 L/SEC
FEFMAX-PRED: 11.32 L/SEC
FEV1-%PRED-PRE: 60 %
FEV1-PRE: 3.03 L
FEV1FEV6-PRE: 59 %
FEV1FEV6-PRED: 82 %
FEV1FVC-PRE: 53 %
FEV1FVC-PRED: 81 %
FIFMAX-PRE: 10.51 L/SEC
FVC-%PRED-PRE: 92 %
FVC-PRE: 5.74 L
FVC-PRED: 6.23 L

## 2018-09-24 PROCEDURE — 87070 CULTURE OTHR SPECIMN AEROBIC: CPT | Performed by: INTERNAL MEDICINE

## 2018-09-24 PROCEDURE — 87077 CULTURE AEROBIC IDENTIFY: CPT | Performed by: INTERNAL MEDICINE

## 2018-09-24 PROCEDURE — G0463 HOSPITAL OUTPT CLINIC VISIT: HCPCS | Mod: ZF

## 2018-09-24 PROCEDURE — 87107 FUNGI IDENTIFICATION MOLD: CPT | Performed by: INTERNAL MEDICINE

## 2018-09-24 PROCEDURE — 87186 SC STD MICRODIL/AGAR DIL: CPT | Performed by: INTERNAL MEDICINE

## 2018-09-24 ASSESSMENT — PAIN SCALES - GENERAL: PAINLEVEL: NO PAIN (0)

## 2018-09-24 NOTE — LETTER
"9/24/2018       RE: Telly Leal  [address] [address]     Dear Colleague,    Thank you for referring your patient, Telly Leal, to the [facility] at [facility]. Please see a copy of my visit note below.    Reason for Visit  Telly Leal is a 35 year old year old male who is being seen for RECHECK (Cystic Fibrosis 2 month follow up )    CF HPI  The patient was seen and examined by Rocael Miller   At the patient's last clinic visit he had a further modest decline in his pulmonary function tests but was feeling at baseline.  The plan was to again defer antibiotics but see if he could initiate aerobic exercise to augment airway clearance.  Today, he reports just getting over a \"cold\".  His wife and daughter developed a similar illness prior to the onset of his symptoms.  He also had increased nasal congestion for the past week.  He has still residual secretions in his upper airway that are difficult to fully clear.  He has been having increased cough with airway clearance.  Cough is been uncomfortable due to some left lower extremity numbness.  He is having some sciatic pain in the past few weeks.  No hemoptysis or chest pain.  Currently not on SOO.  He has not been able to initiate exercise as planned due to his back pain.    Current Outpatient Prescriptions   Medication     acetylcysteine (MUCOMYST) 20 % nebulizer solution     albuterol (2.5 MG/3ML) 0.083% neb solution     albuterol (PROAIR HFA) 108 (90 BASE) MCG/ACT Inhaler     azithromycin (ZITHROMAX) 250 MG tablet     blood glucose monitoring (NO BRAND SPECIFIED) test strip     Cholecalciferol 4000 UNITS CAPS     colistimethate/colistin-base activity (COLYMYCIN) 150 mg/2mL SOLR neb solution     CREON 37681-14561 UNITS CPEP per EC capsule     cromolyn (INTAL) 20 MG/2ML neb solution     fluticasone (FLONASE) 50 MCG/ACT spray     Multiple Vitamin " (MULTIVITAMINS PO)     Nutritional Supplements (NUTREN 2.0) LIQD     oseltamivir (TAMIFLU) 75 MG capsule     phytonadione (MEPHYTON) 5 MG tablet     ranitidine (ZANTAC) 150 MG tablet     tobramycin, PF, (SOO) 300 MG/5ML neb solution     water for injection sterile SOLN     aztreonam (AZACTAM) 200 MG/ML     No current facility-administered medications for this visit.      No Known Allergies  Past Medical History:   Diagnosis Date     Chronic sinusitis      Cystic fibrosis with pulmonary manifestations (H)      Exocrine pancreatic insufficiency      GERD (gastroesophageal reflux disease)      Hemoptysis      Malnutrition (H)        Past Surgical History:   Procedure Laterality Date     LOBECTOMY      right upper lobe     TUBES         Social History     Social History     Marital status: Single     Spouse name: N/A     Number of children: N/A     Years of education: N/A     Occupational History      [employer]     Social History Main Topics     Smoking status: Never Smoker     Smokeless tobacco: Never Used     Alcohol use 0.0 oz/week     0 Standard drinks or equivalent per week     Drug use: Not on file     Sexual activity: Not on file     Other Topics Concern     Not on file     Social History Narrative    Patient works for the [employer] as an .  He does not have stable living conditions at this time.  He has a [place] guillen.   Update: living with wife Bre in [facility]. Birth of daughter Nieves Rebolledo August, 2017    ROS Pulmonary  Const: no fever, chills, fatigue. ENT: no sinus pain. Mild intermittent clear drainage.  At baseline GI: no diarrhea, constipation, grease in stools, or gerd sx with h2 blocker. Using 2 cans TF nightly.  Endo: no polyuria or polydipsia.  Neuro: Having sciatic pain by history radiating down his posterior left lower extremity.  Some associated numbness as well.  Symptoms are fluctuating in severity but overall trending towards the better.  No  "change in bowel or bladder habits  A complete ROS was otherwise negative except as noted in the HPI.  /81 (BP Location: Right arm, Patient Position: Chair, Cuff Size: Adult Regular)  Pulse 106  Resp 16  Ht 1.897 m (6' 2.69\")  Wt 88.2 kg (194 lb 7.1 oz)  SpO2 95%  BMI 24.51 kg/m2  Exam:   GENERAL APPEARANCE: Well developed, well nourished, alert, and in no apparent distress.  EYES: anicteric  HENT: Nasal mucosa with no edema and no hyperemia. No nasal polyps.  EARS: TMs with normal landmarks and light reflex bilaterally.  MOUTH: Oral mucosa is moist, without any lesions, no tonsillar enlargement, no oropharyngeal exudate.  NECK: supple, no masses, no thyromegaly.  LYMPHATICS: No significant  cervical, or supraclavicular nodes.  RESP:  good air flow throughout.  Coarse expiratory upper rhonchi new from previous. Normal chest wall excursion.  CV: Normal S1, S2, regular rhythm, normal rate. No murmur.  No rub. No gallop. No LE edema.   ABDOMEN:  Bowel sounds normal, soft, nontender, no HSM or masses.   MS: extremities normal. No cyanosis.  SKIN:  PEG site without erythema or discharge. Chest scar-like lesion upper sternum unchanged. Diffuse freckles without overt change.  NEURO: Mentation intact, speech normal, normal gait and stance  PSYCH: mentation appears normal. and affect normal/bright  Results:  Recent Results (from the past 168 hour(s))   General PFT Lab (Please always keep checked)    Collection Time: 09/24/18  2:24 PM   Result Value Ref Range    FVC-Pred 6.23 L    FVC-Pre 5.74 L    FVC-%Pred-Pre 92 %    FEV1-Pre 3.03 L    FEV1-%Pred-Pre 60 %    FEV1FVC-Pred 81 %    FEV1FVC-Pre 53 %    FEFMax-Pred 11.32 L/sec    FEFMax-Pre 8.44 L/sec    FEFMax-%Pred-Pre 74 %    FEF2575-Pred 4.78 L/sec    FEF2575-Pre 1.16 L/sec    NUC0584-%Pred-Pre 24 %    ExpTime-Pre 12.18 sec    FIFMax-Pre 10.51 L/sec    FEV1FEV6-Pred 82 %    FEV1FEV6-Pre 59 %   Cystic Fibrosis Culture Aerob Bacterial    Collection Time: 09/24/18  " 2:45 PM   Result Value Ref Range    Specimen Description Sputum     Culture Micro Moderate growth  Normal luis       Culture Micro Culture in progress      Spirometry interpretation: personally reviewed. Valid maneuver. Moderate obstruction. FEV1 unchanged and remains 10% below 2 years ago.  FVC today down a little under 10%  Sputum last visit: PA x 2, aspergillus, B cereus  Last AFB study June 2018 negative to date  Chest x-ray January 2018 previously reviewed. Mild volume loss right s/p upper lobectomy. Mild bronchiectatic changes with mild mucus plugging. No interval change.    Assessment and plan:  1.  Possible mild exacerbation of CF lung disease: Patient overall near baseline in terms of pulmonary function tests with likely viral induced mild exacerbation now trending towards the better.  Anticipate he will recover without antibiotics with additional time.  Will have a low threshold to initiate antibiotics particularly if his back pain continues to limit his physical activity as well as potentially impair forcefulness of his cough.  He will continue chronic azithromycin and alternate Chelly and SOO nebs.  His most recent cultures suggest SOO nebs will be efficacious.  Organisms less sensitive to quinolone.  2. Impaired glucose tolerance: Fasting > 100 and peak > 200 and 2 hour value OK 1/18. A1C under 6.  Will obtain repeat oral glucose tolerance test in conjunction with his next visit.  3.  Pancreatic insufficiency with a history of malnutrition: Adequate enzyme replacement by history.  His BMI is still at target at 24.5 today but he has continued to trend down in his weight.  Some of this history is due to healthier diet at home as his wife is trying to lose weight.  Will increase his nightly tube feeds to 3 cans.  4. CF sinus disease: has flonase prn - more prominent when seasonal allergies present.  5. GERD: Adequate control with ranitidine.   6. Low vitamin D level: Improved adherence to replacement.  Level from January 2018 slightly low at 28.  7. Skin lesions: Seen by outside Dermatologist. Central chest scar could be treated by steroid injections per pt but not actively pursuing. Plan in place for annual exam of his many freckles.  He is due for her next exam August 2018.  8. Healthcare maintenance: Will obtain annual studies in conjunction with his next visit.  He will be getting an influenza vaccination at work in early October.  Provided prescription for Tamiflu to use on an as-needed basis and discussed indications for doing so.    Patient will follow up in 2 months  40 minutes spent in conjunction with this visit with > 50% of time spent counseling and coordinating care of above issues.          Again, thank you for allowing me to participate in the care of your patient.      Sincerely,    Rocael Miller MD

## 2018-09-24 NOTE — PATIENT INSTRUCTIONS
Cystic Fibrosis Self-Care Plan       MRN: [id number]   Clinic Date: September 24, 2018   Patient: Telly Leal     RECOMMENDATIONS:     YOUR GOAL:    CF Nurse Line:  Brice Reveles: [phone]   Sary Hassan, RT: [phone]     Leona Trevinojose manuel: [phone] or Karissa Ashraf, Dieticians: [phone]   Rissa Alvarez, Diabetes Nurse: [phone]      Tika Gutierrez: [phone] or Yoselin Abel [phone], Social Workers   [email or web address]    Annual Studies:   IGG   Date Value Ref Range Status   12/11/2015 1160 695 - 1620 mg/dL Final     Insulin   Date Value Ref Range Status   01/22/2018 107.0 (H) 3 - 25 mU/L Final     Comment:     Starting 7/13/2017, insulin results will decrease by approximately 37%   compared to insulin results reported in EPIC between (12/16/2016-7/12/2017),   and should be interpreted accordingly. Insulin results reported prior to   12/16/16 are comparable to current insulin results and therefore no adjustment   is needed.       There are no preventive care reminders to display for this patient.      Pulmonary Function Tests  FEV1: amount of air you can blow out in 1 second  FVC: total amount of air you can take in and blow out    Your Goals:         PFT Latest Ref Rng & Units 9/24/2018   FVC L 5.74   FEV1 L 3.03   FVC% % 92   FEV1% % 60          Airway Clearance: The Most Important Way to Keep Your Lungs Healthy  Vest Settings:    Hill-Rom Frequencies: 8, 9, 10 Pressure 10 Then, Frequencies 18, 19, 20 Pressure 6      RespirTech: Quick Start with Pressure of     Do each frequency for 5 minutes; Deflate vest after each frequency & cough 3 times before beginning the next setting.    Vest and Neb Therapy should be done 2-3 times/day.    Good Nutrition Can Improve Lung Function and Overall Health     Take ALL of your vitamins with food     Take 1/2 of your enzymes before EVERY meal/snack and the other 1/2 mid-meal/snack    Wt Readings from Last 3 Encounters:    09/24/18 88.2 kg (194 lb 7.1 oz)   06/18/18 90.4 kg (199 lb 4.7 oz)   04/09/18 92.3 kg (203 lb 7.8 oz)       Body mass index is 24.51 kg/(m^2).         National CF Foundation Recommendations for BMI in CF Adults: Women: at least 22 Men: at least 23        Controlling Blood Sugars Helps Prevent Lung Infections & Improves Nutrition  Test blood sugar:     In the morning before eating (goal is )     2 hours after a meal (goal is less than 150)     When pre-meal glucose is greater than 150 add correction     At bedtime (if less than 100 eat a snack with 15 grams of carbohydrates  Last A1C Results:   Hemoglobin A1C   Date Value Ref Range Status   01/22/2018 5.9 4.3 - 6.0 % Final         If diabetic, measure A1C every 6 months. Goal: Under 7%    Staying Healthy    Research:  If you are interested in learning about research opportunities or have questions, please contact Evie Fairbanks at [phone] or [email or web address].      CF Foundation:  Compass is a personalized resource service to help you with the insurance, financial, legal and other issues you are facing.  It's free, confidential and available to anyone with CF.  Ask your  for more information or contact Compass directly at 142-LPWZBVF ([phone]) or compass@cff.org, or learn more at cff.[email or web address].

## 2018-09-24 NOTE — MR AVS SNAPSHOT
After Visit Summary   9/24/2018    Telly Leal    MRN: [id number]           Patient Information     Date Of Birth          1980        Visit Information        Provider Department      9/24/2018 3:00 PM Rocael Miller MD [facility]        Today's Diagnoses     Cystic fibrosis with pulmonary manifestations (H)          Care Instructions    Cystic Fibrosis Self-Care Plan       MRN: [id number]   Clinic Date: September 24, 2018   Patient: Telly Leal     RECOMMENDATIONS:     YOUR GOAL:    CF Nurse Line:  Brice Reveles: [phone]   Sary Hassan, RT: [phone]     Leona Marie: [phone] or Karissa Ashraf Dieticians: [phone]   Rissa Alvarez Diabetes Nurse: [phone]      Tika Gutierrez: [phone] or Yoselin Abel [phone], Social Workers   [email or web address]    Annual Studies:   IGG   Date Value Ref Range Status   12/11/2015 1160 695 - 1620 mg/dL Final     Insulin   Date Value Ref Range Status   01/22/2018 107.0 (H) 3 - 25 mU/L Final     Comment:     Starting 7/13/2017, insulin results will decrease by approximately 37%   compared to insulin results reported in EPIC between (12/16/2016-7/12/2017),   and should be interpreted accordingly. Insulin results reported prior to   12/16/16 are comparable to current insulin results and therefore no adjustment   is needed.       There are no preventive care reminders to display for this patient.      Pulmonary Function Tests  FEV1: amount of air you can blow out in 1 second  FVC: total amount of air you can take in and blow out    Your Goals:         PFT Latest Ref Rng & Units 9/24/2018   FVC L 5.74   FEV1 L 3.03   FVC% % 92   FEV1% % 60          Airway Clearance: The Most Important Way to Keep Your Lungs Healthy  Vest Settings:    Hill-Rom Frequencies: 8, 9, 10 Pressure 10 Then, Frequencies 18, 19, 20 Pressure 6      RespirTech: Quick Start with Pressure of      Do each frequency for 5 minutes; Deflate vest after each frequency & cough 3 times before beginning the next setting.    Vest and Neb Therapy should be done 2-3 times/day.    Good Nutrition Can Improve Lung Function and Overall Health     Take ALL of your vitamins with food     Take 1/2 of your enzymes before EVERY meal/snack and the other 1/2 mid-meal/snack    Wt Readings from Last 3 Encounters:   09/24/18 88.2 kg (194 lb 7.1 oz)   06/18/18 90.4 kg (199 lb 4.7 oz)   04/09/18 92.3 kg (203 lb 7.8 oz)       Body mass index is 24.51 kg/(m^2).         National CF Foundation Recommendations for BMI in CF Adults: Women: at least 22 Men: at least 23        Controlling Blood Sugars Helps Prevent Lung Infections & Improves Nutrition  Test blood sugar:     In the morning before eating (goal is )     2 hours after a meal (goal is less than 150)     When pre-meal glucose is greater than 150 add correction     At bedtime (if less than 100 eat a snack with 15 grams of carbohydrates  Last A1C Results:   Hemoglobin A1C   Date Value Ref Range Status   01/22/2018 5.9 4.3 - 6.0 % Final         If diabetic, measure A1C every 6 months. Goal: Under 7%    Staying Healthy    Research:  If you are interested in learning about research opportunities or have questions, please contact Evie Fairbanks at [phone] or [email or web address].      CF Foundation:  Compass is a personalized resource service to help you with the insurance, financial, legal and other issues you are facing.  It's free, confidential and available to anyone with CF.  Ask your  for more information or contact Compass directly at 264-COMPASS ([phone]) or compass@cff.org, or learn more at [email or web address].                             Follow-ups after your visit        Follow-up notes from your care team     Return in about 2 months (around 11/24/2018).      Your next 10 appointments already scheduled     Nov 20, 2018  1:35 PM CST   XR CHEST 2 VIEWS  with UCXR1   [facility] ([facility])    [address]  [address] [facility] [address] [phone]           How do I prepare for my exam? (Food and drink instructions) No Food and Drink Restrictions.  How do I prepare for my exam? (Other instructions) You do not need to do anything special for this exam.  What should I wear: Wear comfortable clothes.  How long does the exam take: Most scans take less than 5 minutes.  What should I bring: Bring a list of your medicines, including vitamins, minerals and over-the-counter drugs. It is safest to leave personal items at home.  Do I need a :  No  is needed.  What do I need to tell my doctor: Tell your doctor if there s any chance you are pregnant.  What should I do after the exam: No restrictions, You may resume normal activities.  What is this test: An image of a specific body part shown in shades of black and white.  Who should I call with questions: If you have any questions, please call the Imaging Department where you will have your exam. Directions, parking instructions, and other information is available on our website, [email or web address].            Nov 20, 2018  2:00 PM CST   CF LOOP with  PFL CF   [facility] Function Testing ([facility])    [address] [address]   [phone]            Nov 20, 2018  2:20 PM CST   (Arrive by 2:05 PM)   RETURN CYSTIC FIBROSIS VISIT with Rocael Miller MD   [facility] ([facility])    [address] [place] [address]  [address] [place] [address]   [phone]              Future tests that were ordered for you today     Open Future Orders        Priority Expected Expires Ordered    RESPIRATORY FLOW VOLUME LOOP Routine 11/23/2018 3/23/2019 9/24/2018    ALT Routine 11/24/2018 9/24/2019 9/24/2018    Basic metabolic panel Routine  11/24/2018 9/24/2019 9/24/2018    Lipid Profile Routine 11/24/2018 9/24/2019 9/24/2018    Albumin level Routine 11/24/2018 9/24/2019 9/24/2018    Alkaline phosphatase Routine 11/24/2018 9/24/2019 9/24/2018    AST Routine 11/24/2018 9/24/2019 9/24/2018    Iron Routine 11/24/2018 9/24/2019 9/24/2018    Hemoglobin A1c Routine 11/24/2018 9/24/2019 9/24/2018    IgE Routine 11/24/2018 9/24/2019 9/24/2018    Magnesium Routine 11/24/2018 9/24/2019 9/24/2018    Phosphorus Routine 11/24/2018 9/24/2019 9/24/2018    Testosterone total  Routine 11/24/2018 9/24/2019 9/24/2018    Protein total Routine 11/24/2018 9/24/2019 9/24/2018    Vitamin A Routine 11/24/2018 9/24/2019 9/24/2018    Vitamin E Routine 11/24/2018 9/24/2019 9/24/2018    Vitamin D Deficiency Routine 11/24/2018 9/24/2019 9/24/2018    CBC with platelets differential Routine 11/24/2018 9/24/2019 9/24/2018    Erythrocyte sedimentation rate auto Routine 11/24/2018 9/24/2019 9/24/2018    Routine UA with microscopic Routine 11/24/2018 9/24/2019 9/24/2018    Albumin Random Urine Quantitative with Creat Ratio Routine 11/24/2018 9/24/2019 9/24/2018    X-ray Chest 2 vws* Routine 11/24/2018 9/24/2019 9/24/2018            Who to contact     If you have questions or need follow up information about today's clinic visit or your schedule please contact [facility] directly at [phone].  Normal or non-critical lab and imaging results will be communicated to you by MyChart, letter or phone within 4 business days after the clinic has received the results. If you do not hear from us within 7 days, please contact the clinic through MyChart or phone. If you have a critical or abnormal lab result, we will notify you by phone as soon as possible.  Submit refill requests through [employer] or call your pharmacy and they will forward the refill request to us. Please allow 3 business days for your refill to be completed.          Additional Information About  "Your Visit        MyChart Information     [employer] gives you secure access to your electronic health record. If you see a primary care provider, you can also send messages to your care team and make appointments. If you have questions, please call your primary care clinic.  If you do not have a primary care provider, please call [phone] and they will assist you.        Care EveryWhere ID     This is your Care EveryWhere ID. This could be used by other organizations to access your [place] medical records  [id number]        Your Vitals Were     Pulse Respirations Height Pulse Oximetry BMI (Body Mass Index)       106 16 1.897 m (6' 2.69\") 95% 24.51 kg/m2        Blood Pressure from Last 3 Encounters:   09/24/18 121/81   06/18/18 119/78   04/09/18 126/84    Weight from Last 3 Encounters:   09/24/18 88.2 kg (194 lb 7.1 oz)   06/18/18 90.4 kg (199 lb 4.7 oz)   04/09/18 92.3 kg (203 lb 7.8 oz)              We Performed the Following     Cystic Fibrosis Culture Aerob Bacterial        Primary Care Provider Office Phone # Fax #    Rocael Miller [phone]       [address] [address]        Equal Access to Services     MATTHEW HURD : Hadii eva ku hadasho Soryanali, waaxda luqadaha, qaybta kaalmada adeegyada, james pollack. So [facility] [phone].    ATENCIÓN: Si habla español, tiene a cardozo disposición servicios gratuitos de asistencia lingüística. [facility] [phone].    We comply with applicable federal civil rights laws and [place] laws. We do not discriminate on the basis of race, color, national origin, age, disability, sex, sexual orientation, or gender identity.            Thank you!     Thank you for choosing [facility]  for your care. Our goal is always to provide you with excellent care. Hearing back from our patients is one way we can continue to improve our services. Please take a few minutes to " complete the written survey that you may receive in the mail after your visit with us. Thank you!             Your Updated Medication List - Protect others around you: Learn how to safely use, store and throw away your medicines at www.disposemymeds.org.          This list is accurate as of 9/24/18 11:59 PM.  Always use your most recent med list.                   Brand Name Dispense Instructions for use Diagnosis    acetylcysteine 20 % nebulizer solution    MUCOMYST    180 mL    Inhale 2 mLs into the lungs 3 times daily    Cystic fibrosis with pulmonary manifestations (H), Vitamin B12 deficiency disease       * albuterol (2.5 MG/3ML) 0.083% neb solution     360 mL    Take 1 vial (2.5 mg) by nebulization 3 times daily    Cystic fibrosis with pulmonary manifestations (H)       * albuterol 108 (90 Base) MCG/ACT inhaler    PROAIR HFA    1 Inhaler    Inhale 1-2 puffs into the lungs every 6 hours as needed for shortness of breath / dyspnea Every 4-6 hours as needed    Cystic fibrosis with pulmonary manifestations (H), Vitamin B12 deficiency disease       azithromycin 250 MG tablet    ZITHROMAX    30 tablet    Take 1 tablet (250 mg) by mouth daily    CF (cystic fibrosis) (H)       aztreonam 200 MG/ML    AZACTAM    56 each    Take 5 mLs (1 g) by nebulization 2 times daily    Cystic fibrosis with pulmonary manifestations (H)       blood glucose monitoring test strip    no brand specified    100 strip    Use to test blood sugars 4-6 times daily or as directed.    Cystic fibrosis with pulmonary manifestations (H), Glucose intolerance (impaired glucose tolerance)       Cholecalciferol 4000 units Caps     90 capsule    Take 1 capsule by mouth daily    Cystic fibrosis with pulmonary manifestations (H)       colistimethate/colistin-base activity 150 mg/2mL Solr neb solution    COLYMYCIN    9000 mg    Take 150 mg by nebulization 2 times daily    Cystic fibrosis with pulmonary manifestations (H)       CREON [address] units Cpep  per EC capsule   Generic drug:  amylase-lipase-protease     1000 capsule    Take 5-6 capsules (120,000-144,000 Units) by mouth See Admin Instructions 5-6 with meals, 1-5 with snacks,7-8 with tube feedings    Cystic fibrosis with pulmonary manifestations (H)       cromolyn 20 MG/2ML neb solution    INTAL    180 mL    Take 2 mLs (20 mg) by nebulization 3 times daily 1 vial three times daily    Cystic fibrosis with pulmonary manifestations (H)       fluticasone 50 MCG/ACT spray    FLONASE    16 g    Spray 2 sprays into both nostrils as needed    Cystic fibrosis with pulmonary manifestations (H)       MULTIVITAMINS PO      Take 1 tablet by mouth daily.        NUTREN 2.0      Take 4-5 Cans by mouth. 168/ml/hr 7 days/week        oseltamivir 75 MG capsule    TAMIFLU    10 capsule    Take 1 capsule (75 mg) by mouth 2 times daily For 5 days    Cystic fibrosis with pulmonary manifestations (H), Vitamin B12 deficiency disease       phytonadione 5 MG tablet    MEPHYTON    12 tablet    TAKE 1 TABLET (5 MG) BY MOUTH 3 TIMES A WEEK    CF (cystic fibrosis) (H), Exocrine pancreatic insufficiency       ranitidine 150 MG tablet    ZANTAC    180 tablet    Take 1 tablet (150 mg) by mouth 2 times daily    Gastroesophageal reflux disease without esophagitis       tobramycin (PF) 300 MG/5ML neb solution    SOO    300 mL    Take 5 mLs (300 mg) by nebulization 2 times daily 28 days on 28 days off    Cystic fibrosis with pulmonary manifestations (H)       water for injection sterile Soln     250 mL    Inject 4 mLs as directed 2 times daily    Cystic fibrosis with pulmonary manifestations (H)       * Notice:  This list has 2 medication(s) that are the same as other medications prescribed for you. Read the directions carefully, and ask your doctor or other care provider to review them with you.

## 2018-09-24 NOTE — NURSING NOTE
Chief Complaint   Patient presents with     RECHECK     Cystic Fibrosis 2 month follow up      Cat Benedict CMA

## 2018-09-25 NOTE — PROGRESS NOTES
"Reason for Visit  Telly Leal is a 35 year old year old male who is being seen for RECHECK (Cystic Fibrosis 2 month follow up )    CF HPI  The patient was seen and examined by Rocael Miller   At the patient's last clinic visit he had a further modest decline in his pulmonary function tests but was feeling at baseline.  The plan was to again defer antibiotics but see if he could initiate aerobic exercise to augment airway clearance.  Today, he reports just getting over a \"cold\".  His wife and daughter developed a similar illness prior to the onset of his symptoms.  He also had increased nasal congestion for the past week.  He has still residual secretions in his upper airway that are difficult to fully clear.  He has been having increased cough with airway clearance.  Cough is been uncomfortable due to some left lower extremity numbness.  He is having some sciatic pain in the past few weeks.  No hemoptysis or chest pain.  Currently not on SOO.  He has not been able to initiate exercise as planned due to his back pain.    Current Outpatient Prescriptions   Medication     acetylcysteine (MUCOMYST) 20 % nebulizer solution     albuterol (2.5 MG/3ML) 0.083% neb solution     albuterol (PROAIR HFA) 108 (90 BASE) MCG/ACT Inhaler     azithromycin (ZITHROMAX) 250 MG tablet     blood glucose monitoring (NO BRAND SPECIFIED) test strip     Cholecalciferol 4000 UNITS CAPS     colistimethate/colistin-base activity (COLYMYCIN) 150 mg/2mL SOLR neb solution     CREON [address]1726-[address] UNITS CPEP per EC capsule     cromolyn (INTAL) 20 MG/2ML neb solution     fluticasone (FLONASE) 50 MCG/ACT spray     Multiple Vitamin (MULTIVITAMINS PO)     Nutritional Supplements (NUTREN 2.0) LIQD     oseltamivir (TAMIFLU) 75 MG capsule     phytonadione (MEPHYTON) 5 MG tablet     ranitidine (ZANTAC) 150 MG tablet     tobramycin, PF, (SOO) 300 MG/5ML neb solution     water for injection sterile SOLN     aztreonam (AZACTAM) 200 MG/ML     No " "current facility-administered medications for this visit.      No Known Allergies  Past Medical History:   Diagnosis Date     Chronic sinusitis      Cystic fibrosis with pulmonary manifestations (H)      Exocrine pancreatic insufficiency      GERD (gastroesophageal reflux disease)      Hemoptysis      Malnutrition (H)        Past Surgical History:   Procedure Laterality Date     LOBECTOMY      right upper lobe     TUBES         Social History     Social History     Marital status: Single     Spouse name: N/A     Number of children: N/A     Years of education: N/A     Occupational History      [employer]     Social History Main Topics     Smoking status: Never Smoker     Smokeless tobacco: Never Used     Alcohol use 0.0 oz/week     0 Standard drinks or equivalent per week     Drug use: Not on file     Sexual activity: Not on file     Other Topics Concern     Not on file     Social History Narrative    Patient works for the [employer] as an .  He does not have stable living conditions at this time.  He has a Syrian guillen.   Update: living with wife Bre in [facility]. Birth of daughter Nieves Rebolledo August, 2017    ROS Pulmonary  Const: no fever, chills, fatigue. ENT: no sinus pain. Mild intermittent clear drainage.  At baseline GI: no diarrhea, constipation, grease in stools, or gerd sx with h2 blocker. Using 2 cans TF nightly.  Endo: no polyuria or polydipsia.  Neuro: Having sciatic pain by history radiating down his posterior left lower extremity.  Some associated numbness as well.  Symptoms are fluctuating in severity but overall trending towards the better.  No change in bowel or bladder habits  A complete ROS was otherwise negative except as noted in the HPI.  /81 (BP Location: Right arm, Patient Position: Chair, Cuff Size: Adult Regular)  Pulse 106  Resp 16  Ht 1.897 m (6' 2.69\")  Wt 88.2 kg (194 lb 7.1 oz)  SpO2 95%  BMI 24.51 kg/m2  Exam:   GENERAL " APPEARANCE: Well developed, well nourished, alert, and in no apparent distress.  EYES: anicteric  HENT: Nasal mucosa with no edema and no hyperemia. No nasal polyps.  EARS: TMs with normal landmarks and light reflex bilaterally.  MOUTH: Oral mucosa is moist, without any lesions, no tonsillar enlargement, no oropharyngeal exudate.  NECK: supple, no masses, no thyromegaly.  LYMPHATICS: No significant  cervical, or supraclavicular nodes.  RESP:  good air flow throughout.  Coarse expiratory upper rhonchi new from previous. Normal chest wall excursion.  CV: Normal S1, S2, regular rhythm, normal rate. No murmur.  No rub. No gallop. No LE edema.   ABDOMEN:  Bowel sounds normal, soft, nontender, no HSM or masses.   MS: extremities normal. No cyanosis.  SKIN:  PEG site without erythema or discharge. Chest scar-like lesion upper sternum unchanged. Diffuse freckles without overt change.  NEURO: Mentation intact, speech normal, normal gait and stance  PSYCH: mentation appears normal. and affect normal/bright  Results:  Recent Results (from the past 168 hour(s))   General PFT Lab (Please always keep checked)    Collection Time: 09/24/18  2:24 PM   Result Value Ref Range    FVC-Pred 6.23 L    FVC-Pre 5.74 L    FVC-%Pred-Pre 92 %    FEV1-Pre 3.03 L    FEV1-%Pred-Pre 60 %    FEV1FVC-Pred 81 %    FEV1FVC-Pre 53 %    FEFMax-Pred 11.32 L/sec    FEFMax-Pre 8.44 L/sec    FEFMax-%Pred-Pre 74 %    FEF2575-Pred 4.78 L/sec    FEF2575-Pre 1.16 L/sec    JTQ4912-%Pred-Pre 24 %    ExpTime-Pre 12.18 sec    FIFMax-Pre 10.51 L/sec    FEV1FEV6-Pred 82 %    FEV1FEV6-Pre 59 %   Cystic Fibrosis Culture Aerob Bacterial    Collection Time: 09/24/18  2:45 PM   Result Value Ref Range    Specimen Description Sputum     Culture Micro Moderate growth  Normal luis       Culture Micro Culture in progress      Spirometry interpretation: personally reviewed. Valid maneuver. Moderate obstruction. FEV1 unchanged and remains 10% below 2 years ago.  FVC today down  a little under 10%  Sputum last visit: PA x 2, aspergillus, B cereus  Last AFB study June 2018 negative to date  Chest x-ray January 2018 previously reviewed. Mild volume loss right s/p upper lobectomy. Mild bronchiectatic changes with mild mucus plugging. No interval change.    Assessment and plan:  1.  Possible mild exacerbation of CF lung disease: Patient overall near baseline in terms of pulmonary function tests with likely viral induced mild exacerbation now trending towards the better.  Anticipate he will recover without antibiotics with additional time.  Will have a low threshold to initiate antibiotics particularly if his back pain continues to limit his physical activity as well as potentially impair forcefulness of his cough.  He will continue chronic azithromycin and alternate Chelly and SOO nebs.  His most recent cultures suggest SOO nebs will be efficacious.  Organisms less sensitive to quinolone.  2. Impaired glucose tolerance: Fasting > 100 and peak > 200 and 2 hour value OK 1/18. A1C under 6.  Will obtain repeat oral glucose tolerance test in conjunction with his next visit.  3.  Pancreatic insufficiency with a history of malnutrition: Adequate enzyme replacement by history.  His BMI is still at target at 24.5 today but he has continued to trend down in his weight.  Some of this history is due to healthier diet at home as his wife is trying to lose weight.  Will increase his nightly tube feeds to 3 cans.  4. CF sinus disease: has flonase prn - more prominent when seasonal allergies present.  5. GERD: Adequate control with ranitidine.   6. Low vitamin D level: Improved adherence to replacement. Level from January 2018 slightly low at 28.  7. Skin lesions: Seen by outside Dermatologist. Central chest scar could be treated by steroid injections per pt but not actively pursuing. Plan in place for annual exam of his many freckles.  He is due for her next exam August 2018.  8. Healthcare maintenance: Will  obtain annual studies in conjunction with his next visit.  He will be getting an influenza vaccination at work in early October.  Provided prescription for Tamiflu to use on an as-needed basis and discussed indications for doing so.    Patient will follow up in 2 months  40 minutes spent in conjunction with this visit with > 50% of time spent counseling and coordinating care of above issues.     Rocael Miller

## 2018-10-02 LAB
BACTERIA SPEC CULT: ABNORMAL
SPECIMEN SOURCE: ABNORMAL

## 2018-10-17 DIAGNOSIS — E84.0 CYSTIC FIBROSIS WITH PULMONARY MANIFESTATIONS (H): ICD-10-CM

## 2018-11-19 DIAGNOSIS — E84.9 CF (CYSTIC FIBROSIS) (H): ICD-10-CM

## 2018-11-19 DIAGNOSIS — E84.0 CYSTIC FIBROSIS WITH PULMONARY MANIFESTATIONS (H): ICD-10-CM

## 2018-11-19 DIAGNOSIS — E53.8 VITAMIN B12 DEFICIENCY DISEASE: ICD-10-CM

## 2018-11-19 RX ORDER — AZITHROMYCIN 250 MG/1
250 TABLET, FILM COATED ORAL DAILY
Qty: 30 TABLET | Refills: 11 | Status: SHIPPED | OUTPATIENT
Start: 2018-11-19 | End: 2019-10-16

## 2018-11-19 RX ORDER — ACETYLCYSTEINE 200 MG/ML
2 SOLUTION ORAL; RESPIRATORY (INHALATION) 3 TIMES DAILY
Qty: 180 ML | Refills: 12 | Status: SHIPPED | OUTPATIENT
Start: 2018-11-19 | End: 2019-12-26

## 2018-11-19 RX ORDER — ALBUTEROL SULFATE 0.83 MG/ML
2.5 SOLUTION RESPIRATORY (INHALATION) 3 TIMES DAILY
Qty: 360 ML | Refills: 11 | Status: SHIPPED | OUTPATIENT
Start: 2018-11-19 | End: 2019-12-26

## 2018-11-19 RX ORDER — CROMOLYN SODIUM 20 MG/2ML
20 INHALANT INTRABRONCHIAL 3 TIMES DAILY
Qty: 180 ML | Refills: 12 | Status: SHIPPED | OUTPATIENT
Start: 2018-11-19 | End: 2019-11-26

## 2019-01-15 DIAGNOSIS — E84.0 CYSTIC FIBROSIS WITH PULMONARY MANIFESTATIONS (H): ICD-10-CM

## 2019-01-16 RX ORDER — PANCRELIPASE 24000; 76000; 120000 [USP'U]/1; [USP'U]/1; [USP'U]/1
5-6 CAPSULE, DELAYED RELEASE PELLETS ORAL SEE ADMIN INSTRUCTIONS
Qty: 1200 CAPSULE | Refills: 11 | Status: SHIPPED | OUTPATIENT
Start: 2019-01-16 | End: 2020-01-20

## 2019-03-11 ENCOUNTER — DOCUMENTATION ONLY (OUTPATIENT)
Dept: CARE COORDINATION | Facility: CLINIC | Age: 39
End: 2019-03-11

## 2019-04-22 ENCOUNTER — OFFICE VISIT (OUTPATIENT)
Dept: PULMONOLOGY | Facility: CLINIC | Age: 39
End: 2019-04-22
Attending: INTERNAL MEDICINE
Payer: COMMERCIAL

## 2019-04-22 ENCOUNTER — ALLIED HEALTH/NURSE VISIT (OUTPATIENT)
Dept: CARE COORDINATION | Facility: CLINIC | Age: 39
End: 2019-04-22

## 2019-04-22 ENCOUNTER — INFUSION THERAPY VISIT (OUTPATIENT)
Dept: INFUSION THERAPY | Facility: CLINIC | Age: 39
End: 2019-04-22
Attending: INTERNAL MEDICINE
Payer: COMMERCIAL

## 2019-04-22 ENCOUNTER — ANCILLARY PROCEDURE (OUTPATIENT)
Dept: GENERAL RADIOLOGY | Facility: CLINIC | Age: 39
End: 2019-04-22
Attending: INTERNAL MEDICINE
Payer: COMMERCIAL

## 2019-04-22 VITALS
DIASTOLIC BLOOD PRESSURE: 92 MMHG | TEMPERATURE: 98 F | HEIGHT: 75 IN | SYSTOLIC BLOOD PRESSURE: 137 MMHG | HEART RATE: 96 BPM | BODY MASS INDEX: 24.51 KG/M2 | OXYGEN SATURATION: 94 %

## 2019-04-22 VITALS
TEMPERATURE: 98 F | DIASTOLIC BLOOD PRESSURE: 86 MMHG | SYSTOLIC BLOOD PRESSURE: 125 MMHG | HEART RATE: 96 BPM | OXYGEN SATURATION: 94 %

## 2019-04-22 DIAGNOSIS — E84.0 CYSTIC FIBROSIS WITH PULMONARY MANIFESTATIONS (H): ICD-10-CM

## 2019-04-22 DIAGNOSIS — E84.0 CYSTIC FIBROSIS WITH PULMONARY MANIFESTATIONS (H): Primary | ICD-10-CM

## 2019-04-22 DIAGNOSIS — E84.9 CF (CYSTIC FIBROSIS) (H): ICD-10-CM

## 2019-04-22 DIAGNOSIS — Z71.9 ENCOUNTER FOR COUNSELING: Primary | ICD-10-CM

## 2019-04-22 LAB
ALBUMIN SERPL-MCNC: 3.8 G/DL (ref 3.4–5)
ALBUMIN UR-MCNC: NEGATIVE MG/DL
ALP SERPL-CCNC: 84 U/L (ref 40–150)
ALT SERPL W P-5'-P-CCNC: 35 U/L (ref 0–70)
ANION GAP SERPL CALCULATED.3IONS-SCNC: 7 MMOL/L (ref 3–14)
APPEARANCE UR: CLEAR
AST SERPL W P-5'-P-CCNC: 27 U/L (ref 0–45)
BACTERIA #/AREA URNS HPF: ABNORMAL /HPF
BASOPHILS # BLD AUTO: 0 10E9/L (ref 0–0.2)
BASOPHILS NFR BLD AUTO: 0.7 %
BILIRUB UR QL STRIP: NEGATIVE
BUN SERPL-MCNC: 20 MG/DL (ref 7–30)
CALCIUM SERPL-MCNC: 9 MG/DL (ref 8.5–10.1)
CHLORIDE SERPL-SCNC: 107 MMOL/L (ref 94–109)
CHOLEST SERPL-MCNC: 129 MG/DL
CO2 SERPL-SCNC: 25 MMOL/L (ref 20–32)
COLOR UR AUTO: YELLOW
CREAT SERPL-MCNC: 0.94 MG/DL (ref 0.66–1.25)
CREAT UR-MCNC: 191 MG/DL
DIFFERENTIAL METHOD BLD: NORMAL
EOSINOPHIL # BLD AUTO: 0.1 10E9/L (ref 0–0.7)
EOSINOPHIL NFR BLD AUTO: 2.4 %
ERYTHROCYTE [DISTWIDTH] IN BLOOD BY AUTOMATED COUNT: 12.9 % (ref 10–15)
ERYTHROCYTE [SEDIMENTATION RATE] IN BLOOD BY WESTERGREN METHOD: 7 MM/H (ref 0–15)
EXPTIME-PRE: 13 SEC
FEF2575-%PRED-PRE: 15 %
FEF2575-PRE: 0.76 L/SEC
FEF2575-PRED: 4.79 L/SEC
FEFMAX-%PRED-PRE: 79 %
FEFMAX-PRE: 9.05 L/SEC
FEFMAX-PRED: 11.33 L/SEC
FEV1-%PRED-PRE: 63 %
FEV1-PRE: 3.18 L
FEV1FEV6-PRE: 62 %
FEV1FEV6-PRED: 82 %
FEV1FVC-PRE: 51 %
FEV1FVC-PRED: 81 %
FIFMAX-PRE: 9.98 L/SEC
FVC-%PRED-PRE: 100 %
FVC-PRE: 6.24 L
FVC-PRED: 6.24 L
GFR SERPL CREATININE-BSD FRML MDRD: >90 ML/MIN/{1.73_M2}
GLUCOSE BLDC GLUCOMTR-MCNC: 140 MG/DL (ref 70–99)
GLUCOSE SERPL-MCNC: 100 MG/DL (ref 70–99)
GLUCOSE SERPL-MCNC: 104 MG/DL (ref 70–99)
GLUCOSE SERPL-MCNC: 155 MG/DL (ref 70–99)
GLUCOSE SERPL-MCNC: 166 MG/DL (ref 70–99)
GLUCOSE SERPL-MCNC: 221 MG/DL (ref 70–99)
GLUCOSE SERPL-MCNC: 224 MG/DL (ref 70–99)
GLUCOSE UR STRIP-MCNC: NEGATIVE MG/DL
HBA1C MFR BLD: 6.1 % (ref 0–5.6)
HCT VFR BLD AUTO: 45.4 % (ref 40–53)
HDLC SERPL-MCNC: 35 MG/DL
HGB BLD-MCNC: 15.5 G/DL (ref 13.3–17.7)
HGB UR QL STRIP: NEGATIVE
IMM GRANULOCYTES # BLD: 0 10E9/L (ref 0–0.4)
IMM GRANULOCYTES NFR BLD: 0.2 %
INSULIN SERPL-ACNC: 20.6 MU/L (ref 3–25)
INSULIN SERPL-ACNC: 43.5 MU/L (ref 3–25)
INSULIN SERPL-ACNC: 53.4 MU/L (ref 3–25)
INSULIN SERPL-ACNC: 6.5 MU/L (ref 3–25)
INSULIN SERPL-ACNC: 62.6 MU/L (ref 3–25)
IRON SERPL-MCNC: 79 UG/DL (ref 35–180)
KETONES UR STRIP-MCNC: NEGATIVE MG/DL
LDLC SERPL CALC-MCNC: 77 MG/DL
LEUKOCYTE ESTERASE UR QL STRIP: NEGATIVE
LYMPHOCYTES # BLD AUTO: 1.8 10E9/L (ref 0.8–5.3)
LYMPHOCYTES NFR BLD AUTO: 30.2 %
MAGNESIUM SERPL-MCNC: 2.4 MG/DL (ref 1.6–2.3)
MCH RBC QN AUTO: 30 PG (ref 26.5–33)
MCHC RBC AUTO-ENTMCNC: 34.1 G/DL (ref 31.5–36.5)
MCV RBC AUTO: 88 FL (ref 78–100)
MICROALBUMIN UR-MCNC: 8 MG/L
MICROALBUMIN/CREAT UR: 3.99 MG/G CR (ref 0–17)
MONOCYTES # BLD AUTO: 0.5 10E9/L (ref 0–1.3)
MONOCYTES NFR BLD AUTO: 7.6 %
MUCOUS THREADS #/AREA URNS LPF: PRESENT /LPF
NEUTROPHILS # BLD AUTO: 3.5 10E9/L (ref 1.6–8.3)
NEUTROPHILS NFR BLD AUTO: 58.9 %
NITRATE UR QL: NEGATIVE
NONHDLC SERPL-MCNC: 94 MG/DL
NRBC # BLD AUTO: 0 10*3/UL
NRBC BLD AUTO-RTO: 0 /100
PH UR STRIP: 5 PH (ref 5–7)
PHOSPHATE SERPL-MCNC: 3.1 MG/DL (ref 2.5–4.5)
PLATELET # BLD AUTO: 281 10E9/L (ref 150–450)
POTASSIUM SERPL-SCNC: 3.8 MMOL/L (ref 3.4–5.3)
PROT SERPL-MCNC: 7.6 G/DL (ref 6.8–8.8)
RBC # BLD AUTO: 5.16 10E12/L (ref 4.4–5.9)
RBC #/AREA URNS AUTO: <1 /HPF (ref 0–2)
SODIUM SERPL-SCNC: 139 MMOL/L (ref 133–144)
SOURCE: ABNORMAL
SP GR UR STRIP: 1.02 (ref 1–1.03)
TRIGL SERPL-MCNC: 85 MG/DL
UROBILINOGEN UR STRIP-MCNC: 0 MG/DL (ref 0–2)
WBC # BLD AUTO: 5.9 10E9/L (ref 4–11)
WBC #/AREA URNS AUTO: 1 /HPF (ref 0–5)

## 2019-04-22 PROCEDURE — 82043 UR ALBUMIN QUANTITATIVE: CPT | Performed by: INTERNAL MEDICINE

## 2019-04-22 PROCEDURE — 87077 CULTURE AEROBIC IDENTIFY: CPT | Performed by: INTERNAL MEDICINE

## 2019-04-22 PROCEDURE — 84446 ASSAY OF VITAMIN E: CPT | Performed by: INTERNAL MEDICINE

## 2019-04-22 PROCEDURE — 82947 ASSAY GLUCOSE BLOOD QUANT: CPT | Mod: 91 | Performed by: INTERNAL MEDICINE

## 2019-04-22 PROCEDURE — 84590 ASSAY OF VITAMIN A: CPT | Performed by: INTERNAL MEDICINE

## 2019-04-22 PROCEDURE — 83735 ASSAY OF MAGNESIUM: CPT | Performed by: INTERNAL MEDICINE

## 2019-04-22 PROCEDURE — 82962 GLUCOSE BLOOD TEST: CPT

## 2019-04-22 PROCEDURE — 80061 LIPID PANEL: CPT | Performed by: INTERNAL MEDICINE

## 2019-04-22 PROCEDURE — 85025 COMPLETE CBC W/AUTO DIFF WBC: CPT | Performed by: INTERNAL MEDICINE

## 2019-04-22 PROCEDURE — 84450 TRANSFERASE (AST) (SGOT): CPT | Performed by: INTERNAL MEDICINE

## 2019-04-22 PROCEDURE — 25000125 ZZHC RX 250: Mod: ZF | Performed by: INTERNAL MEDICINE

## 2019-04-22 PROCEDURE — 83540 ASSAY OF IRON: CPT | Performed by: INTERNAL MEDICINE

## 2019-04-22 PROCEDURE — 87070 CULTURE OTHR SPECIMN AEROBIC: CPT | Performed by: INTERNAL MEDICINE

## 2019-04-22 PROCEDURE — 84075 ASSAY ALKALINE PHOSPHATASE: CPT | Performed by: INTERNAL MEDICINE

## 2019-04-22 PROCEDURE — 84403 ASSAY OF TOTAL TESTOSTERONE: CPT | Performed by: INTERNAL MEDICINE

## 2019-04-22 PROCEDURE — 84155 ASSAY OF PROTEIN SERUM: CPT | Performed by: INTERNAL MEDICINE

## 2019-04-22 PROCEDURE — 82306 VITAMIN D 25 HYDROXY: CPT | Performed by: INTERNAL MEDICINE

## 2019-04-22 PROCEDURE — 81001 URINALYSIS AUTO W/SCOPE: CPT | Performed by: INTERNAL MEDICINE

## 2019-04-22 PROCEDURE — 82785 ASSAY OF IGE: CPT | Performed by: INTERNAL MEDICINE

## 2019-04-22 PROCEDURE — 87186 SC STD MICRODIL/AGAR DIL: CPT | Performed by: INTERNAL MEDICINE

## 2019-04-22 PROCEDURE — G0463 HOSPITAL OUTPT CLINIC VISIT: HCPCS | Mod: ZF

## 2019-04-22 PROCEDURE — 87107 FUNGI IDENTIFICATION MOLD: CPT | Performed by: INTERNAL MEDICINE

## 2019-04-22 PROCEDURE — 84460 ALANINE AMINO (ALT) (SGPT): CPT | Performed by: INTERNAL MEDICINE

## 2019-04-22 PROCEDURE — 83525 ASSAY OF INSULIN: CPT | Mod: 91 | Performed by: INTERNAL MEDICINE

## 2019-04-22 PROCEDURE — 83036 HEMOGLOBIN GLYCOSYLATED A1C: CPT | Performed by: INTERNAL MEDICINE

## 2019-04-22 PROCEDURE — 85652 RBC SED RATE AUTOMATED: CPT | Performed by: INTERNAL MEDICINE

## 2019-04-22 PROCEDURE — 80069 RENAL FUNCTION PANEL: CPT | Performed by: INTERNAL MEDICINE

## 2019-04-22 RX ADMIN — ALCOHOL 75 G: 65 GEL TOPICAL at 09:05

## 2019-04-22 ASSESSMENT — PAIN SCALES - GENERAL: PAINLEVEL: NO PAIN (0)

## 2019-04-22 NOTE — PROGRESS NOTES
Reason for Visit  Telly Leal is a 35 year old year old male who is being seen for RECHECK (CF)    CF HPI  The patient was seen and examined by Rcoael Miller   At the patient's last clinic visit in the fall, he was experiencing some chest and leg discomfort due to sciatic back pain when he was coughing.  His pulmonary function tests were at the lower limits of baseline.  Today, he reports overall doing well from a respiratory standpoint since his last visit.  He has not needed any additional antibiotics or sought additional medical attention for his cystic fibrosis.  He did develop increased postnasal drip and sputum production a couple weeks ago after catching a cold from his daughter.  He is much improved and approaching baseline again.  He has been completing [employer] 11-12 times per week.  He has not doing any formal exercise.  He feels better on months that he is on inhaled SOO.  Months on colistin nebs he feels a bit more congested and does not like the sticky quality of the medicine.  No hemoptysis or chest pain.  Baseline exertional dyspnea with his activities of daily living.    Current Outpatient Medications   Medication     acetylcysteine (MUCOMYST) 20 % nebulizer solution     albuterol (2.5 MG/3ML) 0.083% neb solution     albuterol (PROAIR HFA) 108 (90 BASE) MCG/ACT Inhaler     azithromycin (ZITHROMAX) 250 MG tablet     blood glucose monitoring (NO BRAND SPECIFIED) test strip     Cholecalciferol 4000 UNITS CAPS     colistimethate/colistin-base activity (COLYMYCIN) 150 mg/2mL SOLR neb solution     CREON 76723-71433 units CPEP per EC capsule     cromolyn (INTAL) 20 MG/2ML neb solution     fluticasone (FLONASE) 50 MCG/ACT spray     Multiple Vitamin (MULTIVITAMINS PO)     Nutritional Supplements (NUTREN 2.0) LIQD     phytonadione (MEPHYTON) 5 MG tablet     ranitidine (ZANTAC) 150 MG tablet     tobramycin, PF, (SOO) 300 MG/5ML neb solution     water for injection sterile SOLN     aztreonam  (AZACTAM) 200 MG/ML     oseltamivir (TAMIFLU) 75 MG capsule     No current facility-administered medications for this visit.      No Known Allergies  Past Medical History:   Diagnosis Date     Chronic sinusitis      Cystic fibrosis with pulmonary manifestations (H)      Exocrine pancreatic insufficiency      GERD (gastroesophageal reflux disease)      Hemoptysis      Malnutrition (H)        Past Surgical History:   Procedure Laterality Date     LOBECTOMY      right upper lobe     TUBES         Social History     Socioeconomic History     Marital status:      Spouse name: Not on file     Number of children: Not on file     Years of education: Not on file     Highest education level: Not on file   Occupational History     Occupation:      Employer: [employer]   Social Needs     Financial resource strain: Not on file     Food insecurity:     Worry: Not on file     Inability: Not on file     Transportation needs:     Medical: Not on file     Non-medical: Not on file   Tobacco Use     Smoking status: Never Smoker     Smokeless tobacco: Never Used   Substance and Sexual Activity     Alcohol use: Yes     Alcohol/week: 0.0 oz     Drug use: Not on file     Sexual activity: Not on file   Lifestyle     Physical activity:     Days per week: Not on file     Minutes per session: Not on file     Stress: Not on file   Relationships     Social connections:     Talks on phone: Not on file     Gets together: Not on file     Attends Ep[employer] service: Not on file     Active member of club or organization: Not on file     Attends meetings of clubs or organizations: Not on file     Relationship status: Not on file     Intimate partner violence:     Fear of current or ex partner: Not on file     Emotionally abused: Not on file     Physically abused: Not on file     Forced sexual activity: Not on file   Other Topics Concern     Parent/sibling w/ CABG, MI or angioplasty before 65F 55M? Not Asked   Social History  "Narrative    Patient works for the [employer] as an .  He does not have stable living conditions at this time.  He has a Divehi guillen.   Update: [facility] [place] in [facility]. Birth of daughter Nieves Rebolledo August, 2017    ROS Pulmonary  Const: no fever, chills, fatigue. ENT: no sinus pain. Mild intermittent clear drainage.  At baseline GI: no diarrhea, constipation, grease in stools, or gerd sx with h2 blocker. Using 2 cans TF nightly.  Endo: occ feels headache and lightheaded if goes too long without caloric intake that resolves with simple sugar intake. Neuro: sciatic pain much better with stretching. No longer seeing chiropractor.   A complete ROS was otherwise negative except as noted in the HPI.  BP (!) 137/92   Pulse 96   Temp 98  F (36.7  C) (Oral)   Ht 1.897 m (6' 2.69\")   SpO2 94%   BMI 24.51 kg/m    Exam:   GENERAL APPEARANCE: Well developed, well nourished, alert, and in no apparent distress.  EYES: anicteric  HENT: Nasal mucosa with no edema and no hyperemia. No nasal polyps.  EARS: external canals normal. TM obscured by cerumen bilaterally  MOUTH: Oral mucosa is moist, without any lesions, no tonsillar enlargement, no oropharyngeal exudate.  NECK: supple, no masses, no thyromegaly.  LYMPHATICS: No significant  cervical, or supraclavicular nodes.  RESP:  good air flow throughout.  Some rhonchi localized to anterior and posterior right lung. Normal chest wall excursion.  CV: Normal S1, S2, regular rhythm, normal rate. No murmur.  No rub. No gallop. No LE edema.   ABDOMEN:  Bowel sounds normal, soft, nontender, no HSM or masses.   MS: extremities normal. No cyanosis.  SKIN:  PEG site without erythema or discharge. Chest scar-like lesion upper sternum unchanged. Diffuse freckles without overt change.  NEURO: Mentation intact, speech normal, normal gait and stance  PSYCH: mentation appears normal. and affect normal/bright  Results:  Recent Results (from the past 168 " hour(s))   Erythrocyte sedimentation rate auto    Collection Time: 04/22/19  9:00 AM   Result Value Ref Range    Sed Rate 7 0 - 15 mm/h   CBC with platelets differential    Collection Time: 04/22/19  9:00 AM   Result Value Ref Range    WBC 5.9 4.0 - 11.0 10e9/L    RBC Count 5.16 4.4 - 5.9 10e12/L    Hemoglobin 15.5 13.3 - 17.7 g/dL    Hematocrit 45.4 40.0 - 53.0 %    MCV 88 78 - 100 fl    MCH 30.0 26.5 - 33.0 pg    MCHC 34.1 31.5 - 36.5 g/dL    RDW 12.9 10.0 - 15.0 %    Platelet Count 281 150 - 450 10e9/L    Diff Method Automated Method     % Neutrophils 58.9 %    % Lymphocytes 30.2 %    % Monocytes 7.6 %    % Eosinophils 2.4 %    % Basophils 0.7 %    % Immature Granulocytes 0.2 %    Nucleated RBCs 0 0 /100    Absolute Neutrophil 3.5 1.6 - 8.3 10e9/L    Absolute Lymphocytes 1.8 0.8 - 5.3 10e9/L    Absolute Monocytes 0.5 0.0 - 1.3 10e9/L    Absolute Eosinophils 0.1 0.0 - 0.7 10e9/L    Absolute Basophils 0.0 0.0 - 0.2 10e9/L    Abs Immature Granulocytes 0.0 0 - 0.4 10e9/L    Absolute Nucleated RBC 0.0    Protein total    Collection Time: 04/22/19  9:00 AM   Result Value Ref Range    Protein Total 7.6 6.8 - 8.8 g/dL   Phosphorus    Collection Time: 04/22/19  9:00 AM   Result Value Ref Range    Phosphorus 3.1 2.5 - 4.5 mg/dL   Magnesium    Collection Time: 04/22/19  9:00 AM   Result Value Ref Range    Magnesium 2.4 (H) 1.6 - 2.3 mg/dL   Hemoglobin A1c    Collection Time: 04/22/19  9:00 AM   Result Value Ref Range    Hemoglobin A1C 6.1 (H) 0 - 5.6 %   Iron    Collection Time: 04/22/19  9:00 AM   Result Value Ref Range    Iron 79 35 - 180 ug/dL   AST    Collection Time: 04/22/19  9:00 AM   Result Value Ref Range    AST 27 0 - 45 U/L   Alkaline phosphatase    Collection Time: 04/22/19  9:00 AM   Result Value Ref Range    Alkaline Phosphatase 84 40 - 150 U/L   Albumin level    Collection Time: 04/22/19  9:00 AM   Result Value Ref Range    Albumin 3.8 3.4 - 5.0 g/dL   Lipid Profile    Collection Time: 04/22/19  9:00 AM    Result Value Ref Range    Cholesterol 129 <200 mg/dL    Triglycerides 85 <150 mg/dL    HDL Cholesterol 35 (L) >39 mg/dL    LDL Cholesterol Calculated 77 <100 mg/dL    Non HDL Cholesterol 94 <130 mg/dL   Basic metabolic panel    Collection Time: 04/22/19  9:00 AM   Result Value Ref Range    Sodium 139 133 - 144 mmol/L    Potassium 3.8 3.4 - 5.3 mmol/L    Chloride 107 94 - 109 mmol/L    Carbon Dioxide 25 20 - 32 mmol/L    Anion Gap 7 3 - 14 mmol/L    Glucose 104 (H) 70 - 99 mg/dL    Urea Nitrogen 20 7 - 30 mg/dL    Creatinine 0.94 0.66 - 1.25 mg/dL    GFR Estimate >90 >60 mL/min/[1.73_m2]    GFR Estimate If Black >90 >60 mL/min/[1.73_m2]    Calcium 9.0 8.5 - 10.1 mg/dL   ALT    Collection Time: 04/22/19  9:00 AM   Result Value Ref Range    ALT 35 0 - 70 U/L   Glucose in a Series: Draw Time Zero    Collection Time: 04/22/19  9:00 AM   Result Value Ref Range    Glucose 100 (H) 70 - 99 mg/dL   Insulin in a Series: Draw Time Zero    Collection Time: 04/22/19  9:00 AM   Result Value Ref Range    Insulin 6.5 3 - 25 mU/L   Glucose in a Series: Draw +30 minutes    Collection Time: 04/22/19  9:35 AM   Result Value Ref Range    Glucose 166 (H) 70 - 99 mg/dL   Insulin in a Series: Draw +30 minutes    Collection Time: 04/22/19  9:35 AM   Result Value Ref Range    Insulin 20.6 3 - 25 mU/L   Glucose in a Series: Draw +60 minutes    Collection Time: 04/22/19 10:05 AM   Result Value Ref Range    Glucose 224 (H) 70 - 99 mg/dL   Insulin in a Series: Draw +60 minutes    Collection Time: 04/22/19 10:05 AM   Result Value Ref Range    Insulin 43.5 (H) 3 - 25 mU/L   Glucose in a Series: Draw +90 minutes    Collection Time: 04/22/19 10:35 AM   Result Value Ref Range    Glucose 221 (H) 70 - 99 mg/dL   Insulin in a Series: Draw +90 minutes    Collection Time: 04/22/19 10:35 AM   Result Value Ref Range    Insulin 62.6 (H) 3 - 25 mU/L   Glucose in a Series: Draw +120 minutes    Collection Time: 04/22/19 11:05 AM   Result Value Ref Range     Glucose 155 (H) 70 - 99 mg/dL   Insulin in a Series: Draw +120 minutes    Collection Time: 04/22/19 11:05 AM   Result Value Ref Range    Insulin 53.4 (H) 3 - 25 mU/L   Glucose by meter    Collection Time: 04/22/19 11:08 AM   Result Value Ref Range    Glucose 140 (H) 70 - 99 mg/dL   Albumin Random Urine Quantitative with Creat Ratio    Collection Time: 04/22/19 11:15 AM   Result Value Ref Range    Creatinine Urine 191 mg/dL    Albumin Urine mg/L 8 mg/L    Albumin Urine mg/g Cr 3.99 0 - 17 mg/g Cr   Routine UA with microscopic    Collection Time: 04/22/19 11:15 AM   Result Value Ref Range    Color Urine Yellow     Appearance Urine Clear     Glucose Urine Negative NEG^Negative mg/dL    Bilirubin Urine Negative NEG^Negative    Ketones Urine Negative NEG^Negative mg/dL    Specific Gravity Urine 1.023 1.003 - 1.035    Blood Urine Negative NEG^Negative    pH Urine 5.0 5.0 - 7.0 pH    Protein Albumin Urine Negative NEG^Negative mg/dL    Urobilinogen mg/dL 0.0 0.0 - 2.0 mg/dL    Nitrite Urine Negative NEG^Negative    Leukocyte Esterase Urine Negative NEG^Negative    Source Midstream Urine     WBC Urine 1 0 - 5 /HPF    RBC Urine <1 0 - 2 /HPF    Bacteria Urine Few (A) NEG^Negative /HPF    Mucous Urine Present (A) NEG^Negative /LPF   General PFT Lab (Please always keep checked)    Collection Time: 04/22/19 12:40 PM   Result Value Ref Range    FVC-Pred 6.24 L    FVC-Pre 6.24 L    FVC-%Pred-Pre 100 %    FEV1-Pre 3.18 L    FEV1-%Pred-Pre 63 %    FEV1FVC-Pred 81 %    FEV1FVC-Pre 51 %    FEFMax-Pred 11.33 L/sec    FEFMax-Pre 9.05 L/sec    FEFMax-%Pred-Pre 79 %    FEF2575-Pred 4.79 L/sec    FEF2575-Pre 0.76 L/sec    BIJ8754-%Pred-Pre 15 %    ExpTime-Pre 13.00 sec    FIFMax-Pre 9.98 L/sec    FEV1FEV6-Pred 82 %    FEV1FEV6-Pre 62 %     Spirometry interpretation: personally reviewed. Valid maneuver. Moderate obstruction. FEV1 up 5%. At baseline of past 2 years.   Sputum last visit: PA x 1, aspergillus  Last AFB study June 2018 negative  to date  Chest x-ray today personally reviewed: No change in mild bronchiectatic changes with mild mucus plugging.     Assessment and plan:  1. CF lung disease: Patient recovering from recent URI and PFTs improved today for unclear reasons. At baseline of past couple years. He will continue chronic azithromycin and alternate Coly and SOO nebs.  His most recent cultures suggest SOO nebs will be efficacious. Intolerance and/or lack of perceived benefit from coly nebs. We discussed aztreonam nebs. He is hesitant to pursue cayston due to tid dosing and separate equipment. He will consider use of reconstituted aztreonam. Organisms less sensitive to quinolone. 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 Discussed with our pharmacist Cecilia and it appears patient would likely be eligible for new triple therapy if FDA approved.   2. Impaired glucose tolerance: Fasting > 100 and peak > 200 and 2 hour value right at 140. A little worse than Jan 2018 but similar to previous. A1C similar to past currently at 6.1%. Does not appear to be an evolving problem and overall with good clinical course. He has been resistant/reticient to embark on insulin therapy.Will explore home monitoring during a time he is ill to ensure he is not unexpectedly running high sugars.   3.  Pancreatic insufficiency with a history of malnutrition: Adequate enzyme replacement by history.  His weight had been declining but trending back up this visit with BMI at 24.5 which is at target. Will continue 2 cans TF nightly.   4. CF sinus disease: has flonase prn - more prominent when seasonal allergies present.  5. GERD: Adequate control with ranitidine.   6. Low vitamin D level: Improved adherence to replacement. Level from January 2018 slightly low at 28. Today's level pending.  7. Skin lesions: Seen by outside Dermatologist. Central chest scar could be treated by steroid injections per pt but not actively pursuing. Plan in place for annual exam of his  many freckles.  He is overdue for follow up with his dermatologist and encouraged him to make an appointment.  8. Healthcare maintenance: Annual studies due April, 2020.      Patient will follow up in 2 months  40 minutes spent in conjunction with this visit with > 50% of time spent counseling and coordinating care of above issues as well as reviewing annual studies with patient.     Rocael Miller

## 2019-04-22 NOTE — PROGRESS NOTES
Telly is here for a glucose tolerance test for a history of Cystic Fibrosis.  Orders written by Dr. Miller on 04/22/2019.  Telly arrived NPO, last eating at 11 pm last evening.  IV placed in left AC difficulty by GRACIE Soria LPN.  Requested labs drawn.  Glucose and Insulin levels drawn at -0- 9 am, +30 935 am, +60 1005 am, +90 1035 am, and + 120 1105 am.  Telly started drinking the Glucola at 905 am and completed within 10 minutes.  Post blood sugar tested and was 140.  Snack given to patient prior to discharge.   PIV discontinued without difficulty.    Telly will follow up, as planned, with his CF team for test results and all future appointments.     GRACIE Soria LPN    Administrations This Visit     glucose tolerence test (GLUCOLA) 25% oral liquid 75 g     Admin Date  04/22/2019 Action  Given Dose  75 g Route  Oral Administered By  Flaquito Soria LPN

## 2019-04-22 NOTE — PROGRESS NOTES
"Adult Cystic Fibrosis Program  Social Work Clinic Consult    Data:   Met with Telly to review psychosocial needs and provide counseling as needed.    Introduced self as new CF SW and provided contact info. Telly states he has been doing \"really well\" recently. His wife is pregnant with their second child and they will find out if it is a girl or a boy in a few weeks. His daughter Nieves and wife are also both doing well. He would like SW to send him a copy of a HCD to fill out. ERIC explained HCD and will send via email. He denied having any concerns for SW at this time. He would like to complete his annual assessment with SW at his next appointment as he had a long day in clinic.    The following email was sent to Telly:    Bret Varner-    Great to meet you in clinic yesterday. I attached a copy of a Health Care Directive to this email. You are more than welcome to bring a copy back to clinic with you to have it notarized and scanned in the chart. If you have someone else notarize or witness it you can scan it an email it back to me and I will get it in the chart.    Let me know if you have any questions!    Intervention:  -Introduction to SW and SW role  -Resource education/referral (Health Care Directive)    Assessment: Telly was pleasant and presented with a normal affect. He would like to complete a HCD which ERIC emailed.     Plan:   -Continue to assist with psychosocial concern(s).  -Continue to follow through regular clinic consult.  -Continue to follow for any psychosocial needs that may arise.  -Complete full psychosocial assessment annually.     OANH Coombs, [facility]  Adult Cystic Fibrosis   Ph: [phone], Pager: [phone]        "

## 2019-04-22 NOTE — LETTER
4/22/2019       RE: Telly Leal  [address] [address]     Dear Colleague,    Thank you for referring your patient, Telly Leal, to the [facility] at [facility]. Please see a copy of my visit note below.    Reason for Visit  Telly Leal is a 35 year old year old male who is being seen for RECHECK (CF)    CF HPI  The patient was seen and examined by Rocael Miller   At the patient's last clinic visit in the fall, he was experiencing some chest and leg discomfort due to sciatic back pain when he was coughing.  His pulmonary function tests were at the lower limits of baseline.  Today, he reports overall doing well from a respiratory standpoint since his last visit.  He has not needed any additional antibiotics or sought additional medical attention for his cystic fibrosis.  He did develop increased postnasal drip and sputum production a couple weeks ago after catching a cold from his daughter.  He is much improved and approaching baseline again.  He has been completing [employer] 11-12 times per week.  He has not doing any formal exercise.  He feels better on months that he is on inhaled SOO.  Months on colistin nebs he feels a bit more congested and does not like the sticky quality of the medicine.  No hemoptysis or chest pain.  Baseline exertional dyspnea with his activities of daily living.    Current Outpatient Medications   Medication     acetylcysteine (MUCOMYST) 20 % nebulizer solution     albuterol (2.5 MG/3ML) 0.083% neb solution     albuterol (PROAIR HFA) 108 (90 BASE) MCG/ACT Inhaler     azithromycin (ZITHROMAX) 250 MG tablet     blood glucose monitoring (NO BRAND SPECIFIED) test strip     Cholecalciferol 4000 UNITS CAPS     colistimethate/colistin-base activity (COLYMYCIN) 150 mg/2mL SOLR neb solution     CREON 40656-06696 units CPEP per EC capsule     cromolyn (INTAL) 20 MG/2ML neb solution      fluticasone (FLONASE) 50 MCG/ACT spray     Multiple Vitamin (MULTIVITAMINS PO)     Nutritional Supplements (NUTREN 2.0) LIQD     phytonadione (MEPHYTON) 5 MG tablet     ranitidine (ZANTAC) 150 MG tablet     tobramycin, PF, (SOO) 300 MG/5ML neb solution     water for injection sterile SOLN     aztreonam (AZACTAM) 200 MG/ML     oseltamivir (TAMIFLU) 75 MG capsule     No current facility-administered medications for this visit.      No Known Allergies  Past Medical History:   Diagnosis Date     Chronic sinusitis      Cystic fibrosis with pulmonary manifestations (H)      Exocrine pancreatic insufficiency      GERD (gastroesophageal reflux disease)      Hemoptysis      Malnutrition (H)        Past Surgical History:   Procedure Laterality Date     LOBECTOMY      right upper lobe     TUBES         Social History     Socioeconomic History     Marital status:      Spouse name: Not on file     Number of children: Not on file     Years of education: Not on file     Highest education level: Not on file   Occupational History     Occupation:      Employer: [employer]   Social Needs     Financial resource strain: Not on file     Food insecurity:     Worry: Not on file     Inability: Not on file     Transportation needs:     Medical: Not on file     Non-medical: Not on file   Tobacco Use     Smoking status: Never Smoker     Smokeless tobacco: Never Used   Substance and Sexual Activity     Alcohol use: Yes     Alcohol/week: 0.0 oz     Drug use: Not on file     Sexual activity: Not on file   Lifestyle     Physical activity:     Days per week: Not on file     Minutes per session: Not on file     Stress: Not on file   Relationships     Social connections:     Talks on phone: Not on file     Gets together: Not on file     Attends Cheondoism service: Not on file     Active member of club or organization: Not on file     Attends meetings of clubs or organizations: Not on file     Relationship status: Not on  "file     Intimate partner violence:     Fear of current or ex partner: Not on file     Emotionally abused: Not on file     Physically abused: Not on file     Forced sexual activity: Not on file   Other Topics Concern     Parent/sibling w/ CABG, MI or angioplasty before 65F 55M? Not Asked   Social History Narrative    Patient works for the [employer] as an .  He does not have stable living conditions at this time.  He has a [place] guillen.   Update: living with wife Bre in [place], [facility]. Birth of daughter Nieves Rebolledo August, 2017    ROS Pulmonary  Const: no fever, chills, fatigue. ENT: no sinus pain. Mild intermittent clear drainage.  At baseline GI: no diarrhea, constipation, grease in stools, or gerd sx with h2 blocker. Using 2 cans TF nightly.  Endo: occ feels headache and lightheaded if goes too long without caloric intake that resolves with simple sugar intake. Neuro: sciatic pain much better with stretching. No longer seeing chiropractor.   A complete ROS was otherwise negative except as noted in the HPI.  BP (!) 137/92   Pulse 96   Temp 98  F (36.7  C) (Oral)   Ht 1.897 m (6' 2.69\")   SpO2 94%   BMI 24.51 kg/m     Exam:   GENERAL APPEARANCE: Well developed, well nourished, alert, and in no apparent distress.  EYES: anicteric  HENT: Nasal mucosa with no edema and no hyperemia. No nasal polyps.  EARS: external canals normal. TM obscured by cerumen bilaterally  MOUTH: Oral mucosa is moist, without any lesions, no tonsillar enlargement, no oropharyngeal exudate.  NECK: supple, no masses, no thyromegaly.  LYMPHATICS: No significant  cervical, or supraclavicular nodes.  RESP:  good air flow throughout.  Some rhonchi localized to anterior and posterior right lung. Normal chest wall excursion.  CV: Normal S1, S2, regular rhythm, normal rate. No murmur.  No rub. No gallop. No LE edema.   ABDOMEN:  Bowel sounds normal, soft, nontender, no HSM or masses.   MS: extremities normal. No " cyanosis.  SKIN:  PEG site without erythema or discharge. Chest scar-like lesion upper sternum unchanged. Diffuse freckles without overt change.  NEURO: Mentation intact, speech normal, normal gait and stance  PSYCH: mentation appears normal. and affect normal/bright  Results:  Recent Results (from the past 168 hour(s))   Erythrocyte sedimentation rate auto    Collection Time: 04/22/19  9:00 AM   Result Value Ref Range    Sed Rate 7 0 - 15 mm/h   CBC with platelets differential    Collection Time: 04/22/19  9:00 AM   Result Value Ref Range    WBC 5.9 4.0 - 11.0 10e9/L    RBC Count 5.16 4.4 - 5.9 10e12/L    Hemoglobin 15.5 13.3 - 17.7 g/dL    Hematocrit 45.4 40.0 - 53.0 %    MCV 88 78 - 100 fl    MCH 30.0 26.5 - 33.0 pg    MCHC 34.1 31.5 - 36.5 g/dL    RDW 12.9 10.0 - 15.0 %    Platelet Count 281 150 - 450 10e9/L    Diff Method Automated Method     % Neutrophils 58.9 %    % Lymphocytes 30.2 %    % Monocytes 7.6 %    % Eosinophils 2.4 %    % Basophils 0.7 %    % Immature Granulocytes 0.2 %    Nucleated RBCs 0 0 /100    Absolute Neutrophil 3.5 1.6 - 8.3 10e9/L    Absolute Lymphocytes 1.8 0.8 - 5.3 10e9/L    Absolute Monocytes 0.5 0.0 - 1.3 10e9/L    Absolute Eosinophils 0.1 0.0 - 0.7 10e9/L    Absolute Basophils 0.0 0.0 - 0.2 10e9/L    Abs Immature Granulocytes 0.0 0 - 0.4 10e9/L    Absolute Nucleated RBC 0.0    Protein total    Collection Time: 04/22/19  9:00 AM   Result Value Ref Range    Protein Total 7.6 6.8 - 8.8 g/dL   Phosphorus    Collection Time: 04/22/19  9:00 AM   Result Value Ref Range    Phosphorus 3.1 2.5 - 4.5 mg/dL   Magnesium    Collection Time: 04/22/19  9:00 AM   Result Value Ref Range    Magnesium 2.4 (H) 1.6 - 2.3 mg/dL   Hemoglobin A1c    Collection Time: 04/22/19  9:00 AM   Result Value Ref Range    Hemoglobin A1C 6.1 (H) 0 - 5.6 %   Iron    Collection Time: 04/22/19  9:00 AM   Result Value Ref Range    Iron 79 35 - 180 ug/dL   AST    Collection Time: 04/22/19  9:00 AM   Result Value Ref Range     AST 27 0 - 45 U/L   Alkaline phosphatase    Collection Time: 04/22/19  9:00 AM   Result Value Ref Range    Alkaline Phosphatase 84 40 - 150 U/L   Albumin level    Collection Time: 04/22/19  9:00 AM   Result Value Ref Range    Albumin 3.8 3.4 - 5.0 g/dL   Lipid Profile    Collection Time: 04/22/19  9:00 AM   Result Value Ref Range    Cholesterol 129 <200 mg/dL    Triglycerides 85 <150 mg/dL    HDL Cholesterol 35 (L) >39 mg/dL    LDL Cholesterol Calculated 77 <100 mg/dL    Non HDL Cholesterol 94 <130 mg/dL   Basic metabolic panel    Collection Time: 04/22/19  9:00 AM   Result Value Ref Range    Sodium 139 133 - 144 mmol/L    Potassium 3.8 3.4 - 5.3 mmol/L    Chloride 107 94 - 109 mmol/L    Carbon Dioxide 25 20 - 32 mmol/L    Anion Gap 7 3 - 14 mmol/L    Glucose 104 (H) 70 - 99 mg/dL    Urea Nitrogen 20 7 - 30 mg/dL    Creatinine 0.94 0.66 - 1.25 mg/dL    GFR Estimate >90 >60 mL/min/[1.73_m2]    GFR Estimate If Black >90 >60 mL/min/[1.73_m2]    Calcium 9.0 8.5 - 10.1 mg/dL   ALT    Collection Time: 04/22/19  9:00 AM   Result Value Ref Range    ALT 35 0 - 70 U/L   Glucose in a Series: Draw Time Zero    Collection Time: 04/22/19  9:00 AM   Result Value Ref Range    Glucose 100 (H) 70 - 99 mg/dL   Insulin in a Series: Draw Time Zero    Collection Time: 04/22/19  9:00 AM   Result Value Ref Range    Insulin 6.5 3 - 25 mU/L   Glucose in a Series: Draw +30 minutes    Collection Time: 04/22/19  9:35 AM   Result Value Ref Range    Glucose 166 (H) 70 - 99 mg/dL   Insulin in a Series: Draw +30 minutes    Collection Time: 04/22/19  9:35 AM   Result Value Ref Range    Insulin 20.6 3 - 25 mU/L   Glucose in a Series: Draw +60 minutes    Collection Time: 04/22/19 10:05 AM   Result Value Ref Range    Glucose 224 (H) 70 - 99 mg/dL   Insulin in a Series: Draw +60 minutes    Collection Time: 04/22/19 10:05 AM   Result Value Ref Range    Insulin 43.5 (H) 3 - 25 mU/L   Glucose in a Series: Draw +90 minutes    Collection Time: 04/22/19 10:35  AM   Result Value Ref Range    Glucose 221 (H) 70 - 99 mg/dL   Insulin in a Series: Draw +90 minutes    Collection Time: 04/22/19 10:35 AM   Result Value Ref Range    Insulin 62.6 (H) 3 - 25 mU/L   Glucose in a Series: Draw +120 minutes    Collection Time: 04/22/19 11:05 AM   Result Value Ref Range    Glucose 155 (H) 70 - 99 mg/dL   Insulin in a Series: Draw +120 minutes    Collection Time: 04/22/19 11:05 AM   Result Value Ref Range    Insulin 53.4 (H) 3 - 25 mU/L   Glucose by meter    Collection Time: 04/22/19 11:08 AM   Result Value Ref Range    Glucose 140 (H) 70 - 99 mg/dL   Albumin Random Urine Quantitative with Creat Ratio    Collection Time: 04/22/19 11:15 AM   Result Value Ref Range    Creatinine Urine 191 mg/dL    Albumin Urine mg/L 8 mg/L    Albumin Urine mg/g Cr 3.99 0 - 17 mg/g Cr   Routine UA with microscopic    Collection Time: 04/22/19 11:15 AM   Result Value Ref Range    Color Urine Yellow     Appearance Urine Clear     Glucose Urine Negative NEG^Negative mg/dL    Bilirubin Urine Negative NEG^Negative    Ketones Urine Negative NEG^Negative mg/dL    Specific Gravity Urine 1.023 1.003 - 1.035    Blood Urine Negative NEG^Negative    pH Urine 5.0 5.0 - 7.0 pH    Protein Albumin Urine Negative NEG^Negative mg/dL    Urobilinogen mg/dL 0.0 0.0 - 2.0 mg/dL    Nitrite Urine Negative NEG^Negative    Leukocyte Esterase Urine Negative NEG^Negative    Source Midstream Urine     WBC Urine 1 0 - 5 /HPF    RBC Urine <1 0 - 2 /HPF    Bacteria Urine Few (A) NEG^Negative /HPF    Mucous Urine Present (A) NEG^Negative /LPF   General PFT Lab (Please always keep checked)    Collection Time: 04/22/19 12:40 PM   Result Value Ref Range    FVC-Pred 6.24 L    FVC-Pre 6.24 L    FVC-%Pred-Pre 100 %    FEV1-Pre 3.18 L    FEV1-%Pred-Pre 63 %    FEV1FVC-Pred 81 %    FEV1FVC-Pre 51 %    FEFMax-Pred 11.33 L/sec    FEFMax-Pre 9.05 L/sec    FEFMax-%Pred-Pre 79 %    FEF2575-Pred 4.79 L/sec    FEF2575-Pre 0.76 L/sec    USD4220-%Pred-Pre  15 %    ExpTime-Pre 13.00 sec    FIFMax-Pre 9.98 L/sec    FEV1FEV6-Pred 82 %    FEV1FEV6-Pre 62 %     Spirometry interpretation: personally reviewed. Valid maneuver. Moderate obstruction. FEV1 up 5%. At baseline of past 2 years.   Sputum last visit: PA x 1, aspergillus  Last AFB study June 2018 negative to date  Chest x-ray today personally reviewed: No change in mild bronchiectatic changes with mild mucus plugging.     Assessment and plan:  1. CF lung disease: Patient recovering from recent URI and PFTs improved today for unclear reasons. At baseline of past couple years. He will continue chronic azithromycin and alternate Coly and SOO nebs.  His most recent cultures suggest SOO nebs will be efficacious. Intolerance and/or lack of perceived benefit from coly nebs. We discussed aztreonam nebs. He is hesitant to pursue cayston due to tid dosing and separate equipment. He will consider use of reconstituted aztreonam. Organisms less sensitive to quinolone. 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 Discussed with our pharmacist Cecilia and it appears patient would likely be eligible for new triple therapy if FDA approved.   2. Impaired glucose tolerance: Fasting > 100 and peak > 200 and 2 hour value right at 140. A little worse than Jan 2018 but similar to previous. A1C similar to past currently at 6.1%. Does not appear to be an evolving problem and overall with good clinical course. He has been resistant/reticient to embark on insulin therapy.Will explore home monitoring during a time he is ill to ensure he is not unexpectedly running high sugars.   3.  Pancreatic insufficiency with a history of malnutrition: Adequate enzyme replacement by history.  His weight had been declining but trending back up this visit with BMI at 24.5 which is at target. Will continue 2 cans TF nightly.   4. CF sinus disease: has flonase prn - more prominent when seasonal allergies present.  5. GERD: Adequate control with ranitidine.    6. Low vitamin D level: Improved adherence to replacement. Level from January 2018 slightly low at 28. Today's level pending.  7. Skin lesions: Seen by outside Dermatologist. Central chest scar could be treated by steroid injections per pt but not actively pursuing. Plan in place for annual exam of his many freckles.  He is overdue for follow up with his dermatologist and encouraged him to make an appointment.  8. Healthcare maintenance: Annual studies due April, 2020.      Patient will follow up in 2 months  40 minutes spent in conjunction with this visit with > 50% of time spent counseling and coordinating care of above issues as well as reviewing annual studies with patient.     Rocael Miller

## 2019-04-23 LAB
DEPRECATED CALCIDIOL+CALCIFEROL SERPL-MC: 38 UG/L (ref 20–75)
TESTOST SERPL-MCNC: 267 NG/DL (ref 240–950)

## 2019-04-24 LAB — IGE SERPL-ACNC: 5 KIU/L (ref 0–114)

## 2019-04-25 LAB
A-TOCOPHEROL VIT E SERPL-MCNC: 7.6 MG/L (ref 5.5–18)
ANNOTATION COMMENT IMP: NORMAL
BETA+GAMMA TOCOPHEROL SERPL-MCNC: 0.7 MG/L (ref 0–6)
RETINYL PALMITATE SERPL-MCNC: <0.02 MG/L (ref 0–0.1)
VIT A SERPL-MCNC: 0.76 MG/L (ref 0.3–1.2)

## 2019-04-30 LAB
BACTERIA SPEC CULT: ABNORMAL
SPECIMEN SOURCE: ABNORMAL

## 2019-07-17 ENCOUNTER — MYC REFILL (OUTPATIENT)
Dept: PULMONOLOGY | Facility: CLINIC | Age: 39
End: 2019-07-17

## 2019-07-17 DIAGNOSIS — E84.0 CYSTIC FIBROSIS WITH PULMONARY MANIFESTATIONS (H): ICD-10-CM

## 2019-07-18 ENCOUNTER — TELEPHONE (OUTPATIENT)
Dept: PHARMACY | Facility: CLINIC | Age: 39
End: 2019-07-18

## 2019-07-18 RX ORDER — TOBRAMYCIN INHALATION SOLUTION 300 MG/5ML
300 INHALANT RESPIRATORY (INHALATION) 2 TIMES DAILY
Qty: 300 ML | Refills: 5 | Status: SHIPPED | OUTPATIENT
Start: 2019-07-18 | End: 2020-07-15

## 2019-08-25 NOTE — NURSING NOTE
Chief Complaint   Patient presents with     Cystic Fibrosis     Patient is being seen for CF follow up      Milagros Hernandez CMA at 1:37 PM on 4/9/2018     complains of pain/discomfort

## 2019-09-25 ENCOUNTER — MYC MEDICAL ADVICE (OUTPATIENT)
Dept: PULMONOLOGY | Facility: CLINIC | Age: 39
End: 2019-09-25

## 2019-09-25 DIAGNOSIS — E84.0 CYSTIC FIBROSIS WITH PULMONARY MANIFESTATIONS (H): Primary | ICD-10-CM

## 2019-10-02 RX ORDER — LEVOFLOXACIN 750 MG/1
750 TABLET, FILM COATED ORAL DAILY
Qty: 21 TABLET | Refills: 0 | Status: SHIPPED | OUTPATIENT
Start: 2019-10-02 | End: 2019-10-06

## 2019-10-04 ENCOUNTER — HEALTH MAINTENANCE LETTER (OUTPATIENT)
Age: 39
End: 2019-10-04

## 2019-10-06 ENCOUNTER — APPOINTMENT (OUTPATIENT)
Dept: CT IMAGING | Facility: CLINIC | Age: 39
End: 2019-10-06
Attending: EMERGENCY MEDICINE
Payer: COMMERCIAL

## 2019-10-06 ENCOUNTER — HOSPITAL ENCOUNTER (EMERGENCY)
Facility: CLINIC | Age: 39
Discharge: HOME | End: 2019-10-06
Attending: EMERGENCY MEDICINE | Admitting: EMERGENCY MEDICINE
Payer: COMMERCIAL

## 2019-10-06 VITALS
HEIGHT: 75 IN | SYSTOLIC BLOOD PRESSURE: 119 MMHG | WEIGHT: 198.4 LBS | BODY MASS INDEX: 24.67 KG/M2 | TEMPERATURE: 98.1 F | HEART RATE: 102 BPM | DIASTOLIC BLOOD PRESSURE: 85 MMHG | RESPIRATION RATE: 23 BRPM | OXYGEN SATURATION: 94 %

## 2019-10-06 DIAGNOSIS — E84.0 CYSTIC FIBROSIS WITH PULMONARY MANIFESTATIONS (H): ICD-10-CM

## 2019-10-06 DIAGNOSIS — K65.4 MESENTERIC PANNICULITIS (H): ICD-10-CM

## 2019-10-06 LAB
ALBUMIN SERPL-MCNC: 3.3 G/DL (ref 3.4–5)
ALBUMIN UR-MCNC: 10 MG/DL
ALP SERPL-CCNC: 97 U/L (ref 40–150)
ALT SERPL W P-5'-P-CCNC: 102 U/L (ref 0–70)
ANION GAP SERPL CALCULATED.3IONS-SCNC: 7 MMOL/L (ref 3–14)
APPEARANCE UR: CLEAR
AST SERPL W P-5'-P-CCNC: 70 U/L (ref 0–45)
BASOPHILS # BLD AUTO: 0 10E9/L (ref 0–0.2)
BASOPHILS NFR BLD AUTO: 0 %
BILIRUB SERPL-MCNC: 1 MG/DL (ref 0.2–1.3)
BILIRUB UR QL STRIP: NEGATIVE
BUN SERPL-MCNC: 17 MG/DL (ref 7–30)
CALCIUM SERPL-MCNC: 8.3 MG/DL (ref 8.5–10.1)
CHLORIDE SERPL-SCNC: 104 MMOL/L (ref 94–109)
CO2 SERPL-SCNC: 26 MMOL/L (ref 20–32)
COLOR UR AUTO: YELLOW
CREAT SERPL-MCNC: 0.96 MG/DL (ref 0.66–1.25)
DIFFERENTIAL METHOD BLD: NORMAL
EOSINOPHIL # BLD AUTO: 0.1 10E9/L (ref 0–0.7)
EOSINOPHIL NFR BLD AUTO: 0.9 %
ERYTHROCYTE [DISTWIDTH] IN BLOOD BY AUTOMATED COUNT: 13.2 % (ref 10–15)
GFR SERPL CREATININE-BSD FRML MDRD: >90 ML/MIN/{1.73_M2}
GLUCOSE SERPL-MCNC: 112 MG/DL (ref 70–99)
GLUCOSE UR STRIP-MCNC: NEGATIVE MG/DL
HCT VFR BLD AUTO: 44.9 % (ref 40–53)
HGB BLD-MCNC: 14.7 G/DL (ref 13.3–17.7)
HGB UR QL STRIP: NEGATIVE
INTERPRETATION ECG - MUSE: NORMAL
KETONES UR STRIP-MCNC: 5 MG/DL
LEUKOCYTE ESTERASE UR QL STRIP: NEGATIVE
LYMPHOCYTES # BLD AUTO: 2.5 10E9/L (ref 0.8–5.3)
LYMPHOCYTES NFR BLD AUTO: 28.8 %
MCH RBC QN AUTO: 29.3 PG (ref 26.5–33)
MCHC RBC AUTO-ENTMCNC: 32.7 G/DL (ref 31.5–36.5)
MCV RBC AUTO: 90 FL (ref 78–100)
MONOCYTES # BLD AUTO: 0.5 10E9/L (ref 0–1.3)
MONOCYTES NFR BLD AUTO: 6.3 %
MUCOUS THREADS #/AREA URNS LPF: PRESENT /LPF
NEUTROPHILS # BLD AUTO: 5.6 10E9/L (ref 1.6–8.3)
NEUTROPHILS NFR BLD AUTO: 64 %
NITRATE UR QL: NEGATIVE
PH UR STRIP: 7.5 PH (ref 5–7)
PLATELET # BLD AUTO: 256 10E9/L (ref 150–450)
PLATELET # BLD EST: NORMAL 10*3/UL
POIKILOCYTOSIS BLD QL SMEAR: SLIGHT
POTASSIUM SERPL-SCNC: 4.1 MMOL/L (ref 3.4–5.3)
PROT SERPL-MCNC: 7.3 G/DL (ref 6.8–8.8)
RBC # BLD AUTO: 5.01 10E12/L (ref 4.4–5.9)
RBC #/AREA URNS AUTO: 1 /HPF (ref 0–2)
SODIUM SERPL-SCNC: 137 MMOL/L (ref 133–144)
SOURCE: ABNORMAL
SP GR UR STRIP: 1.02 (ref 1–1.03)
UROBILINOGEN UR STRIP-MCNC: 2 MG/DL (ref 0–2)
VARIANT LYMPHS BLD QL SMEAR: PRESENT
WBC # BLD AUTO: 8.7 10E9/L (ref 4–11)
WBC #/AREA URNS AUTO: <1 /HPF (ref 0–5)

## 2019-10-06 PROCEDURE — 96360 HYDRATION IV INFUSION INIT: CPT | Performed by: EMERGENCY MEDICINE

## 2019-10-06 PROCEDURE — 25000128 H RX IP 250 OP 636: Performed by: EMERGENCY MEDICINE

## 2019-10-06 PROCEDURE — 85025 COMPLETE CBC W/AUTO DIFF WBC: CPT | Performed by: EMERGENCY MEDICINE

## 2019-10-06 PROCEDURE — 80053 COMPREHEN METABOLIC PANEL: CPT | Performed by: EMERGENCY MEDICINE

## 2019-10-06 PROCEDURE — 93005 ELECTROCARDIOGRAM TRACING: CPT | Performed by: EMERGENCY MEDICINE

## 2019-10-06 PROCEDURE — 81001 URINALYSIS AUTO W/SCOPE: CPT | Performed by: EMERGENCY MEDICINE

## 2019-10-06 PROCEDURE — 99284 EMERGENCY DEPT VISIT MOD MDM: CPT | Mod: Z6 | Performed by: EMERGENCY MEDICINE

## 2019-10-06 PROCEDURE — 99285 EMERGENCY DEPT VISIT HI MDM: CPT | Mod: 25 | Performed by: EMERGENCY MEDICINE

## 2019-10-06 PROCEDURE — 74176 CT ABD & PELVIS W/O CONTRAST: CPT

## 2019-10-06 RX ORDER — SENNOSIDES A AND B 8.6 MG/1
1 TABLET, FILM COATED ORAL 2 TIMES DAILY
Qty: 20 TABLET | Refills: 0 | Status: SHIPPED | OUTPATIENT
Start: 2019-10-06 | End: 2019-10-16

## 2019-10-06 RX ORDER — METRONIDAZOLE 500 MG/1
500 TABLET ORAL 2 TIMES DAILY
Qty: 14 TABLET | Refills: 0 | Status: SHIPPED | OUTPATIENT
Start: 2019-10-06 | End: 2019-10-13

## 2019-10-06 RX ORDER — TRAMADOL HYDROCHLORIDE 50 MG/1
50 TABLET ORAL EVERY 6 HOURS PRN
Qty: 10 TABLET | Refills: 0 | Status: SHIPPED | OUTPATIENT
Start: 2019-10-06 | End: 2019-11-26

## 2019-10-06 RX ORDER — LEVOFLOXACIN 750 MG/1
750 TABLET, FILM COATED ORAL DAILY
Qty: 10 TABLET | Refills: 0 | Status: SHIPPED | OUTPATIENT
Start: 2019-10-06 | End: 2019-10-16

## 2019-10-06 RX ADMIN — SODIUM CHLORIDE 1000 ML: 9 INJECTION, SOLUTION INTRAVENOUS at 08:22

## 2019-10-06 ASSESSMENT — MIFFLIN-ST. JEOR: SCORE: 1910.57

## 2019-10-06 ASSESSMENT — ENCOUNTER SYMPTOMS
SHORTNESS OF BREATH: 0
NAUSEA: 1
WHEEZING: 0
COUGH: 0
BACK PAIN: 1
ABDOMINAL PAIN: 1
VOMITING: 1

## 2019-10-06 NOTE — ED PROVIDER NOTES
[place] EMERGENCY DEPARTMENT ([facility])  10/06/19 ED 10  7:57 AM  History     Chief Complaint   Patient presents with     Chest Pain     The history is provided by the patient and medical records.     Telly Leal is a 39 year old male cystic fibrosis patient who presents to the ER with complaints of some left lower quadrant abdominal pain that he has had for the last several days.  Patient states that he has not had any fevers but has had some nausea and occasional emesis.  Patient denies any melena or bright blood per rectum and denies any UTI symptoms.  Patient states the pain stays pretty much in his left lower quadrant but does occasionally make his left testicle hurt and sometimes goes into his back.  Patient has no previous history of diverticulitis or nephrolithiasis and states that he has no acute pulmonary problems at this time.    I have reviewed the Medications, Allergies, Past Medical and Surgical History, and Social History in the [employer] system.  PAST MEDICAL HISTORY:   Past Medical History:   Diagnosis Date     Chronic sinusitis      Cystic fibrosis with pulmonary manifestations (H)      Exocrine pancreatic insufficiency      GERD (gastroesophageal reflux disease)      Hemoptysis      Malnutrition (H)        PAST SURGICAL HISTORY:   Past Surgical History:   Procedure Laterality Date     LOBECTOMY      right upper lobe     TUBES         FAMILY HISTORY:   Family History   Problem Relation Age of Onset     Diabetes Other         grandparent     Cancer Other         grandparent, skin, breast, prostate     Hypertension Mother      High cholesterol Other         grandparent     Depression Sister        SOCIAL HISTORY:   Social History     Tobacco Use     Smoking status: Never Smoker     Smokeless tobacco: Never Used   Substance Use Topics     Alcohol use: Not Currently     Alcohol/week: 0.0 standard drinks       Patient's Medications   Previous Medications    ACETYLCYSTEINE (MUCOMYST) 20 %  NEBULIZER SOLUTION    Inhale 2 mLs into the lungs 3 times daily    ALBUTEROL (2.5 MG/3ML) 0.083% NEB SOLUTION    Take 1 vial (2.5 mg) by nebulization 3 times daily    ALBUTEROL (PROAIR HFA) 108 (90 BASE) MCG/ACT INHALER    Inhale 1-2 puffs into the lungs every 6 hours as needed for shortness of breath / dyspnea Every 4-6 hours as needed    AZITHROMYCIN (ZITHROMAX) 250 MG TABLET    Take 1 tablet (250 mg) by mouth daily    AZTREONAM (AZACTAM) 200 MG/ML    Take 5 mLs (1 g) by nebulization 2 times daily    BLOOD GLUCOSE MONITORING (NO BRAND SPECIFIED) TEST STRIP    Use to test blood sugars 4-6 times daily or as directed.    CHOLECALCIFEROL 4000 UNITS CAPS    Take 1 capsule by mouth daily    COLISTIMETHATE/COLISTIN-BASE ACTIVITY (COLYMYCIN) 150 MG/2ML SOLR NEB SOLUTION    Take 150 mg by nebulization 2 times daily    CREON 40852-99251 UNITS CPEP PER EC CAPSULE    Take 5-6 capsules (120,000-144,000 Units) by mouth See Admin Instructions 5-6 with meals, 1-5 with snacks,7-8 with tube feedings    CROMOLYN (INTAL) 20 MG/2ML NEB SOLUTION    Take 2 mLs (20 mg) by nebulization 3 times daily 1 vial three times daily    FLUTICASONE (FLONASE) 50 MCG/ACT SPRAY    Spray 2 sprays into both nostrils as needed    LEVOFLOXACIN (LEVAQUIN) 750 MG TABLET    Take 1 tablet (750 mg) by mouth daily Hold azithromcyin while on levaquin    MULTIPLE VITAMIN (MULTIVITAMINS PO)    Take 1 tablet by mouth daily.    NUTRITIONAL SUPPLEMENTS (NUTREN 2.0) LIQD    Take 4-5 Cans by mouth. 168/ml/hr 7 days/week    OSELTAMIVIR (TAMIFLU) 75 MG CAPSULE    Take 1 capsule (75 mg) by mouth 2 times daily For 5 days    PHYTONADIONE (MEPHYTON) 5 MG TABLET    TAKE 1 TABLET (5 MG) BY MOUTH 3 TIMES A WEEK    RANITIDINE (ZANTAC) 150 MG TABLET    Take 1 tablet (150 mg) by mouth 2 times daily    TOBRAMYCIN, PF, (SOO) 300 MG/5ML NEB SOLUTION    Take 5 mLs (300 mg) by nebulization 2 times daily 28 days on 28 days off    WATER FOR INJECTION STERILE SOLN    Inject 4 mLs as  "directed 2 times daily        No Known Allergies     Review of Systems   Respiratory: Negative for cough, shortness of breath and wheezing.    Gastrointestinal: Positive for abdominal pain, nausea and vomiting.   Musculoskeletal: Positive for back pain.   All other systems reviewed and are negative.      Physical Exam   BP: 122/80  Pulse: 107  Heart Rate: 99  Temp: 98.1  F (36.7  C)  Resp: 16  Height: 190.5 cm (6' 3\")  Weight: 90 kg (198 lb 6.4 oz)  SpO2: 95 %      Physical Exam  Vitals signs and nursing note reviewed.   Constitutional:       Appearance: He is not ill-appearing.      Comments: Conversant pleasant   HENT:      Head: Atraumatic.   Eyes:      Extraocular Movements: Extraocular movements intact.      Pupils: Pupils are equal, round, and reactive to light.   Neck:      Musculoskeletal: Neck supple.   Cardiovascular:      Rate and Rhythm: Regular rhythm.      Heart sounds: Normal heart sounds.   Pulmonary:      Breath sounds: Normal breath sounds.   Chest:      Chest wall: No tenderness.   Abdominal:      Palpations: Abdomen is soft.      Comments: There is some mild tenderness in the left lower quadrant to deep palpation there is no CVA tenderness there is no rebound or guarding   Genitourinary:     Comments:  exam was within normal limits  Musculoskeletal:      Right lower leg: He exhibits no tenderness. No edema.      Left lower leg: He exhibits no tenderness. No edema.   Skin:     General: Skin is warm.      Findings: No rash.   Neurological:      General: No focal deficit present.      Mental Status: He is alert and oriented to person, place, and time.      Comments: Grossly intact and symmetric   Psychiatric:         Mood and Affect: Mood normal.         ED Course        Procedures        Results for orders placed or performed during the hospital encounter of 10/06/19   Abd/pelvis CT no contrast - Stone Protocol    Narrative    Exam: CT abdomen pelvis without contrast 10/6/2019 10:31 " AM.    History: Left flank pain, suspect renal calculus.    Comparison: None.    Technique: Helical CT images from the lung bases through the symphysis  pubis without intravenous contrast. Multiplanar reconstructions  obtained and reviewed.    Dose: 502 mGy*cm    Findings:     Abdomen/Pelvis:   Moderate fat stranding within the mid abdominal mesentery along the  course of the superior mesenteric artery and vein, extending into the  mesentery of the lower abdomen. Trace reactive free fluid. Multiple  mildly prominent mesenteric lymph nodes throughout the abdomen,  including for example an 8 mm node in the right lower quadrant (series  7, image 355). No abnormally dilated bowel. No pneumatosis, portal  venous gas, or pneumoperitoneum. Dilated appendix measuring up to 13  mm which contains dense, inspissated contents. No significant  periappendiceal fat stranding to suggest acute appendicitis.    Nonobstructing 5 mm left renal calculus. No right renal calculi. No  hydronephrosis or hydroureter. No ureteral or vesicular calculi  identified. Unremarkable appearance of the liver. Gallbladder is  surgically absent. Spleen is enlarged, measuring up to 15.2 cm.  Complete fatty replacement of the pancreas. Adrenal glands are normal.  Major abdominal vasculature is normal in caliber.    Lower chest:  Dependent atelectasis in the lung bases. Partially visualized  bronchiectasis, bronchial wall thickening, and scattered tree-in-bud  nodularity in the lungs. No focal consolidation. No pericardial  effusion.    Bones and soft tissues:  No acute or suspicious osseous abnormality. Pars interarticularis  defect on the right at L5. Mild degenerative changes in the spine.      Impression    Impression:  1. Moderate mesenteric fat stranding, greatest in the mid abdominal  mesentery with scattered prominent mesenteric lymph nodes and small  volume abdominal free fluid. Findings are nonspecific, however  suspicious for mesenteric  panniculitis.   2. Nonobstructing left nephrolithiasis.  3. Sequelae of cystic fibrosis including lipomatous hypertrophy of the  pancreas, pulmonary manifestations in the partially visualized chest,  and dense inspissated debris within a dilated, noninflamed appendix.  4. Splenomegaly.    I have personally reviewed the examination and initial interpretation  and I agree with the findings.    MAYRA JAMES MD   CBC with platelets differential   Result Value Ref Range    WBC 8.7 4.0 - 11.0 10e9/L    RBC Count 5.01 4.4 - 5.9 10e12/L    Hemoglobin 14.7 13.3 - 17.7 g/dL    Hematocrit 44.9 40.0 - 53.0 %    MCV 90 78 - 100 fl    MCH 29.3 26.5 - 33.0 pg    MCHC 32.7 31.5 - 36.5 g/dL    RDW 13.2 10.0 - 15.0 %    Platelet Count 256 150 - 450 10e9/L    Diff Method Manual Differential     % Neutrophils 64.0 %    % Lymphocytes 28.8 %    % Monocytes 6.3 %    % Eosinophils 0.9 %    % Basophils 0.0 %    Absolute Neutrophil 5.6 1.6 - 8.3 10e9/L    Absolute Lymphocytes 2.5 0.8 - 5.3 10e9/L    Absolute Monocytes 0.5 0.0 - 1.3 10e9/L    Absolute Eosinophils 0.1 0.0 - 0.7 10e9/L    Absolute Basophils 0.0 0.0 - 0.2 10e9/L    Poikilocytosis Slight     Reactive Lymphs Present     Platelet Estimate Confirming automated cell count    Comprehensive metabolic panel   Result Value Ref Range    Sodium 137 133 - 144 mmol/L    Potassium 4.1 3.4 - 5.3 mmol/L    Chloride 104 94 - 109 mmol/L    Carbon Dioxide 26 20 - 32 mmol/L    Anion Gap 7 3 - 14 mmol/L    Glucose 112 (H) 70 - 99 mg/dL    Urea Nitrogen 17 7 - 30 mg/dL    Creatinine 0.96 0.66 - 1.25 mg/dL    GFR Estimate >90 >60 mL/min/[1.73_m2]    GFR Estimate If Black >90 >60 mL/min/[1.73_m2]    Calcium 8.3 (L) 8.5 - 10.1 mg/dL    Bilirubin Total 1.0 0.2 - 1.3 mg/dL    Albumin 3.3 (L) 3.4 - 5.0 g/dL    Protein Total 7.3 6.8 - 8.8 g/dL    Alkaline Phosphatase 97 40 - 150 U/L     (H) 0 - 70 U/L    AST 70 (H) 0 - 45 U/L   UA with Microscopic reflex to Culture   Result Value Ref Range     Color Urine Yellow     Appearance Urine Clear     Glucose Urine Negative NEG^Negative mg/dL    Bilirubin Urine Negative NEG^Negative    Ketones Urine 5 (A) NEG^Negative mg/dL    Specific Gravity Urine 1.021 1.003 - 1.035    Blood Urine Negative NEG^Negative    pH Urine 7.5 (H) 5.0 - 7.0 pH    Protein Albumin Urine 10 (A) NEG^Negative mg/dL    Urobilinogen mg/dL 2.0 0.0 - 2.0 mg/dL    Nitrite Urine Negative NEG^Negative    Leukocyte Esterase Urine Negative NEG^Negative    Source Midstream Urine     WBC Urine <1 0 - 5 /HPF    RBC Urine 1 0 - 2 /HPF    Mucous Urine Present (A) NEG^Negative /LPF   EKG 12 lead   Result Value Ref Range    Interpretation ECG Click View Image link to view waveform and result      Medications   0.9% sodium chloride BOLUS (0 mLs Intravenous Stopped 10/6/19 0383)         Assessments & Plan (with Medical Decision Making)     I have reviewed the nursing notes.    Abdominal CT was done to rule out nephrolithiasis and this revealed fat stranding with probable mesenteric inflammation and no true diverticulitis and no true appendicitis.  Because of the dilated appendix, surgery was consulted and saw the patient.  They felt no surgical intervention was necessary at this time.    I have reviewed the findings, diagnosis, plan and need for follow up with the patient.     START taking      Dose / Directions   metroNIDAZOLE 500 MG tablet  Commonly known as:  FLAGYL      Dose:  500 mg  Take 1 tablet (500 mg) by mouth 2 times daily for 7 days  Quantity:  14 tablet  Refills:  0     senna 8.6 MG tablet  Commonly known as:  SENOKOT      Dose:  1 tablet  Take 1 tablet by mouth 2 times daily for 10 days  Quantity:  20 tablet  Refills:  0     traMADol 50 MG tablet  Commonly known as:  ULTRAM      Dose:  50 mg  Take 1 tablet (50 mg) by mouth every 6 hours as needed for pain  Quantity:  10 tablet  Refills:  0        CONTINUE these medicines which have NOT CHANGED      Dose / Directions   levofloxacin 750 MG  tablet  Commonly known as:  LEVAQUIN  Used for:  Cystic fibrosis with pulmonary manifestations (H)      Dose:  750 mg  Take 1 tablet (750 mg) by mouth daily for 10 days Hold azithromcyin while on levaquin  Quantity:  10 tablet  Refills:  0           Where to get your medicines      Some of these will need a paper prescription and others can be bought over the counter. Ask your nurse if you have questions.    Bring a paper prescription for each of these medications    levofloxacin 750 MG tablet    metroNIDAZOLE 500 MG tablet    senna 8.6 MG tablet    traMADol 50 MG tablet         Final diagnoses:   Mesenteric panniculitis (H)     Keep hydrated    Please make an appointment to follow up with Your Primary Care Provider in 4-5 days for recheck.    Return to the ER for worsening.    Tolu Cramer MD, MD    10/6/2019   [facility], [place], EMERGENCY DEPARTMENT     Tolu Cramer MD  10/06/19 3689

## 2019-10-06 NOTE — DISCHARGE INSTRUCTIONS
Keep hydrated    Please make an appointment to follow up with Your Primary Care Provider in 4-5 days for recheck.    Return to the ER for worsening.

## 2019-10-06 NOTE — CONSULTS
"    General Surgery Consultation    Telly Leal  MRN#: [id number]    Date of Admission:  10/6/2019    Date of Consult: 10/6/2019    Reason for consult: LLQ Abdominal pain       Requesting service: Emergency Dept       Requesting provider: Dr. Cramer                   Assessment and Plan:   Assessment:   Telly Leal is a 39 year old male with a h/o CF c/b pancreatic insufficiency and sinus disease who presents with 2 weeks of general malaise and 1 week of LLQ abdominal pain. CT with noted nonobstructing left nephrolithiasis and moderate mesenteric fat stranding suspicious for mesenteric panniculitis, and dense inspissated debris within a dilated, noninflamed appendix.        Plan:   - no acute surgical intervention indicated  - may consider CTA for further evaluation of mesenteric panniculitis    Discussed with Chief, Dr. Cruz and Staff, Dr. Galloway.    Isela Jernigan, DO  General Surgery, PGY2  [place] Pager: [phone]              Chief Complaint:   Abdominal pain         History of Present Illness:   Telly Leal is a 39 year old male with a h/o CF c/b pancreatic insufficiency and sinus disease who presents with 2 weeks of general malaise and 1 week of LLQ abdominal pain.    Patient endorses 2 weeks of general malaise with sinus congestion, chronic cough, headache, chills, nausea, and decreased appetite. In the last week noted focal LLQ pain with waves of deep pain \"like being kicked in the nuts\". Endorses a constant 3/10 pain then with waves of increased pain up to 7/10. No specific triggers, can occur at any time of day or night. Does not seem to be exacerbated with movement or with meals. Has had decreased oral intake but still tolerating his tube feeds. Does endorse an episode of emesis this morning after taking his antibiotic on an empty stomach. Has continued to have regular BMs daily. Nonbloody, well-formed BMs, possible decreased volume. Still passing flatus. Denies abdominal " bloating.    ED workup with labs showing no leukocytosis, however mildly increased LFTs. CT with noted nonobstructing left nephrolithiasis and moderate mesenteric fat stranding suspicious for mesenteric panniculitis, and dense inspissated debris within a dilated, noninflamed appendix              Past Medical History:     Past Medical History:   Diagnosis Date     Chronic sinusitis      Cystic fibrosis with pulmonary manifestations (H)      Exocrine pancreatic insufficiency      GERD (gastroesophageal reflux disease)      Hemoptysis      Malnutrition (H)              Past Surgical History:     Past Surgical History:   Procedure Laterality Date     LOBECTOMY      right upper lobe     TUBES               Social History:       Social History     Tobacco Use     Smoking status: Never Smoker     Smokeless tobacco: Never Used   Substance Use Topics     Alcohol use: Not Currently     Alcohol/week: 0.0 standard drinks             Family History:     Family History   Problem Relation Age of Onset     Diabetes Other         grandparent     Cancer Other         grandparent, skin, breast, prostate     Hypertension Mother      High cholesterol Other         grandparent     Depression Sister                 Allergies:   No Known Allergies          Medications:     No current facility-administered medications on file prior to encounter.   acetylcysteine (MUCOMYST) 20 % nebulizer solution, Inhale 2 mLs into the lungs 3 times daily  albuterol (2.5 MG/3ML) 0.083% neb solution, Take 1 vial (2.5 mg) by nebulization 3 times daily  albuterol (PROAIR HFA) 108 (90 BASE) MCG/ACT Inhaler, Inhale 1-2 puffs into the lungs every 6 hours as needed for shortness of breath / dyspnea Every 4-6 hours as needed  azithromycin (ZITHROMAX) 250 MG tablet, Take 1 tablet (250 mg) by mouth daily  aztreonam (AZACTAM) 200 MG/ML, Take 5 mLs (1 g) by nebulization 2 times daily (Patient not taking: Reported on 9/24/2018)  blood glucose monitoring (NO BRAND  SPECIFIED) test strip, Use to test blood sugars 4-6 times daily or as directed.  Cholecalciferol 4000 UNITS CAPS, Take 1 capsule by mouth daily  colistimethate/colistin-base activity (COLYMYCIN) 150 mg/2mL SOLR neb solution, Take 150 mg by nebulization 2 times daily  CREON 93700-56851 units CPEP per EC capsule, Take 5-6 capsules (120,000-144,000 Units) by mouth See Admin Instructions 5-6 with meals, 1-5 with snacks,7-8 with tube feedings  cromolyn (INTAL) 20 MG/2ML neb solution, Take 2 mLs (20 mg) by nebulization 3 times daily 1 vial three times daily  fluticasone (FLONASE) 50 MCG/ACT spray, Spray 2 sprays into both nostrils as needed  levofloxacin (LEVAQUIN) 750 MG tablet, Take 1 tablet (750 mg) by mouth daily Hold azithromcyin while on levaquin  Multiple Vitamin (MULTIVITAMINS PO), Take 1 tablet by mouth daily.  Nutritional Supplements (NUTREN 2.0) LIQD, Take 4-5 Cans by mouth. 168/ml/hr 7 days/week  oseltamivir (TAMIFLU) 75 MG capsule, Take 1 capsule (75 mg) by mouth 2 times daily For 5 days (Patient not taking: Reported on 4/22/2019)  phytonadione (MEPHYTON) 5 MG tablet, TAKE 1 TABLET (5 MG) BY MOUTH 3 TIMES A WEEK  ranitidine (ZANTAC) 150 MG tablet, Take 1 tablet (150 mg) by mouth 2 times daily  tobramycin, PF, (SOO) 300 MG/5ML neb solution, Take 5 mLs (300 mg) by nebulization 2 times daily 28 days on 28 days off  water for injection sterile SOLN, Inject 4 mLs as directed 2 times daily              Review of Systems:   10-point ROS otherwise negative except as noted above.          Physical Exam:     Temp:  [98.1  F (36.7  C)] 98.1  F (36.7  C)  Pulse:  [] 105  Heart Rate:  [] 105  Resp:  [16-28] 19  BP: (119-133)/(79-93) 133/92  SpO2:  [93 %-98 %] 95 %     General: AAOx4, NAD, lying comfortably in bed  CV: regular rate and rhythm, warm, well-perfused  Pulm: no dyspnea, breathing comfortably on RA  Abd: soft, non-distended, mild LLQ tenderness, no rebound, no guarding, no peritoneal  signs  Extremities: no edema  Neuro: moving all extremities spontaneously without apparent deficit    No intake/output data recorded.          Data:   Labs:  Arterial Blood Gases   No lab results found in last 7 days.     Complete Blood Count   Recent Labs   Lab 10/06/19  0817   WBC 8.7   HGB 14.7          Basic Metabolic Panel  Recent Labs   Lab 10/06/19  0817      POTASSIUM 4.1   CHLORIDE 104   CO2 26   BUN 17   CR 0.96   *   ADDY 8.3*       Liver Function Tests  Recent Labs   Lab 10/06/19  0817   AST 70*   *   ALKPHOS 97   BILITOTAL 1.0   ALBUMIN 3.3*       Pancreatic Enzymes  No lab results found in last 7 days.    Coagulation Profile  No lab results found in last 7 days.    Lactate  No lab results found in last 7 days.    Imaging:   Results for orders placed or performed during the hospital encounter of 10/06/19   Abd/pelvis CT no contrast - Stone Protocol    Narrative    Exam: CT abdomen pelvis without contrast 10/6/2019 10:31 AM.    History: Left flank pain, suspect renal calculus.    Comparison: None.    Technique: Helical CT images from the lung bases through the symphysis  pubis without intravenous contrast. Multiplanar reconstructions  obtained and reviewed.    Dose: 502 mGy*cm    Findings:     Abdomen/Pelvis:   Moderate fat stranding within the mid abdominal mesentery along the  course of the superior mesenteric artery and vein, extending into the  mesentery of the lower abdomen. Trace reactive free fluid. Multiple  mildly prominent mesenteric lymph nodes throughout the abdomen,  including for example an 8 mm node in the right lower quadrant (series  7, image 355). No abnormally dilated bowel. No pneumatosis, portal  venous gas, or pneumoperitoneum. Dilated appendix measuring up to 13  mm which contains dense, inspissated contents. No significant  periappendiceal fat stranding to suggest acute appendicitis.    Nonobstructing 5 mm left renal calculus. No right renal calculi.  No  hydronephrosis or hydroureter. No ureteral or vesicular calculi  identified. Unremarkable appearance of the liver. Gallbladder is  surgically absent. Spleen is enlarged, measuring up to 15.2 cm.  Complete fatty replacement of the pancreas. Adrenal glands are normal.  Major abdominal vasculature is normal in caliber.    Lower chest:  Dependent atelectasis in the lung bases. Partially visualized  bronchiectasis, bronchial wall thickening, and scattered tree-in-bud  nodularity in the lungs. No focal consolidation. No pericardial  effusion.    Bones and soft tissues:  No acute or suspicious osseous abnormality. Pars interarticularis  defect on the right at L5. Mild degenerative changes in the spine.      Impression    Impression:  1. Moderate mesenteric fat stranding, greatest in the mid abdominal  mesentery with scattered prominent mesenteric lymph nodes and small  volume abdominal free fluid. Findings are nonspecific, however  suspicious for mesenteric panniculitis.   2. Nonobstructing left nephrolithiasis.  3. Sequelae of cystic fibrosis including lipomatous hypertrophy of the  pancreas, pulmonary manifestations in the partially visualized chest,  and dense inspissated debris within a dilated, noninflamed appendix.  4. Splenomegaly.    I have personally reviewed the examination and initial interpretation  and I agree with the findings.    MAYRA JAMES MD

## 2019-10-06 NOTE — ED AVS SNAPSHOT
[facility], [place], Emergency Department  [address] [place] [id number]  Phone:  [phone]                                    Telly Leal   MRN: [id number]    Department:  [facility], Emergency Department   Date of Visit:  10/6/2019           After Visit Summary Signature Page    I have received my discharge instructions, and my questions have been answered. I have discussed any challenges I see with this plan with the nurse or doctor.    ..........................................................................................................................................  Patient/Patient Representative Signature      ..........................................................................................................................................  Patient Representative Print Name and Relationship to Patient    ..................................................               ................................................  Date                                   Time    ..........................................................................................................................................  Reviewed by Signature/Title    ...................................................              ..............................................  Date                                               Time          22EPIC Rev 08/18

## 2019-10-11 ENCOUNTER — OFFICE VISIT (OUTPATIENT)
Dept: PULMONOLOGY | Facility: CLINIC | Age: 39
End: 2019-10-11
Attending: INTERNAL MEDICINE
Payer: COMMERCIAL

## 2019-10-11 VITALS
BODY MASS INDEX: 24.29 KG/M2 | DIASTOLIC BLOOD PRESSURE: 76 MMHG | WEIGHT: 195.4 LBS | SYSTOLIC BLOOD PRESSURE: 121 MMHG | HEART RATE: 114 BPM | OXYGEN SATURATION: 95 % | HEIGHT: 75 IN

## 2019-10-11 DIAGNOSIS — K59.00 CONSTIPATION, UNSPECIFIED CONSTIPATION TYPE: ICD-10-CM

## 2019-10-11 DIAGNOSIS — R93.5 ABNORMAL ABDOMINAL CT SCAN: Primary | ICD-10-CM

## 2019-10-11 PROCEDURE — G0463 HOSPITAL OUTPT CLINIC VISIT: HCPCS | Mod: ZF

## 2019-10-11 ASSESSMENT — MIFFLIN-ST. JEOR: SCORE: 1896.96

## 2019-10-11 ASSESSMENT — PAIN SCALES - GENERAL: PAINLEVEL: NO PAIN (0)

## 2019-10-11 NOTE — NURSING NOTE
Chief Complaint   Patient presents with     New Patient     CF     Vitals were taken and medications were reconciled.     CHUCK Cagle

## 2019-10-11 NOTE — PROGRESS NOTES
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 Recent progressive severe lower abd pain assoc w/ subj F/C/NS, seen in ED on 10/6. Labs largely unrevealing (though very mild LFT elevations, intermittently seen since 2008), non-contrast CT scan w/ findings suggestive of \"renae mesentery\" and dilated non-inflamed appendix. Surgical consult obtained, advised contrast imaging and supportive care. Was already on early portion of 21-day course of levofloxacin for suspected URI, started on Flagyl by ED for 10-day course (currently on day #5). Also started on Senna for possible constipation, reported improved abd pain/distention and appetite after more evacuative BMs after starting laxative. Continues to feel better at this time. Denies any tenesmus or insecurity passing flatus, no fecal incontinence or new perianal/nocturnal sxs noted. Denies any overt obstructive sxs at this time, +ongoing passage of stool/flatus. Denies any N/V/F/C/HA/NS or other const/syst/cardiopulmonary sxs, no BRB/melena in stool or overt urinary changes, no unintentional wt loss or appetite/satiety changes. No other bowel/bladder habit changes, no dysphagia/odynophagia. No jaundice/icterus/pruritus, no acholic stools/steatorrhea, no new lumps/bumps, no jt pain/oral ulcer/rash/eye sxs noted.     ROS: 10pt ROS performed and otherwise negative.    PERTINENT PAST MEDICAL HISTORY:  As noted above.    PREVIOUS ABDOMINAL/GYNECOLOGIC SURGERIES:  As noted above.    PREVIOUS " ENDOSCOPY:  None.    PERTINENT MEDICATIONS:  - Anticoagulation/Antiplatelet Agents: none.  Medications reviewed with patient today, see Medication List/Assessment for details.  No other NSAID/anticoagulation reported by patient.  No other OTC/herbal/supplements reported by patient.    SOCIAL HISTORY:  Denies any tob/rd/ivda, rare social etoh. Works as a  for the [employer] (40hrs/wk).    FAMILY HISTORY:  MGGM w/ CRC, Mother w/ breast CA. No panc/esophageal/other GI CA, no other Campos or other HPS-related Og. No IBD/celiac, no other AI/liver/thyroid disease. No known FH bleeding/clotting disorders.    PHYSICAL EXAMINATION:  Vitals reviewed, AFVSS  Wt 195# today (stable)  Gen: aaox3, cooperative, pleasant, not diaphoretic, nad  HEENT: ncat, neck supple, no clad/sclad, normal op w/o ulcer/exudate, anicteric, mmm  Mallampati Score 1  Resp/CV without acute findings, not dyspneic/tachycardic  Abd: +nabs, soft, nt, nd, no peritoneal s/s noted. No ecchymoses, +lap surgical scars, +LUQ GJ TAINA-KEY tube in place and c/d/i, +old R lateral chest wall defect (prior chest tube for PTX).  Ext: no c/c/e  Skin: warm, perfused, no jaundice  Neuro: grossly intact, no asterixis noted    PERTINENT STUDIES:  Non-contrast CT a/p 10/6/19 (images reviewed personally today)  Moderate fat stranding within the mid abdominal mesentery along the  course of the superior mesenteric artery and vein, extending into the  mesentery of the lower abdomen. Trace reactive free fluid. Multiple  mildly prominent mesenteric lymph nodes throughout the abdomen,  including for example an 8 mm node in the right lower quadrant (series  7, image 355). No abnormally dilated bowel. No pneumatosis, portal  venous gas, or pneumoperitoneum. Dilated appendix measuring up to 13  mm which contains dense, inspissated contents. No significant  periappendiceal fat stranding to suggest acute appendicitis.     Nonobstructing 5 mm left renal calculus.  "No right renal calculi. No  hydronephrosis or hydroureter. No ureteral or vesicular calculi  identified. Unremarkable appearance of the liver. Gallbladder is  surgically absent. Spleen is enlarged, measuring up to 15.2 cm.  Complete fatty replacement of the pancreas. Adrenal glands are normal.  Major abdominal vasculature is normal in caliber.     Lower chest:  Dependent atelectasis in the lung bases. Partially visualized  bronchiectasis, bronchial wall thickening, and scattered tree-in-bud  nodularity in the lungs. No focal consolidation. No pericardial  effusion.     Bones and soft tissues:  No acute or suspicious osseous abnormality. Pars interarticularis  defect on the right at L5. Mild degenerative changes in the spine.                                                                      Impression:  1. Moderate mesenteric fat stranding, greatest in the mid abdominal  mesentery with scattered prominent mesenteric lymph nodes and small  volume abdominal free fluid. Findings are nonspecific, however  suspicious for mesenteric panniculitis.   2. Nonobstructing left nephrolithiasis.  3. Sequelae of cystic fibrosis including lipomatous hypertrophy of the  pancreas, pulmonary manifestations in the partially visualized chest,  and dense inspissated debris within a dilated, noninflamed appendix.  4. Splenomegaly.    ASSESSMENT/PLAN:    1. Recent ED visit for worsening abdominal pain, particularly given the abnormal CT findings with a \"renae mesentery\" and a mildly distended appendix (without other surrounding bowel/appendiceal inflammation on a non-IV contrast study) - discussed next steps in evaluation and management together today  1. It was wonderful getting a chance to meet with you to discuss your recent ED visit for worsening abdominal pain, particularly given the abnormal CT findings with a \"renae mesentery\" and a mildly distended appendix (without other surrounding bowel/appendiceal inflammation on a non-IV " "contrast study) - discussed next steps in evaluation and management together today.  - Discussed the appendiceal distention which is common in CF, usually related to inspissated/concreted mucus or stool (or more rarely, related to a mucocele). Would reassess this on repeat contrast CT imaging in the near-future, discussed potential considerations/yield of diagnostic colonoscopy as well.  - Discussed the mild colonic stool burden which could signify development of Constipation in setting of CF, symptomatically improved in the last few days with institution of a stimulant laxative. Recommend discontinuing Senna for now, and trial of daily Miralax instead (titrated up or down based on response).  - Complete the course of Levofloxacin and Flagyl as ordered for now, review with your CF team on Monday.    2. Discussed the \"renae mesentery\" finding, only very rarely associated with a specific rare autoimmune syndrome (sclerosing mesenteritis).  - Recommend obtaining a follow-up CT Angiogram of the abdomen/pelvis (IV contrast study) to assess for vascular/bowel wall abnormalities and persistent or progressive mesenteric involvement.  - If found again on follow-up CT, recommend obtaining IgG subclasses + Contrast Liver MRI/MRCP to evaluate for possible IgG4-related disease.  - May also need to readdress role for diagnostic EGD + Colonoscopy if mesenteric findings persist as well (particularly if progressive mesenteric LAD, consideration for surgical biopsy if so?).  - Recommend keeping a food diary for the next few days, review with CF Nutrition given significant daily PERT intake (risk for Fibrosing Colonopathy if PERT overuse for fat intake?).    3. If your reflux becomes more problematic, could consider trial of low-dose daily PPI or transition to alternative H2RA medication (famotidine, rather than ranitidine).    4. Did note the very mild nonspecific AST/ALT elevation on your ED studies recently (very intermittently " noted since at least 2008 on review of your prior LFTs).  - If this persists/worsens, may need to consider role for Contrast Liver MRI/MRCP to characterize this further in the post-cholecystectomy setting. Please have your LFTs rechecked at your upcoming CF visit.    5. Continue to monitor for the danger signs/symptoms we reviewed together today:  worsening abdominal pain, worsening diarrhea, bowel/bladder obstructive symptoms (nausea/vomiting, abdominal distention, difficulty passing stool/flatus/urine), blood mixed into stools, persistent fevers/chills, progressive anemia (particularly with iron deficiency), difficulty with swallowing, perianal/rectal discomfort (particularly with defecation), unexpected weight loss, etc.  - Contact us via MyChart or phone should these symptoms occur, particularly as we may advise further evaluation accordingly.    2. Cancer Screening  No known high-risk FH CRC (MGGM), will readdress once current diagnostic evaluation is completed.    RTC with CF team as scheduled. Discussed my imminent transition from [facility] today.     Thank you for this consultation. It was a pleasure to participate in the care of this patient; please contact us with any further questions.    Fabio Holt MD   of Medicine  [facility] - Department of Medicine  Division of Gastroenterology

## 2019-10-11 NOTE — PATIENT INSTRUCTIONS
"I've included a brief summary of our discussion and care plan from today's visit below.  Please review this information with your primary care provider.  _______________________________________________________________________      1. It was wonderful getting a chance to meet with you to discuss your recent ED visit for worsening abdominal pain, particularly given the abnormal CT findings with a \"renae mesentery\" and a mildly distended appendix (without other surrounding bowel/appendiceal inflammation on a non-IV contrast study) - discussed next steps in evaluation and management together today.  - Discussed the appendiceal distention which is common in CF, usually related to inspissated/concreted mucus or stool (or more rarely, related to a mucocele). Would reassess this on repeat contrast CT imaging in the near-future, discussed potential considerations/yield of diagnostic colonoscopy as well.  - Discussed the mild colonic stool burden which could signify development of Constipation in setting of CF, symptomatically improved in the last few days with institution of a stimulant laxative. Recommend discontinuing Senna for now, and trial of daily Miralax instead (titrated up or down based on response).  - Complete the course of Levofloxacin and Flagyl as ordered for now, review with your CF team on Monday.    2. Discussed the \"renae mesentery\" finding, only very rarely associated with a specific rare autoimmune syndrome (sclerosing mesenteritis).  - Recommend obtaining a follow-up CT Angiogram of the abdomen/pelvis (IV contrast study) to assess for vascular/bowel wall abnormalities and persistent or progressive mesenteric involvement.  - If found again on follow-up CT, recommend obtaining IgG subclasses + Contrast Liver MRI/MRCP to evaluate for possible IgG4-related disease.    3. If your reflux becomes more problematic, could consider trial of low-dose daily PPI or transition to alternative H2RA medication " (famotidine, rather than ranitidine).    4. Did note the very mild nonspecific AST/ALT elevation on your ED studies recently (very intermittently noted since at least 2008 on review of your prior LFTs).  - If this persists/worsens, may need to consider role for Contrast Liver MRI/MRCP to characterize this further in the post-cholecystectomy setting. Please have your LFTs rechecked at your upcoming CF visit.    5. Continue to monitor for the danger signs/symptoms we reviewed together today:  worsening abdominal pain, worsening diarrhea, bowel/bladder obstructive symptoms (nausea/vomiting, abdominal distention, difficulty passing stool/flatus/urine), blood mixed into stools, persistent fevers/chills, progressive anemia (particularly with iron deficiency), difficulty with swallowing, perianal/rectal discomfort (particularly with defecation), unexpected weight loss, etc.  - Contact us via [employer]isc[employer] or phone should these symptoms occur, particularly as we may advise further evaluation accordingly.    6. Return to CF Clinic as scheduled. Discussed my imminent transition from the [facility] today.  - If you are unable to schedule this follow-up appointment today, please contact our  at [phone] within the next week to help set up this necessary appointment.    _______________________________________________________________________    It was a pleasure seeing you in clinic today - please be in touch if there are any further questions that arise following today's visit.  During business hours, you may reach Clinic Nurse Triage Line at [phone].  For urgent/emergent questions after business hours, you may reach the on-call GI Fellow by contacting the [facility]  at [phone].    Any benign/non-urgent test results are usually communicated via letter or Periscapehart message within 1-2 weeks after completion.  Urgent results (those that require a change in the previously-discussed  care plan) are usually communicated via a phone call once available from our clinic staff to discuss the results and the next steps in your evaluation.    I recommend signing up for [employer] access if you have not already done so and are comfortable with using a computer.  This allows for online access to your lab results and also helps you communicate efficiently with my clinic should any questions arise in your care.    We have Financial Counseling services available through our clinic.  If you have questions about your insurance coverage or payment responsibilities, particularly before you undergo any tests or procedures, please let us know and we can arrange a consultation accordingly to help you make informed decisions about your healthcare.    Sincerely,    Fabio Holt MD    [facility] - Department of Medicine  Division of Gastroenterology

## 2019-10-11 NOTE — LETTER
"10/11/2019       RE: Telly Leal  [address] [address]     Dear Colleague,    Thank you for referring your patient, Telly Leal, to the [facility] at [facility]. Please see a copy of my visit note below.    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 Recent progressive severe lower abd pain assoc w/ subj F/C/NS, seen in ED on 10/6. Labs largely unrevealing (though very mild LFT elevations, intermittently seen since 2008), non-contrast CT scan w/ findings suggestive of \"renae mesentery\" and dilated non-inflamed appendix. Surgical consult obtained, advised contrast imaging and supportive care. Was already on early portion of 21-day course of levofloxacin for suspected URI, started on Flagyl by ED for 10-day course (currently on day #5). Also started on Senna for possible constipation, reported improved abd pain/distention and appetite after more evacuative BMs after starting laxative. Continues to feel better at this time. Denies any tenesmus or insecurity passing flatus, no fecal incontinence or new perianal/nocturnal sxs noted. Denies any overt obstructive sxs at this time, +ongoing passage of stool/flatus. Denies any N/V/F/C/HA/NS or other const/syst/cardiopulmonary sxs, no BRB/melena in stool or overt urinary changes, no unintentional wt loss or appetite/satiety changes. No other bowel/bladder habit changes, no dysphagia/odynophagia. No " jaundice/icterus/pruritus, no acholic stools/steatorrhea, no new lumps/bumps, no jt pain/oral ulcer/rash/eye sxs noted.     ROS: 10pt ROS performed and otherwise negative.    PERTINENT PAST MEDICAL HISTORY:  As noted above.    PREVIOUS ABDOMINAL/GYNECOLOGIC SURGERIES:  As noted above.    PREVIOUS ENDOSCOPY:  None.    PERTINENT MEDICATIONS:  - Anticoagulation/Antiplatelet Agents: none.  Medications reviewed with patient today, see Medication List/Assessment for details.  No other NSAID/anticoagulation reported by patient.  No other OTC/herbal/supplements reported by patient.    SOCIAL HISTORY:  Denies any tob/rd/ivda, rare social etoh. Works as a  for the Dept [employer] (40hrs/wk).    FAMILY HISTORY:  MGGM w/ CRC, Mother w/ breast CA. No panc/esophageal/other GI CA, no other Campos or other HPS-related Og. No IBD/celiac, no other AI/liver/thyroid disease. No known FH bleeding/clotting disorders.    PHYSICAL EXAMINATION:  Vitals reviewed, AFVSS  Wt 195# today (stable)  Gen: aaox3, cooperative, pleasant, not diaphoretic, nad  HEENT: ncat, neck supple, no clad/sclad, normal op w/o ulcer/exudate, anicteric, mmm  Mallampati Score 1  Resp/CV without acute findings, not dyspneic/tachycardic  Abd: +nabs, soft, nt, nd, no peritoneal s/s noted. No ecchymoses, +lap surgical scars, +LUQ GJ TAINA-KEY tube in place and c/d/i, +old R lateral chest wall defect (prior chest tube for PTX).  Ext: no c/c/e  Skin: warm, perfused, no jaundice  Neuro: grossly intact, no asterixis noted    PERTINENT STUDIES:  Non-contrast CT a/p 10/6/19 (images reviewed personally today)  Moderate fat stranding within the mid abdominal mesentery along the  course of the superior mesenteric artery and vein, extending into the  mesentery of the lower abdomen. Trace reactive free fluid. Multiple  mildly prominent mesenteric lymph nodes throughout the abdomen,  including for example an 8 mm node in the right lower quadrant (series  7, image  "355). No abnormally dilated bowel. No pneumatosis, portal  venous gas, or pneumoperitoneum. Dilated appendix measuring up to 13  mm which contains dense, inspissated contents. No significant  periappendiceal fat stranding to suggest acute appendicitis.     Nonobstructing 5 mm left renal calculus. No right renal calculi. No  hydronephrosis or hydroureter. No ureteral or vesicular calculi  identified. Unremarkable appearance of the liver. Gallbladder is  surgically absent. Spleen is enlarged, measuring up to 15.2 cm.  Complete fatty replacement of the pancreas. Adrenal glands are normal.  Major abdominal vasculature is normal in caliber.     Lower chest:  Dependent atelectasis in the lung bases. Partially visualized  bronchiectasis, bronchial wall thickening, and scattered tree-in-bud  nodularity in the lungs. No focal consolidation. No pericardial  effusion.     Bones and soft tissues:  No acute or suspicious osseous abnormality. Pars interarticularis  defect on the right at L5. Mild degenerative changes in the spine.                                                                      Impression:  1. Moderate mesenteric fat stranding, greatest in the mid abdominal  mesentery with scattered prominent mesenteric lymph nodes and small  volume abdominal free fluid. Findings are nonspecific, however  suspicious for mesenteric panniculitis.   2. Nonobstructing left nephrolithiasis.  3. Sequelae of cystic fibrosis including lipomatous hypertrophy of the  pancreas, pulmonary manifestations in the partially visualized chest,  and dense inspissated debris within a dilated, noninflamed appendix.  4. Splenomegaly.    ASSESSMENT/PLAN:    1. Recent ED visit for worsening abdominal pain, particularly given the abnormal CT findings with a \"renae mesentery\" and a mildly distended appendix (without other surrounding bowel/appendiceal inflammation on a non-IV contrast study) - discussed next steps in evaluation and management together " "today  1. It was wonderful getting a chance to meet with you to discuss your recent ED visit for worsening abdominal pain, particularly given the abnormal CT findings with a \"renae mesentery\" and a mildly distended appendix (without other surrounding bowel/appendiceal inflammation on a non-IV contrast study) - discussed next steps in evaluation and management together today.  - Discussed the appendiceal distention which is common in CF, usually related to inspissated/concreted mucus or stool (or more rarely, related to a mucocele). Would reassess this on repeat contrast CT imaging in the near-future, discussed potential considerations/yield of diagnostic colonoscopy as well.  - Discussed the mild colonic stool burden which could signify development of Constipation in setting of CF, symptomatically improved in the last few days with institution of a stimulant laxative. Recommend discontinuing Senna for now, and trial of daily Miralax instead (titrated up or down based on response).  - Complete the course of Levofloxacin and Flagyl as ordered for now, review with your CF team on Monday.    2. Discussed the \"renae mesentery\" finding, only very rarely associated with a specific rare autoimmune syndrome (sclerosing mesenteritis).  - Recommend obtaining a follow-up CT Angiogram of the abdomen/pelvis (IV contrast study) to assess for vascular/bowel wall abnormalities and persistent or progressive mesenteric involvement.  - If found again on follow-up CT, recommend obtaining IgG subclasses + Contrast Liver MRI/MRCP to evaluate for possible IgG4-related disease.  - May also need to readdress role for diagnostic EGD + Colonoscopy if mesenteric findings persist as well (particularly if progressive mesenteric LAD, consideration for surgical biopsy if so?).  - Recommend keeping a food diary for the next few days, review with CF Nutrition given significant daily PERT intake (risk for Fibrosing Colonopathy if PERT overuse for fat " intake?).    3. If your reflux becomes more problematic, could consider trial of low-dose daily PPI or transition to alternative H2RA medication (famotidine, rather than ranitidine).    4. Did note the very mild nonspecific AST/ALT elevation on your ED studies recently (very intermittently noted since at least 2008 on review of your prior LFTs).  - If this persists/worsens, may need to consider role for Contrast Liver MRI/MRCP to characterize this further in the post-cholecystectomy setting. Please have your LFTs rechecked at your upcoming CF visit.    5. Continue to monitor for the danger signs/symptoms we reviewed together today:  worsening abdominal pain, worsening diarrhea, bowel/bladder obstructive symptoms (nausea/vomiting, abdominal distention, difficulty passing stool/flatus/urine), blood mixed into stools, persistent fevers/chills, progressive anemia (particularly with iron deficiency), difficulty with swallowing, perianal/rectal discomfort (particularly with defecation), unexpected weight loss, etc.  - Contact us via MyChart or phone should these symptoms occur, particularly as we may advise further evaluation accordingly.    2. Cancer Screening  No known high-risk FH CRC (MGGM), will readdress once current diagnostic evaluation is completed.    RTC with CF team as scheduled. Discussed my imminent transition from [facility] today.     Thank you for this consultation. It was a pleasure to participate in the care of this patient; please contact us with any further questions.    Fabio Holt MD   of Medicine  [facility] - Department of Medicine  Division of Gastroenterology

## 2019-10-14 ENCOUNTER — OFFICE VISIT (OUTPATIENT)
Dept: PULMONOLOGY | Facility: CLINIC | Age: 39
End: 2019-10-14
Attending: INTERNAL MEDICINE
Payer: COMMERCIAL

## 2019-10-14 ENCOUNTER — ALLIED HEALTH/NURSE VISIT (OUTPATIENT)
Dept: CARE COORDINATION | Facility: CLINIC | Age: 39
End: 2019-10-14

## 2019-10-14 VITALS
WEIGHT: 193 LBS | HEIGHT: 75 IN | OXYGEN SATURATION: 95 % | DIASTOLIC BLOOD PRESSURE: 76 MMHG | RESPIRATION RATE: 17 BRPM | BODY MASS INDEX: 24 KG/M2 | HEART RATE: 118 BPM | SYSTOLIC BLOOD PRESSURE: 118 MMHG

## 2019-10-14 DIAGNOSIS — E84.0 CYSTIC FIBROSIS WITH PULMONARY MANIFESTATIONS (H): Primary | ICD-10-CM

## 2019-10-14 DIAGNOSIS — E84.0 CYSTIC FIBROSIS WITH PULMONARY MANIFESTATIONS (H): ICD-10-CM

## 2019-10-14 DIAGNOSIS — Z13.9 RISK AND FUNCTIONAL ASSESSMENT: Primary | ICD-10-CM

## 2019-10-14 LAB
EXPTIME-PRE: 12.89 SEC
FEF2575-%PRED-PRE: 23 %
FEF2575-PRE: 1.11 L/SEC
FEF2575-PRED: 4.74 L/SEC
FEFMAX-%PRED-PRE: 77 %
FEFMAX-PRE: 8.75 L/SEC
FEFMAX-PRED: 11.31 L/SEC
FEV1-%PRED-PRE: 58 %
FEV1-PRE: 2.92 L
FEV1FEV6-PRE: 58 %
FEV1FEV6-PRED: 82 %
FEV1FVC-PRE: 51 %
FEV1FVC-PRED: 81 %
FIFMAX-PRE: 10.17 L/SEC
FVC-%PRED-PRE: 92 %
FVC-PRE: 5.76 L
FVC-PRED: 6.21 L

## 2019-10-14 PROCEDURE — 87186 SC STD MICRODIL/AGAR DIL: CPT | Performed by: INTERNAL MEDICINE

## 2019-10-14 PROCEDURE — 97803 MED NUTRITION INDIV SUBSEQ: CPT | Mod: ZF | Performed by: DIETITIAN, REGISTERED

## 2019-10-14 PROCEDURE — G0463 HOSPITAL OUTPT CLINIC VISIT: HCPCS | Mod: ZF

## 2019-10-14 PROCEDURE — 87077 CULTURE AEROBIC IDENTIFY: CPT | Performed by: INTERNAL MEDICINE

## 2019-10-14 PROCEDURE — 87070 CULTURE OTHR SPECIMN AEROBIC: CPT | Performed by: INTERNAL MEDICINE

## 2019-10-14 PROCEDURE — 87107 FUNGI IDENTIFICATION MOLD: CPT | Performed by: INTERNAL MEDICINE

## 2019-10-14 RX ORDER — OSELTAMIVIR PHOSPHATE 75 MG/1
75 CAPSULE ORAL 2 TIMES DAILY
Qty: 10 CAPSULE | Refills: 0 | Status: SHIPPED | OUTPATIENT
Start: 2019-10-14 | End: 2019-10-19

## 2019-10-14 RX ORDER — FLUTICASONE PROPIONATE 50 MCG
2 SPRAY, SUSPENSION (ML) NASAL PRN
Qty: 16 G | Refills: 11 | Status: SHIPPED | OUTPATIENT
Start: 2019-10-14 | End: 2023-03-28

## 2019-10-14 ASSESSMENT — MIFFLIN-ST. JEOR: SCORE: 1886.07

## 2019-10-14 ASSESSMENT — PAIN SCALES - GENERAL: PAINLEVEL: NO PAIN (0)

## 2019-10-14 NOTE — PROGRESS NOTES
Reason for Visit  Telly Leal is a 35 year old year old male who is being seen for Consult (Cystic Fibrosis )    CF HPI  The patient was seen and examined by Rocael Miller   The patient has not been seen since April 2019.  He reports overall feeling well over the summer.  Last month he developed sinus pain with occipital headache and was experiencing some night sweats.  He had a modest increase in his respiratory symptoms.  He contacted our center in late September at which time I started him on levofloxacin.  He did not feel well enough to go to work and was doing vest 3 times a day at home.  His sputum was initially clear to yellow in appearance.  As he has been improving over the past week it is now more yellow.  He has not had any hemoptysis.  He has been on SOO nebs for the past 10 days.  He is back to work and has resumed vest therapy twice daily.  He previously was having increased dyspnea with walking stairs but now feels back to baseline exercise tolerance.  His initial sinus pain and headache have subsided.  He is no longer having night sweats or chills.    The patient was seen in the emergency department 8 days ago for abdominal pain.  This was slowly building over the preceding few days.  He had pain that was greatest in the sub-umbilical and left lower quadrant regions.  There is some radiation towards his groin.  He had an abdominal CT scan which showed a noninflamed but enlarged appendix.  There is also mesenteric fat stranding noted.  The patient was given Flagyl which was added to his levofloxacin.  He has been using laxatives and had a period of more frequent stools and with that has had improvement in his abdominal pain and appetite.  He has minimal residual discomfort and appetite is approaching baseline.  He is continued using 2 cans of tube feeds.  He had been experiencing some nausea but this has also resolved.  He is not been using ranitidine recently due to absence of reflux  symptoms.    Current Outpatient Medications   Medication     acetylcysteine (MUCOMYST) 20 % nebulizer solution     albuterol (2.5 MG/3ML) 0.083% neb solution     albuterol (PROAIR HFA) 108 (90 BASE) MCG/ACT Inhaler     azithromycin (ZITHROMAX) 250 MG tablet     blood glucose monitoring (NO BRAND SPECIFIED) test strip     Cholecalciferol 4000 UNITS CAPS     colistimethate/colistin-base activity (COLYMYCIN) 150 mg/2mL SOLR neb solution     CREON 13201-71769 units CPEP per EC capsule     cromolyn (INTAL) 20 MG/2ML neb solution     fluticasone (FLONASE) 50 MCG/ACT nasal spray     levofloxacin (LEVAQUIN) 750 MG tablet     Multiple Vitamin (MULTIVITAMINS PO)     Nutritional Supplements (NUTREN 2.0) LIQD     oseltamivir (TAMIFLU) 75 MG capsule     phytonadione (MEPHYTON) 5 MG tablet     ranitidine (ZANTAC) 150 MG tablet     senna (SENOKOT) 8.6 MG tablet     tobramycin, PF, (SOO) 300 MG/5ML neb solution     traMADol (ULTRAM) 50 MG tablet     water for injection sterile SOLN     aztreonam (AZACTAM) 200 MG/ML     oseltamivir (TAMIFLU) 75 MG capsule     No current facility-administered medications for this visit.      No Known Allergies  Past Medical History:   Diagnosis Date     Chronic sinusitis      Cystic fibrosis with pulmonary manifestations (H)      Exocrine pancreatic insufficiency      GERD (gastroesophageal reflux disease)      Hemoptysis      Malnutrition (H)        Past Surgical History:   Procedure Laterality Date     LOBECTOMY      right upper lobe     TUBES         Social History     Socioeconomic History     Marital status:      Spouse name: Not on file     Number of children: Not on file     Years of education: Not on file     Highest education level: Not on file   Occupational History     Occupation:      Employer: [employer]   Social Needs     Financial resource strain: Not on file     Food insecurity:     Worry: Not on file     Inability: Not on file     Transportation needs:      "Medical: Not on file     Non-medical: Not on file   Tobacco Use     Smoking status: Never Smoker     Smokeless tobacco: Never Used   Substance and Sexual Activity     Alcohol use: Not Currently     Alcohol/week: 0.0 standard drinks     Drug use: Not Currently     Sexual activity: Not on file   Lifestyle     Physical activity:     Days per week: Not on file     Minutes per session: Not on file     Stress: Not on file   Relationships     Social connections:     Talks on phone: Not on file     Gets together: Not on file     Attends Episcopal service: Not on file     Active member of club or organization: Not on file     Attends meetings of clubs or organizations: Not on file     Relationship status: Not on file     Intimate partner violence:     Fear of current or ex partner: Not on file     Emotionally abused: Not on file     Physically abused: Not on file     Forced sexual activity: Not on file   Other Topics Concern     Parent/sibling w/ CABG, MI or angioplasty before 65F 55M? Not Asked   Social History Narrative    Patient works for the [employer] as an .  He does not have stable living conditions at this time.  He has a Surinamese guillen.   Update: living with wife Bre in [facility]. Birth of daughter Nieves Rebolledo August, 2017    ROS Pulmonary  Const: see HPI. ENT: see HPI.  GI: see HPI.    A complete ROS was otherwise negative except as noted in the HPI.  /76   Pulse 118   Resp 17   Ht 1.905 m (6' 3\")   Wt 87.5 kg (193 lb)   SpO2 95%   BMI 24.12 kg/m    Exam:   GENERAL APPEARANCE: Well developed, well nourished, alert, and in no apparent distress.  EYES: anicteric  HENT: Nasal mucosa with no edema and no hyperemia. No nasal polyps.  MOUTH: Oral mucosa is moist, without any lesions, no tonsillar enlargement, no oropharyngeal exudate.  NECK: supple, no masses, no thyromegaly.  LYMPHATICS: No significant  cervical, or supraclavicular nodes.  RESP:  good air flow throughout.  Anterior " lung fields a bit diminished vs previous. Normal chest wall excursion.  CV: Normal S1, S2, regular rhythm, normal rate. No murmur.  No rub. No gallop. No LE edema.   ABDOMEN:  Bowel sounds normal, soft, nontender, no HSM or masses.   MS: extremities normal. No cyanosis.  SKIN:  PEG site without erythema or discharge. Chest scar-like lesion upper sternum unchanged. Diffuse freckles without overt change.  NEURO: Mentation intact, speech normal, normal gait and stance  PSYCH: mentation appears normal. and affect normal/bright  Results:  Recent Results (from the past 168 hour(s))   General PFT Lab (Please always keep checked)    Collection Time: 10/14/19  1:26 PM   Result Value Ref Range    FVC-Pred 6.21 L    FVC-Pre 5.76 L    FVC-%Pred-Pre 92 %    FEV1-Pre 2.92 L    FEV1-%Pred-Pre 58 %    FEV1FVC-Pred 81 %    FEV1FVC-Pre 51 %    FEFMax-Pred 11.31 L/sec    FEFMax-Pre 8.75 L/sec    FEFMax-%Pred-Pre 77 %    FEF2575-Pred 4.74 L/sec    FEF2575-Pre 1.11 L/sec    VOM6827-%Pred-Pre 23 %    ExpTime-Pre 12.89 sec    FIFMax-Pre 10.17 L/sec    FEV1FEV6-Pred 82 %    FEV1FEV6-Pre 58 %     Spirometry interpretation: personally reviewed. Valid maneuver. Moderately severe obstruction. FEV1 down over 5%.    Sputum last visit: PA x 1, aspergillus    Labs from October 6, 2019 personally reviewed. Normal BMP.  with other LFTs normal. CBC normal    Assessment and plan:  1. Exacerbation of CF lung disease: Symptoms improving but below baseline PFTs. Pseudomonas resistant to oral antibiotics. He will continue alice and follow that with coly.  He is hesitant to pursue cayston due to tid dosing and separate equipment. He will consider use of reconstituted aztreonam. He will continue vest at least bid. Will follow up today's culture. He will complete current supply of levofloxacin and then resume chronic azithromycin. Will consider IV antibiotics. The patient's genotype is [id number]/CFTR del 2,3. Discussed with our pharmacist Cecilia and it  appears patient would likely be eligible for new triple therapy if FDA approved.   2. Impaired glucose tolerance: Fasting > 100 and peak > 200 and 2 hour value right at 140. A little worse than Jan 2018 but similar to previous. A1C similar to past currently at 6.1%. Does not appear to be an evolving problem and overall with good clinical course.   3.  Pancreatic insufficiency with a history of malnutrition on tube feeds: Adequate enzyme replacement by history.  His weight had been declining but trending back up this visit with BMI at 24.1 which is at target. Will continue 2 cans TF nightly.   4. Acute on chronic CF sinus disease: likely viral trigger. Improving. Continue flonase.  5. GERD: quiescent off treatment   6. Low vitamin D level: Improved adherence to replacement. Vitamin D in April, 2019 good at 38  7. Skin lesions: Seen by outside Dermatologist. Central chest scar could be treated by steroid injections per pt but not actively pursuing. Plan in place for annual exam of his many freckles.  He is overdue for follow up with his dermatologist and encouraged him to make an appointment.  8. Healthcare maintenance: Annual studies due April, 2020. Patient received flu vaccine at work this fall. Rx tamiflu for prn use today and reviewed indications for use.    Patient will follow up in 1 month    40 minutes spent in conjunction with this visit with > 50% of time spent counseling and coordinating care of above issues as well as reviewing annual studies with patient.     Rocael Miller

## 2019-10-14 NOTE — LETTER
10/14/2019     RE: Telly Leal  [address] [id number]     Dear Colleague,    Thank you for referring your patient, Telly Leal, to the [facility] at [facility]. Please see a copy of my visit note below.    Reason for Visit  Telly Leal is a 35 year old year old male who is being seen for Consult (Cystic Fibrosis )    CF HPI  The patient was seen and examined by Rocael Miller   The patient has not been seen since April 2019.  He reports overall feeling well over the summer.  Last month he developed sinus pain with occipital headache and was experiencing some night sweats.  He had a modest increase in his respiratory symptoms.  He contacted our center in late September at which time I started him on levofloxacin.  He did not feel well enough to go to work and was doing vest 3 times a day at home.  His sputum was initially clear to yellow in appearance.  As he has been improving over the past week it is now more yellow.  He has not had any hemoptysis.  He has been on SOO nebs for the past 10 days.  He is back to work and has resumed vest therapy twice daily.  He previously was having increased dyspnea with walking stairs but now feels back to baseline exercise tolerance.  His initial sinus pain and headache have subsided.  He is no longer having night sweats or chills.    The patient was seen in the emergency department 8 days ago for abdominal pain.  This was slowly building over the preceding few days.  He had pain that was greatest in the sub-umbilical and left lower quadrant regions.  There is some radiation towards his groin.  He had an abdominal CT scan which showed a noninflamed but enlarged appendix.  There is also mesenteric fat stranding noted.  The patient was given Flagyl which was added to his levofloxacin.  He has been using laxatives and had a period of more frequent stools and with that has  had improvement in his abdominal pain and appetite.  He has minimal residual discomfort and appetite is approaching baseline.  He is continued using 2 cans of tube feeds.  He had been experiencing some nausea but this has also resolved.  He is not been using ranitidine recently due to absence of reflux symptoms.    Current Outpatient Medications   Medication     acetylcysteine (MUCOMYST) 20 % nebulizer solution     albuterol (2.5 MG/3ML) 0.083% neb solution     albuterol (PROAIR HFA) 108 (90 BASE) MCG/ACT Inhaler     azithromycin (ZITHROMAX) 250 MG tablet     blood glucose monitoring (NO BRAND SPECIFIED) test strip     Cholecalciferol 4000 UNITS CAPS     colistimethate/colistin-base activity (COLYMYCIN) 150 mg/2mL SOLR neb solution     CREON 46668-19937 units CPEP per EC capsule     cromolyn (INTAL) 20 MG/2ML neb solution     fluticasone (FLONASE) 50 MCG/ACT nasal spray     levofloxacin (LEVAQUIN) 750 MG tablet     Multiple Vitamin (MULTIVITAMINS PO)     Nutritional Supplements (NUTREN 2.0) LIQD     oseltamivir (TAMIFLU) 75 MG capsule     phytonadione (MEPHYTON) 5 MG tablet     ranitidine (ZANTAC) 150 MG tablet     senna (SENOKOT) 8.6 MG tablet     tobramycin, PF, (SOO) 300 MG/5ML neb solution     traMADol (ULTRAM) 50 MG tablet     water for injection sterile SOLN     aztreonam (AZACTAM) 200 MG/ML     oseltamivir (TAMIFLU) 75 MG capsule     No current facility-administered medications for this visit.      No Known Allergies  Past Medical History:   Diagnosis Date     Chronic sinusitis      Cystic fibrosis with pulmonary manifestations (H)      Exocrine pancreatic insufficiency      GERD (gastroesophageal reflux disease)      Hemoptysis      Malnutrition (H)        Past Surgical History:   Procedure Laterality Date     LOBECTOMY      right upper lobe     TUBES         Social History     Socioeconomic History     Marital status:      Spouse name: Not on file     Number of children: Not on file     Years of  "education: Not on file     Highest education level: Not on file   Occupational History     Occupation:      Employer: [employer]   Social Needs     Financial resource strain: Not on file     Food insecurity:     Worry: Not on file     Inability: Not on file     Transportation needs:     Medical: Not on file     Non-medical: Not on file   Tobacco Use     Smoking status: Never Smoker     Smokeless tobacco: Never Used   Substance and Sexual Activity     Alcohol use: Not Currently     Alcohol/week: 0.0 standard drinks     Drug use: Not Currently     Sexual activity: Not on file   Lifestyle     Physical activity:     Days per week: Not on file     Minutes per session: Not on file     Stress: Not on file   Relationships     Social connections:     Talks on phone: Not on file     Gets together: Not on file     Attends Jew service: Not on file     Active member of club or organization: Not on file     Attends meetings of clubs or organizations: Not on file     Relationship status: Not on file     Intimate partner violence:     Fear of current or ex partner: Not on file     Emotionally abused: Not on file     Physically abused: Not on file     Forced sexual activity: Not on file   Other Topics Concern     Parent/sibling w/ CABG, MI or angioplasty before 65F 55M? Not Asked   Social History Narrative    Patient works for the [employer] as an .  He does not have stable living conditions at this time.  He has a [place] Islander guillen.   Update: living with wife Bre in [facility]. Birth of daughter Nieves Rebolledo August, 2017    ROS Pulmonary  Const: see HPI. ENT: see HPI.  GI: see HPI.    A complete ROS was otherwise negative except as noted in the HPI.  /76   Pulse 118   Resp 17   Ht 1.905 m (6' 3\")   Wt 87.5 kg (193 lb)   SpO2 95%   BMI 24.12 kg/m     Exam:   GENERAL APPEARANCE: Well developed, well nourished, alert, and in no apparent distress.  EYES: anicteric  HENT: Nasal " mucosa with no edema and no hyperemia. No nasal polyps.  MOUTH: Oral mucosa is moist, without any lesions, no tonsillar enlargement, no oropharyngeal exudate.  NECK: supple, no masses, no thyromegaly.  LYMPHATICS: No significant  cervical, or supraclavicular nodes.  RESP:  good air flow throughout.  Anterior lung fields a bit diminished vs previous. Normal chest wall excursion.  CV: Normal S1, S2, regular rhythm, normal rate. No murmur.  No rub. No gallop. No LE edema.   ABDOMEN:  Bowel sounds normal, soft, nontender, no HSM or masses.   MS: extremities normal. No cyanosis.  SKIN:  PEG site without erythema or discharge. Chest scar-like lesion upper sternum unchanged. Diffuse freckles without overt change.  NEURO: Mentation intact, speech normal, normal gait and stance  PSYCH: mentation appears normal. and affect normal/bright  Results:  Recent Results (from the past 168 hour(s))   General PFT Lab (Please always keep checked)    Collection Time: 10/14/19  1:26 PM   Result Value Ref Range    FVC-Pred 6.21 L    FVC-Pre 5.76 L    FVC-%Pred-Pre 92 %    FEV1-Pre 2.92 L    FEV1-%Pred-Pre 58 %    FEV1FVC-Pred 81 %    FEV1FVC-Pre 51 %    FEFMax-Pred 11.31 L/sec    FEFMax-Pre 8.75 L/sec    FEFMax-%Pred-Pre 77 %    FEF2575-Pred 4.74 L/sec    FEF2575-Pre 1.11 L/sec    RCG8750-%Pred-Pre 23 %    ExpTime-Pre 12.89 sec    FIFMax-Pre 10.17 L/sec    FEV1FEV6-Pred 82 %    FEV1FEV6-Pre 58 %     Spirometry interpretation: personally reviewed. Valid maneuver. Moderately severe obstruction. FEV1 down over 5%.    Sputum last visit: PA x 1, aspergillus    Labs from October 6, 2019 personally reviewed. Normal BMP.  with other LFTs normal. CBC normal    Assessment and plan:  1. Exacerbation of CF lung disease: Symptoms improving but below baseline PFTs. Pseudomonas resistant to oral antibiotics. He will continue alice and follow that with coly.  [facility] and separate equipment. He will consider  use of reconstituted aztreonam. He will continue vest at least bid. Will follow up today's culture. He will complete current supply of levofloxacin and then resume chronic azithromycin. Will consider IV antibiotics. The patient's genotype is [id number]/CFTR del 2,3. Discussed with our pharmacist Cecilia and it appears patient would likely be eligible for new triple therapy if FDA approved.   2. Impaired glucose tolerance: Fasting > 100 and peak > 200 and 2 hour value right at 140. A little worse than Jan 2018 but similar to previous. A1C similar to past currently at 6.1%. Does not appear to be an evolving problem and overall with good clinical course.   3.  Pancreatic insufficiency with a history of malnutrition on tube feeds: Adequate enzyme replacement by history.  His weight had been declining but trending back up this visit with BMI at 24.1 which is at target. Will continue 2 cans TF nightly.   4. Acute on chronic CF sinus disease: likely viral trigger. Improving. Continue flonase.  5. GERD: quiescent off treatment   6. Low vitamin D level: Improved adherence to replacement. Vitamin D in April, 2019 good at 38  7. Skin lesions: Seen by outside Dermatologist. Central chest scar could be treated by steroid injections per pt but not actively pursuing. Plan in place for annual exam of his many freckles.  He is overdue for follow up with his dermatologist and encouraged him to make an appointment.  8. Healthcare maintenance: Annual studies due April, 2020. Patient received flu vaccine at work this fall. Rx tamiflu for prn use today and reviewed indications for use.    Patient will follow up in 1 month    40 minutes spent in conjunction with this visit with > 50% of time spent counseling and coordinating care of above issues as well as reviewing annual studies with patient.     Rocael Miller

## 2019-10-14 NOTE — NURSING NOTE
Chief Complaint   Patient presents with     RECHECK     Cystic Fibrosis     Medications reviewed and vital signs taken.   Mya Julian, KIRSTIN

## 2019-10-16 DIAGNOSIS — E84.9 CF (CYSTIC FIBROSIS) (H): ICD-10-CM

## 2019-10-16 RX ORDER — AZITHROMYCIN 250 MG/1
250 TABLET, FILM COATED ORAL DAILY
Qty: 30 TABLET | Refills: 11 | Status: SHIPPED | OUTPATIENT
Start: 2019-10-16 | End: 2020-10-27

## 2019-10-16 NOTE — PROGRESS NOTES
CF Annual Nutrition Assessment    Reason for Assessment  Assessed during [facility] r/t increased nutrition risk with diagnosis of CF per protocol    Nutrition Significant PMH  Mild Lung Disease   Pancreatic Insufficient  G-tube for nutrition support  Impaired Glucose Tolerance    Social Assessment  Living situation: with wife + 7 month daughter  Work/School/Disability: full-time for [place] [employer]  Food Insecurity:  None    Anthropometric Assessment  Height: 189.7 cm  IBW based on BMI 22 for females and 23 for males per CF Foundation recs: 82.8 kg / 182#  Today's Weight: 87.5 kg (actual weight)   %IBW: 106%   Body mass index is 24.12 kg/m .   Current weight is considered: Normal weight / at CF goal.   Some weight fluctuations; in 2018 maintained weight ~195-200# and now down to 193#, potentially related to recent GI issues. Most recently with 2.5 kg (3%) loss x 2 weeks. In the past, pt states he tends to gain more weight during the winter and loses during the summer with increased activity.     Wt Readings from Last 10 Encounters:   10/14/19 87.5 kg (193 lb)   10/11/19 88.6 kg (195 lb 6.4 oz)   10/06/19 90 kg (198 lb 6.4 oz)   18 88.2 kg (194 lb 7.1 oz)   18 90.4 kg (199 lb 4.7 oz)   18 92.3 kg (203 lb 7.8 oz)   18 91.9 kg (202 lb 9.6 oz)   18 91.9 kg (202 lb 9.6 oz)   17 91.8 kg (202 lb 6.1 oz)   17 91.4 kg (201 lb 9.6 oz)     Physical Activity/Exercise: not addressed this visit    Pancreatic Enzymes  Brand:  Creon [id number]  PO Dosin with meals, 1-5 with snacks = 2180 units lipase/kg/meal  TF Dosing: currently 11 caps w/ tube feeding (2 cans)   Estimated Daily Intake: 30 caps with meals + 11 with TF = 41 caps/day (11,180 units lipase/kg/day)    Are you taking enzymes as recommended:Yes   Signs of Malabsorption: No  Enzyme Program:  None - information provided during previous visits; declined today  GI concerns: ED visit last week for abdominal pain. Seen  by Dr. Holt on 10/11/19 and noted some mild stool burden with pain improvement with laxatives + mucomyst via GT; also taking flagyl. Recommended to discontinue senna and take miralax regularly. Pt reports he tried miralax x 2 days but felt the dose was too high and no longer taking with return to GI baseline.    Diet History and Assessment  Diet Preferences/Allergies/Intolerances: Regular diet  Intake Recall/Comments: Typical intake 2-3 meals/day + snacks. Appetite is only fair but eats consistently. Does not think he would be able to eat enough to maintain weight without use of TF. Due to impaired glucose tolerance, pt makes greater effort to combine simple sugars and soda with meals and to balance CHO intake throughout the day.     In the past month, appetite/PO has been down due to GI issues noted above. Slowly improving and now back to ~80%. Has been trying to still get in his TFs.     Previous Diet Recall  B: skips or small snack (fruit or muffin)  L: peanut butter and jelly sandwich, chips, cookie  D: protein with starch and vegetable    Calcium: gallon whole milk/week + yogurt, string cheese  Salt: likely adequate from foods  Hydration: Water, Gatorade, whole milk; drinks Regular Coke with meals  Supplements:  No  Tube Feeding: Yes --    Enteral Nutrition  Type of Feeding Tube: G-tube (button)  Formula: Nutren 2.0  Rate/Frequency: 135 ml/hr x 2 cans nightly (~4 hrs infusion time)  Nutrition: 500 mls, 1000 kcals, 42 g protein, 46 g fat, 108 g CHO  Enzymes: taking 11 caps before infusion, providing 5739 units lipase/gm fat in feeds which is above recommended ranges with decrease in TF volume. Discussed today goal to decrease enzyme dose with feeds.     Estimated Energy and Protein Needs  Estimation based on weight maintenance with Mild lung disease and pancreatic insufficient.    BEE: 2100   3999-1550 kcals/day =  175-200% BEE  140-185 g protein/day = 1.5-2 g/kg    Laboratory Assessment    Vitamin A   Date  Value Ref Range Status   04/22/2019 0.76 0.30 - 1.20 mg/L Final     Vitamin D Deficiency screening   Date Value Ref Range Status   04/22/2019 38 20 - 75 ug/L Final     Comment:     Season, race, dietary intake, and treatment affect the concentration of   25-hydroxy-Vitamin D. Values may decrease during winter months and increase   during summer months. Values 20-29 ug/L may indicate Vitamin D insufficiency   and values <20 ug/L may indicate Vitamin D deficiency.  Vitamin D determination is routinely performed by an immunoassay specific for   25 hydroxyvitamin D3.  If an individual is on vitamin D2 (ergocalciferol)   supplementation, please specify 25 OH vitamin D2 and D3 level determination by   LCMSMS test [id number].       Vitamin E   Date Value Ref Range Status   04/22/2019 7.6 5.5 - 18.0 mg/L Final     Comment:     (Note)  Test developed and characteristics determined by [facility]. See Compliance Statement B: [email or web address]       Iron   Date Value Ref Range Status   04/22/2019 79 35 - 180 ug/dL Final     Cholesterol   Date Value Ref Range Status   04/22/2019 129 <200 mg/dL Final     Triglycerides   Date Value Ref Range Status   04/22/2019 85 <150 mg/dL Final     HDL Cholesterol   Date Value Ref Range Status   04/22/2019 35 (L) >39 mg/dL Final     LDL Cholesterol Calculated   Date Value Ref Range Status   04/22/2019 77 <100 mg/dL Final     Comment:     Desirable:       <100 mg/dl     VLDL-Cholesterol   Date Value Ref Range Status   12/23/2014 33 (H) 0 - 30 mg/dL Final     Cholesterol/HDL Ratio   Date Value Ref Range Status   12/23/2014 3.9 0.0 - 5.0 Final     OGTT: 2019, indeterminate glucose tolerance  DEXA: 2018, lowest Z-score 0 at L1-L4.     Current Vitamin/Mineral Prescription: None.   Previous recommended regimen: MVI, Vitamin D 2000 units, and Vitamin E 400 units  Comments: Not taking vitamins as previously recommended with fat-soluble vitamin levels WNL. Agreeable to resuming multivitamin and/or  Vitamin D during winter months.     NUTRITION DIAGNOSIS  Impaired nutrient utilization r/t CF pancreatic insufficiency and  CF hypermetabolism as evidenced by pt requires enzyme replacement therapy, enteral nutrition support, and high kcal/protein diet to maintain nutrition and health status.   INTERVENTIONS/RECOMMENDATIONS  1) Reviewed weight trends, PO, and TF use. Goal for no additional losses, ideally maintenance and/or slight gains back to baseline. Encouraged PO intake as tolerated with TF infusion. Also discussed recent abdominal pain/GI concerns. Reviewed recommendations for Miralax and discussed titrating dose as needed (such as 3x/week dosing). During discussion reviewed adequacy of enzyme dose. Noticed that pt decreased from 3 to 2 cans of TF overnight but kept enzyme dose the same at 11 caps. New plan to decrease to just 8 caps with 2 cans of feeds to provide more appropriate 4170 units lipase/g fat in feeds. If no s/sx malabsorption, could continue to work down on enzyme dose towards 3 caps per can (3130 units lipase/g fat in feeds).     2) Discussed vitamin dosing and annual study labs from April. Encouraged pt to add in either multivitamin or Vitamin D supplement during winter months given history of low level. Pt agreeable to restarting supplementation.     Patient Understanding: Good  Expected Compliance: Good  Follow-Up Plans: Per protocol    GOALS:  1) Maintain BMI 23-26 kg/m2  2) Start Vitamin D 2000 units daily    FOLLOW-UP/MONITORING:  Visit patient within 12 month(s) or sooner per pt/MD request    Time Spent In Face-to-Face Patient Interactions: 15 minutes      Leona Marie RD, LD  Cystic Fibrosis/Lung Transplant Dietitian  Pager [phone]

## 2019-10-17 ASSESSMENT — PATIENT HEALTH QUESTIONNAIRE - PHQ9
5. POOR APPETITE OR OVEREATING: NOT AT ALL
SUM OF ALL RESPONSES TO PHQ QUESTIONS 1-9: 0

## 2019-10-17 ASSESSMENT — ANXIETY QUESTIONNAIRES
3. WORRYING TOO MUCH ABOUT DIFFERENT THINGS: NOT AT ALL
7. FEELING AFRAID AS IF SOMETHING AWFUL MIGHT HAPPEN: NOT AT ALL
GAD7 TOTAL SCORE: 0
6. BECOMING EASILY ANNOYED OR IRRITABLE: NOT AT ALL
5. BEING SO RESTLESS THAT IT IS HARD TO SIT STILL: NOT AT ALL
2. NOT BEING ABLE TO STOP OR CONTROL WORRYING: NOT AT ALL
1. FEELING NERVOUS, ANXIOUS, OR ON EDGE: NOT AT ALL

## 2019-10-17 NOTE — PROGRESS NOTES
Respiratory Therapist Note:    Vest    Brand: Hill-Rom - traditional Hill Rom: Frequencies 8, 9, 10 at pressure 10 then frequencies 18, 19, 20 at pressure 6.   Cough Pause: Cough Pause; Yes   Vest Garment Size: Adult Medium   Last Fitting Date: 2019   Frequency of therapy: 12 times per week   Concerns: none    Exercise (purposeful and aerobic for >20 minutes each session): NO.   Does this qualify as additional airway clearance: No    Alternative Airway Clearance:       Nebulized Medications   Bronchodilators: Albuterol   Mucolytic: Mucomyst   Antibiotics: SOO and Colimycin   Additional Inhaled Medications: ICS   Spacer Use: yes     Review Cleaning: Yes. Top rack of .    Education and Transition Information   Correct order of inhaled medications: Yes   Mechanism of Action of inhaled medications: Yes   Frequency of inhaled medications: Yes   Dosage of inhaled medications: Yes   Other:     Home Care:   Nebulizer Cups (Brand/Type): Mare   Nebulizer Compressor    Year Purchased: 2018    [facility], Phone: [phone], Fax: [phone]   Nebulizer Supply Company:     [facility], Phone: [phone], Fax: [phone]    Oxygen:        [facility]   Site:    Date Completed:     Plan of Care and Goals for next visit: Keep up the good work

## 2019-10-17 NOTE — PROGRESS NOTES
"Adult Cystic Fibrosis Program  Annual Psychosocial Assessment    Presenting Information:  TELLY is a 39-year-old man with cystic fibrosis, presenting in CF clinic for a regular follow up with primary CF provider, Dr Hung Miller.  Met with Telly for annual psychosocial assessment.     Living situation:   Telly is currently living with his wife, Bre, and daughter, Nieves Rebolledo who was born in August 2017, in [place] in a home that they own. Bre is pregnant with their second child (a girl) and is due in ~1 month. They do not have any pets. He denies concerns about his living situation.      Family Constellation:   Telly was initially raised by both biological parents, who  when he was around 14 years old. He subsequently lived with his mother but maintained regular contact with his father. eTlly has an older brother and older sister. His mother has a house in [place], [place] and [place] in [place]. His father, brother and sister live in [place]. His siblings are both  and Yobany has 1 niece and 2 nephews. Yobany's extended family also lives in the [place] area and operate a large local apple [employer]. Bre's parents live about 2 blocks away from them. 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      Support Network:   Telly describes his social support as \"great, I maybe have too much\". His immediate and extended family are involved and supportive and have been especially helpful with his daughter. He draws additional support from friends and co-workers. He describes a close relationship with Bre, and they are looking forward to having their second daughter soon. They have been  since 2015. Yobany does not stay in contact with anyone who has CF, but he knew other people with CF when he was growing up.      Adjustment to Illness:   Yobany was diagnosed with CF during infancy. He remembers struggling with weight issues as a child. He notes significant past " "health events in 2000, when he got a G-tube, but continued to struggle with gaining weight, and in 2003, when he experienced a pneumothorax and lobectomy. Since that event, he notes that he has successfully gained weight.      Yobany describes his current health status as \"great up until a few weeks ago\". He reports coming down with a cold a few weeks ago and also had severe abdominal pain. It is mostly resolved at this point and he is back to \"80% health\". He had to take a few weeks off of work to recover but is mostly back to work now. Clinically, he has mild lung disease, a history of malnutrition and glucose intolerance. He denies any physical health symptoms that interfere with his daily activities. He generally completes 2 vest treatments a day. He walks often and plays hockey in the winter, although has not been playing as often since his daughter was born. He has done nightly tube-feedings for many years and denies related concerns.        Advance Directive:   This SW reviewed Advance Directive education. Telly is interested in filling one out today, especially given his recent health scare. He requested SW send a copy of a HCD via email following this appointment.     Education;   Yobany completed a Bachelor's Degree in Accounting and Business Administration at [employer] in 2007. He does not currently plan to pursue further education.       Employment:   Yobany has been working full time as an  for the [employer] for the past 8 years. He reports great benefits through the state and has 6 weeks of parental leave as a part of his benefits package. He plans to spread his PTO by taking every Friday off for the next several months to spend with his daughter once his wife goes back to work. He enjoys his job overall and reports a supportive boss and co-workers, he is also part of a union.     Yobany's wife Bre works full time as a  at the [place] [employer]. She " "recently started this role and is enjoying it. They are able to carpool to work.     Mental Health/Coping:   Yobany coped with depressive/anxious symptoms in 2013, due to stressful transitions. He did brief counseling, which he did not find especially helpful.  The issues then resolved.    Yobany reports his current mood as \"great!\". He denies any current symptoms indicative of mood, anxiety, or other mental health disorder.     ARCHANA-7 score today: 0, indicating an absence of anxiety symptoms.    PHQ-9 score today: 0, indicating an absence of depression symptoms as well.     Yobany describes his current stress level as manageable. He generally ayana with stressors through working out, walking, going for a drive or distracting himself. Yobany was raised Amish and describes ariana as important to him, noting that his brother in law is a .      Chemical Health   Yobany denies use of tobacco, illicit drugs, or alcohol. He does not have any history of chemical dependency or psychosocial impairment related to substance use.      Finances:   Yobany is able to afford his daily living expenses and denies any financial concerns.    Insurance:   Yobany has employer-based insurance ([employer]). He notes that costs are affordable for him and he denies related concerns.      Recreation/Leisure Interests:   Telly enjoys playing hockey, working out, golfing, spending time with family/friends, and traveling.     Intervention:  -Psychosocial Assessment  -Health Care Directive education  -Supportive counseling    Assessment:  Telly presented with a bright affect and appeared to be open and used humor in his responses. He and his wife are about to have their second daughter which they are looking forward to. Yobany reports a stable job. He was sick recently been feels he has almost fully recovered. His mental health is stable.     Yobany seems to be psychosocially stable overall, with access to relevant resources and supports.  No concerns " expressed/noted.    Plan:  Re-consult for any psychosocial needs that may arise.    Complete psychosocial assessment annually.  Continue to follow for regular clinic consult.    OANH Coombs, [facility]  Adult Cystic Fibrosis   Ph: [phone], Pager: [phone]     ERIC sent the following email to Yobany on 10/17:    Bret Drew to see you in clinic the other day. I can t remember if you wanted me to email you a new copy of a Health Care Directive or not  so here it is!    Let me know if you have any questions, and I apologize if you didn t actually need this!    Marybeth

## 2019-10-18 ASSESSMENT — ANXIETY QUESTIONNAIRES: GAD7 TOTAL SCORE: 0

## 2019-10-19 LAB
BACTERIA SPEC CULT: ABNORMAL
SPECIMEN SOURCE: ABNORMAL

## 2019-11-07 ENCOUNTER — ALLIED HEALTH/NURSE VISIT (OUTPATIENT)
Dept: PULMONOLOGY | Facility: CLINIC | Age: 39
End: 2019-11-07
Attending: INTERNAL MEDICINE
Payer: COMMERCIAL

## 2019-11-07 ENCOUNTER — DOCUMENTATION ONLY (OUTPATIENT)
Dept: PULMONOLOGY | Facility: CLINIC | Age: 39
End: 2019-11-07

## 2019-11-07 DIAGNOSIS — E84.0 CYSTIC FIBROSIS WITH PULMONARY MANIFESTATIONS (H): Primary | ICD-10-CM

## 2019-11-26 ENCOUNTER — OFFICE VISIT (OUTPATIENT)
Dept: PHARMACY | Facility: CLINIC | Age: 39
End: 2019-11-26
Payer: COMMERCIAL

## 2019-11-26 ENCOUNTER — OFFICE VISIT (OUTPATIENT)
Dept: PULMONOLOGY | Facility: CLINIC | Age: 39
End: 2019-11-26
Attending: INTERNAL MEDICINE
Payer: COMMERCIAL

## 2019-11-26 VITALS
BODY MASS INDEX: 23.62 KG/M2 | HEART RATE: 106 BPM | OXYGEN SATURATION: 97 % | SYSTOLIC BLOOD PRESSURE: 115 MMHG | WEIGHT: 190 LBS | DIASTOLIC BLOOD PRESSURE: 73 MMHG | HEIGHT: 75 IN | RESPIRATION RATE: 17 BRPM

## 2019-11-26 DIAGNOSIS — K21.9 GERD (GASTROESOPHAGEAL REFLUX DISEASE): ICD-10-CM

## 2019-11-26 DIAGNOSIS — E84.0 CYSTIC FIBROSIS WITH PULMONARY MANIFESTATIONS (H): ICD-10-CM

## 2019-11-26 DIAGNOSIS — E84.0 CYSTIC FIBROSIS WITH PULMONARY MANIFESTATIONS (H): Primary | ICD-10-CM

## 2019-11-26 DIAGNOSIS — E84.9 CF (CYSTIC FIBROSIS) (H): Primary | ICD-10-CM

## 2019-11-26 LAB
ALBUMIN SERPL-MCNC: 3.8 G/DL (ref 3.4–5)
ALP SERPL-CCNC: 83 U/L (ref 40–150)
ALT SERPL W P-5'-P-CCNC: 42 U/L (ref 0–70)
AST SERPL W P-5'-P-CCNC: 26 U/L (ref 0–45)
BILIRUB DIRECT SERPL-MCNC: 0.2 MG/DL (ref 0–0.2)
BILIRUB SERPL-MCNC: 1.2 MG/DL (ref 0.2–1.3)
CK SERPL-CCNC: 86 U/L (ref 30–300)
EXPTIME-PRE: 13.9 SEC
FEF2575-%PRED-PRE: 19 %
FEF2575-PRE: 0.95 L/SEC
FEF2575-PRED: 4.74 L/SEC
FEFMAX-%PRED-PRE: 86 %
FEFMAX-PRE: 9.79 L/SEC
FEFMAX-PRED: 11.31 L/SEC
FEV1-%PRED-PRE: 61 %
FEV1-PRE: 3.06 L
FEV1FEV6-PRE: 60 %
FEV1FEV6-PRED: 82 %
FEV1FVC-PRE: 51 %
FEV1FVC-PRED: 81 %
FIFMAX-PRE: 10.47 L/SEC
FVC-%PRED-PRE: 96 %
FVC-PRE: 6 L
FVC-PRED: 6.21 L
PROT SERPL-MCNC: 7.7 G/DL (ref 6.8–8.8)

## 2019-11-26 PROCEDURE — 80076 HEPATIC FUNCTION PANEL: CPT | Performed by: PHYSICIAN ASSISTANT

## 2019-11-26 PROCEDURE — 87077 CULTURE AEROBIC IDENTIFY: CPT | Performed by: INTERNAL MEDICINE

## 2019-11-26 PROCEDURE — 87186 SC STD MICRODIL/AGAR DIL: CPT | Performed by: INTERNAL MEDICINE

## 2019-11-26 PROCEDURE — 87070 CULTURE OTHR SPECIMN AEROBIC: CPT | Performed by: INTERNAL MEDICINE

## 2019-11-26 PROCEDURE — G0463 HOSPITAL OUTPT CLINIC VISIT: HCPCS | Mod: ZF

## 2019-11-26 PROCEDURE — 36415 COLL VENOUS BLD VENIPUNCTURE: CPT | Performed by: PHYSICIAN ASSISTANT

## 2019-11-26 PROCEDURE — 99207 ZZC NO CHARGE LOS: CPT | Performed by: PHARMACIST

## 2019-11-26 PROCEDURE — 82550 ASSAY OF CK (CPK): CPT | Performed by: PHYSICIAN ASSISTANT

## 2019-11-26 PROCEDURE — 87107 FUNGI IDENTIFICATION MOLD: CPT | Performed by: INTERNAL MEDICINE

## 2019-11-26 RX ORDER — CROMOLYN SODIUM 20 MG/2ML
20 INHALANT INTRABRONCHIAL 3 TIMES DAILY
Qty: 180 ML | Refills: 12 | Status: SHIPPED | OUTPATIENT
Start: 2019-11-26 | End: 2022-10-31

## 2019-11-26 RX ORDER — FAMOTIDINE 20 MG/1
20 TABLET, FILM COATED ORAL 2 TIMES DAILY PRN
Qty: 20 TABLET | Refills: 3 | Status: SHIPPED | OUTPATIENT
Start: 2019-11-26

## 2019-11-26 RX ORDER — NUTRITIONAL SUPPLEMENT 0.04G-1/ML
2 LIQUID (ML) ORAL AT BEDTIME
Qty: 60 CAN | Refills: 11 | COMMUNITY
Start: 2019-11-26

## 2019-11-26 ASSESSMENT — PAIN SCALES - GENERAL: PAINLEVEL: NO PAIN (0)

## 2019-11-26 ASSESSMENT — MIFFLIN-ST. JEOR: SCORE: 1872.46

## 2019-11-26 NOTE — PROGRESS NOTES
Reason for Visit  Telly Leal is a 35 year old year old male who is being seen for RECHECK (CF )    CF HPI  The patient was seen and examined by Rocael Miller   I saw the patient 6 weeks about after a 7-month absence from clinic. He had a modest increase in respiratory symptoms and comparable decline in PFTs. Was partway through course of levofloxacin. Had not been quite as adherent to airway clearance. Today, he reports completing the levofloxacin with further improvement in symptoms. Currently with baseilne frequency of cough, color of sputum and but increased volume of sputum. He attributes this to missing vest for a few days with birth of his dtr 6 days ago. Back to vest bid. No hemoptysis or chest pain. Baseline exertional dyspnea.     Current Outpatient Medications   Medication     acetylcysteine (MUCOMYST) 20 % nebulizer solution     albuterol (2.5 MG/3ML) 0.083% neb solution     albuterol (PROAIR HFA) 108 (90 BASE) MCG/ACT Inhaler     azithromycin (ZITHROMAX) 250 MG tablet     blood glucose monitoring (NO BRAND SPECIFIED) test strip     Cholecalciferol 4000 UNITS CAPS     colistimethate/colistin-base activity (COLYMYCIN) 150 mg/2mL SOLR neb solution     CREON 92233-46056 units CPEP per EC capsule     cromolyn (INTAL) 20 MG/2ML neb solution     famotidine (PEPCID) 20 MG tablet     fluticasone (FLONASE) 50 MCG/ACT nasal spray     Multiple Vitamin (MULTIVITAMINS PO)     Nutritional Supplements (NUTREN 2.0)     phytonadione (MEPHYTON) 5 MG tablet     tobramycin, PF, (SOO) 300 MG/5ML neb solution     water for injection sterile SOLN     aztreonam (AZACTAM) 200 MG/ML     oseltamivir (TAMIFLU) 75 MG capsule     No current facility-administered medications for this visit.      No Known Allergies  Past Medical History:   Diagnosis Date     Chronic sinusitis      Cystic fibrosis with pulmonary manifestations (H)      Exocrine pancreatic insufficiency      GERD (gastroesophageal reflux disease)       Hemoptysis      Malnutrition (H)        Past Surgical History:   Procedure Laterality Date     LOBECTOMY      right upper lobe     TUBES         Social History     Socioeconomic History     Marital status:      Spouse name: Not on file     Number of children: Not on file     Years of education: Not on file     Highest education level: Not on file   Occupational History     Occupation:      Employer: [employer]   Social Needs     Financial resource strain: Not on file     Food insecurity:     Worry: Not on file     Inability: Not on file     Transportation needs:     Medical: Not on file     Non-medical: Not on file   Tobacco Use     Smoking status: Never Smoker     Smokeless tobacco: Never Used   Substance and Sexual Activity     Alcohol use: Not Currently     Alcohol/week: 0.0 standard drinks     Drug use: Not Currently     Sexual activity: Not on file   Lifestyle     Physical activity:     Days per week: Not on file     Minutes per session: Not on file     Stress: Not on file   Relationships     Social connections:     Talks on phone: Not on file     Gets together: Not on file     Attends Faith service: Not on file     Active member of club or organization: Not on file     Attends meetings of clubs or organizations: Not on file     Relationship status: Not on file     Intimate partner violence:     Fear of current or ex partner: Not on file     Emotionally abused: Not on file     Physically abused: Not on file     Forced sexual activity: Not on file   Other Topics Concern     Parent/sibling w/ CABG, MI or angioplasty before 65F 55M? Not Asked   Social History Narrative    Patient works for the [employer] as an .  He does not have stable living conditions at this time.  He has a Mosotho guillen.   Update: living with wife Bre in [facility]. Birth of daughter Nieves Rebolledo August, 2017, birth of daughter Josy Felder November, 2019    ROS Pulmonary  Const: No  "fever chills sweats.  GI: He missed some tube feeds around the time his daughter was born.  He is back into his routine.  No grease in his stools.  No abdominal pain or pain.  ENT: No sinus pain pressure postnasal drip.  A complete ROS was otherwise negative except as noted in the HPI.  /73   Pulse 106   Resp 17   Ht 1.905 m (6' 3\")   Wt 86.2 kg (190 lb)   SpO2 97%   BMI 23.75 kg/m    Exam:   GENERAL APPEARANCE: Well developed, well nourished, alert, and in no apparent distress.  EYES: anicteric  HENT: Nasal mucosa with no edema and no hyperemia. No nasal polyps.  MOUTH: Oral mucosa is moist, without any lesions, no tonsillar enlargement, no oropharyngeal exudate.  NECK: supple, no masses, no thyromegaly.  LYMPHATICS: No significant  cervical, or supraclavicular nodes.  RESP:  good air flow throughout.  Anterior lung fields a bit diminished vs previous. Normal chest wall excursion.  Reduced breath sounds at right posterior base as baseline.  CV: Normal S1, S2, regular rhythm, normal rate. No murmur.  No rub. No gallop. No LE edema.   ABDOMEN:  Bowel sounds normal, soft, nontender, no HSM or masses.   MS: extremities normal. No cyanosis.  SKIN:  PEG site without erythema or discharge. Chest scar-like lesion upper sternum unchanged. Diffuse freckles without overt change.  NEURO: Mentation intact, speech normal, normal gait and stance  PSYCH: mentation appears normal. and affect normal/bright  Results:  Recent Results (from the past 168 hour(s))   General PFT Lab (Please always keep checked)    Collection Time: 11/26/19  2:35 PM   Result Value Ref Range    FVC-Pred 6.21 L    FVC-Pre 6.00 L    FVC-%Pred-Pre 96 %    FEV1-Pre 3.06 L    FEV1-%Pred-Pre 61 %    FEV1FVC-Pred 81 %    FEV1FVC-Pre 51 %    FEFMax-Pred 11.31 L/sec    FEFMax-Pre 9.79 L/sec    FEFMax-%Pred-Pre 86 %    FEF2575-Pred 4.74 L/sec    FEF2575-Pre 0.95 L/sec    DPX6254-%Pred-Pre 19 %    ExpTime-Pre 13.90 sec    FIFMax-Pre 10.47 L/sec    " FEV1FEV6-Pred 82 %    FEV1FEV6-Pre 60 %   CK total    Collection Time: 11/26/19  4:30 PM   Result Value Ref Range    CK Total 86 30 - 300 U/L   Hepatic panel    Collection Time: 11/26/19  4:30 PM   Result Value Ref Range    Bilirubin Direct 0.2 0.0 - 0.2 mg/dL    Bilirubin Total 1.2 0.2 - 1.3 mg/dL    Albumin 3.8 3.4 - 5.0 g/dL    Protein Total 7.7 6.8 - 8.8 g/dL    Alkaline Phosphatase 83 40 - 150 U/L    ALT 42 0 - 70 U/L    AST 26 0 - 45 U/L     Spirometry interpretation: personally reviewed. Valid maneuver. Moderately severe obstruction. FEV1 up less than 5% from last visit but he is within his baseline.  Sputum last visit: WARREN barragan 2, aspergillus    Assessment and plan:  1. CF lung disease: He has a modest increase in sputum production which is likely due to missing some vest therapy during his daughter's birth.  Anticipate he will fully recover without additional antibiotic.  Pulmonary function tests are back within his previous baseline.  He will continue chronic azithromycin.  He will continue Chelly nebs every other month.  Will defer aztreonam nebs for now.  Will clarify when last used SOO nebs.  He will continue vest therapy twice a day.  Limited time to exercise with limited ability to change that currently.  1B.  CFTR modulation therapy: The patient's genotype is [id number]/CFTR del 2,3. Discussed with our pharmacist Cecilia and he is eligible for newly FDA approved triple therapy-Trikafta.  His liver function tests today have normalized and he has a normal baseline CK.  We reviewed risks and benefits of initiating therapy.  Based on the published data there looks to be substantial benefit and he is agreeable to proceeding.  He will need liver function tests every 3 months for 1 year and CK checks as well once initiated.  2. Impaired glucose tolerance: Fasting > 100 and peak > 200 and 2 hour value right at 140. A little worse than Jan 2018 but similar to previous. A1C most recently at 6.1%. Does not appear to be  an evolving problem and overall with good clinical course.   3.  Pancreatic insufficiency with a history of malnutrition on tube feeds: Adequate enzyme replacement by history.  His weight is down modestly likely due to missing some tube feeds and erratic meals around the time of his daughter's birth.  He has back on to his usual nutritional intake and will continue 2 cans TF nightly.  He has not been taking vitamin K supplement and will review with our nutritionist, Leona, whether he can obtain his vitamin K as part of a multivitamin supplement.  He has not had hemoptysis in years and I do not think he needs a separate vitamin K supplement at higher doses.  4. Acute on chronic CF sinus disease:Improving. Continue flonase.  5. GERD: quiescent off treatment.  He will use famotidine as needed  6. Skin lesions: Seen by outside Dermatologist. Central chest scar could be treated by steroid injections per pt but not actively pursuing. Plan in place for annual exam of his many freckles.  He is overdue for follow up with his dermatologist and encouraged him to make an appointment.  7. Healthcare maintenance: Annual studies due April, 2020. Patient received flu vaccine at work this fall.  Last visit Rx tamiflu for prn use today and reviewed indications for use.    Patient will follow up in 2 months    40 minutes spent in conjunction with this visit with > 50% of time spent counseling and coordinating care of above issues as well as reviewing annual studies with patient.     Rocael Miller

## 2019-11-26 NOTE — PATIENT INSTRUCTIONS
Cystic Fibrosis Self-Care Plan       MRN: [id number]   Clinic Date: November 26, 2019   Patient: Telly Leal     RECOMMENDATIONS:     YOUR GOAL:    CF Nurse Line:  Brice Reveles: [phone]   Sary Hassan, RT: [phone]     Leona Trevinojose manuel: [phone] or Karissa Ashraf, Dieticians: [phone]   Rissa Alvarez, Diabetes Nurse: [phone]      Tika Gutierrez: [phone] or Yoselin Abel [phone], Social Workers   [email or web address]    Annual Studies:   IGG   Date Value Ref Range Status   12/11/2015 1,160 695 - 1,620 mg/dL Final     Insulin   Date Value Ref Range Status   04/22/2019 53.4 (H) 3 - 25 mU/L Final     There are no preventive care reminders to display for this patient.      Pulmonary Function Tests  FEV1: amount of air you can blow out in 1 second  FVC: total amount of air you can take in and blow out    Your Goals:         PFT Latest Ref Rng & Units 11/26/2019   FVC L 6.00   FEV1 L 3.06   FVC% % 96   FEV1% % 61          Airway Clearance: The Most Important Way to Keep Your Lungs Healthy  Vest Settings:    Hill-Rom Frequencies: 8, 9, 10 Pressure 10 Then, Frequencies 18, 19, 20 Pressure 6      RespirTech: Quick Start with Pressure of     Do each frequency for 5 minutes; Deflate vest after each frequency & cough 3 times before beginning the next setting.    Vest and Neb Therapy should be done 2 times/day.    Good Nutrition Can Improve Lung Function and Overall Health     Take ALL of your vitamins with food     Take 1/2 of your enzymes before EVERY meal/snack and the other 1/2 mid-meal/snack    Wt Readings from Last 3 Encounters:   11/26/19 86.2 kg (190 lb)   10/14/19 87.5 kg (193 lb)   10/11/19 88.6 kg (195 lb 6.4 oz)       Body mass index is 23.75 kg/m .         National CF Foundation Recommendations for BMI in CF Adults: Women: at least 22 Men: at least 23        Controlling Blood Sugars Helps Prevent Lung Infections & Improves Nutrition  Test blood sugar:     In  the morning before eating (goal is )     2 hours after a meal (goal is less than 150)     When pre-meal glucose is greater than 150 add correction     At bedtime (if less than 100 eat a snack with 15 grams of carbohydrates  Last A1C Results:   Hemoglobin A1C   Date Value Ref Range Status   04/22/2019 6.1 (H) 0 - 5.6 % Final     Comment:     Normal <5.7% Prediabetes 5.7-6.4%  Diabetes 6.5% or higher - adopted from ADA   consensus guidelines.           If diabetic, measure A1C every 6 months. Goal: Under 7%    Staying Healthy    Research:  If you are interested in learning about research opportunities or have questions, please contact Evie Fairbanks at [phone] or [email or web address].       Foundation:  [employer] is a personalized resource service to help you with the insurance, financial, legal and other issues you are facing.  It's free, confidential and available to anyone with CF.  Ask your  for more information or contact [employer] directly at 086-MVHLRAC ([phone]) or compass@cff.org, or learn more at cff.org/compass.

## 2019-11-26 NOTE — NURSING NOTE
Chief Complaint   Patient presents with     RECHECK     CF     Medications reviewed and vital signs taken.   Mya Julian, KIRSTIN

## 2019-11-26 NOTE — LETTER
11/26/2019     RE: Telly Leal  [address] [address]     Dear Colleague,    Thank you for referring your patient, Telly Leal, to the [facility] at [facility]. Please see a copy of my visit note below.    Reason for Visit  Telly Leal is a 35 year old year old male who is being seen for RECHECK (CF )    CF HPI  The patient was seen and examined by Rocael Miller   I saw the patient 6 weeks about after a 7-month absence from clinic. He had a modest increase in respiratory symptoms and comparable decline in PFTs. Was partway through course of levofloxacin. Had not been quite as adherent to airway clearance. Today, he reports completing the levofloxacin with further improvement in symptoms. Currently with baseilne frequency of cough, color of sputum and but increased volume of sputum. He attributes this to missing vest for a few days with birth of his dtr 6 days ago. Back to vest bid. No hemoptysis or chest pain. Baseline exertional dyspnea.     Current Outpatient Medications   Medication     acetylcysteine (MUCOMYST) 20 % nebulizer solution     albuterol (2.5 MG/3ML) 0.083% neb solution     albuterol (PROAIR HFA) 108 (90 BASE) MCG/ACT Inhaler     azithromycin (ZITHROMAX) 250 MG tablet     blood glucose monitoring (NO BRAND SPECIFIED) test strip     Cholecalciferol 4000 UNITS CAPS     colistimethate/colistin-base activity (COLYMYCIN) 150 mg/2mL SOLR neb solution     CREON 10857-75178 units CPEP per EC capsule     cromolyn (INTAL) 20 MG/2ML neb solution     famotidine (PEPCID) 20 MG tablet     fluticasone (FLONASE) 50 MCG/ACT nasal spray     Multiple Vitamin (MULTIVITAMINS PO)     Nutritional Supplements (NUTREN 2.0)     phytonadione (MEPHYTON) 5 MG tablet     tobramycin, PF, (SOO) 300 MG/5ML neb solution     water for injection sterile SOLN     aztreonam (AZACTAM) 200 MG/ML     oseltamivir (TAMIFLU) 75 MG  capsule     No current facility-administered medications for this visit.      No Known Allergies  Past Medical History:   Diagnosis Date     Chronic sinusitis      Cystic fibrosis with pulmonary manifestations (H)      Exocrine pancreatic insufficiency      GERD (gastroesophageal reflux disease)      Hemoptysis      Malnutrition (H)        Past Surgical History:   Procedure Laterality Date     LOBECTOMY      right upper lobe     TUBES         Social History     Socioeconomic History     Marital status:      Spouse name: Not on file     Number of children: Not on file     Years of education: Not on file     Highest education level: Not on file   Occupational History     Occupation:      Employer: [employer]   Social Needs     Financial resource strain: Not on file     Food insecurity:     Worry: Not on file     Inability: Not on file     Transportation needs:     Medical: Not on file     Non-medical: Not on file   Tobacco Use     Smoking status: Never Smoker     Smokeless tobacco: Never Used   Substance and Sexual Activity     Alcohol use: Not Currently     Alcohol/week: 0.0 standard drinks     Drug use: Not Currently     Sexual activity: Not on file   Lifestyle     Physical activity:     Days per week: Not on file     Minutes per session: Not on file     Stress: Not on file   Relationships     Social connections:     Talks on phone: Not on file     Gets together: Not on file     Attends Rastafarian service: Not on file     Active member of club or organization: Not on file     Attends meetings of clubs or organizations: Not on file     Relationship status: Not on file     Intimate partner violence:     Fear of current or ex partner: Not on file     Emotionally abused: Not on file     Physically abused: Not on file     Forced sexual activity: Not on file   Other Topics Concern     Parent/sibling w/ CABG, MI or angioplasty before 65F 55M? Not Asked   Social History Narrative    Patient works for  "[employer] [place] as an .  He does not have stable living conditions at this time.  He has a Hebrew guillen.   Update: living with wife Bre in [facility]. Birth of daughter Nieves Rebolledo August, 2017, birth of daughter Josy Felder November, 2019    ROS Pulmonary  Const: No fever chills sweats.  GI: He missed some tube feeds around the time his daughter was born.  He is back into his routine.  No grease in his stools.  No abdominal pain or pain.  ENT: No sinus pain pressure postnasal drip.  A complete ROS was otherwise negative except as noted in the HPI.  /73   Pulse 106   Resp 17   Ht 1.905 m (6' 3\")   Wt 86.2 kg (190 lb)   SpO2 97%   BMI 23.75 kg/m     Exam:   GENERAL APPEARANCE: Well developed, well nourished, alert, and in no apparent distress.  EYES: anicteric  HENT: Nasal mucosa with no edema and no hyperemia. No nasal polyps.  MOUTH: Oral mucosa is moist, without any lesions, no tonsillar enlargement, no oropharyngeal exudate.  NECK: supple, no masses, no thyromegaly.  LYMPHATICS: No significant  cervical, or supraclavicular nodes.  RESP:  good air flow throughout.  Anterior lung fields a bit diminished vs previous. Normal chest wall excursion.  Reduced breath sounds at right posterior base as baseline.  CV: Normal S1, S2, regular rhythm, normal rate. No murmur.  No rub. No gallop. No LE edema.   ABDOMEN:  Bowel sounds normal, soft, nontender, no HSM or masses.   MS: extremities normal. No cyanosis.  SKIN:  PEG site without erythema or discharge. Chest scar-like lesion upper sternum unchanged. Diffuse freckles without overt change.  NEURO: Mentation intact, speech normal, normal gait and stance  PSYCH: mentation appears normal. and affect normal/bright  Results:  Recent Results (from the past 168 hour(s))   General PFT Lab (Please always keep checked)    Collection Time: 11/26/19  2:35 PM   Result Value Ref Range    FVC-Pred 6.21 L    FVC-Pre 6.00 L    FVC-%Pred-Pre 96 %    " FEV1-Pre 3.06 L    FEV1-%Pred-Pre 61 %    FEV1FVC-Pred 81 %    FEV1FVC-Pre 51 %    FEFMax-Pred 11.31 L/sec    FEFMax-Pre 9.79 L/sec    FEFMax-%Pred-Pre 86 %    FEF2575-Pred 4.74 L/sec    FEF2575-Pre 0.95 L/sec    YCX0497-%Pred-Pre 19 %    ExpTime-Pre 13.90 sec    FIFMax-Pre 10.47 L/sec    FEV1FEV6-Pred 82 %    FEV1FEV6-Pre 60 %   CK total    Collection Time: 11/26/19  4:30 PM   Result Value Ref Range    CK Total 86 30 - 300 U/L   Hepatic panel    Collection Time: 11/26/19  4:30 PM   Result Value Ref Range    Bilirubin Direct 0.2 0.0 - 0.2 mg/dL    Bilirubin Total 1.2 0.2 - 1.3 mg/dL    Albumin 3.8 3.4 - 5.0 g/dL    Protein Total 7.7 6.8 - 8.8 g/dL    Alkaline Phosphatase 83 40 - 150 U/L    ALT 42 0 - 70 U/L    AST 26 0 - 45 U/L     Spirometry interpretation: personally reviewed. Valid maneuver. Moderately severe obstruction. FEV1 up less than 5% from last visit but he is within his baseline.  Sputum last visit: PA x 2, aspergillus    Assessment and plan:  1. CF lung disease: He has a modest increase in sputum production which is likely due to missing some vest therapy during his daughter's birth.  Anticipate he will fully recover without additional antibiotic.  Pulmonary function tests are back within his previous baseline.  He will continue chronic azithromycin.  He will continue Chelly nebs every other month.  Will defer aztreonam nebs for now.  Will clarify when last used SOO nebs.  He will continue vest therapy twice a day.  Limited time to exercise with limited ability to change that currently.  1B.  CFTR modulation therapy: The patient's genotype is [id number]/CFTR del 2,3. Discussed with our pharmacist Cecilia and he is eligible for newly FDA approved triple therapy-Trikafta.  His liver function tests today have normalized and he has a normal baseline CK.  We reviewed risks and benefits of initiating therapy.  Based on the published data there looks to be substantial benefit and he is agreeable to proceeding.  He  will need liver function tests every 3 months for 1 year and CK checks as well once initiated.  2. Impaired glucose tolerance: Fasting > 100 and peak > 200 and 2 hour value right at 140. A little worse than Jan 2018 but similar to previous. A1C most recently at 6.1%. Does not appear to be an evolving problem and overall with good clinical course.   3.  Pancreatic insufficiency with a history of malnutrition on tube feeds: Adequate enzyme replacement by history.  His weight is down modestly likely due to missing some tube feeds and erratic meals around the time of his daughter's birth.  He has back on to his usual nutritional intake and will continue 2 cans TF nightly.  He has not been taking vitamin K supplement and will review with our nutritionist, Leona, whether he can obtain his vitamin K as part of a multivitamin supplement.  He has not had hemoptysis in years and I do not think he needs a separate vitamin K supplement at higher doses.  4. Acute on chronic CF sinus disease:Improving. Continue flonase.  5. GERD: quiescent off treatment.  He will use famotidine as needed  6. Skin lesions: Seen by outside Dermatologist. Central chest scar could be treated by steroid injections per pt but not actively pursuing. Plan in place for annual exam of his many freckles.  He is overdue for follow up with his dermatologist and encouraged him to make an appointment.  7. Healthcare maintenance: Annual studies due April, 2020. Patient received flu vaccine at work this fall.  Last visit Rx tamiflu for prn use today and reviewed indications for use.  Patient will follow up in 2 months  40 minutes spent in conjunction with this visit with > 50% of time spent counseling and coordinating care of above issues as well as reviewing annual studies with patient.     Rocael Miller

## 2019-11-26 NOTE — PROGRESS NOTES
Therapy Management:                                                    Telly Leal is a 39 year old male coming in for a therapy management visit.  He was referred to me from Dr. Miller.     Reason for Consult: Trikafta initiation    Discussion:      Patient is eligible for treatment with CFTR modulator therapy. Most appropriate choice for patient of currently available CFTR modulators is: elexacaftor/tezacaftor/ivacaftor based on age, CFTR mutation genotype, past medical history and current medications. Patient was previously naive to CFTR modulator.    Recommended dose two orange elexacaftor 100mg/tezacaftor 50mg/ivacaftor 75mg in the morning and one blue ivacaftor 150mg tablet in the evening    Dose should be given with age-appropriate fat containing food. Provided appropriate examples of fat-containing foods to patient (e.g. Whole milk, cheese, avocado, peanut butter).    Patient will not need dilated eye exam prior to initiation.    Baseline LFTs and CK drawn and are within normal limits Recommend continuing to monitor LFTs and CK quarterly for the first year of treatment then annually therafter.      Educated patient on potential side effects, including headache, GI disturbances, and increased respiratory symptoms during the first few days of taking the medication.  Education provided on how to administer medication, what to do if a dose is missed, monitoring prior to and while on therapy, medications/foods to avoid, and expected benefits.  Discussed with patient how to obtain CFTR modulator from specialty pharmacy and informed patient they will need to bring home supply if hospitalized. Patient was engaged in teaching and verbalized understanding.    Patient enrolled in [facility], paperwork signed in clinic today .      Plan:  1. We will work on obtaining insurance coverage for Trikafta.  Once insurance authorization is obtained we will send the prescription to [facility] per  patient request.  The pharmacy will contact you to arrange a delivery.  2. When you receive the medication, start taking Trikafta 2 orange tablets in the morning and 1 blue tablet in the evening, 12 hours apart with fat-containing food.    Will follow-up in 1-3 months to assess response to Trikafta.    Cecilia IgnacioD  CF Medication Therapy Management Pharmacist  [facility]  [phone]

## 2019-11-27 ENCOUNTER — TELEPHONE (OUTPATIENT)
Dept: PULMONOLOGY | Facility: CLINIC | Age: 39
End: 2019-11-27

## 2019-11-27 DIAGNOSIS — E84.9 CF (CYSTIC FIBROSIS) (H): Primary | ICD-10-CM

## 2019-11-27 RX ORDER — PEDIATRIC MULTIVIT 61/D3/VIT K 1500-800
1 CAPSULE ORAL DAILY
Qty: 60 CAPSULE | Refills: 11 | Status: SHIPPED | OUTPATIENT
Start: 2019-11-27 | End: 2020-12-18

## 2019-11-27 NOTE — PROGRESS NOTES
Nutrition Note-    Notified by CF team to start CF-specific MVI in order to discontinue individual Vitamin K and D. Sent message to patient via [employer] with instructions to start MVW Complete. Updated prescription to [facility].       Leona Marie RD, LD  Cystic Fibrosis/Lung Transplant Dietitian  Pager [phone]

## 2019-11-27 NOTE — TELEPHONE ENCOUNTER
PA Initiation    Medication: Trikafta- PENDING  Insurance Company:    Pharmacy Filling the Rx: [facility] - [place], [place] - [address]  Filling Pharmacy Phone:    Filling Pharmacy Fax:    Start Date: 11/27/2019

## 2019-12-01 LAB
BACTERIA SPEC CULT: ABNORMAL
SPECIMEN SOURCE: ABNORMAL

## 2019-12-03 NOTE — TELEPHONE ENCOUNTER
Prior Authorization Approval    Authorization Effective Date: 12/2/2019  Authorization Expiration Date: 12/2/2020  Medication: elexacaftor-tezacaftor-ivacaftor & ivacaftor (TRIKAFTA) 100-50-75 & 150 MG tablet pack (approved  Approved Dose/Quantity: 84 per 28 days  Reference #:     Insurance Company:    Expected CoPay:       CoPay Card Available:      Foundation Assistance Needed:    Which Pharmacy is filling the prescription (Not needed for infusion/clinic administered): [facility] - [place], [place] - [address]  Pharmacy Notified: Yes  Patient Notified: No

## 2019-12-16 NOTE — PROGRESS NOTES
[employer] Fitting Appointment Note    Telly Leal is well known to our Cystic Fibrosis clinic and evaluated in clinic today to assess fit and tolerance of the [employer] device for additional airway clearance options.     Patient currently participates in airway clearance two times daily, and increases ton three times daily with illness. He experiences difficulty getting these 30-45 minute sessions in due to frequent travel, young children in the home, and difficulty taking his current machine back and forth.    The [employer] device is a reliable, powerful, and battery operated portable option for airway clearance. It is almost 30% lighter than current device and significantly quieter, making it much easier to transport and use around small children. The [employer] also allows for targeted therapy to specific lung regions through an easy to use control, allowing for additional benefits that the patient's current device does not allow.     Additionally, Mr Leal hows signs of bronchiectasis on Chest CT, focal disease present on chest X-ray, frequent use of antibiotics, and daily productive cough that make it necessary for multiple times daily aggressive airway clearance. Regular airway clearance in people with Cystic Fibrosis has been proven to increase or stabilize lung function, decrease antibiotic use, and decrease exacerbations requiring hospitalizations. The [employer] allows for greater flexibility in all of these required multi-times daily treatments in.      The device was fit in clinic today, and patient wore it for >20 minutes to assess tolerance and benefits of use. Safety, basics of use, warranty, treatment specifics, and ordering process were reviewed with the patient. Provider and patient would like to move forward with the ordering process at this time.

## 2019-12-24 DIAGNOSIS — E84.0 CYSTIC FIBROSIS WITH PULMONARY MANIFESTATIONS (H): ICD-10-CM

## 2019-12-24 DIAGNOSIS — E53.8 VITAMIN B12 DEFICIENCY DISEASE: ICD-10-CM

## 2019-12-26 RX ORDER — ACETYLCYSTEINE 200 MG/ML
2 SOLUTION ORAL; RESPIRATORY (INHALATION) 3 TIMES DAILY
Qty: 180 ML | Refills: 12 | Status: SHIPPED | OUTPATIENT
Start: 2019-12-26 | End: 2021-02-15

## 2019-12-26 RX ORDER — ALBUTEROL SULFATE 0.83 MG/ML
2.5 SOLUTION RESPIRATORY (INHALATION) 3 TIMES DAILY
Qty: 360 ML | Refills: 11 | Status: SHIPPED | OUTPATIENT
Start: 2019-12-26 | End: 2022-10-31

## 2019-12-27 ENCOUNTER — CARE COORDINATION (OUTPATIENT)
Dept: PULMONOLOGY | Facility: CLINIC | Age: 39
End: 2019-12-27

## 2019-12-27 NOTE — PROGRESS NOTES
VM left with pt to check his [employer] message sent 11/27/19 to get LFTs done 6 weeks after starting Trikafta. Advised to call the CF office if he has questions.

## 2020-01-13 ENCOUNTER — OFFICE VISIT (OUTPATIENT)
Dept: PULMONOLOGY | Facility: CLINIC | Age: 40
End: 2020-01-13
Attending: INTERNAL MEDICINE
Payer: COMMERCIAL

## 2020-01-13 VITALS
WEIGHT: 197.75 LBS | SYSTOLIC BLOOD PRESSURE: 132 MMHG | RESPIRATION RATE: 17 BRPM | OXYGEN SATURATION: 97 % | HEART RATE: 94 BPM | DIASTOLIC BLOOD PRESSURE: 94 MMHG | BODY MASS INDEX: 24.59 KG/M2 | HEIGHT: 75 IN

## 2020-01-13 DIAGNOSIS — E84.0 CYSTIC FIBROSIS WITH PULMONARY MANIFESTATIONS (H): Primary | ICD-10-CM

## 2020-01-13 DIAGNOSIS — E84.9 CF (CYSTIC FIBROSIS) (H): ICD-10-CM

## 2020-01-13 LAB
ALBUMIN SERPL-MCNC: 3.9 G/DL (ref 3.4–5)
ALP SERPL-CCNC: 77 U/L (ref 40–150)
ALT SERPL W P-5'-P-CCNC: 45 U/L (ref 0–70)
AST SERPL W P-5'-P-CCNC: 28 U/L (ref 0–45)
BILIRUB DIRECT SERPL-MCNC: 0.3 MG/DL (ref 0–0.2)
BILIRUB SERPL-MCNC: 1.7 MG/DL (ref 0.2–1.3)
EXPTIME-PRE: 10.79 SEC
FEF2575-%PRED-PRE: 32 %
FEF2575-PRE: 1.53 L/SEC
FEF2575-PRED: 4.74 L/SEC
FEFMAX-%PRED-PRE: 86 %
FEFMAX-PRE: 9.79 L/SEC
FEFMAX-PRED: 11.31 L/SEC
FEV1-%PRED-PRE: 70 %
FEV1-PRE: 3.53 L
FEV1FEV6-PRE: 60 %
FEV1FEV6-PRED: 82 %
FEV1FVC-PRE: 56 %
FEV1FVC-PRED: 81 %
FIFMAX-PRE: 10.01 L/SEC
FVC-%PRED-PRE: 101 %
FVC-PRE: 6.31 L
FVC-PRED: 6.21 L
PROT SERPL-MCNC: 7.8 G/DL (ref 6.8–8.8)

## 2020-01-13 PROCEDURE — 87077 CULTURE AEROBIC IDENTIFY: CPT | Performed by: INTERNAL MEDICINE

## 2020-01-13 PROCEDURE — 36415 COLL VENOUS BLD VENIPUNCTURE: CPT | Performed by: PHYSICIAN ASSISTANT

## 2020-01-13 PROCEDURE — 87186 SC STD MICRODIL/AGAR DIL: CPT | Performed by: INTERNAL MEDICINE

## 2020-01-13 PROCEDURE — 87070 CULTURE OTHR SPECIMN AEROBIC: CPT | Performed by: INTERNAL MEDICINE

## 2020-01-13 PROCEDURE — 80076 HEPATIC FUNCTION PANEL: CPT | Performed by: PHYSICIAN ASSISTANT

## 2020-01-13 PROCEDURE — G0463 HOSPITAL OUTPT CLINIC VISIT: HCPCS | Mod: ZF

## 2020-01-13 ASSESSMENT — MIFFLIN-ST. JEOR: SCORE: 1907.63

## 2020-01-13 ASSESSMENT — PAIN SCALES - GENERAL: PAINLEVEL: NO PAIN (0)

## 2020-01-13 NOTE — LETTER
1/13/2020       RE: Telly Leal  [address] [address]     Dear Colleague,    Thank you for referring your patient, Telly Leal, to the [facility] at [facility]. Please see a copy of my visit note below.    Reason for Visit  Telly Leal is a 35 year old year old male who is being seen for RECHECK (CF )    CF HPI  The patient was seen and examined by Rocael Miller   At the patient's last clinic visit in early December his pulmonary function tests had returned to baseline after a course of oral antibiotics.  At that point we initiated triple therapy.  Today, the patient reports improved respiratory status despite having more difficulty adhering to his baseline airway clearance regimen.  His second daughter was born in November and there is been an adjustment to the routine accommodating that increased demands on childcare.  He had a particularly bad week last week with only 7 vessels during that time.  He will occasionally have a day without any vest therapy.  However, he currently is having minimal sputum production and what he does produce looks more like saliva.  He notes increased exercise tolerance when climbing the stairs at work and can walk 3 flights without stopping with less difficulty than previous.  He continues to alternate Chelly and SOO nebs and currently is on SOO.  No chest pain or hemoptysis.    Current Outpatient Medications   Medication     acetylcysteine (MUCOMYST) 20 % neb solution     albuterol (PROAIR HFA) 108 (90 BASE) MCG/ACT Inhaler     albuterol (PROVENTIL) (2.5 MG/3ML) 0.083% neb solution     azithromycin (ZITHROMAX) 250 MG tablet     blood glucose monitoring (NO BRAND SPECIFIED) test strip     Cholecalciferol 4000 UNITS CAPS     CREON 13084-03413 units CPEP per EC capsule     cromolyn (INTAL) 20 MG/2ML neb solution     elexacaftor-tezacaftor-ivacaftor & ivacaftor (TRIKAFTA)  100-50-75 & 150 MG tablet pack     famotidine (PEPCID) 20 MG tablet     fluticasone (FLONASE) 50 MCG/ACT nasal spray     Multiple Vitamin (MULTIVITAMINS PO)     Nutritional Supplements (NUTREN 2.0)     phytonadione (MEPHYTON) 5 MG tablet     tobramycin, PF, (SOO) 300 MG/5ML neb solution     water for injection sterile SOLN     colistimethate/colistin-base activity (COLYMYCIN) 150 mg/2mL SOLR neb solution     mvw complete formulation (SOFTGELS) capsule     oseltamivir (TAMIFLU) 75 MG capsule     No current facility-administered medications for this visit.      No Known Allergies  Past Medical History:   Diagnosis Date     Chronic sinusitis      Cystic fibrosis with pulmonary manifestations (H)      Exocrine pancreatic insufficiency      GERD (gastroesophageal reflux disease)      Hemoptysis      Malnutrition (H)        Past Surgical History:   Procedure Laterality Date     LOBECTOMY      right upper lobe     TUBES         Social History     Socioeconomic History     Marital status:      Spouse name: Not on file     Number of children: Not on file     Years of education: Not on file     Highest education level: Not on file   Occupational History     Occupation:      Employer: [employer]   Social Needs     Financial resource strain: Not on file     Food insecurity:     Worry: Not on file     Inability: Not on file     Transportation needs:     Medical: Not on file     Non-medical: Not on file   Tobacco Use     Smoking status: Never Smoker     Smokeless tobacco: Never Used   Substance and Sexual Activity     Alcohol use: Not Currently     Alcohol/week: 0.0 standard drinks     Drug use: Not Currently     Sexual activity: Not on file   Lifestyle     Physical activity:     Days per week: Not on file     Minutes per session: Not on file     Stress: Not on file   Relationships     Social connections:     Talks on phone: Not on file     Gets together: Not on file     Attends Spiritism service: Not on  "file     Active member of club or organization: Not on file     Attends meetings of clubs or organizations: Not on file     Relationship status: Not on file     Intimate partner violence:     Fear of current or ex partner: Not on file     Emotionally abused: Not on file     Physically abused: Not on file     Forced sexual activity: Not on file   Other Topics Concern     Parent/sibling w/ CABG, MI or angioplasty before 65F 55M? Not Asked   Social History Narrative    Patient works for the [employer] as an .  He does not have stable living conditions at this time.  He has a U[place] guillen.   Update: living with wife Bre in [facility]. Birth of daughter Nieves Rebolledo August, 2017, birth of daughter Josy Felder November, 2019    ROS Pulmonary  Const: No fever chills sweats.  GI: Consistently using 2 cans of tube feed per night.  No grease in his stools.  No abdominal pain.  ENT: No sinus pain pressure postnasal drip.  A complete ROS was otherwise negative except as noted in the HPI.  BP (!) 132/94   Pulse 94   Resp 17   Ht 1.905 m (6' 3\")   Wt 89.7 kg (197 lb 12 oz)   SpO2 97%   BMI 24.72 kg/m     Exam:   GENERAL APPEARANCE: Well developed, well nourished, alert, and in no apparent distress.  EYES: anicteric  HENT: Nasal mucosa with no edema and no hyperemia. No nasal polyps.  MOUTH: Oral mucosa is moist, without any lesions, no tonsillar enlargement, no oropharyngeal exudate.  NECK: supple, no masses, no thyromegaly.  LYMPHATICS: No significant  cervical, or supraclavicular nodes.  RESP:  good air flow throughout.  Anterior lung fields a bit diminished vs previous. Normal chest wall excursion.  Reduced breath sounds at right posterior base as baseline.  CV: Normal S1, S2, regular rhythm, normal rate. No murmur.  No rub. No gallop. No LE edema.   ABDOMEN:  Bowel sounds normal, soft, nontender, no HSM or masses.   MS: extremities normal. No cyanosis.  SKIN:  PEG site without erythema or " discharge. Chest scar-like lesion upper sternum unchanged. Diffuse freckles without overt change.  NEURO: Mentation intact, speech normal, normal gait and stance  PSYCH: mentation appears normal. and affect normal/bright  Results:  Recent Results (from the past 168 hour(s))   General PFT Lab (Please always keep checked)    Collection Time: 01/13/20  2:05 PM   Result Value Ref Range    FVC-Pred 6.21 L    FVC-Pre 6.31 L    FVC-%Pred-Pre 101 %    FEV1-Pre 3.53 L    FEV1-%Pred-Pre 70 %    FEV1FVC-Pred 81 %    FEV1FVC-Pre 56 %    FEFMax-Pred 11.31 L/sec    FEFMax-Pre 9.79 L/sec    FEFMax-%Pred-Pre 86 %    FEF2575-Pred 4.74 L/sec    FEF2575-Pre 1.53 L/sec    IYQ5115-%Pred-Pre 32 %    ExpTime-Pre 10.79 sec    FIFMax-Pre 10.01 L/sec    FEV1FEV6-Pred 82 %    FEV1FEV6-Pre 60 %   Hepatic panel    Collection Time: 01/13/20  3:33 PM   Result Value Ref Range    Bilirubin Direct 0.3 (H) 0.0 - 0.2 mg/dL    Bilirubin Total 1.7 (H) 0.2 - 1.3 mg/dL    Albumin 3.9 3.4 - 5.0 g/dL    Protein Total 7.8 6.8 - 8.8 g/dL    Alkaline Phosphatase 77 40 - 150 U/L    ALT 45 0 - 70 U/L    AST 28 0 - 45 U/L     Spirometry interpretation: personally reviewed. Valid maneuver.  Mild obstruction. FEV1 improved 15% from last visit.  He is above his recent baseline with today's values his best in 6 years.    Sputum last visit: PA x 2, aspergillus    Assessment and plan:  1. CF lung disease: Symptoms are at baseline and he has had a dramatic improvement in his pulmonary function tests despite having more difficulty with adherence to his baseline airway clearance regimen.  This is presumably due to initiation of triple therapy.  However, we did review the importance of maintaining his airway clearance regimen and he is in agreement.  He will work towards consistently completing 2 vest therapies per day.  He will continue to alternate Chelly nebs and SOO nebs.  He is having difficulty getting approval for Chelly nebs and will pursue prior authorization or  appeal as needed.  He is on chronic azithromycin therapy as well.  1B.  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  As above, he has had an impressive response to Trikafta.  His bilirubin is slightly elevated today but not sufficiently high that I think we need to hold his medication.  However, he will continue liver function tests every 3 months.  2. Impaired glucose tolerance: Last annual study fasting > 100 and peak > 200 and 2 hour value right at 140. A little worse than Jan 2018 but similar to previous. A1C most recently at 6.1%. Does not appear to be an evolving problem and overall with good clinical course.  May see improvement with modulation therapy.  Defer repeat endocrine evaluation.  3.  Pancreatic insufficiency with a history of malnutrition on tube feeds: Adequate enzyme replacement by history.  Last visit his weight had dropped after missing some tube feeds but today has recovered and BMI at baseline at 24.9.  Will consider cutting back tube feeds if ongoing weight gain resulting from modulation therapy.  Vitamin supplementation adjusted last visit but he has not received his prescription.  We have contacted our pharmacy and asked that they follow through on previous prescription.  4. Chronic CF sinus disease: Improving. Continue flonase.  5. Skin lesions: Seen by outside Dermatologist. Central chest scar could be treated by steroid injections per pt but not actively pursuing. Plan in place for annual exam of his many freckles.  He is overdue for follow up with his dermatologist and encouraged him to make an appointment.  6. Healthcare maintenance: Annual studies due April, 2020 and ordered for next visit. Patient received flu vaccine at work this fall.  Last visit Rx tamiflu for prn use today and reviewed indications for use.    Patient will follow up in 2 - 3 months    40 minutes spent in conjunction with this visit with > 50% of time spent counseling and  coordinating care of above issues as well as reviewing annual studies with patient.     Rocael Miller

## 2020-01-14 NOTE — PROGRESS NOTES
Reason for Visit  Telly Leal is a 35 year old year old male who is being seen for RECHECK (CF )    CF HPI  The patient was seen and examined by Rocael Miller   At the patient's last clinic visit in early December his pulmonary function tests had returned to baseline after a course of oral antibiotics.  At that point we initiated triple therapy.  Today, the patient reports improved respiratory status despite having more difficulty adhering to his baseline airway clearance regimen.  His second daughter was born in November and there is been an adjustment to the routine accommodating that increased demands on childcare.  He had a particularly bad week last week with only 7 vessels during that time.  He will occasionally have a day without any vest therapy.  However, he currently is having minimal sputum production and what he does produce looks more like saliva.  He notes increased exercise tolerance when climbing the stairs at work and can walk 3 flights without stopping with less difficulty than previous.  He continues to alternate Chelly and SOO nebs and currently is on SOO.  No chest pain or hemoptysis.    Current Outpatient Medications   Medication     acetylcysteine (MUCOMYST) 20 % neb solution     albuterol (PROAIR HFA) 108 (90 BASE) MCG/ACT Inhaler     albuterol (PROVENTIL) (2.5 MG/3ML) 0.083% neb solution     azithromycin (ZITHROMAX) 250 MG tablet     blood glucose monitoring (NO BRAND SPECIFIED) test strip     Cholecalciferol 4000 UNITS CAPS     CREON 91591-18388 units CPEP per EC capsule     cromolyn (INTAL) 20 MG/2ML neb solution     elexacaftor-tezacaftor-ivacaftor & ivacaftor (TRIKAFTA) 100-50-75 & 150 MG tablet pack     famotidine (PEPCID) 20 MG tablet     fluticasone (FLONASE) 50 MCG/ACT nasal spray     Multiple Vitamin (MULTIVITAMINS PO)     Nutritional Supplements (NUTREN 2.0)     phytonadione (MEPHYTON) 5 MG tablet     tobramycin, PF, (SOO) 300 MG/5ML neb solution     water for  injection sterile SOLN     colistimethate/colistin-base activity (COLYMYCIN) 150 mg/2mL SOLR neb solution     mvw complete formulation (SOFTGELS) capsule     oseltamivir (TAMIFLU) 75 MG capsule     No current facility-administered medications for this visit.      No Known Allergies  Past Medical History:   Diagnosis Date     Chronic sinusitis      Cystic fibrosis with pulmonary manifestations (H)      Exocrine pancreatic insufficiency      GERD (gastroesophageal reflux disease)      Hemoptysis      Malnutrition (H)        Past Surgical History:   Procedure Laterality Date     LOBECTOMY      right upper lobe     TUBES         Social History     Socioeconomic History     Marital status:      Spouse name: Not on file     Number of children: Not on file     Years of education: Not on file     Highest education level: Not on file   Occupational History     Occupation:      Employer: [employer]   Social Needs     Financial resource strain: Not on file     Food insecurity:     Worry: Not on file     Inability: Not on file     Transportation needs:     Medical: Not on file     Non-medical: Not on file   Tobacco Use     Smoking status: Never Smoker     Smokeless tobacco: Never Used   Substance and Sexual Activity     Alcohol use: Not Currently     Alcohol/week: 0.0 standard drinks     Drug use: Not Currently     Sexual activity: Not on file   Lifestyle     Physical activity:     Days per week: Not on file     Minutes per session: Not on file     Stress: Not on file   Relationships     Social connections:     Talks on phone: Not on file     Gets together: Not on file     Attends Mandaen service: Not on file     Active member of club or organization: Not on file     Attends meetings of clubs or organizations: Not on file     Relationship status: Not on file     Intimate partner violence:     Fear of current or ex partner: Not on file     Emotionally abused: Not on file     Physically abused: Not on  "file     Forced sexual activity: Not on file   Other Topics Concern     Parent/sibling w/ CABG, MI or angioplasty before 65F 55M? Not Asked   Social History Narrative    Patient works for the [employer] as an .  He does not have stable living conditions at this time.  He has a Czech guillen.   Update: living with wife Bre in [facility]. Birth of daughter Nieves Rebolledo August, 2017, birth of daughter Josy Felder November, 2019    ROS Pulmonary  Const: No fever chills sweats.  GI: Consistently using 2 cans of tube feed per night.  No grease in his stools.  No abdominal pain.  ENT: No sinus pain pressure postnasal drip.  A complete ROS was otherwise negative except as noted in the HPI.  BP (!) 132/94   Pulse 94   Resp 17   Ht 1.905 m (6' 3\")   Wt 89.7 kg (197 lb 12 oz)   SpO2 97%   BMI 24.72 kg/m    Exam:   GENERAL APPEARANCE: Well developed, well nourished, alert, and in no apparent distress.  EYES: anicteric  HENT: Nasal mucosa with no edema and no hyperemia. No nasal polyps.  MOUTH: Oral mucosa is moist, without any lesions, no tonsillar enlargement, no oropharyngeal exudate.  NECK: supple, no masses, no thyromegaly.  LYMPHATICS: No significant  cervical, or supraclavicular nodes.  RESP:  good air flow throughout.  Anterior lung fields a bit diminished vs previous. Normal chest wall excursion.  Reduced breath sounds at right posterior base as baseline.  CV: Normal S1, S2, regular rhythm, normal rate. No murmur.  No rub. No gallop. No LE edema.   ABDOMEN:  Bowel sounds normal, soft, nontender, no HSM or masses.   MS: extremities normal. No cyanosis.  SKIN:  PEG site without erythema or discharge. Chest scar-like lesion upper sternum unchanged. Diffuse freckles without overt change.  NEURO: Mentation intact, speech normal, normal gait and stance  PSYCH: mentation appears normal. and affect normal/bright  Results:  Recent Results (from the past 168 hour(s))   General PFT Lab (Please " always keep checked)    Collection Time: 01/13/20  2:05 PM   Result Value Ref Range    FVC-Pred 6.21 L    FVC-Pre 6.31 L    FVC-%Pred-Pre 101 %    FEV1-Pre 3.53 L    FEV1-%Pred-Pre 70 %    FEV1FVC-Pred 81 %    FEV1FVC-Pre 56 %    FEFMax-Pred 11.31 L/sec    FEFMax-Pre 9.79 L/sec    FEFMax-%Pred-Pre 86 %    FEF2575-Pred 4.74 L/sec    FEF2575-Pre 1.53 L/sec    IGT2773-%Pred-Pre 32 %    ExpTime-Pre 10.79 sec    FIFMax-Pre 10.01 L/sec    FEV1FEV6-Pred 82 %    FEV1FEV6-Pre 60 %   Hepatic panel    Collection Time: 01/13/20  3:33 PM   Result Value Ref Range    Bilirubin Direct 0.3 (H) 0.0 - 0.2 mg/dL    Bilirubin Total 1.7 (H) 0.2 - 1.3 mg/dL    Albumin 3.9 3.4 - 5.0 g/dL    Protein Total 7.8 6.8 - 8.8 g/dL    Alkaline Phosphatase 77 40 - 150 U/L    ALT 45 0 - 70 U/L    AST 28 0 - 45 U/L     Spirometry interpretation: personally reviewed. Valid maneuver.  Mild obstruction. FEV1 improved 15% from last visit.  He is above his recent baseline with today's values his best in 6 years.    Sputum last visit: PA x 2, aspergillus    Assessment and plan:  1. CF lung disease: Symptoms are at baseline and he has had a dramatic improvement in his pulmonary function tests despite having more difficulty with adherence to his baseline airway clearance regimen.  This is presumably due to initiation of triple therapy.  However, we did review the importance of maintaining his airway clearance regimen and he is in agreement.  He will work towards consistently completing 2 vest therapies per day.  He will continue to alternate Chelly nebs and SOO nebs.  He is having difficulty getting approval for Chelly nebs and will pursue prior authorization or appeal as needed.  He is on chronic azithromycin therapy as well.  1B.  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  As above, he has had an impressive response to Trikafta.  His bilirubin is slightly elevated today but not sufficiently high that I think we need to hold  his medication.  However, he will continue liver function tests every 3 months.  2. Impaired glucose tolerance: Last annual study fasting > 100 and peak > 200 and 2 hour value right at 140. A little worse than Jan 2018 but similar to previous. A1C most recently at 6.1%. Does not appear to be an evolving problem and overall with good clinical course.  May see improvement with modulation therapy.  Defer repeat endocrine evaluation.  3.  Pancreatic insufficiency with a history of malnutrition on tube feeds: Adequate enzyme replacement by history.  Last visit his weight had dropped after missing some tube feeds but today has recovered and BMI at baseline at 24.9.  Will consider cutting back tube feeds if ongoing weight gain resulting from modulation therapy.  Vitamin supplementation adjusted last visit but he has not received his prescription.  We have contacted our pharmacy and asked that they follow through on previous prescription.  4. Chronic CF sinus disease: Improving. Continue flonase.  5. Skin lesions: Seen by outside Dermatologist. Central chest scar could be treated by steroid injections per pt but not actively pursuing. Plan in place for annual exam of his many freckles.  He is overdue for follow up with his dermatologist and encouraged him to make an appointment.  6. Healthcare maintenance: Annual studies due April, 2020 and ordered for next visit. Patient received flu vaccine at work this fall.  Last visit Rx tamiflu for prn use today and reviewed indications for use.    Patient will follow up in 2 - 3 months    40 minutes spent in conjunction with this visit with > 50% of time spent counseling and coordinating care of above issues as well as reviewing annual studies with patient.     Rocael Miller

## 2020-01-18 LAB
BACTERIA SPEC CULT: ABNORMAL
BACTERIA SPEC CULT: ABNORMAL
Lab: ABNORMAL
SPECIMEN SOURCE: ABNORMAL

## 2020-01-20 DIAGNOSIS — E84.0 CYSTIC FIBROSIS WITH PULMONARY MANIFESTATIONS (H): ICD-10-CM

## 2020-01-20 RX ORDER — PANCRELIPASE 24000; 76000; 120000 [USP'U]/1; [USP'U]/1; [USP'U]/1
5-6 CAPSULE, DELAYED RELEASE PELLETS ORAL SEE ADMIN INSTRUCTIONS
Qty: 1200 CAPSULE | Refills: 11 | Status: SHIPPED | OUTPATIENT
Start: 2020-01-20 | End: 2020-12-16

## 2020-02-11 ENCOUNTER — TELEPHONE (OUTPATIENT)
Dept: PULMONOLOGY | Facility: CLINIC | Age: 40
End: 2020-02-11

## 2020-02-11 NOTE — TELEPHONE ENCOUNTER
Prior Authorization Approval    Authorization Effective Date: 2/7/2020  Authorization Expiration Date: 2/7/2021  Medication: Tobramycin (APPROVED)  Approved Dose/Quantity: 280 PER 28 DAYS  Reference #:     Insurance Company: [employer] - Phone [phone] Fax [phone]  Expected CoPay:       CoPay Card Available: Yes    Foundation Assistance Needed:    Which Pharmacy is filling the prescription (Not needed for infusion/clinic administered): [facility]  Pharmacy Notified: No  Patient Notified: No

## 2020-04-14 ENCOUNTER — TELEPHONE (OUTPATIENT)
Dept: PULMONOLOGY | Facility: CLINIC | Age: 40
End: 2020-04-14

## 2020-04-14 NOTE — TELEPHONE ENCOUNTER
Prior Authorization Approval    Authorization Effective Date: 4/10/2020  Authorization Expiration Date: 7/9/2020  Medication: Azithromycin  Approved Dose/Quantity: 30  Reference #:     Insurance Company: [employer] - Phone [phone] Fax [phone]  Expected CoPay:       CoPay Card Available:      Foundation Assistance Needed:    Which Pharmacy is filling the prescription (Not needed for infusion/clinic administered): [facility] - [place], [place] - [address]  Pharmacy Notified:    Patient Notified:

## 2020-04-17 ENCOUNTER — TELEPHONE (OUTPATIENT)
Dept: PULMONOLOGY | Facility: CLINIC | Age: 40
End: 2020-04-17

## 2020-04-17 NOTE — TELEPHONE ENCOUNTER
Called patient to screen for symptoms and change upcoming appointment to virtual visit. Patient states they have not experienced any recent fevers. Patient is agreeable to participate in virtual visit on Tuesday 4/21 at 1:00. Verified correct email address listed in Epic. Instructed patient to have ipad/laptop available and open to email at time of visit. Expecting phone call from [facility] prior to appointment time. Pt aware that he is due for lab monitoring as well as annual labs. Patient will try to get labs drawn prior to visit at local  lab. Verbalized understanding.  Georgia Granda, RN, BSN

## 2020-04-20 DIAGNOSIS — E84.0 CYSTIC FIBROSIS WITH PULMONARY MANIFESTATIONS (H): ICD-10-CM

## 2020-04-20 LAB
ALBUMIN SERPL-MCNC: 3.9 G/DL (ref 3.4–5)
ALBUMIN UR-MCNC: NEGATIVE MG/DL
ALP SERPL-CCNC: 78 U/L (ref 40–150)
ALT SERPL W P-5'-P-CCNC: 50 U/L (ref 0–70)
ANION GAP SERPL CALCULATED.3IONS-SCNC: 4 MMOL/L (ref 3–14)
APPEARANCE UR: NORMAL
AST SERPL W P-5'-P-CCNC: 32 U/L (ref 0–45)
BASOPHILS # BLD AUTO: 0 10E9/L (ref 0–0.2)
BASOPHILS NFR BLD AUTO: 0.6 %
BILIRUB UR QL STRIP: NEGATIVE
BUN SERPL-MCNC: 19 MG/DL (ref 7–30)
CALCIUM SERPL-MCNC: 8.9 MG/DL (ref 8.5–10.1)
CHLORIDE SERPL-SCNC: 107 MMOL/L (ref 94–109)
CHOLEST SERPL-MCNC: 147 MG/DL
CO2 SERPL-SCNC: 27 MMOL/L (ref 20–32)
COLOR UR AUTO: YELLOW
CREAT SERPL-MCNC: 0.85 MG/DL (ref 0.66–1.25)
CREAT UR-MCNC: 171 MG/DL
DIFFERENTIAL METHOD BLD: NORMAL
EOSINOPHIL # BLD AUTO: 0.1 10E9/L (ref 0–0.7)
EOSINOPHIL NFR BLD AUTO: 1.2 %
ERYTHROCYTE [DISTWIDTH] IN BLOOD BY AUTOMATED COUNT: 14.1 % (ref 10–15)
GFR SERPL CREATININE-BSD FRML MDRD: >90 ML/MIN/{1.73_M2}
GLUCOSE SERPL-MCNC: 140 MG/DL (ref 70–99)
GLUCOSE UR STRIP-MCNC: NEGATIVE MG/DL
HBA1C MFR BLD: 5.6 % (ref 0–5.6)
HCT VFR BLD AUTO: 46.1 % (ref 40–53)
HDLC SERPL-MCNC: 40 MG/DL
HGB BLD-MCNC: 15.8 G/DL (ref 13.3–17.7)
HGB UR QL STRIP: NEGATIVE
IMM GRANULOCYTES # BLD: 0 10E9/L (ref 0–0.4)
IMM GRANULOCYTES NFR BLD: 0.2 %
INR PPP: 1.07 (ref 0.86–1.14)
IRON SERPL-MCNC: 109 UG/DL (ref 35–180)
KETONES UR STRIP-MCNC: NEGATIVE MG/DL
LDLC SERPL CALC-MCNC: 79 MG/DL
LEUKOCYTE ESTERASE UR QL STRIP: NEGATIVE
LYMPHOCYTES # BLD AUTO: 2.4 10E9/L (ref 0.8–5.3)
LYMPHOCYTES NFR BLD AUTO: 47 %
MAGNESIUM SERPL-MCNC: 2.2 MG/DL (ref 1.6–2.3)
MCH RBC QN AUTO: 30.6 PG (ref 26.5–33)
MCHC RBC AUTO-ENTMCNC: 34.3 G/DL (ref 31.5–36.5)
MCV RBC AUTO: 89 FL (ref 78–100)
MICROALBUMIN UR-MCNC: 9 MG/L
MICROALBUMIN/CREAT UR: 5.27 MG/G CR (ref 0–17)
MONOCYTES # BLD AUTO: 0.3 10E9/L (ref 0–1.3)
MONOCYTES NFR BLD AUTO: 6.4 %
NEUTROPHILS # BLD AUTO: 2.3 10E9/L (ref 1.6–8.3)
NEUTROPHILS NFR BLD AUTO: 44.6 %
NITRATE UR QL: NEGATIVE
NONHDLC SERPL-MCNC: 107 MG/DL
NRBC # BLD AUTO: 0 10*3/UL
NRBC BLD AUTO-RTO: 0 /100
PH UR STRIP: 6 PH (ref 5–7)
PHOSPHATE SERPL-MCNC: 4 MG/DL (ref 2.5–4.5)
PLATELET # BLD AUTO: 258 10E9/L (ref 150–450)
POTASSIUM SERPL-SCNC: 3.5 MMOL/L (ref 3.4–5.3)
PROT SERPL-MCNC: 7.7 G/DL (ref 6.8–8.8)
RBC # BLD AUTO: 5.16 10E12/L (ref 4.4–5.9)
RBC #/AREA URNS AUTO: <1 /HPF (ref 0–2)
SODIUM SERPL-SCNC: 138 MMOL/L (ref 133–144)
SOURCE: NORMAL
SP GR UR STRIP: 1.02 (ref 1–1.03)
TRIGL SERPL-MCNC: 141 MG/DL
UROBILINOGEN UR STRIP-MCNC: 0 MG/DL (ref 0–2)
WBC # BLD AUTO: 5.2 10E9/L (ref 4–11)
WBC #/AREA URNS AUTO: 1 /HPF (ref 0–5)

## 2020-04-21 ENCOUNTER — VIRTUAL VISIT (OUTPATIENT)
Dept: PULMONOLOGY | Facility: CLINIC | Age: 40
End: 2020-04-21
Attending: INTERNAL MEDICINE
Payer: COMMERCIAL

## 2020-04-21 DIAGNOSIS — E84.0 CYSTIC FIBROSIS WITH PULMONARY MANIFESTATIONS (H): Primary | ICD-10-CM

## 2020-04-21 LAB — DEPRECATED CALCIDIOL+CALCIFEROL SERPL-MC: 36 UG/L (ref 20–75)

## 2020-04-21 NOTE — PROGRESS NOTES
"Telly Leal is a 39 year old male who is being evaluated via a billable video visit.      The patient has been notified of following:     \"This video visit will be conducted via a call between you and your physician/provider. We have found that certain health care needs can be provided without the need for an in-person physical exam.  This service lets us provide the care you need with a video conversation.  If a prescription is necessary we can send it directly to your pharmacy.  If lab work is needed we can place an order for that and you can then stop by our lab to have the test done at a later time.    Video visits are billed at different rates depending on your insurance coverage.  Please reach out to your insurance provider with any questions.    If during the course of the call the physician/provider feels a video visit is not appropriate, you will not be charged for this service.\"    Patient has given verbal consent for Video visit? Yes    How would you like to obtain your AVS? Celiaharbrooklynn    Patient would like the video invitation sent by: Send to e-mail at: [email or web address]      Converted to Phone visit due to technical problems.    Reason for Visit  Telly Leal is a 39 year old year old male who is being seen for RECHECK (Return video visit )    CF HPI  The patient was seen and examined by Rocael Miller MD   When I last saw the patient 3 months ago he had significant improvement in his respiratory symptoms as well as improved pulmonary function tests after transitioning to Trikafta the previous month.  Today, he reports doing well from a respiratory standpoint since then.  He is completing vest therapy consistently twice a day.  He continues to alternate inhaled antibiotics and tolerating them well.  He is producing minimal sputum which is white to pale yellow in appearance.  This remains far below his baseline from earlier in 2019.  He has been getting out for walks on a nightly " basis and reports good exercise tolerance although he is not doing any formal aerobic exercise.  No hemoptysis or chest pain.    Current Outpatient Medications   Medication     acetylcysteine (MUCOMYST) 20 % neb solution     albuterol (PROAIR HFA) 108 (90 BASE) MCG/ACT Inhaler     albuterol (PROVENTIL) (2.5 MG/3ML) 0.083% neb solution     azithromycin (ZITHROMAX) 250 MG tablet     blood glucose monitoring (NO BRAND SPECIFIED) test strip     Cholecalciferol 4000 UNITS CAPS     colistimethate/colistin-base activity (COLYMYCIN) 150 mg/2mL SOLR neb solution     CREON 71523-02142 units CPEP per EC capsule     cromolyn (INTAL) 20 MG/2ML neb solution     elexacaftor-tezacaftor-ivacaftor & ivacaftor (TRIKAFTA) 100-50-75 & 150 MG tablet pack     famotidine (PEPCID) 20 MG tablet     fluticasone (FLONASE) 50 MCG/ACT nasal spray     Multiple Vitamin (MULTIVITAMINS PO)     mvw complete formulation (SOFTGELS) capsule     Nutritional Supplements (NUTREN 2.0)     phytonadione (MEPHYTON) 5 MG tablet     tobramycin, PF, (SOO) 300 MG/5ML neb solution     water for injection sterile SOLN     oseltamivir (TAMIFLU) 75 MG capsule     No current facility-administered medications for this visit.      No Known Allergies  Past Medical History:   Diagnosis Date     Chronic sinusitis      Cystic fibrosis with pulmonary manifestations (H)      Exocrine pancreatic insufficiency      GERD (gastroesophageal reflux disease)      Hemoptysis      Malnutrition (H)        Past Surgical History:   Procedure Laterality Date     LOBECTOMY      right upper lobe     TUBES         Social History     Socioeconomic History     Marital status:      Spouse name: Not on file     Number of children: Not on file     Years of education: Not on file     Highest education level: Not on file   Occupational History     Occupation:      Employer: [employer]   Social Needs     Financial resource strain: Not on file     Food insecurity     Worry:  Not on file     Inability: Not on file     Transportation needs     Medical: Not on file     Non-medical: Not on file   Tobacco Use     Smoking status: Never Smoker     Smokeless tobacco: Never Used   Substance and Sexual Activity     Alcohol use: Not Currently     Alcohol/week: 0.0 standard drinks     Drug use: Not Currently     Sexual activity: Not on file   Lifestyle     Physical activity     Days per week: Not on file     Minutes per session: Not on file     Stress: Not on file   Relationships     Social connections     Talks on phone: Not on file     Gets together: Not on file     Attends Mosque service: Not on file     Active member of club or organization: Not on file     Attends meetings of clubs or organizations: Not on file     Relationship status: Not on file     Intimate partner violence     Fear of current or ex partner: Not on file     Emotionally abused: Not on file     Physically abused: Not on file     Forced sexual activity: Not on file   Other Topics Concern     Parent/sibling w/ CABG, MI or angioplasty before 65F 55M? Not Asked   Social History Narrative    Patient works for the [employer] as an .  He does not have stable living conditions at this time.  He has a Kazakh guillen.       ROS Pulmonary  Constitutional: No fatigue, fever chills sweats.  GI: No diarrhea constipation abdominal pain or grease in his stools or reflux symptoms.  He continues to use 2 cans of tube feed per night.  He infrequently checks his weight but a few weeks ago it was 200 pounds.  Endocrine: No symptoms to suggest hypoglycemia.  No polyuria polydipsia polyphagia.  ENT: Currently has some mild rhinorrhea and itchiness he attributes to seasonal allergies.  A complete ROS was otherwise negative except as noted in the HPI.    Results:  Last sputum culture: PA x 1  Recent Results (from the past 168 hour(s))   Albumin Random Urine Quantitative with Creat Ratio    Collection Time: 04/20/20  1:15 PM   Result  Value Ref Range    Creatinine Urine 171 mg/dL    Albumin Urine mg/L 9 mg/L    Albumin Urine mg/g Cr 5.27 0 - 17 mg/g Cr   Routine UA with microscopic    Collection Time: 04/20/20  1:15 PM   Result Value Ref Range    Color Urine Yellow     Appearance Urine Slightly Cloudy     Glucose Urine Negative NEG^Negative mg/dL    Bilirubin Urine Negative NEG^Negative    Ketones Urine Negative NEG^Negative mg/dL    Specific Gravity Urine 1.021 1.003 - 1.035    Blood Urine Negative NEG^Negative    pH Urine 6.0 5.0 - 7.0 pH    Protein Albumin Urine Negative NEG^Negative mg/dL    Urobilinogen mg/dL 0.0 0.0 - 2.0 mg/dL    Nitrite Urine Negative NEG^Negative    Leukocyte Esterase Urine Negative NEG^Negative    Source Midstream Urine     WBC Urine 1 0 - 5 /HPF    RBC Urine <1 0 - 2 /HPF   CBC with platelets differential    Collection Time: 04/20/20  1:16 PM   Result Value Ref Range    WBC 5.2 4.0 - 11.0 10e9/L    RBC Count 5.16 4.4 - 5.9 10e12/L    Hemoglobin 15.8 13.3 - 17.7 g/dL    Hematocrit 46.1 40.0 - 53.0 %    MCV 89 78 - 100 fl    MCH 30.6 26.5 - 33.0 pg    MCHC 34.3 31.5 - 36.5 g/dL    RDW 14.1 10.0 - 15.0 %    Platelet Count 258 150 - 450 10e9/L    Diff Method Automated Method     % Neutrophils 44.6 %    % Lymphocytes 47.0 %    % Monocytes 6.4 %    % Eosinophils 1.2 %    % Basophils 0.6 %    % Immature Granulocytes 0.2 %    Nucleated RBCs 0 0 /100    Absolute Neutrophil 2.3 1.6 - 8.3 10e9/L    Absolute Lymphocytes 2.4 0.8 - 5.3 10e9/L    Absolute Monocytes 0.3 0.0 - 1.3 10e9/L    Absolute Eosinophils 0.1 0.0 - 0.7 10e9/L    Absolute Basophils 0.0 0.0 - 0.2 10e9/L    Abs Immature Granulocytes 0.0 0 - 0.4 10e9/L    Absolute Nucleated RBC 0.0    Vitamin D Deficiency    Collection Time: 04/20/20  1:16 PM   Result Value Ref Range    Vitamin D Deficiency screening 36 20 - 75 ug/L   Protein total    Collection Time: 04/20/20  1:16 PM   Result Value Ref Range    Protein Total 7.7 6.8 - 8.8 g/dL   Phosphorus    Collection Time:  04/20/20  1:16 PM   Result Value Ref Range    Phosphorus 4.0 2.5 - 4.5 mg/dL   Magnesium    Collection Time: 04/20/20  1:16 PM   Result Value Ref Range    Magnesium 2.2 1.6 - 2.3 mg/dL   INR    Collection Time: 04/20/20  1:16 PM   Result Value Ref Range    INR 1.07 0.86 - 1.14   Hemoglobin A1c    Collection Time: 04/20/20  1:16 PM   Result Value Ref Range    Hemoglobin A1C 5.6 0 - 5.6 %   Iron    Collection Time: 04/20/20  1:16 PM   Result Value Ref Range    Iron 109 35 - 180 ug/dL   Alkaline phosphatase    Collection Time: 04/20/20  1:16 PM   Result Value Ref Range    Alkaline Phosphatase 78 40 - 150 U/L   AST    Collection Time: 04/20/20  1:16 PM   Result Value Ref Range    AST 32 0 - 45 U/L   Albumin level    Collection Time: 04/20/20  1:16 PM   Result Value Ref Range    Albumin 3.9 3.4 - 5.0 g/dL   Lipid Profile    Collection Time: 04/20/20  1:16 PM   Result Value Ref Range    Cholesterol 147 <200 mg/dL    Triglycerides 141 <150 mg/dL    HDL Cholesterol 40 >39 mg/dL    LDL Cholesterol Calculated 79 <100 mg/dL    Non HDL Cholesterol 107 <130 mg/dL   Basic metabolic panel    Collection Time: 04/20/20  1:16 PM   Result Value Ref Range    Sodium 138 133 - 144 mmol/L    Potassium 3.5 3.4 - 5.3 mmol/L    Chloride 107 94 - 109 mmol/L    Carbon Dioxide 27 20 - 32 mmol/L    Anion Gap 4 3 - 14 mmol/L    Glucose 140 (H) 70 - 99 mg/dL    Urea Nitrogen 19 7 - 30 mg/dL    Creatinine 0.85 0.66 - 1.25 mg/dL    GFR Estimate >90 >60 mL/min/[1.73_m2]    GFR Estimate If Black >90 >60 mL/min/[1.73_m2]    Calcium 8.9 8.5 - 10.1 mg/dL   ALT    Collection Time: 04/20/20  1:16 PM   Result Value Ref Range    ALT 50 0 - 70 U/L       Assessment and plan:   1.  Cystic fibrosis lung disease: Excellent clinical course over the past few months.  Cranks of symptoms much improved likely due to Trikafta.  Suspect his pulmonary function tests would be at baseline if we were able to check them.  Will not make any changes to his regimen.  Reinforced  the importance of staying closely adherent even though his symptom control is so good particularly in light of coronavirus risk.  He will continue alternating SOO and colistin nebs as well as chronic azithromycin.  2.  C FTR modulation therapy: Tolerating this well with good response.  Liver function tests normal.  He will need repeat testing in 3 months.  3.  Pancreatic exocrine insufficiency with prior history of malnutrition: Doing well in this regard with reported weight stable.  His BMI has been at target.  He has adequate enzyme replacement by history.  He will continue 2 cans of tube feeds nightly.  4.  Chronic cystic fibrosis sinusitis: He may have an allergic component.  Currently doing well.  5.  Impaired glucose tolerance: Hemoglobin A1c 5.6% this week provide some reassurance this has not worsened.  Weight stability also reassuring.  Unable to obtain glucose tolerance test in light of coronavirus risk.  6.  Skin lesions: Followed by outside dermatologist.  He reports no noticeable change since last visit.  7.  Healthcare maintenance: Annual studies due April, 2021.  However, he is overdue for his DEXA scan and will obtain this once clinic resumes regular function.  8.  COVID-19 concerns: The patient has been working from home on reduced hours for the past 5 weeks.  He is taking appropriate quarantine precautions.  I recommended he return to routine contact with others in delayed fashion as quarantine restrictions are loose and in order to allow time to discern whether a surge in cases will occur.  The patient will follow-up in 3 months  Telephone start time: 1 PM  Telephone end time: 1:30 PM  Duration of phone call 30 minutes  Rocael Miller MD

## 2020-04-22 LAB
IGE SERPL-ACNC: 6 KIU/L (ref 0–114)
TESTOST SERPL-MCNC: 368 NG/DL (ref 240–950)

## 2020-04-23 LAB
A-TOCOPHEROL VIT E SERPL-MCNC: 9.7 MG/L (ref 5.5–18)
ANNOTATION COMMENT IMP: NORMAL
BETA+GAMMA TOCOPHEROL SERPL-MCNC: 0.3 MG/L (ref 0–6)
RETINYL PALMITATE SERPL-MCNC: <0.02 MG/L (ref 0–0.1)
VIT A SERPL-MCNC: 0.7 MG/L (ref 0.3–1.2)

## 2020-07-15 DIAGNOSIS — E84.0 CYSTIC FIBROSIS WITH PULMONARY MANIFESTATIONS (H): ICD-10-CM

## 2020-07-15 RX ORDER — TOBRAMYCIN INHALATION SOLUTION 300 MG/5ML
INHALANT RESPIRATORY (INHALATION)
Qty: 280 ML | Refills: 4 | Status: SHIPPED | OUTPATIENT
Start: 2020-07-15 | End: 2021-09-13

## 2020-07-29 ENCOUNTER — TELEPHONE (OUTPATIENT)
Dept: PHARMACY | Facility: CLINIC | Age: 40
End: 2020-07-29

## 2020-07-29 DIAGNOSIS — E84.9 CF (CYSTIC FIBROSIS) (H): Primary | ICD-10-CM

## 2020-07-29 NOTE — TELEPHONE ENCOUNTER
LM for patient to remind him he is due for labs. Orders are in his chart, he just needs to call to schedule a lab appointment at a [facility].    Cecilia IgnacioD  CF Medication Therapy Management Pharmacist  [facility]  [phone]

## 2020-08-20 DIAGNOSIS — E84.0 CYSTIC FIBROSIS WITH PULMONARY MANIFESTATIONS (H): ICD-10-CM

## 2020-08-20 DIAGNOSIS — E84.9 CF (CYSTIC FIBROSIS) (H): ICD-10-CM

## 2020-08-20 LAB
ALBUMIN SERPL-MCNC: 3.8 G/DL (ref 3.4–5)
ALP SERPL-CCNC: 83 U/L (ref 40–150)
ALT SERPL W P-5'-P-CCNC: 37 U/L (ref 0–70)
AST SERPL W P-5'-P-CCNC: 26 U/L (ref 0–45)
BILIRUB DIRECT SERPL-MCNC: 0.3 MG/DL (ref 0–0.2)
BILIRUB SERPL-MCNC: 3.1 MG/DL (ref 0.2–1.3)
CK SERPL-CCNC: 169 U/L (ref 30–300)
PROT SERPL-MCNC: 7.3 G/DL (ref 6.8–8.8)

## 2020-08-21 DIAGNOSIS — E84.9 CF (CYSTIC FIBROSIS) (H): Primary | ICD-10-CM

## 2020-08-24 ENCOUNTER — VIRTUAL VISIT (OUTPATIENT)
Dept: PULMONOLOGY | Facility: CLINIC | Age: 40
End: 2020-08-24
Attending: INTERNAL MEDICINE
Payer: COMMERCIAL

## 2020-08-24 DIAGNOSIS — E84.0 CYSTIC FIBROSIS WITH PULMONARY MANIFESTATIONS (H): Primary | ICD-10-CM

## 2020-08-24 NOTE — PROGRESS NOTES
"Telly Leal is a 40 year old male who is being evaluated via a billable video visit.      The patient has been notified of following:     \"This video visit will be conducted via a call between you and your physician/provider. We have found that certain health care needs can be provided without the need for an in-person physical exam.  This service lets us provide the care you need with a video conversation.  If a prescription is necessary we can send it directly to your pharmacy.  If lab work is needed we can place an order for that and you can then stop by our lab to have the test done at a later time.    Video visits are billed at different rates depending on your insurance coverage.  Please reach out to your insurance provider with any questions.    If during the course of the call the physician/provider feels a video visit is not appropriate, you will not be charged for this service.\"    Patient has given verbal consent for Video visit? Yes  How would you like to obtain your AVS? MyChart  If you are dropped from the video visit, the video invite should be resent to: Other e-mail: [employer]  Will anyone else be joining your video visit? No        Video-Visit Details    Type of service:  Video Visit    Video Start Time: 1:05 PM  Video End Time: 1:35    Originating Location (pt. Location): Home    Distant Location (provider location):  [facility]     Platform used for Video Visit: Kwasi Miller MD    Reason for Visit  Telly Leal is a 40 year old year old male who is being seen for Follow Up (cf)    CF HPI  Since the patient's last virtual visit in April he reports doing well.  Specifically, he has not had any sustained increase in respiratory symptoms.  He has been busier in the past month with his children transitioning back into  and he has been back working full-time since earlier this month.  He has missed therapy a few days during this time.  " If he did so, he would feel tight.  He has minimal sputum production and is not bothered by cough between therapies.  He typically is using his vest twice a day.  No hemoptysis or chest pain.      Current Outpatient Medications   Medication     acetylcysteine (MUCOMYST) 20 % neb solution     albuterol (PROAIR HFA) 108 (90 BASE) MCG/ACT Inhaler     albuterol (PROVENTIL) (2.5 MG/3ML) 0.083% neb solution     azithromycin (ZITHROMAX) 250 MG tablet     blood glucose monitoring (NO BRAND SPECIFIED) test strip     Cholecalciferol 4000 UNITS CAPS     colistimethate/colistin-base activity (COLYMYCIN) 150 mg/2mL SOLR neb solution     CREON 25026-36342 units CPEP per EC capsule     cromolyn (INTAL) 20 MG/2ML neb solution     elexacaftor-tezacaftor-ivacaftor & ivacaftor (TRIKAFTA) 100-50-75 & 150 MG tablet pack     famotidine (PEPCID) 20 MG tablet     fluticasone (FLONASE) 50 MCG/ACT nasal spray     Multiple Vitamin (MULTIVITAMINS PO)     mvw complete formulation (SOFTGELS) capsule     Nutritional Supplements (NUTREN 2.0)     phytonadione (MEPHYTON) 5 MG tablet     tobramycin, PF, (SOO) 300 MG/5ML neb solution     water for injection sterile SOLN     oseltamivir (TAMIFLU) 75 MG capsule     No current facility-administered medications for this visit.      No Known Allergies  Past Medical History:   Diagnosis Date     Chronic sinusitis      Cystic fibrosis with pulmonary manifestations (H)      Exocrine pancreatic insufficiency      GERD (gastroesophageal reflux disease)      Hemoptysis      Malnutrition (H)        Past Surgical History:   Procedure Laterality Date     LOBECTOMY      right upper lobe     TUBES         Social History     Socioeconomic History     Marital status:      Spouse name: Not on file     Number of children: Not on file     Years of education: Not on file     Highest education level: Not on file   Occupational History     Occupation:      Employer: [employer]   Social Needs      Financial resource strain: Not on file     Food insecurity     Worry: Not on file     Inability: Not on file     Transportation needs     Medical: Not on file     Non-medical: Not on file   Tobacco Use     Smoking status: Never Smoker     Smokeless tobacco: Never Used   Substance and Sexual Activity     Alcohol use: Not Currently     Alcohol/week: 0.0 standard drinks     Drug use: Not Currently     Sexual activity: Not on file   Lifestyle     Physical activity     Days per week: Not on file     Minutes per session: Not on file     Stress: Not on file   Relationships     Social connections     Talks on phone: Not on file     Gets together: Not on file     Attends Taoism service: Not on file     Active member of club or organization: Not on file     Attends meetings of clubs or organizations: Not on file     Relationship status: Not on file     Intimate partner violence     Fear of current or ex partner: Not on file     Emotionally abused: Not on file     Physically abused: Not on file     Forced sexual activity: Not on file   Other Topics Concern     Parent/sibling w/ CABG, MI or angioplasty before 65F 55M? Not Asked   Social History Narrative    Patient works for the [employer] as an .  He does not have stable living conditions at this time.  He has a Spanish guillen.       ROS Pulmonary  Constitutional: No fever chills sweats.  Good energy.  GI: He has been gaining weight.  He has cut back his tube feeds to 1 can per night.  No grease in his stools, constipation, diarrhea, or pain.  ENT: Occasional congestion but not sustained.  He is using Flonase infrequently.  Endocrine: No recent symptoms of hypo-or hyper per glycemia.  A complete ROS was otherwise negative except as noted in the HPI.  There were no vitals taken for this visit.    Results:  Recent Results (from the past 168 hour(s))   CK total    Collection Time: 08/20/20 11:58 AM   Result Value Ref Range    CK Total 169 30 - 300 U/L   Hepatic  panel    Collection Time: 08/20/20 11:58 AM   Result Value Ref Range    Bilirubin Direct 0.3 (H) 0.0 - 0.2 mg/dL    Bilirubin Total 3.1 (H) 0.2 - 1.3 mg/dL    Albumin 3.8 3.4 - 5.0 g/dL    Protein Total 7.3 6.8 - 8.8 g/dL    Alkaline Phosphatase 83 40 - 150 U/L    ALT 37 0 - 70 U/L    AST 26 0 - 45 U/L   Cystic Fibrosis Culture Aerob Bacterial    Collection Time: 08/20/20  1:00 PM    Specimen: Sputum   Result Value Ref Range    Specimen Description Sputum     Culture Micro Heavy growth  Normal luis       Culture Micro Light growth  Pseudomonas aeruginosa   (A)     Culture Micro Light growth  Strain 2  Pseudomonas aeruginosa   (A)        Susceptibility    Pseudomonas aeruginosa - TAINA     AMIKACIN >32.0 Resistant ug/mL     AZTREONAM >16.0 Resistant ug/mL     CEFEPIME >16.0 Resistant ug/mL     CEFTAZIDIME >16.0 Resistant ug/mL     CIPROFLOXACIN 2.0 Resistant ug/mL     GENTAMICIN >8.0 Resistant ug/mL     IMIPENEM 2.0 Sensitive ug/mL     LEVOFLOXACIN 4.0 Resistant ug/mL     MEROPENEM <=1.0 Sensitive ug/mL     PIPERACILLIN >64.0 Resistant ug/mL     Piperacillin/Tazo >64.0 Resistant ug/mL     TOBRAMYCIN >8.0 Resistant ug/mL    Pseudomonas aeruginosa - TAINA     AMIKACIN >32.0 Resistant ug/mL     AZTREONAM <=4.0 Sensitive ug/mL     CEFEPIME 8.0 Sensitive ug/mL     CEFTAZIDIME 4.0 Sensitive ug/mL     CIPROFLOXACIN >2.0 Resistant ug/mL     GENTAMICIN >8.0 Resistant ug/mL     IMIPENEM <=0.5 Sensitive ug/mL     LEVOFLOXACIN >4.0 Resistant ug/mL     MEROPENEM <=1.0 Sensitive ug/mL     PIPERACILLIN <=16.0 Sensitive ug/mL     Piperacillin/Tazo <=4.0 Sensitive ug/mL     TOBRAMYCIN >8.0 Resistant ug/mL       Assessment and plan:   1.  Cystic fibrosis lung disease: He continues to have excellent symptom control since starting Trikafta.  He has not yet had pulmonary function tests since starting this medication.  He has missed therapy a bit more in the past month but anticipates being able to consistently complete twice daily therapy  going forward.  His family life and work life are such that it is difficult for him to do formal exercise to augment his airway clearance.  He will continue alternating SOO and Chelly nebs as well as chronic azithromycin.  2.  C FTR modulation therapy: Good subjective response to therapy and awaiting pulmonary function tests to confirm improvement.  Of note, he has significant elevation of his bilirubin to 3.1 but no laboratory evidence of liver injury.  Elevation higher than typical for most patients on Trikafta.  In the short-term I think risk versus benefit favors staying on Trikafta in the short-term.  I discussed results with our CF pharmacist and the plan is to repeat liver function tests as well as hemolysis evaluation approximately 1 week from now.  3.  Pancreatic exocrine insufficiency: He has had some unintentional weight gain.  He has adequate enzyme replacement by history.  Consensus is to hold tube feeds at night should he have additional weight gain.  Most recent weight 208 pounds.  4.  Cystic fibrosis related sinusitis: Infrequent need for Flonase.  5.  Glucose intolerance: No symptoms to suggest elevation or low blood sugars.  Most recent hemoglobin A1c 6.1% April, 2018.  6.  Skin lesions: The patient typically would have an annual exam via his dermatologist.  I encouraged him to contact his dermatologist and see what options there are in light of the COVID pandemic.  7.  Healthcare maintenance: Due for annual studies April, 2020.  8.  COVID-19 pandemic risk: The patient's children are returning to .  Jia has 6 kids in her class and Nieves 12.  Their  center is thorough in their cleaning processes.  The patient and his family are taking adequate precautions with social distancing and use of masks.  Patient will follow-up in 3 months with a video visit.  We will plan on obtaining pulmonary function tests and liver function tests next month.  Total visit time 35 minutes.  30  minutes on video call and 5 minutes with chart review and coordination of care.    Rocael Miller MD

## 2020-08-24 NOTE — LETTER
"    8/24/2020         RE: Telly Leal  [address] [address]        Dear Colleague,    Thank you for referring your patient, Telly Leal, to the [facility]. Please see a copy of my visit note below.    Telly Leal is a 40 year old male who is being evaluated via a billable video visit.      The patient has been notified of following:     \"This video visit will be conducted via a call between you and your physician/provider. We have found that certain health care needs can be provided without the need for an in-person physical exam.  This service lets us provide the care you need with a video conversation.  If a prescription is necessary we can send it directly to your pharmacy.  If lab work is needed we can place an order for that and you can then stop by our lab to have the test done at a later time.    Video visits are billed at different rates depending on your insurance coverage.  Please reach out to your insurance provider with any questions.    If during the course of the call the physician/provider feels a video visit is not appropriate, you will not be charged for this service.\"    Patient has given verbal consent for Video visit? Yes  How would you like to obtain your AVS? MyChart  If you are dropped from the video visit, the video invite should be resent to: Other e-mail: [email or web address]  Will anyone else be joining your video visit? No        Video-Visit Details    Type of service:  Video Visit    Video Start Time: 1:05 PM  Video End Time: 1:35    Originating Location (pt. Location): Home    Distant Location (provider location):  [facility]     Platform used for Video Visit: Kwasi Miller MD    Reason for Visit  Telly Leal is a 40 year old year old male who is being seen for Follow Up (cf)    CF HPI  Since the patient's last virtual visit in April he reports doing well.  Specifically, he " has not had any sustained increase in respiratory symptoms.  He has been busier in the past month with his children transitioning back into  and he has been back working full-time since earlier this month.  He has missed therapy a few days during this time.  If he did so, he would feel tight.  He has minimal sputum production and is not bothered by cough between therapies.  He typically is using his vest twice a day.  No hemoptysis or chest pain.      Current Outpatient Medications   Medication     acetylcysteine (MUCOMYST) 20 % neb solution     albuterol (PROAIR HFA) 108 (90 BASE) MCG/ACT Inhaler     albuterol (PROVENTIL) (2.5 MG/3ML) 0.083% neb solution     azithromycin (ZITHROMAX) 250 MG tablet     blood glucose monitoring (NO BRAND SPECIFIED) test strip     Cholecalciferol 4000 UNITS CAPS     colistimethate/colistin-base activity (COLYMYCIN) 150 mg/2mL SOLR neb solution     CREON 01829-76968 units CPEP per EC capsule     cromolyn (INTAL) 20 MG/2ML neb solution     elexacaftor-tezacaftor-ivacaftor & ivacaftor (TRIKAFTA) 100-50-75 & 150 MG tablet pack     famotidine (PEPCID) 20 MG tablet     fluticasone (FLONASE) 50 MCG/ACT nasal spray     Multiple Vitamin (MULTIVITAMINS PO)     mvw complete formulation (SOFTGELS) capsule     Nutritional Supplements (NUTREN 2.0)     phytonadione (MEPHYTON) 5 MG tablet     tobramycin, PF, (SOO) 300 MG/5ML neb solution     water for injection sterile SOLN     oseltamivir (TAMIFLU) 75 MG capsule     No current facility-administered medications for this visit.      No Known Allergies  Past Medical History:   Diagnosis Date     Chronic sinusitis      Cystic fibrosis with pulmonary manifestations (H)      Exocrine pancreatic insufficiency      GERD (gastroesophageal reflux disease)      Hemoptysis      Malnutrition (H)        Past Surgical History:   Procedure Laterality Date     LOBECTOMY      right upper lobe     TUBES         Social History     Socioeconomic History      Marital status:      Spouse name: Not on file     Number of children: Not on file     Years of education: Not on file     Highest education level: Not on file   Occupational History     Occupation:      Employer: [employer]   Social Needs     Financial resource strain: Not on file     Food insecurity     Worry: Not on file     Inability: Not on file     Transportation needs     Medical: Not on file     Non-medical: Not on file   Tobacco Use     Smoking status: Never Smoker     Smokeless tobacco: Never Used   Substance and Sexual Activity     Alcohol use: Not Currently     Alcohol/week: 0.0 standard drinks     Drug use: Not Currently     Sexual activity: Not on file   Lifestyle     Physical activity     Days per week: Not on file     Minutes per session: Not on file     Stress: Not on file   Relationships     Social connections     Talks on phone: Not on file     Gets together: Not on file     Attends Protestant service: Not on file     Active member of club or organization: Not on file     Attends meetings of clubs or organizations: Not on file     Relationship status: Not on file     Intimate partner violence     Fear of current or ex partner: Not on file     Emotionally abused: Not on file     Physically abused: Not on file     Forced sexual activity: Not on file   Other Topics Concern     Parent/sibling w/ CABG, MI or angioplasty before 65F 55M? Not Asked   Social History Narrative    Patient works for the [employer] as an .  He does not have stable living conditions at this time.  He has a [place] guillen.       ROS Pulmonary  Constitutional: No fever chills sweats.  Good energy.  GI: He has been gaining weight.  He has cut back his tube feeds to 1 can per night.  No grease in his stools, constipation, diarrhea, or pain.  ENT: Occasional congestion but not sustained.  He is using Flonase infrequently.  Endocrine: No recent symptoms of hypo-or hyper per glycemia.  A complete ROS  was otherwise negative except as noted in the HPI.  There were no vitals taken for this visit.    Results:  Recent Results (from the past 168 hour(s))   CK total    Collection Time: 08/20/20 11:58 AM   Result Value Ref Range    CK Total 169 30 - 300 U/L   Hepatic panel    Collection Time: 08/20/20 11:58 AM   Result Value Ref Range    Bilirubin Direct 0.3 (H) 0.0 - 0.2 mg/dL    Bilirubin Total 3.1 (H) 0.2 - 1.3 mg/dL    Albumin 3.8 3.4 - 5.0 g/dL    Protein Total 7.3 6.8 - 8.8 g/dL    Alkaline Phosphatase 83 40 - 150 U/L    ALT 37 0 - 70 U/L    AST 26 0 - 45 U/L   Cystic Fibrosis Culture Aerob Bacterial    Collection Time: 08/20/20  1:00 PM    Specimen: Sputum   Result Value Ref Range    Specimen Description Sputum     Culture Micro Heavy growth  Normal luis       Culture Micro Light growth  Pseudomonas aeruginosa   (A)     Culture Micro Light growth  Strain 2  Pseudomonas aeruginosa   (A)        Susceptibility    Pseudomonas aeruginosa - TAINA     AMIKACIN >32.0 Resistant ug/mL     AZTREONAM >16.0 Resistant ug/mL     CEFEPIME >16.0 Resistant ug/mL     CEFTAZIDIME >16.0 Resistant ug/mL     CIPROFLOXACIN 2.0 Resistant ug/mL     GENTAMICIN >8.0 Resistant ug/mL     IMIPENEM 2.0 Sensitive ug/mL     LEVOFLOXACIN 4.0 Resistant ug/mL     MEROPENEM <=1.0 Sensitive ug/mL     PIPERACILLIN >64.0 Resistant ug/mL     Piperacillin/Tazo >64.0 Resistant ug/mL     TOBRAMYCIN >8.0 Resistant ug/mL    Pseudomonas aeruginosa - TAINA     AMIKACIN >32.0 Resistant ug/mL     AZTREONAM <=4.0 Sensitive ug/mL     CEFEPIME 8.0 Sensitive ug/mL     CEFTAZIDIME 4.0 Sensitive ug/mL     CIPROFLOXACIN >2.0 Resistant ug/mL     GENTAMICIN >8.0 Resistant ug/mL     IMIPENEM <=0.5 Sensitive ug/mL     LEVOFLOXACIN >4.0 Resistant ug/mL     MEROPENEM <=1.0 Sensitive ug/mL     PIPERACILLIN <=16.0 Sensitive ug/mL     Piperacillin/Tazo <=4.0 Sensitive ug/mL     TOBRAMYCIN >8.0 Resistant ug/mL       Assessment and plan:   1.  Cystic fibrosis lung disease: He  continues to have excellent symptom control since starting Trikafta.  He has not yet had pulmonary function tests since starting this medication.  He has missed therapy a bit more in the past month but anticipates being able to consistently complete twice daily therapy going forward.  His family life and work life are such that it is difficult for him to do formal exercise to augment his airway clearance.  He will continue alternating SOO and Chelly nebs as well as chronic azithromycin.  2.  C FTR modulation therapy: Good subjective response to therapy and awaiting pulmonary function tests to confirm improvement.  Of note, he has significant elevation of his bilirubin to 3.1 but no laboratory evidence of liver injury.  Elevation higher than typical for most patients on Trikafta.  In the short-term I think risk versus benefit favors staying on Trikafta in the short-term.  I discussed results with our CF pharmacist and the plan is to repeat liver function tests as well as hemolysis evaluation approximately 1 week from now.  3.  Pancreatic exocrine insufficiency: He has had some unintentional weight gain.  He has adequate enzyme replacement by history.  Consensus is to hold tube feeds at night should he have additional weight gain.  Most recent weight 208 pounds.  4.  Cystic fibrosis related sinusitis: Infrequent need for Flonase.  5.  Glucose intolerance: No symptoms to suggest elevation or low blood sugars.  Most recent hemoglobin A1c 6.1% April, 2018.  6.  Skin lesions: The patient typically would have an annual exam via his dermatologist.  I encouraged him to contact his dermatologist and see what options there are in light of the COVID pandemic.  7.  Healthcare maintenance: Due for annual studies April, 2020.  8.  COVID-19 pandemic risk: The patient's children are returning to .  Jia has 6 kids in her class and Nieves 12.  Their  center is thorough in their cleaning processes.  The patient  and his family are taking adequate precautions with social distancing and use of masks.  Patient will follow-up in 3 months with a video visit.  We will plan on obtaining pulmonary function tests and liver function tests next month.  Total visit time 35 minutes.  30 minutes on video call and 5 minutes with chart review and coordination of care.    Rocael Miller MD

## 2020-08-25 LAB
BACTERIA SPEC CULT: ABNORMAL
SPECIMEN SOURCE: ABNORMAL

## 2020-09-03 ENCOUNTER — TELEPHONE (OUTPATIENT)
Dept: NUTRITION | Facility: CLINIC | Age: 40
End: 2020-09-03

## 2020-09-03 NOTE — PROGRESS NOTES
Nutrition Note-     Reached out to pt to complete annual nutrition assessment during Dr. Miller's clinic. Attempted phone calls and voicemails x 2 as well as [employer] message.      Will attempt to follow up with pt at future clinic visits or per pt request.     Valeria Shirley RD, LD (pager [phone])  Cystic Fibrosis/Lung Transplant Dietitian      -Available Mon-Friday 8 AM-4:30 PM. On weekends/holidays contact coverage dietitian at pager [phone] (inpatient use only).

## 2020-09-16 DIAGNOSIS — E84.9 CF (CYSTIC FIBROSIS) (H): ICD-10-CM

## 2020-09-16 LAB
ALBUMIN SERPL-MCNC: 4.2 G/DL (ref 3.4–5)
ALP SERPL-CCNC: 86 U/L (ref 40–150)
ALT SERPL W P-5'-P-CCNC: 39 U/L (ref 0–70)
AST SERPL W P-5'-P-CCNC: 27 U/L (ref 0–45)
BASOPHILS # BLD AUTO: 0 10E9/L (ref 0–0.2)
BASOPHILS NFR BLD AUTO: 0.3 %
BILIRUB DIRECT SERPL-MCNC: 0.4 MG/DL (ref 0–0.2)
BILIRUB SERPL-MCNC: 2.6 MG/DL (ref 0.2–1.3)
DIFFERENTIAL METHOD BLD: NORMAL
EOSINOPHIL # BLD AUTO: 0.1 10E9/L (ref 0–0.7)
EOSINOPHIL NFR BLD AUTO: 1.7 %
ERYTHROCYTE [DISTWIDTH] IN BLOOD BY AUTOMATED COUNT: 13.2 % (ref 10–15)
HCT VFR BLD AUTO: 48.4 % (ref 40–53)
HGB BLD-MCNC: 16.5 G/DL (ref 13.3–17.7)
IMM GRANULOCYTES # BLD: 0 10E9/L (ref 0–0.4)
IMM GRANULOCYTES NFR BLD: 0.2 %
LYMPHOCYTES # BLD AUTO: 3.1 10E9/L (ref 0.8–5.3)
LYMPHOCYTES NFR BLD AUTO: 51.6 %
MCH RBC QN AUTO: 31.2 PG (ref 26.5–33)
MCHC RBC AUTO-ENTMCNC: 34.1 G/DL (ref 31.5–36.5)
MCV RBC AUTO: 92 FL (ref 78–100)
MONOCYTES # BLD AUTO: 0.5 10E9/L (ref 0–1.3)
MONOCYTES NFR BLD AUTO: 8.1 %
NEUTROPHILS # BLD AUTO: 2.3 10E9/L (ref 1.6–8.3)
NEUTROPHILS NFR BLD AUTO: 38.1 %
NRBC # BLD AUTO: 0 10*3/UL
NRBC BLD AUTO-RTO: 0 /100
PLATELET # BLD AUTO: 265 10E9/L (ref 150–450)
PROT SERPL-MCNC: 7.9 G/DL (ref 6.8–8.8)
RBC # BLD AUTO: 5.29 10E12/L (ref 4.4–5.9)
RETICS # AUTO: 57.1 10E9/L (ref 25–95)
RETICS/RBC NFR AUTO: 1.1 % (ref 0.5–2)
WBC # BLD AUTO: 6 10E9/L (ref 4–11)

## 2020-09-17 LAB
COPATH REPORT: NORMAL
HAPTOGLOB SERPL-MCNC: 124 MG/DL (ref 32–197)

## 2020-09-21 ENCOUNTER — CLINICAL UPDATE (OUTPATIENT)
Dept: PHARMACY | Facility: CLINIC | Age: 40
End: 2020-09-21
Payer: COMMERCIAL

## 2020-09-21 DIAGNOSIS — E84.9 CF (CYSTIC FIBROSIS) (H): Primary | ICD-10-CM

## 2020-09-21 PROCEDURE — 99207 ZZC NO CHARGE LOS: CPT | Performed by: PHARMACIST

## 2020-09-21 NOTE — PROGRESS NOTES
Clinical Update:                                                    At the request of Dr. Miller, a chart review was conducted for Telly Leal.    Reason for Chart Review: Lab review/Trikafta update    Discussion: Telly had repeat hepatic panel on 9/16/20 due to previous bilirubin elevations.  In addition he had CBC, reciculocyte count, haptoglobin and peripheral smear to assess for hemolysis.  His total bilirubin remains elevated at 2.6, most (2.2) is indirect bilirubin. Trikafta is known to cause indirect bilirubin elevation due to OATP inhibition. This appears to be benign, given that all other results are within normal limits.    Plan:  1. Continue Trikafta  2. Repeat hepatic panel and CK in 3 months    Cecilia Gordillo PharmD  CF Medication Therapy Management Pharmacist  [facility]  [phone]

## 2020-10-27 DIAGNOSIS — E84.9 CF (CYSTIC FIBROSIS) (H): ICD-10-CM

## 2020-10-27 RX ORDER — AZITHROMYCIN 250 MG/1
250 TABLET, FILM COATED ORAL DAILY
Qty: 30 TABLET | Refills: 11 | Status: SHIPPED | OUTPATIENT
Start: 2020-10-27 | End: 2021-09-03

## 2020-10-28 RX ORDER — ELEXACAFTOR, TEZACAFTOR, AND IVACAFTOR 100-50-75
KIT ORAL
Qty: 84 EACH | Refills: 1 | Status: SHIPPED | OUTPATIENT
Start: 2020-10-28 | End: 2020-12-17

## 2020-11-08 ENCOUNTER — HEALTH MAINTENANCE LETTER (OUTPATIENT)
Age: 40
End: 2020-11-08

## 2020-12-16 DIAGNOSIS — E84.0 CYSTIC FIBROSIS WITH PULMONARY MANIFESTATIONS (H): ICD-10-CM

## 2020-12-16 DIAGNOSIS — E84.9 CF (CYSTIC FIBROSIS) (H): ICD-10-CM

## 2020-12-16 RX ORDER — PANCRELIPASE 24000; 76000; 120000 [USP'U]/1; [USP'U]/1; [USP'U]/1
5-6 CAPSULE, DELAYED RELEASE PELLETS ORAL SEE ADMIN INSTRUCTIONS
Qty: 1200 CAPSULE | Refills: 11 | Status: CANCELLED | OUTPATIENT
Start: 2020-12-16

## 2020-12-16 RX ORDER — PANCRELIPASE 24000; 76000; 120000 [USP'U]/1; [USP'U]/1; [USP'U]/1
5-6 CAPSULE, DELAYED RELEASE PELLETS ORAL SEE ADMIN INSTRUCTIONS
Qty: 1200 CAPSULE | Refills: 11 | Status: SHIPPED | OUTPATIENT
Start: 2020-12-16 | End: 2021-12-22

## 2020-12-17 RX ORDER — ELEXACAFTOR, TEZACAFTOR, AND IVACAFTOR 100-50-75
KIT ORAL
Qty: 84 EACH | Refills: 0 | Status: SHIPPED | OUTPATIENT
Start: 2020-12-17 | End: 2021-01-13

## 2020-12-18 DIAGNOSIS — E84.9 CF (CYSTIC FIBROSIS) (H): ICD-10-CM

## 2020-12-18 RX ORDER — PEDIATRIC MULTIVIT 61/D3/VIT K 1500-800
CAPSULE ORAL
Qty: 60 CAPSULE | Refills: 11 | Status: SHIPPED | OUTPATIENT
Start: 2020-12-18 | End: 2021-12-22

## 2020-12-19 ENCOUNTER — AMBULATORY - HEALTHEAST (OUTPATIENT)
Dept: FAMILY MEDICINE | Facility: CLINIC | Age: 40
End: 2020-12-19

## 2020-12-19 DIAGNOSIS — Z20.822 COVID-19 RULED OUT: ICD-10-CM

## 2020-12-19 DIAGNOSIS — E84.9 CF (CYSTIC FIBROSIS) (H): ICD-10-CM

## 2020-12-20 ENCOUNTER — AMBULATORY - HEALTHEAST (OUTPATIENT)
Dept: FAMILY MEDICINE | Facility: CLINIC | Age: 40
End: 2020-12-20

## 2020-12-20 DIAGNOSIS — Z20.822 COVID-19 RULED OUT: ICD-10-CM

## 2020-12-20 DIAGNOSIS — E84.9 CF (CYSTIC FIBROSIS) (H): ICD-10-CM

## 2020-12-21 LAB
SARS-COV-2 PCR COMMENT: NORMAL
SARS-COV-2 RNA SPEC QL NAA+PROBE: NEGATIVE
SARS-COV-2 VIRUS SPECIMEN SOURCE: NORMAL

## 2020-12-22 ENCOUNTER — COMMUNICATION - HEALTHEAST (OUTPATIENT)
Dept: SCHEDULING | Facility: CLINIC | Age: 40
End: 2020-12-22

## 2020-12-24 ENCOUNTER — TELEPHONE (OUTPATIENT)
Dept: PULMONOLOGY | Facility: CLINIC | Age: 40
End: 2020-12-24

## 2020-12-24 ENCOUNTER — AMBULATORY - HEALTHEAST (OUTPATIENT)
Dept: FAMILY MEDICINE | Facility: CLINIC | Age: 40
End: 2020-12-24

## 2020-12-24 DIAGNOSIS — Z20.822 SUSPECTED 2019 NOVEL CORONAVIRUS INFECTION: Primary | ICD-10-CM

## 2020-12-24 DIAGNOSIS — Z20.822 SUSPECTED 2019 NOVEL CORONAVIRUS INFECTION: ICD-10-CM

## 2020-12-24 NOTE — TELEPHONE ENCOUNTER
Yobany called today to report a covid exposure (his child) and now fever and headache symptoms.     Had a covid test 5 days after exposure which was negative but that was prior to his symptoms starting.   - CF is well controlled recently  - No respiratory symptoms    Plan:  Repeat covid test  Yobany will call the pulm on-call line if test is positive or if he has respiratory symptoms.     Would place referral for monoclonal antibodies if positive.

## 2020-12-28 ENCOUNTER — COMMUNICATION - HEALTHEAST (OUTPATIENT)
Dept: FAMILY MEDICINE | Facility: CLINIC | Age: 40
End: 2020-12-28

## 2021-01-13 DIAGNOSIS — E84.9 CF (CYSTIC FIBROSIS) (H): Primary | ICD-10-CM

## 2021-01-14 ENCOUNTER — TELEPHONE (OUTPATIENT)
Dept: PULMONOLOGY | Facility: CLINIC | Age: 41
End: 2021-01-14

## 2021-01-14 ENCOUNTER — ALLIED HEALTH/NURSE VISIT (OUTPATIENT)
Dept: CARE COORDINATION | Facility: CLINIC | Age: 41
End: 2021-01-14
Payer: COMMERCIAL

## 2021-01-14 DIAGNOSIS — Z13.9 RISK AND FUNCTIONAL ASSESSMENT: Primary | ICD-10-CM

## 2021-01-14 ASSESSMENT — ANXIETY QUESTIONNAIRES
1. FEELING NERVOUS, ANXIOUS, OR ON EDGE: NOT AT ALL
7. FEELING AFRAID AS IF SOMETHING AWFUL MIGHT HAPPEN: NOT AT ALL
6. BECOMING EASILY ANNOYED OR IRRITABLE: NOT AT ALL
2. NOT BEING ABLE TO STOP OR CONTROL WORRYING: NOT AT ALL
5. BEING SO RESTLESS THAT IT IS HARD TO SIT STILL: NOT AT ALL
3. WORRYING TOO MUCH ABOUT DIFFERENT THINGS: NOT AT ALL
GAD7 TOTAL SCORE: 0

## 2021-01-14 ASSESSMENT — PATIENT HEALTH QUESTIONNAIRE - PHQ9
5. POOR APPETITE OR OVEREATING: NOT AT ALL
SUM OF ALL RESPONSES TO PHQ QUESTIONS 1-9: 0

## 2021-01-14 NOTE — TELEPHONE ENCOUNTER
PA Initiation    Medication: TOBRAMYCIN PENDING  Insurance Company: [employer] - Phone [phone] Fax [phone]  Pharmacy Filling the Rx: [facility] [address]  Filling Pharmacy Phone:    Filling Pharmacy Fax:    Start Date: 1/14/2021

## 2021-01-14 NOTE — PROGRESS NOTES
"Adult Cystic Fibrosis Program  Annual Psychosocial Assessment- phone call    Presenting Information:  TELLY is a 40-year-old man with cystic fibrosis, presenting in CF clinic for a regular follow up with primary CF provider, Dr Hung Miller. Spoke with Telly over the phone for annual psychosocial assessment.     Living situation:   Telly is currently living with his wife, Bre, and his two daughters, Nieves Rebolledo (3.5 yrs old) and Josy (13 months old). They own a home in [place] but are currently looking for something bigger. They do not have any pets. He denies concerns about his living situation.      Family Constellation:   Telly was initially raised by both biological parents, who  when he was around 14 years old. He subsequently lived with his mother but maintained regular contact with his father. Telly has an older brother and older sister. His mother has a house in [place], [place] and [place] in [place]. His father, brother and sister live in [place]. His siblings are both  and Yobany has several nieces and nephews. Yobany's extended family also lives in the [place] area and operate a large local CRE[employer]. Bre's parents live about 2 blocks away from them. Yobany reports all of his family members are doing well despite some of them (including Yobany and his daughter) catching COVID over christmas.     Yobany is interested in having his daughters tested to see if they are CF carriers. SW will reach out to the genetic counselors to ask about this process and will get back to Yobany.     Support Network:   Telly describes his social support as \"really good\". His immediate and extended family are involved and supportive and have been especially helpful with his daughters. He has still been able to get together with his family members during COVID. He draws additional support from friends and co-workers. He describes a close relationship with Bre. They have been  " "since 2015. Yobany does not stay in contact with anyone who has CF, but he knew other people with CF when he was growing up.      Adjustment to Illness:   Yobany was diagnosed with CF during infancy. He remembers struggling with weight issues as a child. He notes significant past health events in 2000, when he got a G-tube, but continued to struggle with gaining weight, and in 2003, when he experienced a pneumothorax and lobectomy. Since that event, he notes that he has successfully gained weight.      Yobany describes his current health status as \"great\". Yobany started trikafta last November and has noticed significant benefits such as coughing less and an increase in his PFT's. He reports getting COVID over the holidays and feeling a bit fatigued but otherwise fine and he has recovered fully. Clinically, he has mild lung disease, a history of malnutrition and glucose intolerance. He denies any physical health symptoms that interfere with his daily activities. He generally completes 2 vest treatments a day. He walks often and occassionally plays hockey in the winter but notes he would like to be more active. He has done nightly tube-feedings for many years and hopes that if he continues to do well on trikafta that he can get rid of these.        Advance Directive:   This SW reviewed Advance Directive education. SW has previously emailed a copy to Yobany which he still has. He will consider filling this out. He is fine with his default decision maker (his wife).     Education;   Yobany completed a Bachelor's Degree in Accounting and Business Administration at [employer] in 2007. He recently got a business leadership certificate through work but does not currently plan to pursue further education.       Employment:   Yobany has been working full time as an  in the [employer] department for the [employer] for the past 8 years. He had to go down to part time work from March to July due to COVID but is back " "to work full time now. He works fully from home due to COVID and is not sure when he will return to the office. 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 He notes this is not much of a pay increase but is good experience. He reports great benefits through the state. He enjoys his job overall and reports a supportive boss and co-workers, he is also part of a union.     Yobany's wife Bre works full time as a  at the [place] [employer]. She has been helping out a lot with COVID operations and has been very busy since March. She is also working from home.     Mental Health/Coping:   Yobany coped with depressive/anxious symptoms in 2013, due to stressful transitions. He did brief counseling, which he did not find especially helpful.  The issues then resolved.    Yobany reports his current mood as \"fine\". He denies any current or past symptoms indicative of mood, anxiety, or other mental health disorders.     ARCHANA-7: score of 0, indicating an absence of anxiety symptoms.    PHQ-9: score of 0, indicating an absence of depression symptoms as well.     Yobany describes his current stress level as manageable. He reports minimal work related stress.  He generally ayana with stressors through working out, walking, going for a drive or distracting himself.     Yobany was raised [employer] and describes ariana as important to him, noting that his brother in law is a . They have been able to continue Sunday school for the girls during COVID and have gone to [employer] occasionally.      Chemical Health   Yobnay denies use of tobacco or illicit drugs. He drinks a beer on occasion but notes this has become even less frequent since the start of COVID. He does not have any history of chemical dependency or psychosocial impairment related to substance use.      Finances:   Yobany is able to afford his daily living expenses and denies any financial concerns. He notes their " house is almost paid off.    Insurance:   Yobany has employer-based insurance ([employer]). He notes that costs are affordable for him and he denies related concerns.      Recreation/Leisure Interests:   Telly enjoys playing hockey, working out, golfing, spending time with family/friends, and traveling.     Intervention:  -Psychosocial Assessment  -Resource education/referral (Genetic counselors)  -Supportive counseling    Assessment:  Yobany had a bright affect and appeared to be open in his responses. He has had an excellent response to trikafta and feels his QOL has improved. 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 He reports stable mental health, finances, and adequate social support.     Yobany seems to be psychosocially stable overall, with access to relevant resources and supports.  No concerns expressed/noted.    Plan:  Re-consult for any psychosocial needs that may arise.    Complete psychosocial assessment annually.  Continue to follow for regular clinic consult.    OANH Coombs, [facility]  Adult Cystic Fibrosis   Ph: [phone], Pager: [phone]

## 2021-01-15 DIAGNOSIS — E84.9 CF (CYSTIC FIBROSIS) (H): ICD-10-CM

## 2021-01-15 LAB
ALBUMIN SERPL-MCNC: 3.8 G/DL (ref 3.4–5)
ALP SERPL-CCNC: 83 U/L (ref 40–150)
ALT SERPL W P-5'-P-CCNC: 33 U/L (ref 0–70)
AST SERPL W P-5'-P-CCNC: 23 U/L (ref 0–45)
BILIRUB DIRECT SERPL-MCNC: 0.4 MG/DL (ref 0–0.2)
BILIRUB SERPL-MCNC: 2.2 MG/DL (ref 0.2–1.3)
CK SERPL-CCNC: 113 U/L (ref 30–300)
PROT SERPL-MCNC: 7.5 G/DL (ref 6.8–8.8)

## 2021-01-15 PROCEDURE — 36415 COLL VENOUS BLD VENIPUNCTURE: CPT | Performed by: PATHOLOGY

## 2021-01-15 PROCEDURE — 82550 ASSAY OF CK (CPK): CPT | Performed by: PATHOLOGY

## 2021-01-15 PROCEDURE — 80076 HEPATIC FUNCTION PANEL: CPT | Performed by: PATHOLOGY

## 2021-01-15 ASSESSMENT — ANXIETY QUESTIONNAIRES: GAD7 TOTAL SCORE: 0

## 2021-01-15 NOTE — TELEPHONE ENCOUNTER
Prior Authorization Not Needed per Insurance    Medication: TOBRAMYCIN NO PA NEEDED  Insurance Company: [id number] - Phone [phone] Fax [phone]  Expected CoPay:      Pharmacy Filling the Rx: [facility] - [address] [address]  Pharmacy Notified:    Patient Notified:

## 2021-01-18 ENCOUNTER — CLINICAL UPDATE (OUTPATIENT)
Dept: PHARMACY | Facility: CLINIC | Age: 41
End: 2021-01-18
Payer: COMMERCIAL

## 2021-01-18 ENCOUNTER — VIRTUAL VISIT (OUTPATIENT)
Dept: PULMONOLOGY | Facility: CLINIC | Age: 41
End: 2021-01-18
Attending: INTERNAL MEDICINE
Payer: COMMERCIAL

## 2021-01-18 DIAGNOSIS — E84.9 CF (CYSTIC FIBROSIS) (H): ICD-10-CM

## 2021-01-18 DIAGNOSIS — E84.0 CYSTIC FIBROSIS WITH PULMONARY MANIFESTATIONS (H): Primary | ICD-10-CM

## 2021-01-18 DIAGNOSIS — E84.9 CF (CYSTIC FIBROSIS) (H): Primary | ICD-10-CM

## 2021-01-18 PROCEDURE — 99207 PR NO CHARGE LOS: CPT | Performed by: PHARMACIST

## 2021-01-18 PROCEDURE — 99214 OFFICE O/P EST MOD 30 MIN: CPT | Mod: 95 | Performed by: INTERNAL MEDICINE

## 2021-01-18 RX ORDER — ELEXACAFTOR, TEZACAFTOR, AND IVACAFTOR 100-50-75
KIT ORAL
Qty: 84 EACH | Refills: 5 | Status: SHIPPED | OUTPATIENT
Start: 2021-01-18 | End: 2021-07-13

## 2021-01-18 NOTE — PROGRESS NOTES
Clinical Update:                                                    At the request of Dr. Miller, a chart review was conducted for Telly Leal.    Reason for Chart Review: Lab review/Trikafta update    Discussion: Reviewed labs from 1/15/21 per recommended Trikafta monitoring.  CK and LFTs are normal. Bilirubin remains elevated although mostly indirect bilirubin, which can be increased due to an interaction with Trikafta.  Bilirubin has been high since Telly started Trikafta but has overall been stable for the last year.  Given normal AST/ALT, a Trikafta dose reduction or interruption in therapy is not needed, per prescribing information.    Plan:  1. Continue Trikafta  2. Repeat hepatic panel and CK in 6 months, since Telly has been on Trikafta for over one year.    Cecilia Gordillo PharmD  CF Medication Therapy Management Pharmacist  [facility]  [phone]

## 2021-01-18 NOTE — LETTER
1/18/2021         RE: Telly Leal  [address] [id number]        Dear Colleague,    Thank you for referring your patient, Telly Leal, to the [facility]. Please see a copy of my visit note below.    Telly is a 40 year old who is being evaluated via a billable video visit.      How would you like to obtain your AVS? [employer]  If the video visit is dropped, the invitation should be resent by: Other e-mail: [email or web address]  Will anyone else be joining your video visit? No        Video-Visit Details    Type of service:  Video Visit    Video Start Time: 4:20 PM  Video End Time: 4:45 PM    Originating Location (pt. Location): Home    Distant Location (provider location):  [facility]     Platform used for Video Visit: [employer]     Pulmonary Staff Progress Note    Chief concern: This visit is for follow-up of cystic fibrosis.    Interval history: Since the patient's last virtual visit in September he overall was doing well from a respiratory standpoint until late December at which time he developed a fever to 101 degrees.  He had a bit of cough and fatigue but not sustained.  His initial nasopharyngeal Covid tests were negative but he subsequently had a saliva test on December 28 in the community that was positive.  His 13-month-old daughter also tested positive as did his mother and father-in-law.  He feels fully recovered.  He has not had any issues of sustained cough or congestion since his last visit.  If he skips vest therapy he will feel some chest tightness.  His young children were home from  from late November through January 8 and he was less consistent in completing vest therapy during that time but now he is back into his routine of twice daily treatments.  He does not get any formal exercise but does not have any increased dyspnea with his activities of  daily living.  No recurrence of chest pain.  What little sputum he produces is clear and is otherwise related to postnasal drip.  He has not had any hemoptysis.    Current Outpatient Medications   Medication     acetylcysteine (MUCOMYST) 20 % neb solution     albuterol (PROAIR HFA) 108 (90 BASE) MCG/ACT Inhaler     albuterol (PROVENTIL) (2.5 MG/3ML) 0.083% neb solution     azithromycin (ZITHROMAX) 250 MG tablet     blood glucose monitoring (NO BRAND SPECIFIED) test strip     Cholecalciferol 4000 UNITS CAPS     colistimethate/colistin-base activity (COLYMYCIN) 150 mg/2mL SOLR neb solution     CREON 84615-51307 units CPEP per EC capsule     cromolyn (INTAL) 20 MG/2ML neb solution     elexacaftor-tezacaftor-ivacaftor & ivacaftor (TRIKAFTA) 100-50-75 & 150 MG tablet pack     famotidine (PEPCID) 20 MG tablet     fluticasone (FLONASE) 50 MCG/ACT nasal spray     Multiple Vitamin (MULTIVITAMINS PO)     mvw complete formulation (SOFTGELS) capsule     Nutritional Supplements (NUTREN 2.0)     phytonadione (MEPHYTON) 5 MG tablet     tobramycin, PF, (SOO) 300 MG/5ML neb solution     water for injection sterile SOLN     No current facility-administered medications for this visit.      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  He does not think it would entail working more hours.    Review of systems: Musculoskeletal: Experiencing some low back pain in the last week.  GI: He lost some weight in late December when his appetite was down with Covid.  He has been using 1 to 2 cans of tube feed per night.  No diarrhea constipation or grease in his stools or abdominal pain.  ENT: Sporadic need for Flonase for congestion.  Endocrine: No polyuria, polyphagia, polydipsia.  The remainder of a complete review of systems is negative unless noted otherwise in interval history.    Physical  examination  Limited to video visit.  The patient is alert appropriate well-developed well-nourished with bright affect and in no apparent distress.  Reported weight at home is 197 pounds.    Studies:  CBC from September, 2020 within normal limits.  Serial liver function tests reviewed.  They have been within normal limits aside from his total bilirubin which was 3.1 in August, 2.6 in September, and is now 2.2 on January 15, 2021.  Haptoglobin September, 2020 was normal.  Peripheral smear was without evidence of hemolysis.  Most recent sputum culture August, 2020 with 2 strains of Pseudomonas.    Impression and plan  1.  Cystic fibrosis lung disease: The patient continues to overall have an excellent clinical course with significant reduction in burden of symptoms with starting Trikafta.  We were again not able to obtain pulmonary function tests due to issues with Covid pandemic.  He will continue vest therapy twice a day, chronic azithromycin, and alternating Chelly and SOO nebs on a monthly basis.  Will consider a trial of cutting back his inhaled antibiotics if he continues to do well.  Will see if we can get him access to a home spirometer and, if so, he will send in results once completed.    2.  C FTR modulation therapy: As above, excellent response to treatment.  His elevation in total bilirubin is presumably due to a side effect of his Trikafta but he does not have any evidence of transaminitis.  Reviewed with CF pharmacist and consensus is to continue at full dose.  Will recheck in 3 months.    3.  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  He had a period of weight loss associated with COVID-19 infection but weight currently adequate and stabilized.    4.  Cystic fibrosis related sinusitis: He will continue nasal steroid as needed.  Much improved with modulation therapy.    5.  History of glucose intolerance: No symptoms.   Will get hemoglobin A1c in conjunction with his next visit.    6.  Skin lesions: He has dermatologist in the community following this.    7.  Healthcare maintenance: Due for annual studies in April and ordered for next visit.  He is current on influenza vaccination.    8.  COVID-19 pandemic risk: The patient acquired Covid over 3 weeks ago.  He has been asymptomatic for more than 2 weeks.  Low risk of this worsening.  He may have good immunity from reinfection for the next few months but duration of protection is unknown.  He will be continuing to work from home and continue social distancing and mask use.    Follow-up in 3 months with annual studies with in person visit.    Total visit time: 35 minutes.  25 minutes on video call and 10 minutes with chart review and coordination of care.    Rocael Miller MD

## 2021-01-18 NOTE — PROGRESS NOTES
Telly is a 40 year old who is being evaluated via a billable video visit.      How would you like to obtain your AVS? Celiaharbrooklynn  If the video visit is dropped, the invitation should be resent by: Other e-mail: [email or web address]  Will anyone else be joining your video visit? No        Video-Visit Details    Type of service:  Video Visit    Video Start Time: 4:20 PM  Video End Time: 4:45 PM    Originating Location (pt. Location): Home    Distant Location (provider location):  [facility]     Platform used for Video Visit: [employer]     Pulmonary Staff Progress Note    Chief concern: This visit is for follow-up of cystic fibrosis.    Interval history: Since the patient's last virtual visit in September he overall was doing well from a respiratory standpoint until late December at which time he developed a fever to 101 degrees.  He had a bit of cough and fatigue but not sustained.  His initial nasopharyngeal Covid tests were negative but he subsequently had a saliva test on December 28 in the community that was positive.  His 13-month-old daughter also tested positive as did his mother and father-in-law.  He feels fully recovered.  He has not had any issues of sustained cough or congestion since his last visit.  If he skips vest therapy he will feel some chest tightness.  His young children were home from  from late November through January 8 and he was less consistent in completing vest therapy during that time but now he is back into his routine of twice daily treatments.  He does not get any formal exercise but does not have any increased dyspnea with his activities of daily living.  No recurrence of chest pain.  What little sputum he produces is clear and is otherwise related to postnasal drip.  He has not had any hemoptysis.    Current Outpatient Medications   Medication     acetylcysteine (MUCOMYST) 20 % neb solution     albuterol (PROAIR HFA) 108 (90 BASE)  MCG/ACT Inhaler     albuterol (PROVENTIL) (2.5 MG/3ML) 0.083% neb solution     azithromycin (ZITHROMAX) 250 MG tablet     blood glucose monitoring (NO BRAND SPECIFIED) test strip     Cholecalciferol 4000 UNITS CAPS     colistimethate/colistin-base activity (COLYMYCIN) 150 mg/2mL SOLR neb solution     CREON 77289-13942 units CPEP per EC capsule     cromolyn (INTAL) 20 MG/2ML neb solution     elexacaftor-tezacaftor-ivacaftor & ivacaftor (TRIKAFTA) 100-50-75 & 150 MG tablet pack     famotidine (PEPCID) 20 MG tablet     fluticasone (FLONASE) 50 MCG/ACT nasal spray     Multiple Vitamin (MULTIVITAMINS PO)     mvw complete formulation (SOFTGELS) capsule     Nutritional Supplements (NUTREN 2.0)     phytonadione (MEPHYTON) 5 MG tablet     tobramycin, PF, (SOO) 300 MG/5ML neb solution     water for injection sterile SOLN     No current facility-administered medications for this visit.      Social history: The patient has applied to a new position within the [place] [employer] that is in the department of [employer] and would be an  role.  He does not think it would entail working more hours.    Review of systems: Musculoskeletal: Experiencing some low back pain in the last week.  GI: He lost some weight in late December when his appetite was down with Covid.  He has been using 1 to 2 cans of tube feed per night.  No diarrhea constipation or grease in his stools or abdominal pain.  ENT: Sporadic need for Flonase for congestion.  Endocrine: No polyuria, polyphagia, polydipsia.  The remainder of a complete review of systems is negative unless noted otherwise in interval history.    Physical examination  Limited to video visit.  The patient is alert appropriate well-developed well-nourished with bright affect and in no apparent distress.  Reported weight at home is 197 pounds.    Studies:  CBC from September, 2020 within normal limits.  Serial liver function tests reviewed.  They have been within  normal limits aside from his total bilirubin which was 3.1 in August, 2.6 in September, and is now 2.2 on January 15, 2021.  Haptoglobin September, 2020 was normal.  Peripheral smear was without evidence of hemolysis.  Most recent sputum culture August, 2020 with 2 strains of Pseudomonas.    Impression and plan  1.  Cystic fibrosis lung disease: The patient continues to overall have an excellent clinical course with significant reduction in burden of symptoms with starting Trikafta.  We were again not able to obtain pulmonary function tests due to issues with Covid pandemic.  He will continue vest therapy twice a day, chronic azithromycin, and alternating Chelly and SOO nebs on a monthly basis.  Will consider a trial of cutting back his inhaled antibiotics if he continues to do well.  Will see if we can get him access to a home spirometer and, if so, he will send in results once completed.    2.  C FTR modulation therapy: As above, excellent response to treatment.  His elevation in total bilirubin is presumably due to a side effect of his Trikafta but he does not have any evidence of transaminitis.  Reviewed with CF pharmacist and consensus is to continue at full dose.  Will recheck in 3 months.    3.  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  He had a period of weight loss associated with COVID-19 infection but weight currently adequate and stabilized.    4.  Cystic fibrosis related sinusitis: He will continue nasal steroid as needed.  Much improved with modulation therapy.    5.  History of glucose intolerance: No symptoms.  Will get hemoglobin A1c in conjunction with his next visit.    6.  Skin lesions: He has dermatologist in the community following this.    7.  Healthcare maintenance: Due for annual studies in April and ordered for next visit.  He is current on influenza vaccination.    8.  COVID-19 pandemic risk: The patient  acquired Covid over 3 weeks ago.  He has been asymptomatic for more than 2 weeks.  Low risk of this worsening.  He may have good immunity from reinfection for the next few months but duration of protection is unknown.  He will be continuing to work from home and continue social distancing and mask use.    Follow-up in 3 months with annual studies with in person visit.    Total visit time: 35 minutes.  25 minutes on video call and 10 minutes with chart review and coordination of care.    Rocael Miller MD

## 2021-02-05 DIAGNOSIS — E84.0 CYSTIC FIBROSIS WITH PULMONARY MANIFESTATIONS (H): ICD-10-CM

## 2021-02-12 DIAGNOSIS — E84.0 CYSTIC FIBROSIS WITH PULMONARY MANIFESTATIONS (H): ICD-10-CM

## 2021-02-12 DIAGNOSIS — E53.8 VITAMIN B12 DEFICIENCY DISEASE: ICD-10-CM

## 2021-02-15 RX ORDER — ACETYLCYSTEINE 200 MG/ML
2 SOLUTION ORAL; RESPIRATORY (INHALATION) 3 TIMES DAILY
Qty: 180 ML | Refills: 12 | Status: SHIPPED | OUTPATIENT
Start: 2021-02-15 | End: 2022-02-16

## 2021-03-10 ENCOUNTER — IMMUNIZATION (OUTPATIENT)
Dept: NURSING | Facility: CLINIC | Age: 41
End: 2021-03-10
Payer: COMMERCIAL

## 2021-03-10 PROCEDURE — 91303 PR COVID VAC JANSSEN AD26 0.5ML: CPT

## 2021-03-10 PROCEDURE — 0031A PR COVID VAC JANSSEN AD26 0.5ML: CPT

## 2021-05-22 ENCOUNTER — HEALTH MAINTENANCE LETTER (OUTPATIENT)
Age: 41
End: 2021-05-22

## 2021-05-25 ENCOUNTER — RECORDS - HEALTHEAST (OUTPATIENT)
Dept: ADMINISTRATIVE | Facility: CLINIC | Age: 41
End: 2021-05-25

## 2021-06-14 NOTE — TELEPHONE ENCOUNTER
Coronavirus (COVID-19) Notification    Lab Result   Lab test 2019-nCoV rRt-PCR OR SARS-COV-2 PCR    Nasopharyngeal AND/OR Oropharyngeal swab is NEGATIVE for 2019-nCoV RNA [OR] SARS-COV-2 RNA (COVID-19) RNA    Your result was negative. This means that we didn't find the virus that causes COVID-19 in your sample. A test may show negative when you do actually have the virus. This can happen when the virus is in the early stages of infection, before you feel illness symptoms.    If you have symptoms   Stay home and away from others (self-isolate) until you meet ALL of the guidelines below:    You've had no fever--and no medicine that reduces fever--for 1 full day (24 hours). And      Your other symptoms have gotten better. For example, your cough or breathing has improved. And     At least 10 days have passed since your symptoms started. (If you ve been told by a doctor that you have a weak immune system, wait 20 days.)     During this time:    Stay home. Don't go to work, school or anywhere else.     Stay in your own room, including for meals. Use your own bathroom if you can.    Stay away from others in your home. No hugging, kissing or shaking hands. No visitors.    Clean  high touch  surfaces often (doorknobs, counters, handles, etc.). Use a household cleaning spray or wipes. You can find a full list on the EPA website at www.epa.gov/pesticide-registration/list-n-disinfectants-use-against-sars-cov-2.    Cover your mouth and nose with a mask, tissue or other face covering to avoid spreading germs.    Wash your hands and face often with soap and water.    Going back to work  Check with your employer for any guidelines to follow for going back to work.  You are sent a letter for your Employer which will serve as formal document notice that you, the employee, tested negative for COVID-19, as of the testing date shown above.    If your symptoms worsen or other concerning symptoms, contact PCP, oncare or consider  returning to Emergency Dept.    Where can I get more information?    [facility]: [email or web address]    Coronavirus Basics: www.[email or web address]./diseases/coronavirus/basics.html    [facility] Hotline (188-722-0269)    Yuridia Colon LPN

## 2021-06-28 ENCOUNTER — ANCILLARY PROCEDURE (OUTPATIENT)
Dept: GENERAL RADIOLOGY | Facility: CLINIC | Age: 41
End: 2021-06-28
Attending: INTERNAL MEDICINE
Payer: COMMERCIAL

## 2021-06-28 ENCOUNTER — OFFICE VISIT (OUTPATIENT)
Dept: PHARMACY | Facility: CLINIC | Age: 41
End: 2021-06-28
Payer: COMMERCIAL

## 2021-06-28 ENCOUNTER — OFFICE VISIT (OUTPATIENT)
Dept: PULMONOLOGY | Facility: CLINIC | Age: 41
End: 2021-06-28
Attending: INTERNAL MEDICINE
Payer: COMMERCIAL

## 2021-06-28 ENCOUNTER — ANCILLARY PROCEDURE (OUTPATIENT)
Dept: BONE DENSITY | Facility: CLINIC | Age: 41
End: 2021-06-28
Attending: INTERNAL MEDICINE
Payer: COMMERCIAL

## 2021-06-28 VITALS
HEART RATE: 108 BPM | SYSTOLIC BLOOD PRESSURE: 128 MMHG | BODY MASS INDEX: 24.61 KG/M2 | OXYGEN SATURATION: 95 % | WEIGHT: 196.87 LBS | DIASTOLIC BLOOD PRESSURE: 65 MMHG

## 2021-06-28 DIAGNOSIS — Z20.822 COVID-19 RULED OUT: ICD-10-CM

## 2021-06-28 DIAGNOSIS — E84.0 CYSTIC FIBROSIS WITH PULMONARY MANIFESTATIONS (H): ICD-10-CM

## 2021-06-28 DIAGNOSIS — E84.0 CYSTIC FIBROSIS WITH PULMONARY MANIFESTATIONS (H): Primary | ICD-10-CM

## 2021-06-28 DIAGNOSIS — K86.81 EXOCRINE PANCREATIC INSUFFICIENCY: ICD-10-CM

## 2021-06-28 DIAGNOSIS — E84.9 CF (CYSTIC FIBROSIS) (H): Primary | ICD-10-CM

## 2021-06-28 LAB
EXPTIME-PRE: 12.15 SEC
FEF2575-%PRED-PRE: 29 %
FEF2575-PRE: 1.37 L/SEC
FEF2575-PRED: 4.69 L/SEC
FEFMAX-%PRED-PRE: 92 %
FEFMAX-PRE: 10.46 L/SEC
FEFMAX-PRED: 11.3 L/SEC
FEV1-%PRED-PRE: 74 %
FEV1-PRE: 3.69 L
FEV1FEV6-PRE: 64 %
FEV1FEV6-PRED: 82 %
FEV1FVC-PRE: 57 %
FEV1FVC-PRED: 80 %
FIFMAX-PRE: 11.42 L/SEC
FVC-%PRED-PRE: 104 %
FVC-PRE: 6.44 L
FVC-PRED: 6.19 L

## 2021-06-28 PROCEDURE — 71046 X-RAY EXAM CHEST 2 VIEWS: CPT | Mod: GC | Performed by: RADIOLOGY

## 2021-06-28 PROCEDURE — G0463 HOSPITAL OUTPT CLINIC VISIT: HCPCS | Mod: 25

## 2021-06-28 PROCEDURE — 97803 MED NUTRITION INDIV SUBSEQ: CPT | Performed by: DIETITIAN, REGISTERED

## 2021-06-28 PROCEDURE — 99214 OFFICE O/P EST MOD 30 MIN: CPT | Mod: 25 | Performed by: INTERNAL MEDICINE

## 2021-06-28 PROCEDURE — 87070 CULTURE OTHR SPECIMN AEROBIC: CPT | Performed by: INTERNAL MEDICINE

## 2021-06-28 PROCEDURE — 99605 MTMS BY PHARM NP 15 MIN: CPT | Performed by: PHARMACIST

## 2021-06-28 PROCEDURE — 87077 CULTURE AEROBIC IDENTIFY: CPT | Performed by: INTERNAL MEDICINE

## 2021-06-28 PROCEDURE — 87107 FUNGI IDENTIFICATION MOLD: CPT | Performed by: INTERNAL MEDICINE

## 2021-06-28 PROCEDURE — 87186 SC STD MICRODIL/AGAR DIL: CPT | Performed by: INTERNAL MEDICINE

## 2021-06-28 PROCEDURE — 94375 RESPIRATORY FLOW VOLUME LOOP: CPT | Performed by: INTERNAL MEDICINE

## 2021-06-28 PROCEDURE — 77080 DXA BONE DENSITY AXIAL: CPT | Performed by: INTERNAL MEDICINE

## 2021-06-28 NOTE — PROGRESS NOTES
Reason for Visit  Telly Leal is a 35 year old year old male who is being seen for Cystic Fibrosis (follow up)    CF HPI  The patient was seen and examined by Rocael Miller   The patient's last few visits have been performed virtually.  He overall has been doing well from a respiratory standpoint without exacerbations.  At the time of his last visit in January he had had recently tested positive for Covid but was not experiencing respiratory symptoms.  Today, he reports doing well since his last visit.  He overall has been consistently completing vest therapy twice a day but in the past month he has been busier at work and it has dropped off to 3 times per week.  Despite this, he reports no interval increase in respiratory symptoms.  He can go 2 weeks without therapy at which point he feels mild congestion.  Most days he does not produce any sputum and when he does, suspects it is related to postnasal drip.  He acquired a new vest this spring after his old device malfunction.  He feels the fit is better with more consistent pressure across his anterior chest wall.  No issues with chest pain or hemoptysis.  He is not doing any formal exercise but is physically active getting out with the kids pulling them on his bike and feels his exercise tolerance is at baseline.    Current Outpatient Medications   Medication     acetylcysteine (MUCOMYST) 20 % neb solution     albuterol (PROAIR HFA) 108 (90 BASE) MCG/ACT Inhaler     albuterol (PROVENTIL) (2.5 MG/3ML) 0.083% neb solution     azithromycin (ZITHROMAX) 250 MG tablet     blood glucose monitoring (NO BRAND SPECIFIED) test strip     Cholecalciferol 4000 UNITS CAPS     colistimethate/colistin-base activity (COLYMYCIN) 150 mg/2mL SOLR neb solution     CREON 96580-72795 units CPEP per EC capsule     cromolyn (INTAL) 20 MG/2ML neb solution     elexacaftor-tezacaftor-ivacaftor & ivacaftor (TRIKAFTA) 100-50-75 & 150 MG tablet pack     famotidine (PEPCID) 20 MG tablet      fluticasone (FLONASE) 50 MCG/ACT nasal spray     Multiple Vitamin (MULTIVITAMINS PO)     mvw complete formulation (SOFTGELS) capsule     Nutritional Supplements (NUTREN 2.0)     phytonadione (MEPHYTON) 5 MG tablet     tobramycin, PF, (SOO) 300 MG/5ML neb solution     water for injection sterile SOLN     No current facility-administered medications for this visit.      No Known Allergies  Past Medical History:   Diagnosis Date     Chronic sinusitis      Cystic fibrosis with pulmonary manifestations (H)      Exocrine pancreatic insufficiency      GERD (gastroesophageal reflux disease)      Hemoptysis      Malnutrition (H)        Past Surgical History:   Procedure Laterality Date     IR MISCELLANEOUS PROCEDURE  2/6/2001     LOBECTOMY      right upper lobe     TUBES         Social History     Socioeconomic History     Marital status:      Spouse name: Not on file     Number of children: Not on file     Years of education: Not on file     Highest education level: Not on file   Occupational History     Occupation:      Employer: [employer]   Social Needs     Financial resource strain: Not on file     Food insecurity     Worry: Not on file     Inability: Not on file     Transportation needs     Medical: Not on file     Non-medical: Not on file   Tobacco Use     Smoking status: Never Smoker     Smokeless tobacco: Never Used   Substance and Sexual Activity     Alcohol use: Not Currently     Alcohol/week: 0.0 standard drinks     Drug use: Not Currently     Sexual activity: Not on file   Lifestyle     Physical activity     Days per week: Not on file     Minutes per session: Not on file     Stress: Not on file   Relationships     Social connections     Talks on phone: Not on file     Gets together: Not on file     Attends Denominational service: Not on file     Active member of club or organization: Not on file     Attends meetings of clubs or organizations: Not on file     Relationship status: Not on  file     Intimate partner violence     Fear of current or ex partner: Not on file     Emotionally abused: Not on file     Physically abused: Not on file     Forced sexual activity: Not on file   Other Topics Concern     Parent/sibling w/ CABG, MI or angioplasty before 65F 55M? Not Asked   Social History Narrative    Patient works for the [employer] as an .  He does not have stable living conditions at this time.  He has a Lao guillen.   Update: living with wife Bre in [facility]. Birth of daughter Nieves Rebolledo August, 2017, birth of daughter Josy Felder November, 2019    ROS Pulmonary  Const: No fever chills sweats.  GI: Consistently using 1  - 2 cans of tube feed per night.  No grease in his stools.  No abdominal pain.  No diarrhea or constipation.  ENT: No sinus pain pressure postnasal drip.  Endocrine: No hypoglycemic symptoms.  A complete ROS was otherwise negative except as noted in the HPI.  /65   Pulse 108   Wt 89.3 kg (196 lb 13.9 oz)   SpO2 95%   BMI 24.61 kg/m    Exam:   GENERAL APPEARANCE: Well developed, well nourished, alert, and in no apparent distress.  EYES: anicteric with injected appearing conjunctiva  HENT: No nasal discharge.  Oral mucosa is moist, without any lesions, no tonsillar enlargement, no oropharyngeal exudate.  RESP:  good air flow throughout.  Clear to auscultation.  Normal chest wall excursion.  Reduced breath sounds at right posterior base as baseline.  CV: Normal S1, S2, regular rhythm, normal rate. No murmur. No gallop. No LE edema.   ABDOMEN:  Bowel sounds normal, soft, nontender, no HSM or masses.   MS: extremities normal. No cyanosis.  SKIN:  PEG site without erythema or discharge. Chest scar-like lesion upper sternum unchanged. Diffuse freckles without overt change.  NEURO: Mentation intact, speech normal, normal gait and stance  PSYCH: mentation appears normal. and affect normal/bright  Results:  Recent Results (from the past 168  hour(s))   General PFT Lab (Please always keep checked)    Collection Time: 06/28/21 12:41 PM   Result Value Ref Range    FVC-Pred 6.19 L    FVC-Pre 6.44 L    FVC-%Pred-Pre 104 %    FEV1-Pre 3.69 L    FEV1-%Pred-Pre 74 %    FEV1FVC-Pred 80 %    FEV1FVC-Pre 57 %    FEFMax-Pred 11.30 L/sec    FEFMax-Pre 10.46 L/sec    FEFMax-%Pred-Pre 92 %    FEF2575-Pred 4.69 L/sec    FEF2575-Pre 1.37 L/sec    LKP9017-%Pred-Pre 29 %    ExpTime-Pre 12.15 sec    FIFMax-Pre 11.42 L/sec    FEV1FEV6-Pred 82 %    FEV1FEV6-Pre 64 %     Spirometry interpretation: personally reviewed. Valid maneuver.  Mild obstruction. FEV1 improved has improved further by more than 5%.  Today's values are the best they have been dating back to 2007.    Sputum last visit: PA x 2    Chest x-ray from today personally reviewed.  Interval improvement in mild mucus plugging present on his 2019 study.    Assessment and plan:  1. CF lung disease: The patient has had an outstanding response to Trikafta with substantially improved burden of symptoms and further improvement in his pulmonary function test today to well above his previous baseline.  This is despite cutting back modestly on his airway clearance regimen.  We discussed at length risks versus benefits of de-escalating his regimen.  Will try discontinuing his cromolyn nebs with plan to resume promptly if he feels worse.  Also reasonable for him to perform vest therapy only once a day if sleep deprived due to childcare responsibilities or if he is feeling well and is able to perform vigorous aerobic exercise that day.  Will consider sequential trial off of inhaled antibiotics if he is doing well at his next visit.  1B.  CFTR modulation therapy: The patient's genotype is [id number]/CFTR del 2,3.  As above, he has had an impressive response to Trikafta.  His bilirubin has been mildly elevated but not progressively so.  He is overdue for follow-up liver function tests and will get those drawn this week.  2.  Impaired glucose tolerance: Last annual study fasting > 100 and peak > 200 and 2 hour value right at 140. A little worse than Jan 2018 but similar to previous. A1C most recently at 6.1%. Does not appear to be an evolving problem and overall with good clinical course.  Will repeat glucose tolerance testing this summer.  3.  Pancreatic insufficiency with a history of malnutrition on tube feeds: Adequate enzyme replacement by history.  He has been able to gradually cut back to 1 to 2 cans of tube feed per night.  Leona, CF nutritionist, reevaluated the patient today.  Will plan on continuing that regimen.  His BMI is at target and stable at 24.8.  4. Chronic CF sinus disease: Improved on modulation therapy. Continue flonase.  5. Skin lesions: Seen by outside Dermatologist. Central chest scar could be treated by steroid injections per pt but not actively pursuing. Plan in place for annual exam of his many freckles.  Need to clarify when he last had his annual dermatologic exam.  6. Healthcare maintenance: Patient will have annual studies drawn later this week.  He is current on Covid vaccination.    Patient will follow up in 3 months    35 minutes spent in conjunction with this visit     Rocael Miller

## 2021-06-28 NOTE — PROGRESS NOTES
CF Annual Nutrition Assessment     Reason for Assessment  Assessed during CF clinic visit with Dr. Miller r/t increased nutrition risk with diagnosis of CF per protocol.      Anthropometric Assessment  Height: 189.7 cm  Weight: 89.3 kg (196 lbs)  Ideal body weight based on BMI 22 for females and 23 for males per [facility] recs: 82.8 kg (182 lbs)  BMI: 24.8 kg/m      Wt Readings from Last 5 Encounters:   21 89.3 kg (196 lb 13.9 oz)   20 89.7 kg (197 lb 12 oz)   19 86.2 kg (190 lb)   10/14/19 87.5 kg (193 lb)   10/11/19 88.6 kg (195 lb 6.4 oz)     Comments: Overall weight has remained relatively stable with some seasonal fluctuations -higher end of norm during winter and lower end of range during the summer with increased activity. At [facility] goal and pt reports goal to maintain weight above 190 lbs.     Pancreatic Enzymes  Brand:  Creon [id number]  PO Dosin with meals, 6 with snacks = 2180 units lipase/kg/meal  TF Dosing: currently 3-4 caps w/ tube feeding (1 can)   Estimated Daily Intake: 50 caps = 13,500 units lipase/kg/day from PO + TF - slightly above recommended dosing range however meal dose appropriate.      Signs of Malabsorption: No - hx stool burden and seen by GI 10/11/19. Not needing Miralax.   Enzyme Program:  None - information provided during previous visits     Nutrition-Related Lab & Vitamin/Mineral Supplements  Annual studies due.      OGTT- , indeterminate glucose tolerance  DEXA - , lowest z-score 0.1      Current Vitamin/Mineral Supplements:MVW Complete 1 capsule daily. Reports taking daily. Obtains from [facility].     Diet History and Assessment  Diet Preferences/Allergies/Intolerances: Regular  Intake Recall/Comments: Appetite only fair at baseline but makes an effort to eat consistently. Consumes 2-3 meals and snacks. Historically has not been able to maintain weight without use of TF. Has been able to decrease TF from 2 cans to 1 can per night and thinks he may be eating  more to compensate. Also tried to hold TFs x 1 week to see if able to maintain weight. Found that he may be more hungry in the morning and able to tolerate a larger breakfast, such as eating a breakfast sandwich.     Diet Recall:  B: skips or small snack (fruit or muffin)  L: PB+J sandwich, chips, or fast food   D: protein with starch and vegetable  HS snack: with Trikafta + TFs - may have a bowl of cereal    Calcium: gallon whole milk/week + yogurt, string cheese  Salt: likely adequate from foods  Hydration: Water, Gatorade, regular coke   Supplements: No   Tube Feeding: Yes --   Type of Feeding Tube: G-tube (button)  Formula: Nutren 2.0  Rate/Frequency: 135 ml/hr x 1 can + water added (~4 hrs infusion time)  Nutrition: 250 mls, 500 kcals, 21 g protein, 23 g fat, 54 g CHO  Enzymes: taking 9-10 caps before infusion along with HS snack (accounting for ~3-4 caps per 1 can TF) provides ~4000 units lipase/g fat in feeds.      NUTRITION DIAGNOSIS  Impaired nutrient utilization related to CF pancreatic insufficiency as evidenced by requires pancreatic enzyme replacement therapy, vitamin/mineral supplementation, and higher kcal/protein diet and nutrition support in order to maintain ideal body weight and nutrition status.     INTERVENTIONS/RECOMMENDATIONS   1) Oral Intake/Weight:   BEE: 2100   4000-8650 kcals/day =  150-175% BEE  110-140 g protein/day = 1.2-1.5 g/kg    Discussed current nutrition status including weight trends, PO, and TF intake. Praised pt for weight maintenance at goal BMI per CFF and increasing PO intake to account for decreased TF infusion. Encouraged pt to continue monitoring weight with decreasing TF / increasing PO as tolerated. No GI concerns noted today and will continue with current enzyme dose.      2) Vitamin/Mineral Supplementation:  Annual study labs due. Pt currently taking MVW Complete 1 capsule daily - this was added November 2019 in order to discontinue separate Vit D and K supplements.  Will contact pt if any changes are needed to regimen pending updated vitamin levels.      GOALS:  1) Weight maintenance with BMI >23 kg/m2  2) Take enzymes and vitamins as recommended     FOLLOW-UP/MONITORING:  Visit patient within 12 months for annual nutrition reassessment.     Time Spent In Face-to-Face Patient Interactions: 15 minutes    Leona Marie RD, LD  Cystic Fibrosis/Lung Transplant Dietitian  Pager [phone]

## 2021-06-28 NOTE — PATIENT INSTRUCTIONS
1. Try stopping cromolyn nebs - can resume if you don't feel as well  2. Get annual study blood tests this week  3. We will order a glucose tolerance tests for you

## 2021-06-28 NOTE — NURSING NOTE
Chief Complaint   Patient presents with     Cystic Fibrosis     follow up     Vitals were taken and medications were reconciled.     CHUCK Cagle

## 2021-06-28 NOTE — LETTER
2021         RE: Telly Leal  [address] [facility] [address]        Dear Colleague,    Thank you for referring your patient, Telly Leal, to the [facility] FOR LUNG SCIENCE AND [facility] [place]. Please see a copy of my visit note below.    CF Annual Nutrition Assessment     Reason for Assessment  Assessed during CF clinic visit with Dr. Miller r/t increased nutrition risk with diagnosis of CF per protocol.      Anthropometric Assessment  Height: 189.7 cm  Weight: 89.3 kg (196 lbs)  Ideal body weight based on BMI 22 for females and 23 for males per CF Foundation recs: 82.8 kg (182 lbs)  BMI: 24.8 kg/m      Wt Readings from Last 5 Encounters:   21 89.3 kg (196 lb 13.9 oz)   20 89.7 kg (197 lb 12 oz)   19 86.2 kg (190 lb)   10/14/19 87.5 kg (193 lb)   10/11/19 88.6 kg (195 lb 6.4 oz)     Comments: Overall weight has remained relatively stable with some seasonal fluctuations -higher end of norm during winter and lower end of range during the summer with increased activity. At [facility] goal and pt reports goal to maintain weight above 190 lbs.     Pancreatic Enzymes  Brand:  Creon [id number]  PO Dosin with meals, 6 with snacks = 2180 units lipase/kg/meal  TF Dosing: currently 3-4 caps w/ tube feeding (1 can)   Estimated Daily Intake: 50 caps = 13,500 units lipase/kg/day from PO + TF - slightly above recommended dosing range however meal dose appropriate.      Signs of Malabsorption: No - hx stool burden and seen by GI 10/11/19. Not needing Miralax.   Enzyme Program:  None - information provided during previous visits     Nutrition-Related Lab & Vitamin/Mineral Supplements  Annual studies due.      OGTT- , indeterminate glucose tolerance  DEXA - , lowest z-score 0.1      Current Vitamin/Mineral Supplements:MVW Complete 1 capsule daily. Reports taking daily. Obtains from [facility].     Diet History and Assessment  Diet  Preferences/Allergies/Intolerances: Regular  Intake Recall/Comments: Appetite only fair at baseline but makes an effort to eat consistently. Consumes 2-3 meals and snacks. Historically has not been able to maintain weight without use of TF. Has been able to decrease TF from 2 cans to 1 can per night and thinks he may be eating more to compensate. Also tried to hold TFs x 1 week to see if able to maintain weight. Found that he may be more hungry in the morning and able to tolerate a larger breakfast, such as eating a breakfast sandwich.     Diet Recall:  B: skips or small snack (fruit or muffin)  L: PB+J sandwich, chips, or fast food   D: protein with starch and vegetable  HS snack: with Trikafta + TFs - may have a bowl of cereal    Calcium: gallon whole milk/week + yogurt, string cheese  Salt: likely adequate from foods  Hydration: Water, Gatorade, regular coke   Supplements: No   Tube Feeding: Yes --   Type of Feeding Tube: G-tube (button)  Formula: Nutren 2.0  Rate/Frequency: 135 ml/hr x 1 can + water added (~4 hrs infusion time)  Nutrition: 250 mls, 500 kcals, 21 g protein, 23 g fat, 54 g CHO  Enzymes: taking 9-10 caps before infusion along with HS snack (accounting for ~3-4 caps per 1 can TF) provides ~4000 units lipase/g fat in feeds.      NUTRITION DIAGNOSIS  Impaired nutrient utilization related to CF pancreatic insufficiency as evidenced by requires pancreatic enzyme replacement therapy, vitamin/mineral supplementation, and higher kcal/protein diet and nutrition support in order to maintain ideal body weight and nutrition status.     INTERVENTIONS/RECOMMENDATIONS   1) Oral Intake/Weight:   BEE: 2100   1565-1854 kcals/day =  150-175% BEE  110-140 g protein/day = 1.2-1.5 g/kg    Discussed current nutrition status including weight trends, PO, and TF intake. Praised pt for weight maintenance at goal BMI per CFF and increasing PO intake to account for decreased TF infusion. Encouraged pt to continue monitoring  weight with decreasing TF / increasing PO as tolerated. No GI concerns noted today and will continue with current enzyme dose.      2) Vitamin/Mineral Supplementation:  Annual study labs due. Pt currently taking MVW Complete 1 capsule daily - this was added November 2019 in order to discontinue separate Vit D and K supplements. Will contact pt if any changes are needed to regimen pending updated vitamin levels.      GOALS:  1) Weight maintenance with BMI >23 kg/m2  2) Take enzymes and vitamins as recommended     FOLLOW-UP/MONITORING:  Visit patient within 12 months for annual nutrition reassessment.     Time Spent In Face-to-Face Patient Interactions: 15 minutes    Leona Marie RD, LD  Cystic Fibrosis/Lung Transplant Dietitian  Pager [phone]           Reason for Visit  Telly Leal is a 35 year old year old male who is being seen for Cystic Fibrosis (follow up)    CF HPI  The patient was seen and examined by Rocael Miller   The patient's last few visits have been performed virtually.  He overall has been doing well from a respiratory standpoint without exacerbations.  At the time of his last visit in January he had had recently tested positive for Covid but was not experiencing respiratory symptoms.  Today, he reports doing well since his last visit.  He overall has been consistently completing vest therapy twice a day but in the past month he has been busier at work and it has dropped off to 3 times per week.  Despite this, he reports no interval increase in respiratory symptoms.  He can go 2 weeks without therapy at which point he feels mild congestion.  Most days he does not produce any sputum and when he does, suspects it is related to postnasal drip.  He acquired a new vest this spring after his old device malfunction.  He feels the fit is better with more consistent pressure across his anterior chest wall.  No issues with chest pain or hemoptysis.  He is not doing any formal exercise but is  physically active getting out with the kids pulling them on his bike and feels his exercise tolerance is at baseline.    Current Outpatient Medications   Medication     acetylcysteine (MUCOMYST) 20 % neb solution     albuterol (PROAIR HFA) 108 (90 BASE) MCG/ACT Inhaler     albuterol (PROVENTIL) (2.5 MG/3ML) 0.083% neb solution     azithromycin (ZITHROMAX) 250 MG tablet     blood glucose monitoring (NO BRAND SPECIFIED) test strip     Cholecalciferol 4000 UNITS CAPS     colistimethate/colistin-base activity (COLYMYCIN) 150 mg/2mL SOLR neb solution     CREON 35290-09921 units CPEP per EC capsule     cromolyn (INTAL) 20 MG/2ML neb solution     elexacaftor-tezacaftor-ivacaftor & ivacaftor (TRIKAFTA) 100-50-75 & 150 MG tablet pack     famotidine (PEPCID) 20 MG tablet     fluticasone (FLONASE) 50 MCG/ACT nasal spray     Multiple Vitamin (MULTIVITAMINS PO)     mvw complete formulation (SOFTGELS) capsule     Nutritional Supplements (NUTREN 2.0)     phytonadione (MEPHYTON) 5 MG tablet     tobramycin, PF, (SOO) 300 MG/5ML neb solution     water for injection sterile SOLN     No current facility-administered medications for this visit.      No Known Allergies  Past Medical History:   Diagnosis Date     Chronic sinusitis      Cystic fibrosis with pulmonary manifestations (H)      Exocrine pancreatic insufficiency      GERD (gastroesophageal reflux disease)      Hemoptysis      Malnutrition (H)        Past Surgical History:   Procedure Laterality Date     IR MISCELLANEOUS PROCEDURE  2/6/2001     LOBECTOMY      right upper lobe     TUBES         Social History     Socioeconomic History     Marital status:      Spouse name: Not on file     Number of children: Not on file     Years of education: Not on file     Highest education level: Not on file   Occupational History     Occupation:      Employer: [employer]   Social Needs     Financial resource strain: Not on file     Food insecurity     Worry: Not on  file     Inability: Not on file     Transportation needs     Medical: Not on file     Non-medical: Not on file   Tobacco Use     Smoking status: Never Smoker     Smokeless tobacco: Never Used   Substance and Sexual Activity     Alcohol use: Not Currently     Alcohol/week: 0.0 standard drinks     Drug use: Not Currently     Sexual activity: Not on file   Lifestyle     Physical activity     Days per week: Not on file     Minutes per session: Not on file     Stress: Not on file   Relationships     Social connections     Talks on phone: Not on file     Gets together: Not on file     Attends Adventism service: Not on file     Active member of club or organization: Not on file     Attends meetings of clubs or organizations: Not on file     Relationship status: Not on file     Intimate partner violence     Fear of current or ex partner: Not on file     Emotionally abused: Not on file     Physically abused: Not on file     Forced sexual activity: Not on file   Other Topics Concern     Parent/sibling w/ CABG, MI or angioplasty before 65F 55M? Not Asked   Social History Narrative    Patient works for the [employer] as an .  He does not have stable living conditions at this time.  He has a Maltese guillen.   Update: living with wife Bre in [facility]. Birth of daughter Nieves Rebolledo August, 2017, birth of daughter Josy Felder November, 2019    ROS Pulmonary  Const: No fever chills sweats.  GI: Consistently using 1  - 2 cans of tube feed per night.  No grease in his stools.  No abdominal pain.  No diarrhea or constipation.  ENT: No sinus pain pressure postnasal drip.  Endocrine: No hypoglycemic symptoms.  A complete ROS was otherwise negative except as noted in the HPI.  /65   Pulse 108   Wt 89.3 kg (196 lb 13.9 oz)   SpO2 95%   BMI 24.61 kg/m    Exam:   GENERAL APPEARANCE: Well developed, well nourished, alert, and in no apparent distress.  EYES: anicteric with injected appearing  conjunctiva  HENT: No nasal discharge.  Oral mucosa is moist, without any lesions, no tonsillar enlargement, no oropharyngeal exudate.  RESP:  good air flow throughout.  Clear to auscultation.  Normal chest wall excursion.  Reduced breath sounds at right posterior base as baseline.  CV: Normal S1, S2, regular rhythm, normal rate. No murmur. No gallop. No LE edema.   ABDOMEN:  Bowel sounds normal, soft, nontender, no HSM or masses.   MS: extremities normal. No cyanosis.  SKIN:  PEG site without erythema or discharge. Chest scar-like lesion upper sternum unchanged. Diffuse freckles without overt change.  NEURO: Mentation intact, speech normal, normal gait and stance  PSYCH: mentation appears normal. and affect normal/bright  Results:  Recent Results (from the past 168 hour(s))   General PFT Lab (Please always keep checked)    Collection Time: 06/28/21 12:41 PM   Result Value Ref Range    FVC-Pred 6.19 L    FVC-Pre 6.44 L    FVC-%Pred-Pre 104 %    FEV1-Pre 3.69 L    FEV1-%Pred-Pre 74 %    FEV1FVC-Pred 80 %    FEV1FVC-Pre 57 %    FEFMax-Pred 11.30 L/sec    FEFMax-Pre 10.46 L/sec    FEFMax-%Pred-Pre 92 %    FEF2575-Pred 4.69 L/sec    FEF2575-Pre 1.37 L/sec    FZJ8303-%Pred-Pre 29 %    ExpTime-Pre 12.15 sec    FIFMax-Pre 11.42 L/sec    FEV1FEV6-Pred 82 %    FEV1FEV6-Pre 64 %     Spirometry interpretation: personally reviewed. Valid maneuver.  Mild obstruction. FEV1 improved has improved further by more than 5%.  Today's values are the best they have been dating back to 2007.    Sputum last visit: PA x 2    Chest x-ray from today personally reviewed.  Interval improvement in mild mucus plugging present on his 2019 study.    Assessment and plan:  1. CF lung disease: The patient has had an outstanding response to Trikafta with substantially improved burden of symptoms and further improvement in his pulmonary function test today to well above his previous baseline.  This is despite cutting back modestly on his airway  clearance regimen.  We discussed at length risks versus benefits of de-escalating his regimen.  Will try discontinuing his cromolyn nebs with plan to resume promptly if he feels worse.  Also reasonable for him to perform vest therapy only once a day if sleep deprived due to childcare responsibilities or if he is feeling well and is able to perform vigorous aerobic exercise that day.  Will consider sequential trial off of inhaled antibiotics if he is doing well at his next visit.  1B.  CFTR modulation therapy: The patient's genotype is [id number]/CFTR del 2,3.  As above, he has had an impressive response to Trikafta.  His bilirubin has been mildly elevated but not progressively so.  He is overdue for follow-up liver function tests and will get those drawn this week.  2. Impaired glucose tolerance: Last annual study fasting > 100 and peak > 200 and 2 hour value right at 140. A little worse than Jan 2018 but similar to previous. A1C most recently at 6.1%. Does not appear to be an evolving problem and overall with good clinical course.  Will repeat glucose tolerance testing this summer.  3.  Pancreatic insufficiency with a history of malnutrition on tube feeds: Adequate enzyme replacement by history.  He has been able to gradually cut back to 1 to 2 cans of tube feed per night.  Leona, CF nutritionist, reevaluated the patient today.  Will plan on continuing that regimen.  His BMI is at target and stable at 24.8.  4. Chronic CF sinus disease: Improved on modulation therapy. Continue flonase.  5. Skin lesions: Seen by outside Dermatologist. Central chest scar could be treated by steroid injections per pt but not actively pursuing. Plan in place for annual exam of his many freckles.  Need to clarify when he last had his annual dermatologic exam.  6. Healthcare maintenance: Patient will have annual studies drawn later this week.  He is current on Covid vaccination.    Patient will follow up in 3 months    35 minutes spent  in conjunction with this visit     Rocael Miller                     Respiratory Therapist Note:          Vest                Brand: Hill-Rom - traditional Hill Rom: Frequencies 8, 9, 10 at pressure 10 then frequencies 18, 19, 20 at pressure 6.                Cough Pause: Cough Pause; Yes                Vest Garment Size: Adult Medium                Last Fitting Date: Due as needed                 Frequency of therapy: 3 times per week                Concerns: Would benefit from trying to increase vesting to 1x/day or incorporating 20-30 minutes of cardio daily to augment airway clearance.          Exercise (purposeful and aerobic for >20 minutes each session): NO.                Does this qualify as additional airway clearance: No         Alternative Airway Clearance:               Nebulized Medications                Bronchodilators: Albuterol                Mucolytic: Mucomyst                Antibiotics: SOO and Colimycin                Additional Inhaled Medications: Cromolyn                Spacer Use:          Review Cleaning: Yes. Countertop bottle sterilizer.         Education and Transition Information                Correct order of inhaled medications: Yes                Mechanism of Action of inhaled medications: Yes                Frequency of inhaled medications: Yes                Dosage of inhaled medications: Yes                Other: Only does nebulizers when vesting. Should try to at least get in scheduled antibiotic nebs.         Home Care:                Nebulizer Cups (Brand/Type): Mare                Nebulizer Compressor                            Year Purchased: Works                            [facility], Phone: [phone], Fax: [phone]                Nebulizer Supply Company:                            [facility], Phone: [phone], Fax: [phone]         Oxygen: N/A         Pulmonary Rehab                Site:                 Date Completed:           Plan of Care and Goals for next visit: It was great meeting you in clinic today, please contact me for any airway clearance needs.      Again, thank you for allowing me to participate in the care of your patient.        Sincerely,        Rocael Miller MD

## 2021-06-29 NOTE — PROGRESS NOTES
Respiratory Therapist Note:          Vest                Brand: Hill-Rom - traditional Hill Rom: Frequencies 8, 9, 10 at pressure 10 then frequencies 18, 19, 20 at pressure 6.                Cough Pause: Cough Pause; Yes                Vest Garment Size: Adult Medium                Last Fitting Date: Due as needed                 Frequency of therapy: 3 times per week                Concerns: Would benefit from trying to increase vesting to 1x/day or incorporating 20-30 minutes of cardio daily to augment airway clearance.          Exercise (purposeful and aerobic for >20 minutes each session): NO.                Does this qualify as additional airway clearance: No         Alternative Airway Clearance:               Nebulized Medications                Bronchodilators: Albuterol                Mucolytic: Mucomyst                Antibiotics: SOO and Colimycin                Additional Inhaled Medications: Cromolyn                Spacer Use:          Review Cleaning: Yes. Countertop bottle sterilizer.         Education and Transition Information                Correct order of inhaled medications: Yes                Mechanism of Action of inhaled medications: Yes                Frequency of inhaled medications: Yes                Dosage of inhaled medications: Yes                Other: Only does nebulizers when vesting. Should try to at least get in scheduled antibiotic nebs.         Home Care:                Nebulizer Cups (Brand/Type): [employer]                Nebulizer Compressor                            Year Purchased: Works                            [facility], Phone: [phone], Fax: [phone]                Nebulizer Supply Company:                            [facility], Phone: [phone], Fax: [phone]         Oxygen: N/A         Pulmonary Rehab                Site:                 Date Completed:          Plan of Care and Goals for next visit: It was great meeting you in  clinic today, please contact me for any airway clearance needs.

## 2021-07-01 NOTE — PATIENT INSTRUCTIONS
Recommendations from today's MTM visit:                                                        Stop cromolyn nebs as directed by Dr. Miller. If your respiratory symptoms increase, please resume the cromolyn.      If you continue to feel well after stopping cromolyn, we will consider stopping one of your inhaled antibiotics at your next clinic appointment.    Please schedule an appointment for annual studies (which will include a hepatic panel) so we can make sure the Trikafta isn't affecting your liver.    Follow-up: 1 year or sooner if needed    It was great to speak with you today.  I value your experience and would be very thankful for your time with providing feedback on our clinic survey. You may receive a survey via email or text message in the next few days.     My Clinical Pharmacist's contact information:                                                      Please feel free to contact me with any questions or concerns you have.      Cecilia Gordillo PharmD  CF Medication Therapy Management Pharmacist  [facility]  [phone]

## 2021-07-01 NOTE — PROGRESS NOTES
Medication Therapy Management (MTM) Encounter    ASSESSMENT:                            Medication Adherence/Access: No issues identified    CF: Stable. Patient would benefit from gradual de-escalation of maintenance medications, given his excellent response to Trikafta    Pancreatic Insufficiency/Nutrition: Stable.      PLAN:                            Stop cromolyn nebs as directed by Dr. Miller. If your respiratory symptoms increase, please resume the cromolyn.      If you continue to feel well after stopping cromolyn, we will consider stopping one of your inhaled antibiotics at your next clinic appointment.    Please schedule an appointment for annual studies (which will include a hepatic panel) so we can make sure the Trikafta isn't affecting your liver.    Follow-up: 1 year or sooner if needed    SUBJECTIVE/OBJECTIVE:                          Telly Leal is a 41 year old male seen for an annual visit. He was referred to me from Dr. Miller. Patient saw provider prior to our visit today.     Reason for visit: annual CF pharmacist visit and medication review.    Allergies/ADRs: Reviewed in chart  Tobacco: He reports that he has never smoked. He has never used smokeless tobacco.  Alcohol: not currently using  Past Medical History: Reviewed in chart      Medication Adherence/Access: Telly fills most of his medications at [facility], except tobramycin which he is required to fill at [facility]. He denies any medication access barriers. Telly reports good medication adherence, which he attributes to his consistent routine at home.    CF:    Genotype: [id number]/CFTR del 2,3  Inhaled medications:   Bronchodilator: albuterol   Mucolytic: acetylcysteine   Antibiotic: Tobramycin alternating with colistimethate every other month   Other: Cromolyn nebs, Flonase nasal spray  Oral medications:   Azithromycin: taking   CFTR modulator: Trikafta  Pulmonary symptoms are improved  PFTs are  "increased  Current FEV1: 74%  Cultures: sputum cultures grow Pseudomonas  Exacerbation status: no exacerbation    CFTR: Telly started Trikafta in November 2019. He has had an excellent clinical response to Trikafta and states he feels \"great\". He had improved pulmonary symptoms (less cough and sputum production) as well as an increase in FEV1 from 61% to 74%. He has not experienced any side effects from Trikafta. He had mild elevations in bilirubin, mostly indirect, but his LFTs have remained stable. Overall he continues to complete airway clearance therapy twice daily, but since being on Trikafta he can miss a few treatments and not have an increase in respiratory symptoms. He has also been able to gradually reduce his tube feeds and maintain his weight.    Pancreatic Insufficiency/Nutrition: Pancreatic enzyme replacement with Creon 74420 units.  Patient is taking 5-6 capsules with meals and 1-5 capsules with snacks and 7-8 with tube feeds. Patient is not experiencing sign/symptoms of malabsorption.  Acid reducer:Pepcid (famotidine) 20 mg twice daily as needed for heartbun  Weight and BMI are stable  Vitamins include: MVW twice daily, vitamin D 4000 units daily and Mepyton 5 mg 3 times weekly. Vitamin D is within goal range.    Lab Results   Component Value Date    VITDT 36 04/20/2020    VITDT 38 04/22/2019     Estimated body mass index is 24.61 kg/m  as calculated from the following:    Height as of 1/13/20: 1.905 m (6' 3\").    Weight as of an earlier encounter on 6/28/21: 89.3 kg (196 lb 13.9 oz).      Today's Vitals:   BP Readings from Last 1 Encounters:   06/28/21 128/65     Pulse Readings from Last 1 Encounters:   06/28/21 108     Wt Readings from Last 1 Encounters:   06/28/21 89.3 kg (196 lb 13.9 oz)     Ht Readings from Last 1 Encounters:   01/13/20 1.905 m (6' 3\")     I spent 15 minutes with this patient today.     See Provider note/AVS from today.     Cecilia Gordillo PharmD  CF Medication Therapy Management " Pharmacist  [facility]  [phone]

## 2021-07-12 LAB
BACTERIA SPEC CULT: ABNORMAL
SPECIMEN SOURCE: ABNORMAL

## 2021-07-13 DIAGNOSIS — E84.9 CF (CYSTIC FIBROSIS) (H): ICD-10-CM

## 2021-07-13 RX ORDER — ELEXACAFTOR, TEZACAFTOR, AND IVACAFTOR 100-50-75
KIT ORAL
Qty: 84 EACH | Refills: 0 | Status: SHIPPED | OUTPATIENT
Start: 2021-07-13 | End: 2021-08-09

## 2021-08-09 DIAGNOSIS — E84.9 CF (CYSTIC FIBROSIS) (H): ICD-10-CM

## 2021-08-09 RX ORDER — ELEXACAFTOR, TEZACAFTOR, AND IVACAFTOR 100-50-75
KIT ORAL
Qty: 84 EACH | Refills: 5 | Status: SHIPPED | OUTPATIENT
Start: 2021-08-09 | End: 2022-01-20

## 2021-09-03 DIAGNOSIS — E84.9 CF (CYSTIC FIBROSIS) (H): ICD-10-CM

## 2021-09-03 RX ORDER — AZITHROMYCIN 250 MG/1
250 TABLET, FILM COATED ORAL DAILY
Qty: 30 TABLET | Refills: 11 | Status: SHIPPED | OUTPATIENT
Start: 2021-09-03 | End: 2022-08-02

## 2021-09-11 ENCOUNTER — HEALTH MAINTENANCE LETTER (OUTPATIENT)
Age: 41
End: 2021-09-11

## 2021-09-14 ENCOUNTER — TELEPHONE (OUTPATIENT)
Dept: PULMONOLOGY | Facility: CLINIC | Age: 41
End: 2021-09-14

## 2021-09-14 NOTE — TELEPHONE ENCOUNTER
Prior Authorization Approval    Authorization Effective Date: 9/14/2021  Authorization Expiration Date: 9/14/2022  Medication: TOBRAMYCIN APPROVED  Approved Dose/Quantity: 280 PER 28 DAYS  Reference #:     Insurance Company: [employer] [phone] Fax [phone]  Expected CoPay:       CoPay Card Available: No    Foundation Assistance Needed:    Which Pharmacy is filling the prescription (Not needed for infusion/clinic administered): [facility] - [place], [place] - [address] [place] [address]  Pharmacy Notified:    Patient Notified:

## 2021-10-08 ENCOUNTER — LAB (OUTPATIENT)
Dept: LAB | Facility: CLINIC | Age: 41
End: 2021-10-08
Payer: COMMERCIAL

## 2021-10-08 DIAGNOSIS — E84.0 CYSTIC FIBROSIS WITH PULMONARY MANIFESTATIONS (H): ICD-10-CM

## 2021-10-08 LAB
ALBUMIN SERPL-MCNC: 4.1 G/DL (ref 3.5–5)
ALBUMIN UR-MCNC: NEGATIVE MG/DL
ALP SERPL-CCNC: 80 U/L (ref 45–120)
ALT SERPL W P-5'-P-CCNC: 21 U/L (ref 0–45)
AMORPH CRY #/AREA URNS HPF: ABNORMAL /HPF
ANION GAP SERPL CALCULATED.3IONS-SCNC: 9 MMOL/L (ref 5–18)
APPEARANCE UR: ABNORMAL
AST SERPL W P-5'-P-CCNC: 24 U/L (ref 0–40)
BACTERIA #/AREA URNS HPF: ABNORMAL /HPF
BASOPHILS # BLD AUTO: 0 10E3/UL (ref 0–0.2)
BASOPHILS NFR BLD AUTO: 0 %
BILIRUB DIRECT SERPL-MCNC: 0.5 MG/DL
BILIRUB SERPL-MCNC: 3.2 MG/DL (ref 0–1)
BILIRUB UR QL STRIP: NEGATIVE
BUN SERPL-MCNC: 16 MG/DL (ref 8–22)
CALCIUM SERPL-MCNC: 9.7 MG/DL (ref 8.5–10.5)
CHLORIDE BLD-SCNC: 103 MMOL/L (ref 98–107)
CHOLEST SERPL-MCNC: 153 MG/DL
CO2 SERPL-SCNC: 27 MMOL/L (ref 22–31)
COLOR UR AUTO: YELLOW
CREAT SERPL-MCNC: 0.91 MG/DL (ref 0.7–1.3)
CREAT UR-MCNC: 147 MG/DL
EOSINOPHIL # BLD AUTO: 0.1 10E3/UL (ref 0–0.7)
EOSINOPHIL NFR BLD AUTO: 1 %
ERYTHROCYTE [DISTWIDTH] IN BLOOD BY AUTOMATED COUNT: 12.7 % (ref 10–15)
FASTING STATUS PATIENT QL REPORTED: YES
GFR SERPL CREATININE-BSD FRML MDRD: >90 ML/MIN/1.73M2
GLUCOSE BLD-MCNC: 91 MG/DL (ref 70–125)
GLUCOSE UR STRIP-MCNC: NEGATIVE MG/DL
HBA1C MFR BLD: 5.6 % (ref 0–5.6)
HCT VFR BLD AUTO: 44.3 % (ref 40–53)
HDLC SERPL-MCNC: 40 MG/DL
HGB BLD-MCNC: 15.4 G/DL (ref 13.3–17.7)
HGB UR QL STRIP: NEGATIVE
IMM GRANULOCYTES # BLD: 0 10E3/UL
IMM GRANULOCYTES NFR BLD: 0 %
INR PPP: 1.01 (ref 0.85–1.15)
IRON SERPL-MCNC: 69 UG/DL (ref 42–175)
KETONES UR STRIP-MCNC: NEGATIVE MG/DL
LDLC SERPL CALC-MCNC: 97 MG/DL
LEUKOCYTE ESTERASE UR QL STRIP: NEGATIVE
LYMPHOCYTES # BLD AUTO: 2.2 10E3/UL (ref 0.8–5.3)
LYMPHOCYTES NFR BLD AUTO: 42 %
MAGNESIUM SERPL-MCNC: 2 MG/DL (ref 1.8–2.6)
MCH RBC QN AUTO: 30.9 PG (ref 26.5–33)
MCHC RBC AUTO-ENTMCNC: 34.8 G/DL (ref 31.5–36.5)
MCV RBC AUTO: 89 FL (ref 78–100)
MICROALBUMIN UR-MCNC: 0.5 MG/DL (ref 0–1.99)
MICROALBUMIN/CREAT UR: 3.4 MG/G CR
MONOCYTES # BLD AUTO: 0.4 10E3/UL (ref 0–1.3)
MONOCYTES NFR BLD AUTO: 9 %
NEUTROPHILS # BLD AUTO: 2.4 10E3/UL (ref 1.6–8.3)
NEUTROPHILS NFR BLD AUTO: 47 %
NITRATE UR QL: NEGATIVE
PH UR STRIP: 7 [PH] (ref 5–8)
PHOSPHATE SERPL-MCNC: 3.5 MG/DL (ref 2.5–4.5)
PLATELET # BLD AUTO: 276 10E3/UL (ref 150–450)
POTASSIUM BLD-SCNC: 4.3 MMOL/L (ref 3.5–5)
PROT SERPL-MCNC: 7 G/DL (ref 6–8)
RBC # BLD AUTO: 4.98 10E6/UL (ref 4.4–5.9)
RBC #/AREA URNS AUTO: ABNORMAL /HPF
SODIUM SERPL-SCNC: 139 MMOL/L (ref 136–145)
SP GR UR STRIP: 1.02 (ref 1–1.03)
SQUAMOUS #/AREA URNS AUTO: ABNORMAL /LPF
TESTOST SERPL-MCNC: 445 NG/DL (ref 240–871)
TRIGL SERPL-MCNC: 78 MG/DL
UROBILINOGEN UR STRIP-ACNC: 1 E.U./DL
WBC # BLD AUTO: 5.1 10E3/UL (ref 4–11)
WBC #/AREA URNS AUTO: ABNORMAL /HPF

## 2021-10-08 PROCEDURE — 82306 VITAMIN D 25 HYDROXY: CPT

## 2021-10-08 PROCEDURE — 80053 COMPREHEN METABOLIC PANEL: CPT

## 2021-10-08 PROCEDURE — 84100 ASSAY OF PHOSPHORUS: CPT

## 2021-10-08 PROCEDURE — 82043 UR ALBUMIN QUANTITATIVE: CPT

## 2021-10-08 PROCEDURE — 82785 ASSAY OF IGE: CPT

## 2021-10-08 PROCEDURE — 83540 ASSAY OF IRON: CPT

## 2021-10-08 PROCEDURE — 84590 ASSAY OF VITAMIN A: CPT | Mod: 90

## 2021-10-08 PROCEDURE — 83735 ASSAY OF MAGNESIUM: CPT

## 2021-10-08 PROCEDURE — 85610 PROTHROMBIN TIME: CPT

## 2021-10-08 PROCEDURE — 36415 COLL VENOUS BLD VENIPUNCTURE: CPT

## 2021-10-08 PROCEDURE — 80061 LIPID PANEL: CPT

## 2021-10-08 PROCEDURE — 81001 URINALYSIS AUTO W/SCOPE: CPT

## 2021-10-08 PROCEDURE — 83036 HEMOGLOBIN GLYCOSYLATED A1C: CPT

## 2021-10-08 PROCEDURE — 99000 SPECIMEN HANDLING OFFICE-LAB: CPT

## 2021-10-08 PROCEDURE — 82248 BILIRUBIN DIRECT: CPT

## 2021-10-08 PROCEDURE — 84403 ASSAY OF TOTAL TESTOSTERONE: CPT

## 2021-10-08 PROCEDURE — 84446 ASSAY OF VITAMIN E: CPT | Mod: 90

## 2021-10-08 PROCEDURE — 85025 COMPLETE CBC W/AUTO DIFF WBC: CPT

## 2021-10-11 ENCOUNTER — CLINICAL UPDATE (OUTPATIENT)
Dept: PHARMACY | Facility: CLINIC | Age: 41
End: 2021-10-11
Payer: COMMERCIAL

## 2021-10-11 DIAGNOSIS — E84.9 CF (CYSTIC FIBROSIS) (H): Primary | ICD-10-CM

## 2021-10-11 DIAGNOSIS — R17 ELEVATED BILIRUBIN: ICD-10-CM

## 2021-10-11 LAB
A-TOCOPHEROL VIT E SERPL-MCNC: 8.8 MG/L
ANNOTATION COMMENT IMP: NORMAL
BETA+GAMMA TOCOPHEROL SERPL-MCNC: <0.2 MG/L
DEPRECATED CALCIDIOL+CALCIFEROL SERPL-MC: 41 UG/L (ref 30–80)
IGE SERPL-ACNC: 4 KU/L (ref 0–114)
RETINYL PALMITATE SERPL-MCNC: <0.02 MG/L
VIT A SERPL-MCNC: 0.72 MG/L

## 2021-10-11 PROCEDURE — 99207 PR NO CHARGE LOS: CPT | Performed by: PHARMACIST

## 2021-10-11 NOTE — PROGRESS NOTES
Clinical Update:                                                    A chart review was conducted for Telly Lael.    Reason for Chart Review: Trikafta Lab Monitoring 6 Month Follow Up    Discussion: Telly has been on Trikafta since 12/3/19. Telly has had some elevations in bilirubin which have required closer monitoring.    Labs were reviewed from 10/8/21 from [facility]. ALT and AST were within normal limits. CK was not drawn at this visit. Bilirubin and direct bilirubin are both elevated from previous. Given direct bilirubin has also been trending up, discussed with Dr. Miller that patient may benefit from a consult with hepatology to ensure safety for continuing Trikafta. Isolated bilirubin elevation is not discussed thoroughly in the package insert. Could consider a trial of ursodiol as well which may help reduce bilirubin, though is usually used for elevated LFTs. Will also plan to recheck labs at upcoming appointment on 10/25.    Lab Results   Component Value Date    ALT 21 10/08/2021    AST 24 10/08/2021    BILITOTAL 3.2 (H) 10/08/2021    DBIL 0.5 10/08/2021     01/15/2021         Plan:  1. Continue Trikafta  2. Recheck hepatic panel and CK at appointment on 10/25  3. Hepatology consult    Flaquita Fitzgerald, PharmD  Cystic Fibrosis MTM Pharmacist  [facility]  Voicemail: [phone]

## 2021-11-23 ENCOUNTER — LAB (OUTPATIENT)
Dept: LAB | Facility: CLINIC | Age: 41
End: 2021-11-23
Attending: INTERNAL MEDICINE
Payer: COMMERCIAL

## 2021-11-23 ENCOUNTER — CLINICAL UPDATE (OUTPATIENT)
Dept: PHARMACY | Facility: CLINIC | Age: 41
End: 2021-11-23
Payer: COMMERCIAL

## 2021-11-23 ENCOUNTER — OFFICE VISIT (OUTPATIENT)
Dept: PULMONOLOGY | Facility: CLINIC | Age: 41
End: 2021-11-23
Attending: INTERNAL MEDICINE
Payer: COMMERCIAL

## 2021-11-23 VITALS
SYSTOLIC BLOOD PRESSURE: 146 MMHG | WEIGHT: 197 LBS | DIASTOLIC BLOOD PRESSURE: 87 MMHG | OXYGEN SATURATION: 97 % | HEIGHT: 75 IN | HEART RATE: 112 BPM | BODY MASS INDEX: 24.49 KG/M2

## 2021-11-23 DIAGNOSIS — E84.9 CF (CYSTIC FIBROSIS) (H): Primary | ICD-10-CM

## 2021-11-23 DIAGNOSIS — E84.9 CF (CYSTIC FIBROSIS) (H): ICD-10-CM

## 2021-11-23 LAB
ALBUMIN SERPL-MCNC: 3.8 G/DL (ref 3.4–5)
ALP SERPL-CCNC: 82 U/L (ref 40–150)
ALT SERPL W P-5'-P-CCNC: 34 U/L (ref 0–70)
AST SERPL W P-5'-P-CCNC: 20 U/L (ref 0–45)
BILIRUB DIRECT SERPL-MCNC: 0.4 MG/DL (ref 0–0.2)
BILIRUB SERPL-MCNC: 2.4 MG/DL (ref 0.2–1.3)
CK SERPL-CCNC: 105 U/L (ref 30–300)
EXPTIME-PRE: 12.81 SEC
FEF2575-%PRED-PRE: 30 %
FEF2575-PRE: 1.41 L/SEC
FEF2575-PRED: 4.64 L/SEC
FEFMAX-%PRED-PRE: 84 %
FEFMAX-PRE: 9.51 L/SEC
FEFMAX-PRED: 11.28 L/SEC
FEV1-%PRED-PRE: 72 %
FEV1-PRE: 3.58 L
FEV1FEV6-PRE: 62 %
FEV1FEV6-PRED: 82 %
FEV1FVC-PRE: 56 %
FEV1FVC-PRED: 80 %
FIFMAX-PRE: 11.07 L/SEC
FVC-%PRED-PRE: 103 %
FVC-PRE: 6.41 L
FVC-PRED: 6.17 L
PROT SERPL-MCNC: 7.4 G/DL (ref 6.8–8.8)

## 2021-11-23 PROCEDURE — 87116 MYCOBACTERIA CULTURE: CPT | Performed by: INTERNAL MEDICINE

## 2021-11-23 PROCEDURE — 36415 COLL VENOUS BLD VENIPUNCTURE: CPT | Performed by: PATHOLOGY

## 2021-11-23 PROCEDURE — 87186 SC STD MICRODIL/AGAR DIL: CPT | Performed by: INTERNAL MEDICINE

## 2021-11-23 PROCEDURE — 87206 SMEAR FLUORESCENT/ACID STAI: CPT | Performed by: INTERNAL MEDICINE

## 2021-11-23 PROCEDURE — G0463 HOSPITAL OUTPT CLINIC VISIT: HCPCS

## 2021-11-23 PROCEDURE — 82550 ASSAY OF CK (CPK): CPT | Performed by: PATHOLOGY

## 2021-11-23 PROCEDURE — 80076 HEPATIC FUNCTION PANEL: CPT | Performed by: PATHOLOGY

## 2021-11-23 PROCEDURE — 99215 OFFICE O/P EST HI 40 MIN: CPT | Mod: 25 | Performed by: INTERNAL MEDICINE

## 2021-11-23 PROCEDURE — 94375 RESPIRATORY FLOW VOLUME LOOP: CPT | Performed by: INTERNAL MEDICINE

## 2021-11-23 PROCEDURE — 99207 PR NO CHARGE LOS: CPT | Performed by: PHARMACIST

## 2021-11-23 ASSESSMENT — MIFFLIN-ST. JEOR: SCORE: 1894.22

## 2021-11-23 ASSESSMENT — PAIN SCALES - GENERAL: PAINLEVEL: NO PAIN (0)

## 2021-11-23 NOTE — PROGRESS NOTES
Clinical Update:                                                    At the request of Dr. Miller, a chart review was conducted for Telly Leal.    Reason for Chart Review: Trikafta Lab Monitoring    Discussion: Telly has been on Trikafta since 12/3/19. Telly has had some elevations in bilirubin which have required closer monitoring.    Labs were reviewed from 11/23/21 from [facility]. ALT,AST, and CK were within normal limits. Bilirubin and direct bilirubin are both improved from previous. However given its still elevated would recommend following through with hepatology consult.     Could consider a trial of ursodiol as well which may help reduce bilirubin, though is usually used for elevated LFTs.     Lab Results   Component Value Date    ALT 34 11/23/2021    AST 20 11/23/2021    BILITOTAL 2.4 (H) 11/23/2021    DBIL 0.4 (H) 11/23/2021     11/23/2021       Plan:  1. Continue Trikafta  2. Recheck hepatic panel and CK 6 months  3. Hepatology consult (previously recommended; not scheduled yet)    Flaquita Fitzgerald, PharmD  Cystic Fibrosis MTM Pharmacist  [facility]  Voicemail: [phone]

## 2021-11-23 NOTE — PROGRESS NOTES
Reason for Visit  CF follow up visit  Pulmonary HPI    He was last since seen at clinic in 06/2021.  Overall, he has been doing well from a respiratory standpoint without exacerbations.  Today, he reports doing well since his last visit. At the time of the last visit, has been doing 1-2 a day of vest therapy. But recently has been doing once a week to prn. Reports that if he notices increased sputum production, he increases therapy to once or twice a day. Reports busy work schedule. Kids staying at home for 10 days because of COVID breakthrough at their school. Denies testing positive for COVID or developing any COVID-like symptoms.     ROS without issues of chest pain or hemoptysis.      He is not doing any formal exercise but is physically active getting out and doing walks with the kids. Considering joining a gym              Current Outpatient Medications   Medication     acetylcysteine (MUCOMYST) 20 % neb solution     albuterol (PROAIR HFA) 108 (90 BASE) MCG/ACT Inhaler     albuterol (PROVENTIL) (2.5 MG/3ML) 0.083% neb solution     azithromycin (ZITHROMAX) 250 MG tablet     blood glucose monitoring (NO BRAND SPECIFIED) test strip     Cholecalciferol 4000 UNITS CAPS     colistimethate/colistin-base activity (COLYMYCIN) 150 mg/2mL SOLR neb solution     CREON 77738-47684 units CPEP per EC capsule     cromolyn (INTAL) 20 MG/2ML neb solution     elexacaftor-tezacaftor-ivacaftor & ivacaftor (TRIKAFTA) 100-50-75 & 150 MG tablet pack     famotidine (PEPCID) 20 MG tablet     fluticasone (FLONASE) 50 MCG/ACT nasal spray     Multiple Vitamin (MULTIVITAMINS PO)     mvw complete formulation (SOFTGELS) capsule     phytonadione (MEPHYTON) 5 MG tablet     tobramycin, PF, (SOO) 300 MG/5ML neb solution     water for injection sterile SOLN     Nutritional Supplements (NUTREN 2.0)     No current facility-administered medications for this visit.     No Known Allergies  Past Medical History:   Diagnosis Date     Chronic sinusitis   "    Cystic fibrosis with pulmonary manifestations (H)      Exocrine pancreatic insufficiency      GERD (gastroesophageal reflux disease)      Hemoptysis      Malnutrition (H)        Past Surgical History:   Procedure Laterality Date     IR MISCELLANEOUS PROCEDURE  2/6/2001     LOBECTOMY      right upper lobe     TUBES         Social History     Socioeconomic History     Marital status:      Spouse name: Not on file     Number of children: Not on file     Years of education: Not on file     Highest education level: Not on file   Occupational History     Occupation:      Employer: [employer]   Tobacco Use     Smoking status: Never Smoker     Smokeless tobacco: Never Used   Substance and Sexual Activity     Alcohol use: Not Currently     Alcohol/week: 0.0 standard drinks     Drug use: Not Currently     Sexual activity: Not on file   Other Topics Concern     Parent/sibling w/ CABG, MI or angioplasty before 65F 55M? Not Asked   Social History Narrative    Patient works for the [employer] as an .  He does not have stable living conditions at this time.  He has a Amharic guillen.     Social Determinants of Health     Financial Resource Strain: Not on file   Food Insecurity: Not on file   Transportation Needs: Not on file   Physical Activity: Not on file   Stress: Not on file   Social Connections: Not on file   Intimate Partner Violence: Not on file   Housing Stability: Not on file       ROS Pulmonary  Constitutional: No fever chills fatigue.  GI: Using tube feeds sporadically in the past few months approximately once a week with 1 to 2 cans.  No diarrhea constipation or grease in his stools.  No abdominal pain.  Endocrine: No hypoglycemic symptoms.  Complete ROS was otherwise negative except as noted in the HPI.  BP (!) 146/87 (BP Location: Right arm)   Pulse 112   Ht 1.905 m (6' 3\")   Wt 89.4 kg (197 lb)   SpO2 97%   BMI 24.62 kg/m    Exam:   GENERAL APPEARANCE: Well developed, " well nourished, alert, and in no apparent distress.  EYES: PERRL, EOMI  HENT: Nasal mucosa with no edema and no hyperemia. No nasal polyps.  EARS: Canals clear, TMs normal  MOUTH: Oral mucosa is moist, without any lesions, no tonsillar enlargement, no oropharyngeal exudate.  NECK: supple, no masses, no thyromegaly.  LYMPHATICS: No significant axillary, cervical, or supraclavicular nodes.  RESP: normal percussion, good air flow throughout.  No crackles. No rhonchi. No wheezes.  CV: Normal S1, S2, regular rhythm, normal rate. No murmur.  No rub. No gallop. No LE edema.   ABDOMEN:  Bowel sounds normal, soft, nontender, no HSM or masses.   MS: extremities normal. No clubbing. No cyanosis.  SKIN: no rash on limited exam  NEURO: Mentation intact, speech normal, normal strength and tone, normal gait and stance  PSYCH: mentation appears normal. and affect normal/bright  Results:  Recent Results (from the past 168 hour(s))   Hepatic function panel    Collection Time: 11/23/21 12:45 PM   Result Value Ref Range    Bilirubin Total 2.4 (H) 0.2 - 1.3 mg/dL    Bilirubin Direct 0.4 (H) 0.0 - 0.2 mg/dL    Protein Total 7.4 6.8 - 8.8 g/dL    Albumin 3.8 3.4 - 5.0 g/dL    Alkaline Phosphatase 82 40 - 150 U/L    AST 20 0 - 45 U/L    ALT 34 0 - 70 U/L   CK total    Collection Time: 11/23/21 12:45 PM   Result Value Ref Range     30 - 300 U/L   General PFT Lab (Please always keep checked)    Collection Time: 11/23/21 12:51 PM   Result Value Ref Range    FVC-Pred 6.17 L    FVC-Pre 6.41 L    FVC-%Pred-Pre 103 %    FEV1-Pre 3.58 L    FEV1-%Pred-Pre 72 %    FEV1FVC-Pred 80 %    FEV1FVC-Pre 56 %    FEFMax-Pred 11.28 L/sec    FEFMax-Pre 9.51 L/sec    FEFMax-%Pred-Pre 84 %    FEF2575-Pred 4.64 L/sec    FEF2575-Pre 1.41 L/sec    GOV7537-%Pred-Pre 30 %    ExpTime-Pre 12.81 sec    FIFMax-Pre 11.07 L/sec    FEV1FEV6-Pred 82 %    FEV1FEV6-Pre 62 %       Assessment and plan:  1. CF lung disease: The patient has had an outstanding response to  Trikafta with substantially improved burden of symptoms and stable pulmonary function test today to well above his previous baseline.  This is despite cutting back modestly on his airway clearance regimen and even decreased daily vest therapy to prn.   He hasn't been using cromolyn nebs with plan to resume promptly if he feels worse. We discussed at length risks versus benefits of de-escalating his regimen.   -He is not getting formal vigorous aerobic exercise regularly but working on enrolling to join a gym  -Trial off of inhaled antibiotics as he is doing well     1B.  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  As above, he has had an impressive response to Trikafta. Unconjugated bili is mildy elevated even though slight decrease from previous peak  -Referral to see GI/Hepatology    2. Impaired glucose tolerance: Previously elevated A1C 6.1% and now down-trended to 5.6%. Does not appear to be an evolving problem and overall with good clinical course.  Will repeat glucose tolerance testing next summer.    3.  Pancreatic insufficiency with a history of malnutrition on tube feeds: Adequate enzyme replacement by history.  He has been able to gradually cut back to 1 to 2 cans of tube feed per night.  Leona, CF nutritionist, reevaluated the patient today.  Will plan on continuing that regimen.  His BMI is at target and stable at 24.6. Reasonable to do tube feeding as prn.    4. Chronic CF sinus disease: Improved on modulation therapy. Continue flonase.    5. Skin lesions: Seen by outside Dermatologist. Central chest scar could be treated by steroid injections per pt but not actively pursuing. Plan in place for annual exam of his many freckles.  Follow up with Derm for annual dermatologic exam.    6. Healthcare maintenance: Patient will have annual studies drawn later this week.  He is current on Covid vaccination and is planning to get J&J booster.     Patient will follow up in 3  months     Patient seen and staffed with Dr. Angela Cardoza MD  Internal Medicine PGY-2    Faculty note  I interviewed and examined the patient and reviewed his studies.  I discussed the patient with Dr. Cardoza and agree with his note above.  Spirometry from today personally reviewed.  Valid maneuver.  Mild obstruction without significant change and within his baseline.    Annual studies obtained October, 2021 since his last visit and personally reviewed.  Normal electrolytes and renal function.  Normal liver function tests aside from a bilirubin of 3.2.  Normal vitamin levels.  Negative urine microalbumin screen.  Hemoglobin A1c 5.6%.  Cholesterol panel unremarkable.  IgE level 4.  Normal CBC and differential.  Normal INR    Most recent sputum culture June, 2021 with light growth of Pseudomonas x1 and Aspergillus fumigatus.    Dexa scan June 2021 result reviewed. Lowest Z score of 0.1 is within normal limits.    1.  Cystic fibrosis lung disease: Patient at baseline in terms of control of symptoms and pulmonary function tests and doing well since initiating Trikafta therapy.  This is now despite sporadic use of airway clearance and discontinuation of cromolyn nebs.  Explained that it would be reasonable for him to limit vest use to as needed based on symptoms but he would like to see if his pulmonary function tests would improve further if he was more consistent with airway clearance and certainly reasonable for him to target at least 1 vest therapy per day to reach that goal.  He is very inconsistent with use of his inhaled antibiotics.  He finds Chelly nebs irritating and will discontinue those.  Strongly encouraged him to use his SOO nebs consistently on an every other month basis.  We will continue chronic azithromycin for now but consider trial of discontinuation.    2.  C FTR modulation therapy with elevated bilirubin: Elevation in bilirubin temporally associated with starting Trikafta.  He has  not had elevated transaminases.  Bilirubin fluctuating and now at the lower end of his new baseline at 2.4 today.  Risk versus benefit continues to favor use of Trikafta.  Previously referred the patient to hepatology but he was unaware of this referral and did not respond.  Will again try to get him scheduled for evaluation.    3.  Pancreatic exocrine insufficiency with a history of malnutrition: His weight is at target.  This is despite cutting back on his use of tube feeds substantially.  I have recommended that he limit tube feed use to as needed including periods with increased caloric expenditure or reduced p.o. intake such as during an exacerbation.    4.  Healthcare maintenance: He is due for annual studies in October, 2022.  He is current on his influenza vaccine.  He previously received the J&J Covid vaccine and strongly recommended that he receive a booster.  He is in favor of this although his wife is concerned as she sustained a pulmonary embolism a couple months after receiving her vaccination.  They are having a lot of discussion around whether their children should receive the vaccination when they are old enough.    See resident note for additional details.    I spent 40 minutes on the date of the encounter doing chart review, history and exam, documentation and further activities as noted above.    Follow up in 3 months    Rocael Miller MD

## 2021-11-23 NOTE — LETTER
11/23/2021     RE: Telly Leal  [address] [address]    Dear Colleague,    Thank you for referring your patient, Telly Leal, to the [facility] FOR LUNG SCIENCE AND [facility] [place]. Please see a copy of my visit note below.    Reason for Visit  CF follow up visit  Pulmonary HPI    He was last since seen at clinic in 06/2021.  Overall, he has been doing well from a respiratory standpoint without exacerbations.  Today, he reports doing well since his last visit. At the time of the last visit, has been doing 1-2 a day of vest therapy. But recently has been doing once a week to prn. Reports that if he notices increased sputum production, he increases therapy to once or twice a day. Reports busy work schedule. Kids staying at home for 10 days because of COVID breakthrough at their school. Denies testing positive for COVID or developing any COVID-like symptoms.     ROS without issues of chest pain or hemoptysis.      He is not doing any formal exercise but is physically active getting out and doing walks with the kids. Considering joining a gym              Current Outpatient Medications   Medication     acetylcysteine (MUCOMYST) 20 % neb solution     albuterol (PROAIR HFA) 108 (90 BASE) MCG/ACT Inhaler     albuterol (PROVENTIL) (2.5 MG/3ML) 0.083% neb solution     azithromycin (ZITHROMAX) 250 MG tablet     blood glucose monitoring (NO BRAND SPECIFIED) test strip     Cholecalciferol 4000 UNITS CAPS     colistimethate/colistin-base activity (COLYMYCIN) 150 mg/2mL SOLR neb solution     CREON 49843-12638 units CPEP per EC capsule     cromolyn (INTAL) 20 MG/2ML neb solution     elexacaftor-tezacaftor-ivacaftor & ivacaftor (TRIKAFTA) 100-50-75 & 150 MG tablet pack     famotidine (PEPCID) 20 MG tablet     fluticasone (FLONASE) 50 MCG/ACT nasal spray     Multiple Vitamin (MULTIVITAMINS PO)     mvw complete formulation (SOFTGELS) capsule     phytonadione (MEPHYTON) 5 MG  tablet     tobramycin, PF, (SOO) 300 MG/5ML neb solution     water for injection sterile SOLN     Nutritional Supplements (NUTREN 2.0)     No current facility-administered medications for this visit.     No Known Allergies  Past Medical History:   Diagnosis Date     Chronic sinusitis      Cystic fibrosis with pulmonary manifestations (H)      Exocrine pancreatic insufficiency      GERD (gastroesophageal reflux disease)      Hemoptysis      Malnutrition (H)        Past Surgical History:   Procedure Laterality Date     IR MISCELLANEOUS PROCEDURE  2/6/2001     LOBECTOMY      right upper lobe     TUBES         Social History     Socioeconomic History     Marital status:      Spouse name: Not on file     Number of children: Not on file     Years of education: Not on file     Highest education level: Not on file   Occupational History     Occupation:      Employer: [employer]   Tobacco Use     Smoking status: Never Smoker     Smokeless tobacco: Never Used   Substance and Sexual Activity     Alcohol use: Not Currently     Alcohol/week: 0.0 standard drinks     Drug use: Not Currently     Sexual activity: Not on file   Other Topics Concern     Parent/sibling w/ CABG, MI or angioplasty before 65F 55M? Not Asked   Social History Narrative    Patient works for the [employer] as an .  He does not have stable living conditions at this time.  He has a [place]ican guillen.     Social Determinants of Health     Financial Resource Strain: Not on file   Food Insecurity: Not on file   Transportation Needs: Not on file   Physical Activity: Not on file   Stress: Not on file   Social Connections: Not on file   Intimate Partner Violence: Not on file   Housing Stability: Not on file       ROS Pulmonary  Constitutional: No fever chills fatigue.  GI: Using tube feeds sporadically in the past few months approximately once a week with 1 to 2 cans.  No diarrhea constipation or grease in his stools.  No abdominal  "pain.  Endocrine: No hypoglycemic symptoms.  Complete ROS was otherwise negative except as noted in the HPI.  BP (!) 146/87 (BP Location: Right arm)   Pulse 112   Ht 1.905 m (6' 3\")   Wt 89.4 kg (197 lb)   SpO2 97%   BMI 24.62 kg/m    Exam:   GENERAL APPEARANCE: Well developed, well nourished, alert, and in no apparent distress.  EYES: PERRL, EOMI  HENT: Nasal mucosa with no edema and no hyperemia. No nasal polyps.  EARS: Canals clear, TMs normal  MOUTH: Oral mucosa is moist, without any lesions, no tonsillar enlargement, no oropharyngeal exudate.  NECK: supple, no masses, no thyromegaly.  LYMPHATICS: No significant axillary, cervical, or supraclavicular nodes.  RESP: normal percussion, good air flow throughout.  No crackles. No rhonchi. No wheezes.  CV: Normal S1, S2, regular rhythm, normal rate. No murmur.  No rub. No gallop. No LE edema.   ABDOMEN:  Bowel sounds normal, soft, nontender, no HSM or masses.   MS: extremities normal. No clubbing. No cyanosis.  SKIN: no rash on limited exam  NEURO: Mentation intact, speech normal, normal strength and tone, normal gait and stance  PSYCH: mentation appears normal. and affect normal/bright  Results:  Recent Results (from the past 168 hour(s))   Hepatic function panel    Collection Time: 11/23/21 12:45 PM   Result Value Ref Range    Bilirubin Total 2.4 (H) 0.2 - 1.3 mg/dL    Bilirubin Direct 0.4 (H) 0.0 - 0.2 mg/dL    Protein Total 7.4 6.8 - 8.8 g/dL    Albumin 3.8 3.4 - 5.0 g/dL    Alkaline Phosphatase 82 40 - 150 U/L    AST 20 0 - 45 U/L    ALT 34 0 - 70 U/L   CK total    Collection Time: 11/23/21 12:45 PM   Result Value Ref Range     30 - 300 U/L   General PFT Lab (Please always keep checked)    Collection Time: 11/23/21 12:51 PM   Result Value Ref Range    FVC-Pred 6.17 L    FVC-Pre 6.41 L    FVC-%Pred-Pre 103 %    FEV1-Pre 3.58 L    FEV1-%Pred-Pre 72 %    FEV1FVC-Pred 80 %    FEV1FVC-Pre 56 %    FEFMax-Pred 11.28 L/sec    FEFMax-Pre 9.51 L/sec    " FEFMax-%Pred-Pre 84 %    FEF2575-Pred 4.64 L/sec    FEF2575-Pre 1.41 L/sec    ICP4943-%Pred-Pre 30 %    ExpTime-Pre 12.81 sec    FIFMax-Pre 11.07 L/sec    FEV1FEV6-Pred 82 %    FEV1FEV6-Pre 62 %       Assessment and plan:  1. CF lung disease: The patient has had an outstanding response to Trikafta with substantially improved burden of symptoms and stable pulmonary function test today to well above his previous baseline.  This is despite cutting back modestly on his airway clearance regimen and even decreased daily vest therapy to prn.   He hasn't been using cromolyn nebs with plan to resume promptly if he feels worse. We discussed at length risks versus benefits of de-escalating his regimen.   -He is not getting formal vigorous aerobic exercise regularly but working on enrolling to join a gym  -Trial off of inhaled antibiotics as he is doing well     1B.  CFTR modulation therapy: The patient's genotype is [id number]/CFTR del 2,3.  As above, he has had an impressive response to Trikafta. Unconjugated bili is mildy elevated even though slight decrease from previous peak  -Referral to see GI/Hepatology    2. Impaired glucose tolerance: Previously elevated A1C 6.1% and now down-trended to 5.6%. Does not appear to be an evolving problem and overall with good clinical course.  Will repeat glucose tolerance testing next summer.    3.  Pancreatic insufficiency with a history of malnutrition on tube feeds: Adequate enzyme replacement by history.  He has been able to gradually cut back to 1 to 2 cans of tube feed per night.  Leona, CF nutritionist, reevaluated the patient today.  Will plan on continuing that regimen.  His BMI is at target and stable at 24.6. Reasonable to do tube feeding as prn.    4. Chronic CF sinus disease: Improved on modulation therapy. Continue flonase.    5. Skin lesions: Seen by outside Dermatologist. Central chest scar could be treated by steroid injections per pt but not actively pursuing. Plan in  place for annual exam of his many freckles.  Follow up with Derm for annual dermatologic exam.    6. Healthcare maintenance: Patient will have annual studies drawn later this week.  He is current on Covid vaccination and is planning to get J&J booster.     Patient will follow up in 3 months     Patient seen and staffed with Dr. Angela Cardoza MD  Internal Medicine PGY-2    Faculty note  I interviewed and examined the patient and reviewed his studies.  I discussed the patient with Dr. Cardoza and agree with his note above.  Spirometry from today personally reviewed.  Valid maneuver.  Mild obstruction without significant change and within his baseline.    Annual studies obtained October, 2021 since his last visit and personally reviewed.  Normal electrolytes and renal function.  Normal liver function tests aside from a bilirubin of 3.2.  Normal vitamin levels.  Negative urine microalbumin screen.  Hemoglobin A1c 5.6%.  Cholesterol panel unremarkable.  IgE level 4.  Normal CBC and differential.  Normal INR    Most recent sputum culture June, 2021 with light growth of Pseudomonas x1 and Aspergillus fumigatus.    Dexa scan June 2021 result reviewed. Lowest Z score of 0.1 is within normal limits.    1.  Cystic fibrosis lung disease: Patient at baseline in terms of control of symptoms and pulmonary function tests and doing well since initiating Trikafta therapy.  This is now despite sporadic use of airway clearance and discontinuation of cromolyn nebs.  Explained that it would be reasonable for him to limit vest use to as needed based on symptoms but he would like to see if his pulmonary function tests would improve further if he was more consistent with airway clearance and certainly reasonable for him to target at least 1 vest therapy per day to reach that goal.  He is very inconsistent with use of his inhaled antibiotics.  He finds Chelly nebs irritating and will discontinue those.  Strongly encouraged him  to use his SOO nebs consistently on an every other month basis.  We will continue chronic azithromycin for now but consider trial of discontinuation.    2.  C FTR modulation therapy with elevated bilirubin: Elevation in bilirubin temporally associated with starting Trikafta.  He has not had elevated transaminases.  Bilirubin fluctuating and now at the lower end of his new baseline at 2.4 today.  Risk versus benefit continues to favor use of Trikafta.  Previously referred the patient to hepatology but he was unaware of this referral and did not respond.  Will again try to get him scheduled for evaluation.    3.  Pancreatic exocrine insufficiency with a history of malnutrition: His weight is at target.  This is despite cutting back on his use of tube feeds substantially.  I have recommended that he limit tube feed use to as needed including periods with increased caloric expenditure or reduced p.o. intake such as during an exacerbation.    4.  Healthcare maintenance: He is due for annual studies in October, 2022.  He is current on his influenza vaccine.  He previously received the J&J Covid vaccine and strongly recommended that he receive a booster.  He is in favor of this although his wife is concerned as she sustained a pulmonary embolism a couple months after receiving her vaccination.  They are having a lot of discussion around whether their children should receive the vaccination when they are old enough.    See resident note for additional details.    I spent 40 minutes on the date of the encounter doing chart review, history and exam, documentation and further activities as noted above.    Follow up in 3 months    Rocael Miller MD

## 2021-11-23 NOTE — NURSING NOTE
Chief Complaint   Patient presents with     Follow Up     CF Follow up      Vitals were taken and medications were reconciled.     Carrie Worthy RMA  1:39 PM

## 2021-11-29 LAB
BACTERIA SPT CULT: ABNORMAL

## 2021-12-08 ENCOUNTER — TELEPHONE (OUTPATIENT)
Dept: PULMONOLOGY | Facility: CLINIC | Age: 41
End: 2021-12-08
Payer: COMMERCIAL

## 2021-12-08 NOTE — TELEPHONE ENCOUNTER
Left voicemail for patient to contact me (direct number provided) or the call center (number provided) to discuss scheduling an appointment as records indicate they are due for a follow up visit with Dr. Miller and Pulmonary Function Test (to be completed prior to visit with Dr. Miller) for some time end of Feb/beginning of March after recently being seen. As they do have a [employer] account, informed them that I would send them a message detailing the same information.

## 2021-12-22 DIAGNOSIS — E84.9 CF (CYSTIC FIBROSIS) (H): ICD-10-CM

## 2021-12-22 DIAGNOSIS — E84.0 CYSTIC FIBROSIS WITH PULMONARY MANIFESTATIONS (H): ICD-10-CM

## 2021-12-22 RX ORDER — PEDIATRIC MULTIVIT 61/D3/VIT K 1500-800
CAPSULE ORAL
Qty: 60 CAPSULE | Refills: 11 | Status: SHIPPED | OUTPATIENT
Start: 2021-12-22 | End: 2023-01-19

## 2021-12-22 RX ORDER — PANCRELIPASE 24000; 76000; 120000 [USP'U]/1; [USP'U]/1; [USP'U]/1
5-6 CAPSULE, DELAYED RELEASE PELLETS ORAL SEE ADMIN INSTRUCTIONS
Qty: 1200 CAPSULE | Refills: 11 | Status: SHIPPED | OUTPATIENT
Start: 2021-12-22 | End: 2023-01-19

## 2021-12-28 ENCOUNTER — TELEPHONE (OUTPATIENT)
Dept: PULMONOLOGY | Facility: CLINIC | Age: 41
End: 2021-12-28
Payer: COMMERCIAL

## 2021-12-28 NOTE — TELEPHONE ENCOUNTER
Prior Authorization Approval    Authorization Effective Date: 12/18/2021  Authorization Expiration Date: 12/18/2022  Medication: Trikafta - approval  Approved Dose/Quantity: 84  Reference #:     Insurance Company: [employer] - Phone [phone] Fax [phone]  Expected CoPay:       CoPay Card Available:      Foundation Assistance Needed:    Which Pharmacy is filling the prescription (Not needed for infusion/clinic administered): [facility] - [place], [place] - [facility] [address]  Pharmacy Notified: Yes  Patient Notified: Yes

## 2022-01-01 ENCOUNTER — HEALTH MAINTENANCE LETTER (OUTPATIENT)
Age: 42
End: 2022-01-01

## 2022-01-19 DIAGNOSIS — E84.9 CF (CYSTIC FIBROSIS) (H): ICD-10-CM

## 2022-01-20 RX ORDER — ELEXACAFTOR, TEZACAFTOR, AND IVACAFTOR 100-50-75
KIT ORAL
Qty: 84 EACH | Refills: 5 | Status: SHIPPED | OUTPATIENT
Start: 2022-01-20 | End: 2022-07-07

## 2022-02-16 DIAGNOSIS — E53.8 VITAMIN B12 DEFICIENCY DISEASE: ICD-10-CM

## 2022-02-16 DIAGNOSIS — E84.0 CYSTIC FIBROSIS WITH PULMONARY MANIFESTATIONS (H): ICD-10-CM

## 2022-02-16 RX ORDER — ACETYLCYSTEINE 200 MG/ML
2 SOLUTION ORAL; RESPIRATORY (INHALATION) 3 TIMES DAILY
Qty: 180 ML | Refills: 12 | Status: SHIPPED | OUTPATIENT
Start: 2022-02-16 | End: 2023-03-14

## 2022-02-22 ENCOUNTER — DOCUMENTATION ONLY (OUTPATIENT)
Dept: LAB | Facility: CLINIC | Age: 42
End: 2022-02-22
Payer: COMMERCIAL

## 2022-02-22 NOTE — PROGRESS NOTES
Telly Leal has an upcoming lab appointment:    Future Appointments   Date Time Provider Department Center   2/22/2022  4:15 PM [facility]                     There is no order available. Please review and place either future orders or HMPO (Review of Health Maintenance Protocol Orders), as appropriate.    Health Maintenance Due   Topic     ANNUAL REVIEW OF HM ORDERS      HIV SCREENING      HEPATITIS C SCREENING      Paul Nevarez

## 2022-02-22 NOTE — PROGRESS NOTES
"Telly's appointment has been moved to 2/25 (Today).  Appointment notes state \"routine blood draw\"    There are no orders in the chart.    Please place orders or advise pt.  "

## 2022-02-25 ENCOUNTER — APPOINTMENT (OUTPATIENT)
Dept: LAB | Facility: CLINIC | Age: 42
End: 2022-02-25
Payer: COMMERCIAL

## 2022-02-25 NOTE — PROGRESS NOTES
Per chart review, patient is not due for annual study labs until 10/2022. After November visit, patient was instructed to follow up for Trikafta labs in 6 months (around May). No note of needed labs by Dr. Miller.     RN called patient and left VM informing no labs or notes of labs needed from our end; however, patient may have had discussion with other provider.     Lorene Alexandre RN

## 2022-02-28 ENCOUNTER — OFFICE VISIT (OUTPATIENT)
Dept: PULMONOLOGY | Facility: CLINIC | Age: 42
End: 2022-02-28
Attending: INTERNAL MEDICINE
Payer: COMMERCIAL

## 2022-02-28 VITALS — DIASTOLIC BLOOD PRESSURE: 82 MMHG | OXYGEN SATURATION: 96 % | SYSTOLIC BLOOD PRESSURE: 113 MMHG | HEART RATE: 108 BPM

## 2022-02-28 DIAGNOSIS — E84.0 CYSTIC FIBROSIS WITH PULMONARY MANIFESTATIONS (H): Primary | ICD-10-CM

## 2022-02-28 DIAGNOSIS — E84.9 CF (CYSTIC FIBROSIS) (H): Primary | ICD-10-CM

## 2022-02-28 DIAGNOSIS — L91.8 SKIN TAG: ICD-10-CM

## 2022-02-28 LAB
EXPTIME-PRE: 13.11 SEC
FEF2575-%PRED-PRE: 27 %
FEF2575-PRE: 1.28 L/SEC
FEF2575-PRED: 4.64 L/SEC
FEFMAX-%PRED-PRE: 91 %
FEFMAX-PRE: 10.31 L/SEC
FEFMAX-PRED: 11.28 L/SEC
FEV1-%PRED-PRE: 73 %
FEV1-PRE: 3.59 L
FEV1FEV6-PRE: 64 %
FEV1FEV6-PRED: 82 %
FEV1FVC-PRE: 56 %
FEV1FVC-PRED: 80 %
FIFMAX-PRE: 8.29 L/SEC
FVC-%PRED-PRE: 102 %
FVC-PRE: 6.35 L
FVC-PRED: 6.17 L

## 2022-02-28 PROCEDURE — 87077 CULTURE AEROBIC IDENTIFY: CPT | Performed by: INTERNAL MEDICINE

## 2022-02-28 PROCEDURE — 94375 RESPIRATORY FLOW VOLUME LOOP: CPT | Performed by: INTERNAL MEDICINE

## 2022-02-28 PROCEDURE — G0463 HOSPITAL OUTPT CLINIC VISIT: HCPCS | Mod: 25

## 2022-02-28 PROCEDURE — 99215 OFFICE O/P EST HI 40 MIN: CPT | Mod: 25 | Performed by: INTERNAL MEDICINE

## 2022-02-28 PROCEDURE — 87070 CULTURE OTHR SPECIMN AEROBIC: CPT | Performed by: INTERNAL MEDICINE

## 2022-02-28 NOTE — PROGRESS NOTES
Reason for Visit  Telly Leal is a 35 year old year old male who is being seen for Cystic Fibrosis (follow up)    CF HPI  The patient was seen and examined by Rocael Miller   Reports doing well from a respiratory standpoint since his last clinic visit. His wife and children developed Covid since his last visit but the patient did not. Home life has been more chaotic in light of this and he has been averaging only about 1 vest therapy per week. He reports baseline infrequent cough and infrequent mucus production. No hemoptysis or chest pain. The family has joined the [place] but they have not yet had time to get there for workouts. He reports baseline exercise tolerance with his activities of daily living.    Current Outpatient Medications   Medication     acetylcysteine (MUCOMYST) 20 % neb solution     albuterol (PROAIR HFA) 108 (90 BASE) MCG/ACT Inhaler     albuterol (PROVENTIL) (2.5 MG/3ML) 0.083% neb solution     azithromycin (ZITHROMAX) 250 MG tablet     blood glucose monitoring (NO BRAND SPECIFIED) test strip     Cholecalciferol 4000 UNITS CAPS     colistimethate/colistin-base activity (COLYMYCIN) 150 mg/2mL SOLR neb solution     CREON 78670-02107 units CPEP per EC capsule     cromolyn (INTAL) 20 MG/2ML neb solution     elexacaftor-tezacaftor-ivacaftor & ivacaftor (TRIKAFTA) 100-50-75 & 150 MG tablet pack     famotidine (PEPCID) 20 MG tablet     fluticasone (FLONASE) 50 MCG/ACT nasal spray     Multiple Vitamin (MULTIVITAMINS PO)     mvw complete formulation (SOFTGELS) capsule     Nutritional Supplements (NUTREN 2.0)     phytonadione (MEPHYTON) 5 MG tablet     tobramycin, PF, (SOO) 300 MG/5ML neb solution     water for injection sterile SOLN     No current facility-administered medications for this visit.     No Known Allergies  Past Medical History:   Diagnosis Date     Chronic sinusitis      Cystic fibrosis with pulmonary manifestations (H)      Exocrine pancreatic insufficiency      GERD  (gastroesophageal reflux disease)      Hemoptysis      Malnutrition (H)        Past Surgical History:   Procedure Laterality Date     IR MISCELLANEOUS PROCEDURE  2/6/2001     LOBECTOMY      right upper lobe     TUBES         Social History     Socioeconomic History     Marital status:      Spouse name: Not on file     Number of children: Not on file     Years of education: Not on file     Highest education level: Not on file   Occupational History     Occupation:      Employer: [employer]   Tobacco Use     Smoking status: Never Smoker     Smokeless tobacco: Never Used   Substance and Sexual Activity     Alcohol use: Not Currently     Alcohol/week: 0.0 standard drinks     Drug use: Not Currently     Sexual activity: Not on file   Other Topics Concern     Parent/sibling w/ CABG, MI or angioplasty before 65F 55M? Not Asked   Social History Narrative    Patient works for the [employer] as an .  He does not have stable living conditions at this time.  He has a [place] guillen.     Social Determinants of Health     Financial Resource Strain: Not on file   Food Insecurity: Not on file   Transportation Needs: Not on file   Physical Activity: Not on file   Stress: Not on file   Social Connections: Not on file   Intimate Partner Violence: Not on file   Housing Stability: Not on file   Update: living with wife Bre in [facility]. Birth of daughter Nieves Rebolledo August, 2017, birth of daughter Josy Felder November, 2019    ROS Pulmonary  Const: No fever chills sweats.  GI: He has not used his feeding tube since a couple days after his last visit. He has better appetite when he does not use his tube feeds and does not find the extra need for caloric intake to be a burden. He has developed a soft tissue protrusion around his PEG site that is causing mild discomfort and occasionally bleeds. There is some yellow mucus coating it intermittently. No surrounding erythema. Not much  change over the past couple months. No diarrhea constipation or grease in his stools. No abdominal pain or nausea.  A complete ROS was otherwise negative except as noted in the HPI.  /82   Pulse 108   SpO2 96%   Exam:   GENERAL APPEARANCE: Well developed, well nourished, alert, and in no apparent distress.  EYES: anicteric with injected appearing conjunctiva  HENT: No nasal discharge.  Oral mucosa is moist, without any lesions, no tonsillar enlargement, no oropharyngeal exudate.  RESP:  good air flow throughout.  Clear to auscultation.  Normal chest wall excursion.  Reduced breath sounds at right posterior base as baseline.  CV: Normal S1, S2, regular rhythm, normal rate. No murmur. No gallop. No LE edema.   ABDOMEN:  Bowel sounds normal, soft, nontender, no HSM or masses.   MS: extremities normal. No cyanosis.  SKIN:  PEG site without erythema. There was a small amount of yellow discharge. He has a 0.5 cm round skin tag around the medial aspect of his PEG tube site and a smaller 1 on the lateral aspect. Chest scar-like lesion upper sternum unchanged. Diffuse freckles without overt change.  NEURO: Mentation intact, speech normal, normal gait and stance  PSYCH: mentation appears normal. and affect normal/bright  Results:  Recent Results (from the past 168 hour(s))   General PFT Lab (Please always keep checked)    Collection Time: 02/28/22  2:53 PM   Result Value Ref Range    FVC-Pred 6.17 L    FVC-Pre 6.35 L    FVC-%Pred-Pre 102 %    FEV1-Pre 3.59 L    FEV1-%Pred-Pre 73 %    FEV1FVC-Pred 80 %    FEV1FVC-Pre 56 %    FEFMax-Pred 11.28 L/sec    FEFMax-Pre 10.31 L/sec    FEFMax-%Pred-Pre 91 %    FEF2575-Pred 4.64 L/sec    FEF2575-Pre 1.28 L/sec    ETG2054-%Pred-Pre 27 %    ExpTime-Pre 13.11 sec    FIFMax-Pre 8.29 L/sec    FEV1FEV6-Pred 82 %    FEV1FEV6-Pre 64 %     Spirometry interpretation: personally reviewed. Valid maneuver.  Mild obstruction. No significant interval change. He is within his previous  baseline.    Sputum last visit: PA x 1    Assessment and plan:  1. CF lung disease: The patient continues to have an excellent clinical course despite significant reduction in airway clearance such that he is typically doing vest therapy once a week. His pulmonary function tests are preserved despite this. We discussed how getting to the gym for aerobic exercise a few days per week and limiting vest therapy to twice daily or more if symptomatic would be reasonable. For now, continue SOO nebs every other month and chronic azithromycin but if adherence to nebs suboptimal, will consider reserving them for exacerbations.  1B.  CFTR modulation therapy: The patient's genotype is [id number]/CFTR del 2,3.  As above, he has had an impressive response to Trikafta.  His bilirubin has been mildly elevated but not progressively so. Will ask hepatology for advice on need for further evaluation.  2. Impaired glucose tolerance: Last annual study fasting > 100 and peak > 200 and 2 hour value right at 140. A little worse than Jan 2018 but similar to previous. A1C most recently at 6.1%. Does not appear to be an evolving problem and overall with good clinical course.  Will repeat glucose tolerance testing this summer.  3.  Pancreatic exocrine insufficiency with a history of malnutrition on tube feeds: Adequate enzyme replacement by history. He has been without tube feeds the last couple months and his weight is stable. Historically he loses weight in the summer. He will continue off of tube feeds as tolerated and if weight is not an issue, will consider feeding tube removal in the fall 2022.  4. Chronic CF sinus disease: Improved on modulation therapy. Continue flonase.  5. Chest skin lesions: Seen by outside Dermatologist. Central chest scar could be treated by steroid injections per pt but not actively pursuing. Plan in place for annual exam of his many freckles.   6. Healthcare maintenance: Patient due for annual studies in  October, 2022. He is current on his Covid vaccinations most recently with Moderna January, 2022  7. Skin tags at PEG site: Not sure if this is new growth or displaced tissue. Causing some discomfort and bleeding. Will refer to general surgery or dermatology.    Patient will follow up in 3 months    Total of 40 minutes spent today in conjunction with this visit including exam, history, care coordination, and documentation. This is exclusive of time interpreting PFTs.    Rocael Miller

## 2022-02-28 NOTE — LETTER
2/28/2022     RE: Telly Leal  [address] [facility] [address]    Dear Colleague,    Thank you for referring your patient, Telly Leal, to the [facility] [place]. Please see a copy of my visit note below.    Reason for Visit  Telly Leal is a 35 year old year old male who is being seen for Cystic Fibrosis (follow up)    CF HPI  The patient was seen and examined by Rocael Miller   Reports doing well from a respiratory standpoint since his last clinic visit. His wife and children developed Covid since his last visit but the patient did not. Home life has been more chaotic in light of this and he has been averaging only about 1 vest therapy per week. He reports baseline infrequent cough and infrequent mucus production. No hemoptysis or chest pain. The family has joined the [facility] but they have not yet had time to get there for workouts. He reports baseline exercise tolerance with his activities of daily living.    Current Outpatient Medications   Medication     acetylcysteine (MUCOMYST) 20 % neb solution     albuterol (PROAIR HFA) 108 (90 BASE) MCG/ACT Inhaler     albuterol (PROVENTIL) (2.5 MG/3ML) 0.083% neb solution     azithromycin (ZITHROMAX) 250 MG tablet     blood glucose monitoring (NO BRAND SPECIFIED) test strip     Cholecalciferol 4000 UNITS CAPS     colistimethate/colistin-base activity (COLYMYCIN) 150 mg/2mL SOLR neb solution     CREON 84685-42480 units CPEP per EC capsule     cromolyn (INTAL) 20 MG/2ML neb solution     elexacaftor-tezacaftor-ivacaftor & ivacaftor (TRIKAFTA) 100-50-75 & 150 MG tablet pack     famotidine (PEPCID) 20 MG tablet     fluticasone (FLONASE) 50 MCG/ACT nasal spray     Multiple Vitamin (MULTIVITAMINS PO)     mvw complete formulation (SOFTGELS) capsule     Nutritional Supplements (NUTREN 2.0)     phytonadione (MEPHYTON) 5 MG tablet     tobramycin, PF, (SOO) 300 MG/5ML neb solution     water for  injection sterile SOLN     No current facility-administered medications for this visit.     No Known Allergies  Past Medical History:   Diagnosis Date     Chronic sinusitis      Cystic fibrosis with pulmonary manifestations (H)      Exocrine pancreatic insufficiency      GERD (gastroesophageal reflux disease)      Hemoptysis      Malnutrition (H)        Past Surgical History:   Procedure Laterality Date     IR MISCELLANEOUS PROCEDURE  2/6/2001     LOBECTOMY      right upper lobe     TUBES         Social History     Socioeconomic History     Marital status:      Spouse name: Not on file     Number of children: Not on file     Years of education: Not on file     Highest education level: Not on file   Occupational History     Occupation:      Employer: [employer]   Tobacco Use     Smoking status: Never Smoker     Smokeless tobacco: Never Used   Substance and Sexual Activity     Alcohol use: Not Currently     Alcohol/week: 0.0 standard drinks     Drug use: Not Currently     Sexual activity: Not on file   Other Topics Concern     Parent/sibling w/ CABG, MI or angioplasty before 65F 55M? Not Asked   Social History Narrative    Patient works for the [employer] as an .  He does not have stable living conditions at this time.  He has a [place] guillen.     Social Determinants of Health     Financial Resource Strain: Not on file   Food Insecurity: Not on file   Transportation Needs: Not on file   Physical Activity: Not on file   Stress: Not on file   Social Connections: Not on file   Intimate Partner Violence: Not on file   Housing Stability: Not on file   Update: living with wife Bre in [facility]. Birth of daughter Nieves Rebolledo August, 2017, birth of daughter Josy Felder November, 2019    ROS Pulmonary  Const: No fever chills sweats.  GI: He has not used his feeding tube since a couple days after his last visit. He has better appetite when he does not use his tube feeds and  does not find the extra need for caloric intake to be a burden. He has developed a soft tissue protrusion around his PEG site that is causing mild discomfort and occasionally bleeds. There is some yellow mucus coating it intermittently. No surrounding erythema. Not much change over the past couple months. No diarrhea constipation or grease in his stools. No abdominal pain or nausea.  A complete ROS was otherwise negative except as noted in the HPI.  /82   Pulse 108   SpO2 96%   Exam:   GENERAL APPEARANCE: Well developed, well nourished, alert, and in no apparent distress.  EYES: anicteric with injected appearing conjunctiva  HENT: No nasal discharge.  Oral mucosa is moist, without any lesions, no tonsillar enlargement, no oropharyngeal exudate.  RESP:  good air flow throughout.  Clear to auscultation.  Normal chest wall excursion.  Reduced breath sounds at right posterior base as baseline.  CV: Normal S1, S2, regular rhythm, normal rate. No murmur. No gallop. No LE edema.   ABDOMEN:  Bowel sounds normal, soft, nontender, no HSM or masses.   MS: extremities normal. No cyanosis.  SKIN:  PEG site without erythema. There was a small amount of yellow discharge. He has a 0.5 cm round skin tag around the medial aspect of his PEG tube site and a smaller 1 on the lateral aspect. Chest scar-like lesion upper sternum unchanged. Diffuse freckles without overt change.  NEURO: Mentation intact, speech normal, normal gait and stance  PSYCH: mentation appears normal. and affect normal/bright  Results:  Recent Results (from the past 168 hour(s))   General PFT Lab (Please always keep checked)    Collection Time: 02/28/22  2:53 PM   Result Value Ref Range    FVC-Pred 6.17 L    FVC-Pre 6.35 L    FVC-%Pred-Pre 102 %    FEV1-Pre 3.59 L    FEV1-%Pred-Pre 73 %    FEV1FVC-Pred 80 %    FEV1FVC-Pre 56 %    FEFMax-Pred 11.28 L/sec    FEFMax-Pre 10.31 L/sec    FEFMax-%Pred-Pre 91 %    FEF2575-Pred 4.64 L/sec    FEF2575-Pre 1.28 L/sec     EWX0933-%Pred-Pre 27 %    ExpTime-Pre 13.11 sec    FIFMax-Pre 8.29 L/sec    FEV1FEV6-Pred 82 %    FEV1FEV6-Pre 64 %     Spirometry interpretation: personally reviewed. Valid maneuver.  Mild obstruction. No significant interval change. He is within his previous baseline.    Sputum last visit: PA x 1    Assessment and plan:  1. CF lung disease: The patient continues to have an excellent clinical course despite significant reduction in airway clearance such that he is typically doing vest therapy once a week. His pulmonary function tests are preserved despite this. We discussed how getting to the gym for aerobic exercise a few days per week and limiting vest therapy to twice daily or more if symptomatic would be reasonable. For now, continue SOO nebs every other month and chronic azithromycin but if adherence to nebs suboptimal, will consider reserving them for exacerbations.  1B.  CFTR modulation therapy: The patient's genotype is [id number]/CFTR del 2,3.  As above, he has had an impressive response to Trikafta.  His bilirubin has been mildly elevated but not progressively so. Will ask hepatology for advice on need for further evaluation.  2. Impaired glucose tolerance: Last annual study fasting > 100 and peak > 200 and 2 hour value right at 140. A little worse than Jan 2018 but similar to previous. A1C most recently at 6.1%. Does not appear to be an evolving problem and overall with good clinical course.  Will repeat glucose tolerance testing this summer.  3.  Pancreatic exocrine insufficiency with a history of malnutrition on tube feeds: Adequate enzyme replacement by history. He has been without tube feeds the last couple months and his weight is stable. Historically he loses weight in the summer. He will continue off of tube feeds as tolerated and if weight is not an issue, will consider feeding tube removal in the fall 2022.  4. Chronic CF sinus disease: Improved on modulation therapy. Continue flonase.  5.  Chest skin lesions: Seen by outside Dermatologist. Central chest scar could be treated by steroid injections per pt but not actively pursuing. Plan in place for annual exam of his many freckles.   6. Healthcare maintenance: Patient due for annual studies in October, 2022. He is current on his Covid vaccinations most recently with Moderna January, 2022  7. Skin tags at PEG site: Not sure if this is new growth or displaced tissue. Causing some discomfort and bleeding. Will refer to general surgery or dermatology.    Patient will follow up in 3 months    Total of 40 minutes spent today in conjunction with this visit including exam, history, care coordination, and documentation. This is exclusive of time interpreting PFTs.    Rocael Miller                     Again, thank you for allowing me to participate in the care of your patient.        Sincerely,        Rocale Miller MD

## 2022-03-01 NOTE — PROGRESS NOTES
Respiratory Therapist Note:         Vest                Brand: Hill-Rom - traditional Hill Rom: Frequencies 8, 9, 10 at pressure 10 then frequencies 18, 19, 20 at pressure 6.                Cough Pause: Cough Pause; No                Vest Garment Size: Adult Medium                Last Fitting Date: Due as needed                Frequency of therapy: 0 times per week                Concerns: Vesting prn when ill         Exercise (purposeful and aerobic for >20 minutes each session): NO.                Does this qualify as additional airway clearance: No         Alternative Airway Clearance:         Nebulized Medications                Bronchodilators: Albuterol                Mucolytic: Mucomyst                Antibiotics:                 Additional Inhaled Medications:                Spacer Use:          Review Cleaning:         Education and Transition Information                Correct order of inhaled medications: Yes                Mechanism of Action of inhaled medications: Yes                Frequency of inhaled medications: Yes                Dosage of inhaled medications: Yes                Other: Only using nebulizers when vesting prn         Home Care:                Nebulizer Cups (Brand/Type): Open Utility                Nebulizer Compressor                            Year Purchased: Works                            [facility], Phone: [phone], Fax: [phone]                Nebulizer Supply Company:                            [facility], Phone: [phone], Fax: [phone]         Oxygen: N/A         Pulmonary Rehab                Site:                 Date Completed:          Plan of Care and Goals for next visit: vesting prn when ill, pft's stable and feeling well.

## 2022-03-09 LAB
BACTERIA SPT CULT: ABNORMAL

## 2022-03-17 ENCOUNTER — TELEPHONE (OUTPATIENT)
Dept: ADMINISTRATIVE | Facility: CLINIC | Age: 42
End: 2022-03-17
Payer: COMMERCIAL

## 2022-03-17 NOTE — PROGRESS NOTES
Cystic Fibrosis Clinical Follow Up Assessment   Assessment Link -Cystic Fibrosis Clinical Follow Up Assessment     2022/03/17 17:04:32 [facility], by Reyna Hoffman        Activity date 2022/03/17        Providers     EASTON HOLT        Medications     TRIKAFTA        Encounter Notes   MAKAYLA MONTALVO is a 41-year-old male being followed by the clinical pharmacist for medication monitoring of Cystic Fibrosis. This patient's current med list, allergies, and vaccination status have been reviewed and updated as appropriate.        Care Details     What are the patient's goals for this therapy?   ?  3/17/2022: Maintaining his health       Did you identify any patient barriers to access and successful treatment?   ? No barriers to access identified       Is it appropriate to collect a PDC at this time? [QA Metric] (An MPR or PDC would not be appropriate for cycled medications or if the patient is on therapy   ? Yes       Document the date of the last refill of PDC   ? 2022-02-16       Document PDC   ? 1       Has the patient missed doses inappropriately?   ? No       Please select CURRENT side effect(s) for monitoring:   ? None          Identified concerns   None     Summary notes   Spoke to pt via Relay after order set up with liaison. States he is doing great. No side effects and has been keeping up with his doses as well. States overall, things have been going great. No health, allergy or med changes. No questions or concerns. PDC= 1.0.   Goals: Maintaining his health; states that is all he wants and Trikafta has been awesome.   - Will continue biannual TM f/u       Angela HOFFMAN, PharmD, CSP  Therapy Management Pharmacist  [address] [address]   [email or web address]  [email or web address]   Specialty: [phone]  Mail Order: [phone]

## 2022-07-05 ENCOUNTER — LAB (OUTPATIENT)
Dept: LAB | Facility: CLINIC | Age: 42
End: 2022-07-05

## 2022-07-05 ENCOUNTER — OFFICE VISIT (OUTPATIENT)
Dept: PULMONOLOGY | Facility: CLINIC | Age: 42
End: 2022-07-05
Attending: INTERNAL MEDICINE
Payer: COMMERCIAL

## 2022-07-05 ENCOUNTER — ALLIED HEALTH/NURSE VISIT (OUTPATIENT)
Dept: CARE COORDINATION | Facility: CLINIC | Age: 42
End: 2022-07-05

## 2022-07-05 ENCOUNTER — OFFICE VISIT (OUTPATIENT)
Dept: DERMATOLOGY | Facility: CLINIC | Age: 42
End: 2022-07-05
Payer: COMMERCIAL

## 2022-07-05 VITALS
WEIGHT: 189 LBS | OXYGEN SATURATION: 95 % | HEIGHT: 75 IN | SYSTOLIC BLOOD PRESSURE: 117 MMHG | BODY MASS INDEX: 23.5 KG/M2 | DIASTOLIC BLOOD PRESSURE: 71 MMHG | RESPIRATION RATE: 17 BRPM | HEART RATE: 105 BPM

## 2022-07-05 DIAGNOSIS — M54.40 BACK PAIN OF LUMBOSACRAL REGION WITH SCIATICA: ICD-10-CM

## 2022-07-05 DIAGNOSIS — E84.9 CF (CYSTIC FIBROSIS) (H): ICD-10-CM

## 2022-07-05 DIAGNOSIS — Z13.9 RISK AND FUNCTIONAL ASSESSMENT: Primary | ICD-10-CM

## 2022-07-05 DIAGNOSIS — E10.9 TYPE 1 DIABETES MELLITUS WITHOUT COMPLICATION (H): ICD-10-CM

## 2022-07-05 DIAGNOSIS — L92.9 GRANULATION TISSUE: Primary | ICD-10-CM

## 2022-07-05 DIAGNOSIS — E84.0 CYSTIC FIBROSIS WITH PULMONARY MANIFESTATIONS (H): Primary | ICD-10-CM

## 2022-07-05 DIAGNOSIS — E84.9 CF (CYSTIC FIBROSIS) (H): Primary | ICD-10-CM

## 2022-07-05 LAB
ALBUMIN SERPL-MCNC: 3.7 G/DL (ref 3.4–5)
ALP SERPL-CCNC: 73 U/L (ref 40–150)
ALT SERPL W P-5'-P-CCNC: 32 U/L (ref 0–70)
AST SERPL W P-5'-P-CCNC: 27 U/L (ref 0–45)
BILIRUB DIRECT SERPL-MCNC: 0.3 MG/DL (ref 0–0.2)
BILIRUB SERPL-MCNC: 1.8 MG/DL (ref 0.2–1.3)
CK SERPL-CCNC: 88 U/L (ref 30–300)
EXPTIME-PRE: 12.08 SEC
FEF2575-%PRED-PRE: 33 %
FEF2575-PRE: 1.53 L/SEC
FEF2575-PRED: 4.58 L/SEC
FEFMAX-%PRED-PRE: 79 %
FEFMAX-PRE: 9 L/SEC
FEFMAX-PRED: 11.26 L/SEC
FEV1-%PRED-PRE: 72 %
FEV1-PRE: 3.52 L
FEV1FEV6-PRE: 61 %
FEV1FEV6-PRED: 82 %
FEV1FVC-PRE: 57 %
FEV1FVC-PRED: 80 %
FIFMAX-PRE: 10.46 L/SEC
FVC-%PRED-PRE: 100 %
FVC-PRE: 6.18 L
FVC-PRED: 6.14 L
PROT SERPL-MCNC: 7 G/DL (ref 6.8–8.8)

## 2022-07-05 PROCEDURE — 36415 COLL VENOUS BLD VENIPUNCTURE: CPT | Performed by: PATHOLOGY

## 2022-07-05 PROCEDURE — 99215 OFFICE O/P EST HI 40 MIN: CPT | Mod: 25 | Performed by: INTERNAL MEDICINE

## 2022-07-05 PROCEDURE — 94375 RESPIRATORY FLOW VOLUME LOOP: CPT | Performed by: INTERNAL MEDICINE

## 2022-07-05 PROCEDURE — 87077 CULTURE AEROBIC IDENTIFY: CPT | Performed by: INTERNAL MEDICINE

## 2022-07-05 PROCEDURE — 97803 MED NUTRITION INDIV SUBSEQ: CPT | Performed by: DIETITIAN, REGISTERED

## 2022-07-05 PROCEDURE — 17250 CHEM CAUT OF GRANLTJ TISSUE: CPT | Performed by: DERMATOLOGY

## 2022-07-05 PROCEDURE — G0463 HOSPITAL OUTPT CLINIC VISIT: HCPCS | Mod: 25

## 2022-07-05 PROCEDURE — 80076 HEPATIC FUNCTION PANEL: CPT | Performed by: PATHOLOGY

## 2022-07-05 PROCEDURE — 82550 ASSAY OF CK (CPK): CPT | Performed by: PATHOLOGY

## 2022-07-05 ASSESSMENT — PAIN SCALES - GENERAL
PAINLEVEL: NO PAIN (0)
PAINLEVEL: NO PAIN (0)

## 2022-07-05 ASSESSMENT — ANXIETY QUESTIONNAIRES
6. BECOMING EASILY ANNOYED OR IRRITABLE: NOT AT ALL
7. FEELING AFRAID AS IF SOMETHING AWFUL MIGHT HAPPEN: NOT AT ALL
5. BEING SO RESTLESS THAT IT IS HARD TO SIT STILL: NOT AT ALL
1. FEELING NERVOUS, ANXIOUS, OR ON EDGE: NOT AT ALL
2. NOT BEING ABLE TO STOP OR CONTROL WORRYING: NOT AT ALL
GAD7 TOTAL SCORE: 0
GAD7 TOTAL SCORE: 0
3. WORRYING TOO MUCH ABOUT DIFFERENT THINGS: NOT AT ALL

## 2022-07-05 ASSESSMENT — PATIENT HEALTH QUESTIONNAIRE - PHQ9
SUM OF ALL RESPONSES TO PHQ QUESTIONS 1-9: 0
5. POOR APPETITE OR OVEREATING: NOT AT ALL

## 2022-07-05 NOTE — PROGRESS NOTES
[facility] Dermatology Note  Encounter Date: Jul 5, 2022  Office Visit     Dermatology Problem List:  # Pertinent PMH: CF.  # Hypergranulation tissue surrounding G-tube insertion site  - S/p silver nitrate 7/5/22  _________________________    Assessment & Plan:    # Hypergranulation tissue surrounding G-tube insertion site  - Silver nitrate applied following topical lidocaine application via sterile gauze  - Wound care with gauze and vaseline encouraged     Follow-up: prn for new or changing lesions, let us know if needs repeat treatment and we can find convenient clinic spot for him    Staff and Scribe:     Scribe Disclosure:  I, Tana Curran, am serving as a scribe to document services personally performed by Rita De La O MD based on data collection and the provider's statements to me.     Ned Loco MD PGY2  Dermatology Resident    Staff Physician Comments:   I saw and evaluated the patient with the resident and I agree with the assessment and plan.  I was present for the entire minor procedure and examination.    Rita De La O MD    Department of Dermatology  [facility]: Phone: [phone], Fax: [phone]  7/11/2022     ____________________________________________    CC: Derm Problem (Telly is here today for skin tags around his g tube site)    HPI:  Mr. Telly Leal is a(n) 42 year old male who presents today as a new patient for irritation around his G tube site. The patient reports that this has been present for the past 6 months. He has noticed a nearly circumferential red area of irritation around the skin where the G tube inserts into his abdomen. It will occasionally bleed, and is irritated with tube changes (every 3 months).     Patient is otherwise feeling well, without additional skin concerns.    Labs Reviewed:  N/A    Physical Exam:  Vitals: There were no  vitals taken for this visit.  SKIN: Focused examination of the abdomen was performed.  - there is a non-epithelialized area of erythematous, pink tissue surrounding the G tube nearly circumferentially.   - No other lesions of concern on areas examined.             Medications:  Current Outpatient Medications   Medication     acetylcysteine (MUCOMYST) 20 % neb solution     albuterol (PROAIR HFA) 108 (90 BASE) MCG/ACT Inhaler     albuterol (PROVENTIL) (2.5 MG/3ML) 0.083% neb solution     azithromycin (ZITHROMAX) 250 MG tablet     blood glucose monitoring (NO BRAND SPECIFIED) test strip     Cholecalciferol 4000 UNITS CAPS     colistimethate/colistin-base activity (COLYMYCIN) 150 mg/2mL SOLR neb solution     CREON 28959-00118 units CPEP per EC capsule     cromolyn (INTAL) 20 MG/2ML neb solution     famotidine (PEPCID) 20 MG tablet     fluticasone (FLONASE) 50 MCG/ACT nasal spray     Multiple Vitamin (MULTIVITAMINS PO)     mvw complete formulation (SOFTGELS) capsule     Nutritional Supplements (NUTREN 2.0)     phytonadione (MEPHYTON) 5 MG tablet     tobramycin, PF, (SOO) 300 MG/5ML neb solution     water for injection sterile SOLN     elexacaftor-tezacaftor-ivacaftor & ivacaftor (TRIKAFTA) 100-50-75 & 150 MG tablet pack     No current facility-administered medications for this visit.      Past Medical History:   Patient Active Problem List   Diagnosis     Type 1 diabetes mellitus (H)     Cystic fibrosis with pulmonary manifestations (H)     CF (cystic fibrosis) (H)     Congenital absence of vas deferens, bilateral     Past Medical History:   Diagnosis Date     Chronic sinusitis      Cystic fibrosis with pulmonary manifestations (H)      Exocrine pancreatic insufficiency      GERD (gastroesophageal reflux disease)      Hemoptysis      Malnutrition (H)         CC Rocael Miller MD  [address] [address] on close of this encounter.

## 2022-07-05 NOTE — PROGRESS NOTES
"Reason for Visit  Telly Leal is a 35 year old year old male who is being seen for RECHECK (Return Cystic Fibrosis )    CF HPI  The patient was seen and examined by Rocael Miller   The patient has been doing well from a respiratory standpoint since his last clinic visit in February.  He has been doing vest therapy \"minimally\" due to a complex family schedule.  He also has not been formally exercising.  Despite this, he denies any problems with increased cough or congestion.  He infrequently produces a small amount of sputum as before.  No new sputum purulence or hemoptysis.  No exertional dyspnea with his activities of daily living.  The patient's biggest concern today is pain that emanates from his left sacroiliac region and extends down into his lower extremities all the way to the dorsal aspect of his left foot.  He has on average 7 out of 10 pain throughout the day.  Sometimes worse with standing and with sharp pain with certain movements or with minor trauma.  He has been seeing a chiropractor 3 times a week for the past several weeks without overt improvement.  Current Outpatient Medications   Medication     acetylcysteine (MUCOMYST) 20 % neb solution     albuterol (PROAIR HFA) 108 (90 BASE) MCG/ACT Inhaler     albuterol (PROVENTIL) (2.5 MG/3ML) 0.083% neb solution     azithromycin (ZITHROMAX) 250 MG tablet     blood glucose monitoring (NO BRAND SPECIFIED) test strip     Cholecalciferol 4000 UNITS CAPS     colistimethate/colistin-base activity (COLYMYCIN) 150 mg/2mL SOLR neb solution     CREON 01919-04484 units CPEP per EC capsule     cromolyn (INTAL) 20 MG/2ML neb solution     elexacaftor-tezacaftor-ivacaftor & ivacaftor (TRIKAFTA) 100-50-75 & 150 MG tablet pack     famotidine (PEPCID) 20 MG tablet     fluticasone (FLONASE) 50 MCG/ACT nasal spray     Multiple Vitamin (MULTIVITAMINS PO)     mvw complete formulation (SOFTGELS) capsule     Nutritional Supplements (NUTREN 2.0)     phytonadione " (MEPHYTON) 5 MG tablet     tobramycin, PF, (SOO) 300 MG/5ML neb solution     water for injection sterile SOLN     No current facility-administered medications for this visit.     No Known Allergies  Past Medical History:   Diagnosis Date     Chronic sinusitis      Cystic fibrosis with pulmonary manifestations (H)      Exocrine pancreatic insufficiency      GERD (gastroesophageal reflux disease)      Hemoptysis      Malnutrition (H)        Past Surgical History:   Procedure Laterality Date     IR MISCELLANEOUS PROCEDURE  2/6/2001     LOBECTOMY      right upper lobe     TUBES         Social History     Socioeconomic History     Marital status:      Spouse name: Not on file     Number of children: Not on file     Years of education: Not on file     Highest education level: Not on file   Occupational History     Occupation:      Employer: [employer]   Tobacco Use     Smoking status: Never Smoker     Smokeless tobacco: Never Used   Substance and Sexual Activity     Alcohol use: Not Currently     Alcohol/week: 0.0 standard drinks     Drug use: Not Currently     Sexual activity: Not on file   Other Topics Concern     Parent/sibling w/ CABG, MI or angioplasty before 65F 55M? Not Asked   Social History Narrative    Patient works for the [employer] as an .  He does not have stable living conditions at this time.  He has a [place]ian guillen.     Social Determinants of Health     Financial Resource Strain: Not on file   Food Insecurity: Not on file   Transportation Needs: Not on file   Physical Activity: Not on file   Stress: Not on file   Social Connections: Not on file   Intimate Partner Violence: Not on file   Housing Stability: Not on file   Update: living with wife Bre in [facility]. Birth of daughter Nieves Rebolledo August, 2017, birth of daughter Josy Felder November, 2019    ROS Pulmonary  Const: No fever chills sweats.  GI: He is aware of weight loss.  He attributes this  "to decreased appetite due to his musculoskeletal pain.  He has used tube feeds only 4 times in the past 3 months.  No diarrhea constipation or grease in the stools.  Endocrine: No polyuria, polydipsia, or polyphagia.  A complete ROS was otherwise negative except as noted in the HPI.  /71   Pulse 105   Resp 17   Ht 1.905 m (6' 3\")   Wt 85.7 kg (189 lb)   SpO2 95%   BMI 23.62 kg/m    Exam:   GENERAL APPEARANCE: Well developed, well nourished, alert, and in no apparent distress.  EYES: anicteric with injected appearing conjunctiva  HENT: No nasal discharge.  Oral mucosa is moist, without any lesions, no tonsillar enlargement, no oropharyngeal exudate.  RESP:  good air flow throughout.  Clear to auscultation.  Normal chest wall excursion.  Reduced breath sounds at right posterior base as baseline.  CV: Normal S1, S2, regular rhythm, normal rate. No murmur. No gallop. No LE edema.   ABDOMEN:  Bowel sounds normal, soft, nontender, no HSM or masses.   MS: extremities normal. No cyanosis.  SKIN:  PEG site without erythema. There was a small amount of yellow discharge.  Silver nitrate applied to protruding soft tissue at his PEG site earlier today.  Chest scar-like lesion upper sternum unchanged. Diffuse freckles without overt change.  NEURO: Mentation intact, speech normal, normal gait and stance  PSYCH: mentation appears normal. and affect normal/bright  Results:  Recent Results (from the past 168 hour(s))   Hepatic panel    Collection Time: 07/05/22  2:39 PM   Result Value Ref Range    Bilirubin Total 1.8 (H) 0.2 - 1.3 mg/dL    Bilirubin Direct 0.3 (H) 0.0 - 0.2 mg/dL    Protein Total 7.0 6.8 - 8.8 g/dL    Albumin 3.7 3.4 - 5.0 g/dL    Alkaline Phosphatase 73 40 - 150 U/L    AST 27 0 - 45 U/L    ALT 32 0 - 70 U/L   CK total    Collection Time: 07/05/22  2:39 PM   Result Value Ref Range    CK 88 30 - 300 U/L   General PFT Lab (Please always keep checked)    Collection Time: 07/05/22  2:44 PM   Result Value Ref " Range    FVC-Pred 6.14 L    FVC-Pre 6.18 L    FVC-%Pred-Pre 100 %    FEV1-Pre 3.52 L    FEV1-%Pred-Pre 72 %    FEV1FVC-Pred 80 %    FEV1FVC-Pre 57 %    FEFMax-Pred 11.26 L/sec    FEFMax-Pre 9.00 L/sec    FEFMax-%Pred-Pre 79 %    FEF2575-Pred 4.58 L/sec    FEF2575-Pre 1.53 L/sec    UBO2119-%Pred-Pre 33 %    ExpTime-Pre 12.08 sec    FIFMax-Pre 10.46 L/sec    FEV1FEV6-Pred 82 %    FEV1FEV6-Pre 61 %     Spirometry interpretation: personally reviewed. Valid maneuver.  Mild obstruction. No significant interval change. He is within his previous baseline.    Sputum last visit: PA x 1 with high degree of resistance    Assessment and plan:  1. CF lung disease: Overall continues to do well with stability in his pulmonary function tests and low burden of symptoms despite near absence of prophylactic airway clearance.  His musculoskeletal pain is not exacerbated by vest and encouraged him to maintain therapy at least a few times per week as historically he has been more prone to exacerbations with doing less.  Not able to escalate exercise due to musculoskeletal pain currently.  For now, continue SOO nebs every other month and chronic azithromycin but if adherence to nebs suboptimal, will consider reserving them for exacerbations.  1B.  CFTR modulation therapy:  As above, he has had an impressive response to Trikafta.  His bilirubin today remains modestly elevated without progressive rise and presumably related to Trikafta but risk versus benefit still favors continuing high-dose Trikafta.  We will be getting repeat liver function tests in conjunction with annual studies next visit.  2. Impaired glucose tolerance: Last annual study fasting > 100 and peak > 200 and 2 hour value right at 140. A little worse than Jan 2018 but similar to previous. A1C most recently at 6.1%. Does not appear to be an evolving problem and overall with good clinical course.  Will repeat glucose monitoring this fall once he is on a consistent tube feed  regimen.  3.  Pancreatic exocrine insufficiency with a history of malnutrition on tube feeds: Adequate enzyme replacement by history.  He has lost about 8 pounds since his last visit which appears to be related to decreased intake in the setting of musculoskeletal pain with anorexia.  Leona, CF nutritionist, consulted.  The patient's weight is currently at target but explained further weight loss would be concerning.  Ultimately the patient elected to resume nightly tube feeds for the time being.  4. Chronic CF sinus disease: Improved on modulation therapy. Continue flonase.  5. Chest skin lesions: Seen by outside Dermatologist. Central chest scar could be treated by steroid injections per pt but not actively pursuing.  His prior dermatologist retired.  Patient will follow-up with dermatology in our clinic to establish annual skin cancer screening exams.  6. Healthcare maintenance: Patient due for annual studies in October, 2022 and ordered for next visit. Covid vaccinations most recently with Moderna January, 2022.  Discussed risk versus benefit of getting a booster.  He has elected to defer until fall when there might be a new vaccine available but consensus is to pursue a booster sooner if significant uptick in burden of disease in the community.    Patient will follow up in 3 months    Total of 40 minutes spent today in conjunction with this visit including exam, history, care coordination, and documentation. This is exclusive of time interpreting PFTs.    Rocael Miller

## 2022-07-05 NOTE — NURSING NOTE
Telly Leal is a 42 year old year old who is being seen for RECHECK (Return Cystic Fibrosis )      Medications reviewed and Vital signs taken.    Specimen Collection Type: Less than 1ml of sputum    Order(s) placed: CF Aerobic Bacterial        Lab Results   Component Value Date    ACIDFAST No acid fast bacilli seen 11/23/2021         Lab Results   Component Value Date    AFBSMS Negative for acid fast bacteria 06/18/2018    AFBSMS  06/18/2018     Less than 5ml of specimen received.  A minimum of 5 mL of sputum or fluid is recommended for recovery of acid fast bacilli   (AFB).  Volumes less than 5 mL are suboptimal and may compromise recovery of AFB from   culture.      AFBSMS  06/18/2018     Assayed at [facility] [address] [place] [id number] [phone]         Medications reviewed and vital signs taken.   Mya Julian CMA

## 2022-07-05 NOTE — LETTER
Date:July 6, 2022      Patient was self referred, no letter generated. Do not send.        [facility] Health Information

## 2022-07-05 NOTE — LETTER
"    7/5/2022         RE: Telly Leal  [id number] [phone]        Dear Colleague,    Thank you for referring your patient, Telly Leal, to the [facility] AND [facility] [place]. Please see a copy of my visit note below.    Reason for Visit  Telly Leal is a 35 year old year old male who is being seen for RECHECK (Return Cystic Fibrosis )    CF HPI  The patient was seen and examined by Rocael Miller   The patient has been doing well from a respiratory standpoint since his last clinic visit in February.  He has been doing vest therapy \"minimally\" due to a complex family schedule.  He also has not been formally exercising.  Despite this, he denies any problems with increased cough or congestion.  He infrequently produces a small amount of sputum as before.  No new sputum purulence or hemoptysis.  No exertional dyspnea with his activities of daily living.  The patient's biggest concern today is pain that emanates from his left sacroiliac region and extends down into his lower extremities all the way to the dorsal aspect of his left foot.  He has on average 7 out of 10 pain throughout the day.  Sometimes worse with standing and with sharp pain with certain movements or with minor trauma.  He has been seeing a chiropractor 3 times a week for the past several weeks without overt improvement.  Current Outpatient Medications   Medication     acetylcysteine (MUCOMYST) 20 % neb solution     albuterol (PROAIR HFA) 108 (90 BASE) MCG/ACT Inhaler     albuterol (PROVENTIL) (2.5 MG/3ML) 0.083% neb solution     azithromycin (ZITHROMAX) 250 MG tablet     blood glucose monitoring (NO BRAND SPECIFIED) test strip     Cholecalciferol 4000 UNITS CAPS     colistimethate/colistin-base activity (COLYMYCIN) 150 mg/2mL SOLR neb solution     CREON 74260-94024 units CPEP per EC capsule     cromolyn (INTAL) 20 MG/2ML neb solution     elexacaftor-tezacaftor-ivacaftor " & ivacaftor (TRIKAFTA) 100-50-75 & 150 MG tablet pack     famotidine (PEPCID) 20 MG tablet     fluticasone (FLONASE) 50 MCG/ACT nasal spray     Multiple Vitamin (MULTIVITAMINS PO)     mvw complete formulation (SOFTGELS) capsule     Nutritional Supplements (NUTREN 2.0)     phytonadione (MEPHYTON) 5 MG tablet     tobramycin, PF, (SOO) 300 MG/5ML neb solution     water for injection sterile SOLN     No current facility-administered medications for this visit.     No Known Allergies  Past Medical History:   Diagnosis Date     Chronic sinusitis      Cystic fibrosis with pulmonary manifestations (H)      Exocrine pancreatic insufficiency      GERD (gastroesophageal reflux disease)      Hemoptysis      Malnutrition (H)        Past Surgical History:   Procedure Laterality Date     IR MISCELLANEOUS PROCEDURE  2/6/2001     LOBECTOMY      right upper lobe     TUBES         Social History     Socioeconomic History     Marital status:      Spouse name: Not on file     Number of children: Not on file     Years of education: Not on file     Highest education level: Not on file   Occupational History     Occupation:      Employer: [employer]   Tobacco Use     Smoking status: Never Smoker     Smokeless tobacco: Never Used   Substance and Sexual Activity     Alcohol use: Not Currently     Alcohol/week: 0.0 standard drinks     Drug use: Not Currently     Sexual activity: Not on file   Other Topics Concern     Parent/sibling w/ CABG, MI or angioplasty before 65F 55M? Not Asked   Social History Narrative    Patient works for the [employer] as an .  He does not have stable living conditions at this time.  He has a Vietnamese guillen.     Social Determinants of Health     Financial Resource Strain: Not on file   Food Insecurity: Not on file   Transportation Needs: Not on file   Physical Activity: Not on file   Stress: Not on file   Social Connections: Not on file   Intimate Partner Violence: Not on file  "  Housing Stability: Not on file   Update: living with wife Bre in [facility]. Birth of daughter Nieves Rebolledo August, 2017, birth of daughter Josy Felder November, 2019    ROS Pulmonary  Const: No fever chills sweats.  GI: He is aware of weight loss.  He attributes this to decreased appetite due to his musculoskeletal pain.  He has used tube feeds only 4 times in the past 3 months.  No diarrhea constipation or grease in the stools.  Endocrine: No polyuria, polydipsia, or polyphagia.  A complete ROS was otherwise negative except as noted in the HPI.  /71   Pulse 105   Resp 17   Ht 1.905 m (6' 3\")   Wt 85.7 kg (189 lb)   SpO2 95%   BMI 23.62 kg/m    Exam:   GENERAL APPEARANCE: Well developed, well nourished, alert, and in no apparent distress.  EYES: anicteric with injected appearing conjunctiva  HENT: No nasal discharge.  Oral mucosa is moist, without any lesions, no tonsillar enlargement, no oropharyngeal exudate.  RESP:  good air flow throughout.  Clear to auscultation.  Normal chest wall excursion.  Reduced breath sounds at right posterior base as baseline.  CV: Normal S1, S2, regular rhythm, normal rate. No murmur. No gallop. No LE edema.   ABDOMEN:  Bowel sounds normal, soft, nontender, no HSM or masses.   MS: extremities normal. No cyanosis.  SKIN:  PEG site without erythema. There was a small amount of yellow discharge.  Silver nitrate applied to protruding soft tissue at his PEG site earlier today.  Chest scar-like lesion upper sternum unchanged. Diffuse freckles without overt change.  NEURO: Mentation intact, speech normal, normal gait and stance  PSYCH: mentation appears normal. and affect normal/bright  Results:  Recent Results (from the past 168 hour(s))   Hepatic panel    Collection Time: 07/05/22  2:39 PM   Result Value Ref Range    Bilirubin Total 1.8 (H) 0.2 - 1.3 mg/dL    Bilirubin Direct 0.3 (H) 0.0 - 0.2 mg/dL    Protein Total 7.0 6.8 - 8.8 g/dL    Albumin 3.7 3.4 - 5.0 " g/dL    Alkaline Phosphatase 73 40 - 150 U/L    AST 27 0 - 45 U/L    ALT 32 0 - 70 U/L   CK total    Collection Time: 07/05/22  2:39 PM   Result Value Ref Range    CK 88 30 - 300 U/L   General PFT Lab (Please always keep checked)    Collection Time: 07/05/22  2:44 PM   Result Value Ref Range    FVC-Pred 6.14 L    FVC-Pre 6.18 L    FVC-%Pred-Pre 100 %    FEV1-Pre 3.52 L    FEV1-%Pred-Pre 72 %    FEV1FVC-Pred 80 %    FEV1FVC-Pre 57 %    FEFMax-Pred 11.26 L/sec    FEFMax-Pre 9.00 L/sec    FEFMax-%Pred-Pre 79 %    FEF2575-Pred 4.58 L/sec    FEF2575-Pre 1.53 L/sec    BBI9520-%Pred-Pre 33 %    ExpTime-Pre 12.08 sec    FIFMax-Pre 10.46 L/sec    FEV1FEV6-Pred 82 %    FEV1FEV6-Pre 61 %     Spirometry interpretation: personally reviewed. Valid maneuver.  Mild obstruction. No significant interval change. He is within his previous baseline.    Sputum last visit: PA x 1 with high degree of resistance    Assessment and plan:  1. CF lung disease: Overall continues to do well with stability in his pulmonary function tests and low burden of symptoms despite near absence of prophylactic airway clearance.  His musculoskeletal pain is not exacerbated by vest and encouraged him to maintain therapy at least a few times per week as historically he has been more prone to exacerbations with doing less.  Not able to escalate exercise due to musculoskeletal pain currently.  For now, continue SOO nebs every other month and chronic azithromycin but if adherence to nebs suboptimal, will consider reserving them for exacerbations.  1B.  CFTR modulation therapy:  As above, he has had an impressive response to Trikafta.  His bilirubin today remains modestly elevated without progressive rise and presumably related to Trikafta but risk versus benefit still favors continuing high-dose Trikafta.  We will be getting repeat liver function tests in conjunction with annual studies next visit.  2. Impaired glucose tolerance: Last annual study fasting > 100  and peak > 200 and 2 hour value right at 140. A little worse than Jan 2018 but similar to previous. A1C most recently at 6.1%. Does not appear to be an evolving problem and overall with good clinical course.  Will repeat glucose monitoring this fall once he is on a consistent tube feed regimen.  3.  Pancreatic exocrine insufficiency with a history of malnutrition on tube feeds: Adequate enzyme replacement by history.  He has lost about 8 pounds since his last visit which appears to be related to decreased intake in the setting of musculoskeletal pain with anorexia.  Leona, CF nutritionist, consulted.  The patient's weight is currently at target but explained further weight loss would be concerning.  Ultimately the patient elected to resume nightly tube feeds for the time being.  4. Chronic CF sinus disease: Improved on modulation therapy. Continue flonase.  5. Chest skin lesions: Seen by outside Dermatologist. Central chest scar could be treated by steroid injections per pt but not actively pursuing.  His prior dermatologist retired.  Patient will follow-up with dermatology in our clinic to establish annual skin cancer screening exams.  6. Healthcare maintenance: Patient due for annual studies in October, 2022 and ordered for next visit. Covid vaccinations most recently with Moderna January, 2022.  Discussed risk versus benefit of getting a booster.  He has elected to defer until fall when there might be a new vaccine available but consensus is to pursue a booster sooner if significant uptick in burden of disease in the community.    Patient will follow up in 3 months    Total of 40 minutes spent today in conjunction with this visit including exam, history, care coordination, and documentation. This is exclusive of time interpreting PFTs.    Rocael Miller                     Again, thank you for allowing me to participate in the care of your patient.        Sincerely,        Rocael Miller MD

## 2022-07-05 NOTE — PROGRESS NOTES
Adult Cystic Fibrosis Program  Annual Psychosocial Assessment- phone call    Presenting Information:  TELLY is a 42-year-old man with cystic fibrosis, presenting in CF clinic for a regular follow up with primary CF provider, Dr Hung Miller. Spoke with Telly over the phone for annual psychosocial assessment.     Living situation:   Telly is currently living with his wife, Bre, and his two daughters, Nieves Rebolledo (5 yrs old) and Josy (2 years old). They own a home in [place] but are currently looking for something bigger. They do not have any pets. He denies concerns about his living situation.      Family Constellation:   Telly was initially raised by both biological parents, who  when he was around 14 years old. He subsequently lived with his mother but maintained regular contact with his father. Telly has an older brother and older sister. His mother has a house in [place], [place] and [place] in [place]. His father, brother, and sister live in [place]. His siblings are both  and Yobany has several nieces and nephews. Yobany's extended family also lives in the [place] area and operate a large local [employer]. Bre's parents live about 2 blocks away from them. Yobnay reports all of his family members are doing well.    Yobany and his wife have two daughters, Nieves Rebolledo and Josy. They were conceived via IVF. Yobany and his wife tried to have a third last year but were unable to conceive and they do not have any more embryos left. At the time of implantation Yobany's wife was experiencing some adverse reactions to the COVID vaccine including blood clots and a heart flutter. They wonder if this had anything to do with the failed conception. Yobany reports a slow recovery for his wife but that she is doing better now. They are not pursuing IVF again and are happy with just having two children.  Nieves enjoys soccer, dance, and skating, and Josy enjoys  "dancing.     Support Network:   Telly describes his social support as \"really good\". His immediate and extended family are involved and supportive and have been especially helpful with his daughters. He has still been able to get together with his family members during COVID. He draws additional support from friends and co-workers. He describes a close relationship with Bre. They have been  since 2015. Yobany does not stay in contact with anyone who has CF, but he knew other people with CF when he was growing up.      Adjustment to Illness:   Yobany was diagnosed with CF during infancy. He remembers struggling with weight issues as a child. He notes significant past health events in 2000, when he got a G-tube, but continued to struggle with gaining weight, and in 2003, when he experienced a pneumothorax and lobectomy. Since that event, he notes that he has successfully gained weight.      Yobany describes his current health status as \"good\". Yobany started trikafta last November and notes it is \"great\" and he feels like his treatment burden is much less. Clinically, he has mild lung disease, a history of malnutrition and glucose intolerance. He stopped doing TF for a while to see if he still needed it, but will be starting back up this summer as he typically loses weight in the summer. He denies any physical health symptoms that interfere with his daily activities. He generally completes 2 vest treatments a day. He walks often and occassionally plays hockey in the winter but notes he would like to be more active.      Advance Directive:   This SW reviewed Advance Directive education. ERIC has previously emailed a copy to Yobany which he still has. He will consider filling this out. He is fine with his default decision maker (his wife).    Education:   Yobany completed a Bachelor's Degree in Accounting and Business Administration at [employer] in 2007. He recently got a business leadership certificate through work " "but does not currently plan to pursue further education.      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 He works from home mostly but goes into the office a few days a week. He reports great benefits through the state. He enjoys his job overall and reports a supportive boss and co-workers, he is also part of a union.    Yobany's wife Bre works full time as a  at the [place] [employer]. She works mainly in COVID operations. She is feeling very burnt out by this work and is looking for something different.     Mental Health/Coping:   Yobany coped with depressive/anxious symptoms in 2013, due to stressful transitions. He did brief counseling, which he did not find especially helpful.  The issues then resolved.    Yobany reports his current mood as \"fine\". He denies any current or past symptoms indicative of mood, anxiety, or other mental health disorders.     ARCHANA-7: score of 0, indicating an absence of anxiety symptoms.    PHQ-9: score of 0, indicating an absence of depression symptoms as well.     Yobany describes his current stress level as manageable. He reports minimal work related stress.  He generally ayana with stressors through working out, walking, going for a drive or distracting himself, and getting a \"change of scenery\".    Yobnay was raised Bahai and describes ariana as important to him, noting that his brother in law is a .      Chemical Health   Yobany denies use of tobacco, illicit drugs, or alcohol. He does not have any history of chemical dependency or psychosocial impairment related to substance use.      Finances:   Yobany is able to afford his daily living expenses and denies any financial concerns.     Insurance:   Yobany has employer-based insurance ([employer]). He notes that costs are affordable for him and he denies related concerns.      Recreation/Leisure " Interests:   Telly enjoys playing hockey, working out, golfing, spending time with family/friends, and traveling.     Intervention:  -Psychosocial Assessment  -Supportive counseling    Assessment:  Yobany was in good spirits and appeared to be open in his responses. He and his wife tried for a third child but were unable to conceive. Around that time his wife was also experiencing adverse reactions to the COVID vaccine, but she is mostly recovered. He reports his daughters are doing well and he enjoys watching their activities. He reports stable health and mental health. No financial/insurance issues.    Yobany seems to be psychosocially stable overall, with access to relevant resources and supports.  No concerns expressed/noted.    Plan:  Re-consult for any psychosocial needs that may arise.    Complete psychosocial assessment annually.  Continue to follow for regular clinic consult.    Marybeth Bourgeois [facility]  Adult Cystic Fibrosis   Ph: [phone], Pager: [phone]

## 2022-07-05 NOTE — LETTER
7/5/2022       RE: Telly Leal  [address] [address]     Dear Colleague,    Thank you for referring your patient, Telly Leal, to the [facility] DERMATOLOG[facility] [place] at [facility]. Please see a copy of my visit note below.    [facility] Dermatology Note  Encounter Date: Jul 5, 2022  Office Visit     Dermatology Problem List:  # Pertinent PMH: CF.  # Hypergranulation tissue surrounding G-tube insertion site  - S/p silver nitrate 7/5/22  _________________________    Assessment & Plan:    # Hypergranulation tissue surrounding G-tube insertion site  - Silver nitrate applied following topical lidocaine application via sterile gauze  - Wound care with gauze and vaseline encouraged     Follow-up: prn for new or changing lesions, let us know if needs repeat treatment and we can find convenient clinic spot for him    Staff and Scribe:     Scribe Disclosure:  I, Tana Curran, am serving as a scribe to document services personally performed by Rita De La O MD based on data collection and the provider's statements to me.     Ned Loco MD PGY2  Dermatology Resident    Staff Physician Comments:   I saw and evaluated the patient with the resident and I agree with the assessment and plan.  I was present for the entire minor procedure and examination.    Rita De La O MD    Department of Dermatology  [facility]: Phone: [phone], Fax: [phone]  7/11/2022     ____________________________________________    CC: Derm Problem (Telly is here today for skin tags around his g tube site)    HPI:  Mr. Telly Leal is a(n) 42 year old male who presents today as a new patient for irritation around his G tube site. The patient reports that this has been present for the past 6 months. He has noticed a  nearly circumferential red area of irritation around the skin where the G tube inserts into his abdomen. It will occasionally bleed, and is irritated with tube changes (every 3 months).     Patient is otherwise feeling well, without additional skin concerns.    Labs Reviewed:  N/A    Physical Exam:  Vitals: There were no vitals taken for this visit.  SKIN: Focused examination of the abdomen was performed.  - there is a non-epithelialized area of erythematous, pink tissue surrounding the G tube nearly circumferentially.   - No other lesions of concern on areas examined.             Medications:  Current Outpatient Medications   Medication     acetylcysteine (MUCOMYST) 20 % neb solution     albuterol (PROAIR HFA) 108 (90 BASE) MCG/ACT Inhaler     albuterol (PROVENTIL) (2.5 MG/3ML) 0.083% neb solution     azithromycin (ZITHROMAX) 250 MG tablet     blood glucose monitoring (NO BRAND SPECIFIED) test strip     Cholecalciferol 4000 UNITS CAPS     colistimethate/colistin-base activity (COLYMYCIN) 150 mg/2mL SOLR neb solution     CREON 78030-09058 units CPEP per EC capsule     cromolyn (INTAL) 20 MG/2ML neb solution     famotidine (PEPCID) 20 MG tablet     fluticasone (FLONASE) 50 MCG/ACT nasal spray     Multiple Vitamin (MULTIVITAMINS PO)     mvw complete formulation (SOFTGELS) capsule     Nutritional Supplements (NUTREN 2.0)     phytonadione (MEPHYTON) 5 MG tablet     tobramycin, PF, (SOO) 300 MG/5ML neb solution     water for injection sterile SOLN     elexacaftor-tezacaftor-ivacaftor & ivacaftor (TRIKAFTA) 100-50-75 & 150 MG tablet pack     No current facility-administered medications for this visit.      Past Medical History:   Patient Active Problem List   Diagnosis     Type 1 diabetes mellitus (H)     Cystic fibrosis with pulmonary manifestations (H)     CF (cystic fibrosis) (H)     Congenital absence of vas deferens, bilateral     Past Medical History:   Diagnosis Date     Chronic sinusitis      Cystic fibrosis with  pulmonary manifestations (H)      Exocrine pancreatic insufficiency      GERD (gastroesophageal reflux disease)      Hemoptysis      Malnutrition (H)         CC Rocael Miller MD  [address] [address] on close of this encounter.

## 2022-07-05 NOTE — NURSING NOTE
Dermatology Rooming Note    Telly Leal's goals for this visit include:   Chief Complaint   Patient presents with     Derm Problem     Telly is here today for skin tags around his g tube site     Tayo Hardin, CMA

## 2022-07-06 DIAGNOSIS — E84.9 CF (CYSTIC FIBROSIS) (H): ICD-10-CM

## 2022-07-07 RX ORDER — ELEXACAFTOR, TEZACAFTOR, AND IVACAFTOR 100-50-75
KIT ORAL
Qty: 84 EACH | Refills: 5 | Status: SHIPPED | OUTPATIENT
Start: 2022-07-07 | End: 2022-12-22

## 2022-07-11 NOTE — PROGRESS NOTES
CF Annual Nutrition Assessment     Reason for Assessment  Assessed during CF clinic visit with Dr. Miller r/t increased nutrition risk with diagnosis of CF per protocol.     Nutrition Significant PMH  Mild Lung Disease - taking Trikafta  Pancreatic Insufficient  G-tube for nutrition support  Glucose intolerance     Anthropometric Assessment  Height: 189.7 cm  Weight: 85.7 kg (189 lbs)  Ideal body weight based on BMI 22 for females and 23 for males per [facility] recs: 82.8 kg (182 lbs)  BMI: 23.62 kg/m      Wt Readings from Last 5 Encounters:   22 85.7 kg (189 lb)   21 89.4 kg (197 lb)   21 89.3 kg (196 lb 13.9 oz)   20 89.7 kg (197 lb 12 oz)   19 86.2 kg (190 lb)     Comments: Loss of 3.7 kg (4% body weight) in 5 months since Feb - not nutritionally significant. Weight remains just at/above CFF goal. Pt reports holding TFs since Feb to see if able to maintain weight without feeds. Has also experienced back pain which contributes to poor appetite.     Pancreatic Enzymes  Brand:  Creon [id number]  PO Dosin with meals, 6 with snacks = 2180 units lipase/kg/meal  TF Dosing: currently 3-4 caps w/ tube feeding (1 can)   Estimated Daily Intake: 50 caps = 13,500 units lipase/kg/day from PO + TF - slightly above recommended dosing range however meal dose appropriate.      Signs of Malabsorption: No - hx stool burden and seen by GI 10/11/19. Not needing Miralax.   Enzyme Program:  None - information provided during previous visits     Nutrition-Related Lab & Vitamin/Mineral Supplements  Annual studies completed 2021.  Vitamin A- 0.72 wnl  Vitamin D- 41 wnl  Vitamin E- 8.8 wnl  Iron- 69 wnl  Lipid Panel- wnl     OGTT- , indeterminate glucose tolerance  DEXA - , lowest z-score 0.1      Current Vitamin/Mineral Supplements:MVW Complete 1 capsule daily. Reports taking daily. Obtains from [facility].     Diet History and Assessment  Diet Preferences/Allergies/Intolerances: Regular  Intake  Recall/Comments: Appetite only fair at baseline but makes an effort to eat consistently. Consumes 2-3 meals and snacks. Historically has not been able to maintain weight without use of TF. Most recently has concerns with appetite related to back pain. Pt is concerned that he lost weight while holding TFs.   Diet Recall:  Breakfast- skips or may have eggs, toast  Lunch- sandwich or eats out, fast food  Dinner- protein with starch and vegetable; pasta or steak/chicken with veggies  HS snack- ice cream    Calcium: gallon whole milk/week + yogurt, string cheese  Salt: likely adequate from foods  Hydration: Water, Gatorade, regular coke   Supplements: No   Tube Feeding: Yes --   Type of Feeding Tube: G-tube (button)  Formula: Nutren 2.0  Rate/Frequency: 135 ml/hr x 1 can + water added (~4 hrs infusion time)  Nutrition: 250 mls, 500 kcals, 21 g protein, 23 g fat, 54 g CHO  Enzymes: taking 9-10 caps before infusion along with HS snack (accounting for ~3-4 caps per 1 can TF) provides ~4000 units lipase/g fat in feeds.      NUTRITION DIAGNOSIS  Impaired nutrient utilization related to CF pancreatic insufficiency as evidenced by requires pancreatic enzyme replacement therapy, vitamin/mineral supplementation, and higher kcal/protein diet and nutrition support in order to maintain ideal body weight and nutrition status.     INTERVENTIONS/RECOMMENDATIONS   1) Oral Intake/Weight:   BEE: 2100   2969-0929 kcals/day =  150-175% BEE  110-140 g protein/day = 1.2-1.5 g/kg    Discussed current nutrition status including weight trends, PO, and TF intake. Discussed with patient today that although weight is down, he remains at CFF goal BMI and could aim for maintenance at this time. Pt prefers to restart TFs and gain some of this weight back. During last RD visit 6/2021, PO intake in AM had improved while holding feeds and pt was able to maintain weight. At visit today, pt reports that back pain and busy work/home schedule are impacting  oral intake. Discussed option to start 1 can overnight feed e/o day, however Telly prefers to do TFs daily- says this consistency is helpful, especially from a GI standpoint. Encourage PO as tolerated. No GI concerns noted today and will continue with current enzyme dose.      2) Vitamin/Mineral Supplementation:  Annual study labs due October 2022. Will contact pt if any changes are needed to regimen pending updated vitamin levels. Due for updated OGTT.      GOALS:  1) Weight maintenance with BMI >23 kg/m2  2) Take enzymes and vitamins as recommended     FOLLOW-UP/MONITORING:  Visit patient within 12 months for annual nutrition reassessment.     Time Spent In Face-to-Face Patient Interactions: 15 minutes    Leona Marie RD, LD  Cystic Fibrosis/Lung Transplant Dietitian  Pager [phone]

## 2022-07-12 LAB
BACTERIA SPT CULT: ABNORMAL

## 2022-07-14 ENCOUNTER — TELEPHONE (OUTPATIENT)
Dept: DERMATOLOGY | Facility: CLINIC | Age: 42
End: 2022-07-14

## 2022-08-02 ENCOUNTER — CLINICAL UPDATE (OUTPATIENT)
Dept: PHARMACY | Facility: CLINIC | Age: 42
End: 2022-08-02
Payer: COMMERCIAL

## 2022-08-02 DIAGNOSIS — E84.9 CF (CYSTIC FIBROSIS) (H): ICD-10-CM

## 2022-08-02 DIAGNOSIS — E84.9 CF (CYSTIC FIBROSIS) (H): Primary | ICD-10-CM

## 2022-08-02 PROCEDURE — 99207 PR NO CHARGE LOS: CPT | Performed by: PHARMACIST

## 2022-08-02 RX ORDER — AZITHROMYCIN 250 MG/1
250 TABLET, FILM COATED ORAL DAILY
Qty: 30 TABLET | Refills: 11 | Status: SHIPPED | OUTPATIENT
Start: 2022-08-02 | End: 2023-06-29

## 2022-08-02 NOTE — PROGRESS NOTES
Clinical Update:                                                    A chart review was conducted for Telly Leal.    Reason for Chart Review: Trikafta 6 Month Lab Follow Up     Discussion: Telly has been on Trikafta since 12/3/19. Per chart review, he continues full dose Trikafta. Telly has had some elevations in bilirubin which have required closer monitoring.     Labs were reviewed from 7/5/22 from [facility]. ALT, AST, and CK were within normal limits. Bilirubin and direct bilirubin continue to improve from previous. However, given it's still elevated, would recommend following through with hepatology consult.     Lab Results   Component Value Date    ALT 32 07/05/2022    AST 27 07/05/2022    BILITOTAL 1.8 (H) 07/05/2022    DBIL 0.3 (H) 07/05/2022    CKT 88 07/05/2022     Plan:  1. Continue Trikafta  2. Recheck hepatic panel and CK 6 months  3. Hepatology consult (previously recommended; not scheduled yet)       Kathryn Cristina, PharmD   Medication Therapy Management   Cystic Fibrosis Pharmacist  Pager: [phone]

## 2022-10-03 ENCOUNTER — PHARMACY VISIT (OUTPATIENT)
Dept: ADMINISTRATIVE | Facility: CLINIC | Age: 42
End: 2022-10-03

## 2022-10-03 NOTE — PROGRESS NOTES
Cystic Fibrosis Clinical Follow Up Assessment   Assessment Link -Cystic Fibrosis Clinical Follow Up Assessment     2022/10/03 15:11:45 [facility], by Reyna Hoffman        Activity Date 2022/09/30   Care Details    What are the patient's goals for this therapy?   ? 3/17/2022: Maintaining his health      Did you identify any patient barriers to access and successful treatment?   ? No barriers to access identified      Is it appropriate to collect a PDC at this time? [QA Metric] (An MPR or PDC would not be appropriate for cycled medications or if the patient is on therapy   ? Yes      Document the date of the last refill of PDC   ? 2022-09-01      Document PDC   ? 1      Has the patient missed doses inappropriately?   ? No      Please select CURRENT side effect(s) for monitoring:   ? None     Summary Notes  I had the pleasure of speaking to pt via text for TM. No side effects. Doses: states he has been great taking them.    - Pt did not respond to further TM questions.   - Will continue biannual TM      Angela HOFFMAN, PharmD, CSP  Therapy Management Pharmacist  [address] [address]   [email or web address]  [email or web address]   Specialty: [phone]  Mail Order: [phone]

## 2022-10-21 ENCOUNTER — CARE COORDINATION (OUTPATIENT)
Dept: PULMONOLOGY | Facility: CLINIC | Age: 42
End: 2022-10-21

## 2022-10-21 NOTE — PROGRESS NOTES
Called and gave V.O. to Flash in admissions at [facility] for enteral orders and neb cup orders to be extended.  Orders on file:  Nutren 2.0 2can/day  Feed bags (patient has pump)  Theodore-key extension sets (14f 4.0c gtube)  And Mare neb cups    Georgia Granda RN

## 2022-10-28 DIAGNOSIS — E84.0 CYSTIC FIBROSIS WITH PULMONARY MANIFESTATIONS (H): ICD-10-CM

## 2022-10-29 ENCOUNTER — HEALTH MAINTENANCE LETTER (OUTPATIENT)
Age: 42
End: 2022-10-29

## 2022-10-31 ENCOUNTER — OFFICE VISIT (OUTPATIENT)
Dept: PULMONOLOGY | Facility: CLINIC | Age: 42
End: 2022-10-31
Attending: INTERNAL MEDICINE
Payer: COMMERCIAL

## 2022-10-31 VITALS
BODY MASS INDEX: 24.01 KG/M2 | OXYGEN SATURATION: 98 % | HEIGHT: 75 IN | HEART RATE: 112 BPM | RESPIRATION RATE: 17 BRPM | SYSTOLIC BLOOD PRESSURE: 112 MMHG | DIASTOLIC BLOOD PRESSURE: 79 MMHG | WEIGHT: 193.12 LBS

## 2022-10-31 DIAGNOSIS — E84.0 CYSTIC FIBROSIS WITH PULMONARY MANIFESTATIONS (H): Primary | ICD-10-CM

## 2022-10-31 DIAGNOSIS — E84.0 CYSTIC FIBROSIS WITH PULMONARY MANIFESTATIONS (H): ICD-10-CM

## 2022-10-31 DIAGNOSIS — E84.0 CYSTIC FIBROSIS WITH PULMONARY EXACERBATION (H): ICD-10-CM

## 2022-10-31 LAB
EXPTIME-PRE: 10.78 SEC
FEF2575-%PRED-PRE: 34 %
FEF2575-PRE: 1.59 L/SEC
FEF2575-PRED: 4.58 L/SEC
FEFMAX-%PRED-PRE: 83 %
FEFMAX-PRE: 9.45 L/SEC
FEFMAX-PRED: 11.26 L/SEC
FEV1-%PRED-PRE: 66 %
FEV1-PRE: 3.23 L
FEV1FEV6-PRE: 62 %
FEV1FEV6-PRED: 82 %
FEV1FVC-PRE: 59 %
FEV1FVC-PRED: 80 %
FIFMAX-PRE: 8.44 L/SEC
FVC-%PRED-PRE: 89 %
FVC-PRE: 5.47 L
FVC-PRED: 6.14 L

## 2022-10-31 PROCEDURE — 87070 CULTURE OTHR SPECIMN AEROBIC: CPT | Performed by: INTERNAL MEDICINE

## 2022-10-31 PROCEDURE — G0463 HOSPITAL OUTPT CLINIC VISIT: HCPCS | Mod: 25

## 2022-10-31 PROCEDURE — 99215 OFFICE O/P EST HI 40 MIN: CPT | Mod: 25 | Performed by: INTERNAL MEDICINE

## 2022-10-31 PROCEDURE — 94375 RESPIRATORY FLOW VOLUME LOOP: CPT | Performed by: INTERNAL MEDICINE

## 2022-10-31 PROCEDURE — 87077 CULTURE AEROBIC IDENTIFY: CPT | Performed by: INTERNAL MEDICINE

## 2022-10-31 RX ORDER — CROMOLYN SODIUM 20 MG/2ML
20 INHALANT INTRABRONCHIAL 3 TIMES DAILY
Qty: 180 ML | Refills: 12 | Status: SHIPPED | OUTPATIENT
Start: 2022-10-31 | End: 2023-11-27

## 2022-10-31 RX ORDER — LEVOFLOXACIN 750 MG/1
750 TABLET, FILM COATED ORAL DAILY
Qty: 21 TABLET | Refills: 0 | Status: SHIPPED | OUTPATIENT
Start: 2022-10-31 | End: 2022-11-21

## 2022-10-31 RX ORDER — ALBUTEROL SULFATE 0.83 MG/ML
2.5 SOLUTION RESPIRATORY (INHALATION) 3 TIMES DAILY
Qty: 360 ML | Refills: 11 | Status: SHIPPED | OUTPATIENT
Start: 2022-10-31 | End: 2023-11-27

## 2022-10-31 ASSESSMENT — PAIN SCALES - GENERAL: PAINLEVEL: NO PAIN (0)

## 2022-10-31 NOTE — NURSING NOTE
"Telly Leal is a 42 year old year old who is being seen for RECHECK (Return Cystic Fibrosis )      Medications reviewed and Vital signs taken.    Specimen Collection Type: Less than 1ml of sputum    Order(s) placed: CF Aerobic Bacterial    *IF AFB order placed - please enter \"PRIORITIZE AFB\" to order comments.       Lab Results   Component Value Date    ACIDFAST No acid fast bacilli seen 11/23/2021         Lab Results   Component Value Date    AFBSMS Negative for acid fast bacteria 06/18/2018    AFBSMS  06/18/2018     Less than 5ml of specimen received.  A minimum of 5 mL of sputum or fluid is recommended for recovery of acid fast bacilli   (AFB).  Volumes less than 5 mL are suboptimal and may compromise recovery of AFB from   culture.      AFBSMS  06/18/2018     Assayed at [facility] [address] [place] [phone]         Medications reviewed and vital signs taken.   Mya Julian CMA    "

## 2022-10-31 NOTE — LETTER
Date:November 1, 2022      Patient was self referred, no letter generated. Do not send.        [facility] Health Information

## 2022-10-31 NOTE — PATIENT INSTRUCTIONS
Start levofloxacin once a day for 3 weeks  Stop azithromycin while on levofloxacin  Resume vest, goal of at least twice a day  Resume alice nebs  Annual studies with next visit.

## 2022-10-31 NOTE — PROGRESS NOTES
Reason for Visit  Telly Leal is a 35 year old year old male who is being seen for RECHECK (Return Cystic Fibrosis )    CF HPI  The patient was seen and examined by Rocael Miller   The patient reports no new respiratory symptoms since his last visit in July.  He occasionally needs to clear his throat.  He has minimal sputum production.  He did not expect his pulmonary function test to be lower today based on symptoms.  He notes he might not have been able to blow out quite as hard due to his ongoing low back pain.  He has not done any vest therapy or nebs since last visit.  He does think he could tolerate vest without aggravating his back pain.  He has been relatively sedentary due to back pain but there is been some improvement finally in the past week.  No hemoptysis or chest pain.  Current Outpatient Medications   Medication     acetylcysteine (MUCOMYST) 20 % neb solution     albuterol (PROAIR HFA) 108 (90 BASE) MCG/ACT Inhaler     albuterol (PROVENTIL) (2.5 MG/3ML) 0.083% neb solution     azithromycin (ZITHROMAX) 250 MG tablet     blood glucose monitoring (NO BRAND SPECIFIED) test strip     Cholecalciferol 4000 UNITS CAPS     colistimethate/colistin-base activity (COLYMYCIN) 150 mg/2mL SOLR neb solution     CREON 21584-97305 units CPEP per EC capsule     cromolyn (INTAL) 20 MG/2ML neb solution     elexacaftor-tezacaftor-ivacaftor & ivacaftor (TRIKAFTA) 100-50-75 & 150 MG tablet pack     famotidine (PEPCID) 20 MG tablet     fluticasone (FLONASE) 50 MCG/ACT nasal spray     levofloxacin (LEVAQUIN) 750 MG tablet     Multiple Vitamin (MULTIVITAMINS PO)     mvw complete formulation (SOFTGELS) capsule     Nutritional Supplements (NUTREN 2.0)     phytonadione (MEPHYTON) 5 MG tablet     tobramycin, PF, (SOO) 300 MG/5ML neb solution     water for injection sterile SOLN     No current facility-administered medications for this visit.     No Known Allergies  Past Medical History:   Diagnosis Date     Chronic  sinusitis      Cystic fibrosis with pulmonary manifestations (H)      Exocrine pancreatic insufficiency      GERD (gastroesophageal reflux disease)      Hemoptysis      Malnutrition (H)        Past Surgical History:   Procedure Laterality Date     IR MISCELLANEOUS PROCEDURE  2/6/2001     LOBECTOMY      right upper lobe     TUBES         Social History     Socioeconomic History     Marital status:      Spouse name: Not on file     Number of children: Not on file     Years of education: Not on file     Highest education level: Not on file   Occupational History     Occupation:      Employer: [employer]   Tobacco Use     Smoking status: Never     Smokeless tobacco: Never   Substance and Sexual Activity     Alcohol use: Not Currently     Alcohol/week: 0.0 standard drinks     Drug use: Not Currently     Sexual activity: Not on file   Other Topics Concern     Parent/sibling w/ CABG, MI or angioplasty before 65F 55M? Not Asked   Social History Narrative    Patient works for the [employer] as an .  He does not have stable living conditions at this time.  He has a E[place] guillen.     Social Determinants of Health     Financial Resource Strain: Not on file   Food Insecurity: Not on file   Transportation Needs: Not on file   Physical Activity: Not on file   Stress: Not on file   Social Connections: Not on file   Intimate Partner Violence: Not on file   Housing Stability: Not on file   Update: living with wife Bre in [facility]. Birth of daughter Nieves Rebolledo August, 2017, birth of daughter Josy Felder November, 2019    ROS Pulmonary  Const: No fever chills sweats.  GI: He resumed nightly tube feeds for about 2 weeks after his last clinic visit.  He has been infrequently using them since in part due to time constraints.  Endo: No hypoglycemic symptoms.  ENT: Mild nasal congestion slightly above baseline currently without pain or purulent discharge.  Musculoskeletal: Still  "working frequently with a chiropractor for his lower back pain.  Also taking herbal supplements recommended by his wife.  A complete ROS was otherwise negative except as noted in the HPI.  /79   Pulse 112   Resp 17   Ht 1.905 m (6' 3\")   Wt 87.6 kg (193 lb 2 oz)   SpO2 98%   BMI 24.14 kg/m    Exam:   GENERAL APPEARANCE: Well developed, well nourished, alert, and in no apparent distress.  EYES: anicteric with injected appearing conjunctiva  HENT: No nasal discharge.  Oral mucosa is moist, without any lesions, no tonsillar enlargement, no oropharyngeal exudate.  RESP:  good air flow throughout.  Clear to auscultation.  Normal chest wall excursion.    CV: Normal S1, S2, regular rhythm, normal rate. No murmur. No gallop. No LE edema.   ABDOMEN:  Bowel sounds normal, soft, nontender, no HSM or masses.   MS: extremities normal. No cyanosis.  SKIN:  PEG site without erythema.  Chest scar-like lesion upper sternum unchanged. Diffuse freckles without overt change.  NEURO: Mentation intact, speech normal, normal gait and stance  PSYCH: mentation appears normal. and affect normal/bright  Results:  Recent Results (from the past 168 hour(s))   General PFT Lab (Please always keep checked)    Collection Time: 10/31/22  1:09 PM   Result Value Ref Range    FVC-Pred 6.14 L    FVC-Pre 5.47 L    FVC-%Pred-Pre 89 %    FEV1-Pre 3.23 L    FEV1-%Pred-Pre 66 %    FEV1FVC-Pred 80 %    FEV1FVC-Pre 59 %    FEFMax-Pred 11.26 L/sec    FEFMax-Pre 9.45 L/sec    FEFMax-%Pred-Pre 83 %    FEF2575-Pred 4.58 L/sec    FEF2575-Pre 1.59 L/sec    ICE9275-%Pred-Pre 34 %    ExpTime-Pre 10.78 sec    FIFMax-Pre 8.44 L/sec    FEV1FEV6-Pred 82 %    FEV1FEV6-Pre 62 %     Spirometry interpretation: personally reviewed. Valid maneuver.  Moderate obstruction.  FEV1 down by about 10% from last visit.    Most recent respiratory culture with Pseudomonas only    Assessment and plan:  1.  Mild exacerbation of CF lung disease: Significant drop in pulmonary " function tests but no associated symptoms.  I suspect that this is due primarily to absence of airway clearance and exercise in his routine.  Perhaps submaximal effort related to back pain playing a role.  The plan is for him to resume vest therapy twice a day and notify our center if this aggravates his back pain.  He will resume his mucolytic and bronchodilator nebs as well as resume SOO nebs.  Also placing him on a 3-week course of levofloxacin.  Patient advised to discontinue azithromycin while on this.  1B.  CFTR modulation therapy:  As above, he has had an impressive response to Trikafta.  Most recent liver function tests from August without worsening of his elevated bilirubin and will continue current dosing.  We will get repeat liver function tests in conjunction with annual studies next visit.  2. Impaired glucose tolerance: Last annual study fasting > 100 and peak > 200 and 2 hour value right at 140. A little worse than Jan 2018 but similar to previous. A1C most recently at 6.1%.  Will repeat glucose tolerance test as part of his annual studies next visit.  3.  Pancreatic exocrine insufficiency with a history of malnutrition on tube feeds: Adequate enzyme replacement by history.  Patient has regained weight after resuming tube feeds back in July.  BMI today 24.3.  Infrequently using tube feeds currently and will continue to monitor his weight closely.  He will follow this at home as well.  4. Chronic CF sinus disease: Improved on modulation therapy. Continue flonase.  5. Chest skin lesions: Seen by outside Dermatologist. Central chest scar could be treated by steroid injections per pt but not actively pursuing.  His prior dermatologist retired.  Patient will follow-up with dermatology in our clinic to establish annual skin cancer screening exams.  6. Healthcare maintenance: Patient will complete annual studies in conjunction with his next visit in a month.  He is current on flu vaccine through his  employer this fall.  Covid vaccinations most recently with Moderna January, 2022.  Discussed risk versus benefit of getting a booster with new formulation.  He would like to hold off in the short-term and reconsider if burden of COVID increases in the community.  He turned 42 this summer and will discuss CF colon cancer screening next visit.  7. Low back pain: will review in more detail supplements he is using at his next visit.    Patient will follow up in 1 month    Total of 40 minutes spent today in conjunction with this visit including exam, history, care coordination, and documentation. This is exclusive of time interpreting PFTs.    Rocael Miller

## 2022-10-31 NOTE — LETTER
10/31/2022         RE: Telly Leal  [address] [id number]        Dear Colleague,    Thank you for referring your patient, Telly Leal, to the [facility] FOR LUNG SCIENCE AND [facility] [place]. Please see a copy of my visit note below.         Reason for Visit  Telly Leal is a 35 year old year old male who is being seen for RECHECK (Return Cystic Fibrosis )    CF HPI  The patient was seen and examined by Rocael Miller   The patient reports no new respiratory symptoms since his last visit in July.  He occasionally needs to clear his throat.  He has minimal sputum production.  He did not expect his pulmonary function test to be lower today based on symptoms.  He notes he might not have been able to blow out quite as hard due to his ongoing low back pain.  He has not done any vest therapy or nebs since last visit.  He does think he could tolerate vest without aggravating his back pain.  He has been relatively sedentary due to back pain but there is been some improvement finally in the past week.  No hemoptysis or chest pain.  Current Outpatient Medications   Medication     acetylcysteine (MUCOMYST) 20 % neb solution     albuterol (PROAIR HFA) 108 (90 BASE) MCG/ACT Inhaler     albuterol (PROVENTIL) (2.5 MG/3ML) 0.083% neb solution     azithromycin (ZITHROMAX) 250 MG tablet     blood glucose monitoring (NO BRAND SPECIFIED) test strip     Cholecalciferol 4000 UNITS CAPS     colistimethate/colistin-base activity (COLYMYCIN) 150 mg/2mL SOLR neb solution     CREON 57248-09531 units CPEP per EC capsule     cromolyn (INTAL) 20 MG/2ML neb solution     elexacaftor-tezacaftor-ivacaftor & ivacaftor (TRIKAFTA) 100-50-75 & 150 MG tablet pack     famotidine (PEPCID) 20 MG tablet     fluticasone (FLONASE) 50 MCG/ACT nasal spray     levofloxacin (LEVAQUIN) 750 MG tablet     Multiple Vitamin (MULTIVITAMINS PO)     mvw complete formulation (SOFTGELS) capsule      Nutritional Supplements (NUTREN 2.0)     phytonadione (MEPHYTON) 5 MG tablet     tobramycin, PF, (SOO) 300 MG/5ML neb solution     water for injection sterile SOLN     No current facility-administered medications for this visit.     No Known Allergies  Past Medical History:   Diagnosis Date     Chronic sinusitis      Cystic fibrosis with pulmonary manifestations (H)      Exocrine pancreatic insufficiency      GERD (gastroesophageal reflux disease)      Hemoptysis      Malnutrition (H)        Past Surgical History:   Procedure Laterality Date     IR MISCELLANEOUS PROCEDURE  2/6/2001     LOBECTOMY      right upper lobe     TUBES         Social History     Socioeconomic History     Marital status:      Spouse name: Not on file     Number of children: Not on file     Years of education: Not on file     Highest education level: Not on file   Occupational History     Occupation:      Employer: [employer]   Tobacco Use     Smoking status: Never     Smokeless tobacco: Never   Substance and Sexual Activity     Alcohol use: Not Currently     Alcohol/week: 0.0 standard drinks     Drug use: Not Currently     Sexual activity: Not on file   Other Topics Concern     Parent/sibling w/ CABG, MI or angioplasty before 65F 55M? Not Asked   Social History Narrative    Patient works for the [employer] as an .  He does not have stable living conditions at this time.  He has a Syriac guillen.     Social Determinants of Health     Financial Resource Strain: Not on file   Food Insecurity: Not on file   Transportation Needs: Not on file   Physical Activity: Not on file   Stress: Not on file   Social Connections: Not on file   Intimate Partner Violence: Not on file   Housing Stability: Not on file   Update: living with wife Bre in [facility]. Birth of daughter Nieves Rebolledo August, 2017, birth of daughter Josy Felder November, 2019    ROS Pulmonary  Const: No fever chills sweats.  GI: He  "resumed nightly tube feeds for about 2 weeks after his last clinic visit.  He has been infrequently using them since in part due to time constraints.  Endo: No hypoglycemic symptoms.  ENT: Mild nasal congestion slightly above baseline currently without pain or purulent discharge.  Musculoskeletal: Still working frequently with a chiropractor for his lower back pain.  Also taking herbal supplements recommended by his wife.  A complete ROS was otherwise negative except as noted in the HPI.  /79   Pulse 112   Resp 17   Ht 1.905 m (6' 3\")   Wt 87.6 kg (193 lb 2 oz)   SpO2 98%   BMI 24.14 kg/m    Exam:   GENERAL APPEARANCE: Well developed, well nourished, alert, and in no apparent distress.  EYES: anicteric with injected appearing conjunctiva  HENT: No nasal discharge.  Oral mucosa is moist, without any lesions, no tonsillar enlargement, no oropharyngeal exudate.  RESP:  good air flow throughout.  Clear to auscultation.  Normal chest wall excursion.    CV: Normal S1, S2, regular rhythm, normal rate. No murmur. No gallop. No LE edema.   ABDOMEN:  Bowel sounds normal, soft, nontender, no HSM or masses.   MS: extremities normal. No cyanosis.  SKIN:  PEG site without erythema.  Chest scar-like lesion upper sternum unchanged. Diffuse freckles without overt change.  NEURO: Mentation intact, speech normal, normal gait and stance  PSYCH: mentation appears normal. and affect normal/bright  Results:  Recent Results (from the past 168 hour(s))   General PFT Lab (Please always keep checked)    Collection Time: 10/31/22  1:09 PM   Result Value Ref Range    FVC-Pred 6.14 L    FVC-Pre 5.47 L    FVC-%Pred-Pre 89 %    FEV1-Pre 3.23 L    FEV1-%Pred-Pre 66 %    FEV1FVC-Pred 80 %    FEV1FVC-Pre 59 %    FEFMax-Pred 11.26 L/sec    FEFMax-Pre 9.45 L/sec    FEFMax-%Pred-Pre 83 %    FEF2575-Pred 4.58 L/sec    FEF2575-Pre 1.59 L/sec    JTD6866-%Pred-Pre 34 %    ExpTime-Pre 10.78 sec    FIFMax-Pre 8.44 L/sec    FEV1FEV6-Pred 82 %    " FEV1FEV6-Pre 62 %     Spirometry interpretation: personally reviewed. Valid maneuver.  Moderate obstruction.  FEV1 down by about 10% from last visit.    Most recent respiratory culture with Pseudomonas only    Assessment and plan:  1.  Mild exacerbation of CF lung disease: Significant drop in pulmonary function tests but no associated symptoms.  I suspect that this is due primarily to absence of airway clearance and exercise in his routine.  Perhaps submaximal effort related to back pain playing a role.  The plan is for him to resume vest therapy twice a day and notify our center if this aggravates his back pain.  He will resume his mucolytic and bronchodilator nebs as well as resume SOO nebs.  Also placing him on a 3-week course of levofloxacin.  Patient advised to discontinue azithromycin while on this.  1B.  CFTR modulation therapy:  As above, he has had an impressive response to Trikafta.  Most recent liver function tests from August without worsening of his elevated bilirubin and will continue current dosing.  We will get repeat liver function tests in conjunction with annual studies next visit.  2. Impaired glucose tolerance: Last annual study fasting > 100 and peak > 200 and 2 hour value right at 140. A little worse than Jan 2018 but similar to previous. A1C most recently at 6.1%.  Will repeat glucose tolerance test as part of his annual studies next visit.  3.  Pancreatic exocrine insufficiency with a history of malnutrition on tube feeds: Adequate enzyme replacement by history.  Patient has regained weight after resuming tube feeds back in July.  BMI today 24.3.  Infrequently using tube feeds currently and will continue to monitor his weight closely.  He will follow this at home as well.  4. Chronic CF sinus disease: Improved on modulation therapy. Continue flonase.  5. Chest skin lesions: Seen by outside Dermatologist. Central chest scar could be treated by steroid injections per pt but not actively  pursuing.  His prior dermatologist retired.  Patient will follow-up with dermatology in our clinic to establish annual skin cancer screening exams.  6. Healthcare maintenance: Patient will complete annual studies in conjunction with his next visit in a month.  He is current on flu vaccine through his employer this fall.  Covid vaccinations most recently with Moderna January, 2022.  Discussed risk versus benefit of getting a booster with new formulation.  He would like to hold off in the short-term and reconsider if burden of COVID increases in the community.  He turned 42 this summer and will discuss CF colon cancer screening next visit.  7. Low back pain: will review in more detail supplements he is using at his next visit.    Patient will follow up in 1 month    Total of 40 minutes spent today in conjunction with this visit including exam, history, care coordination, and documentation. This is exclusive of time interpreting PFTs.    Rocael Miller                     Again, thank you for allowing me to participate in the care of your patient.        Sincerely,        Rocael Miller MD

## 2022-11-06 LAB
BACTERIA SPT CULT: ABNORMAL

## 2022-11-18 ENCOUNTER — TELEPHONE (OUTPATIENT)
Dept: PULMONOLOGY | Facility: CLINIC | Age: 42
End: 2022-11-18

## 2022-11-18 NOTE — TELEPHONE ENCOUNTER
PA Initiation    Medication: Trikafta PA renewal pending   Insurance Company: [employer] - Phone [phone] Fax [phone]  Pharmacy Filling the Rx:    Filling Pharmacy Phone:    Filling Pharmacy Fax:    Start Date: 11/18/2022    Faxed [employer] PA form, genotype, notes to [phone]

## 2022-11-23 NOTE — TELEPHONE ENCOUNTER
Prior Authorization Approval    Authorization Effective Date: 11/21/2022  Authorization Expiration Date: 11/21/2023  Medication: Trikafta PA renewal approved   Approved Dose/Quantity: 100-50-75 & 150mg / 84 for 28 ds  Reference #:     Insurance Company: [employer] - Phone [phone] Fax [phone]  Expected CoPay:       CoPay Card Available:      Foundation Assistance Needed:    Which Pharmacy is filling the prescription (Not needed for infusion/clinic administered): [facility] - [place], [place] - [address]  Pharmacy Notified:    Patient Notified:

## 2022-12-06 ENCOUNTER — MYC MEDICAL ADVICE (OUTPATIENT)
Dept: PULMONOLOGY | Facility: CLINIC | Age: 42
End: 2022-12-06

## 2022-12-06 DIAGNOSIS — J11.1 FLU: ICD-10-CM

## 2022-12-06 DIAGNOSIS — E84.0 CYSTIC FIBROSIS WITH PULMONARY MANIFESTATIONS (H): Primary | ICD-10-CM

## 2022-12-07 RX ORDER — OSELTAMIVIR PHOSPHATE 75 MG/1
75 CAPSULE ORAL DAILY
Qty: 10 CAPSULE | Refills: 0 | Status: SHIPPED | OUTPATIENT
Start: 2022-12-07 | End: 2023-06-26

## 2022-12-22 DIAGNOSIS — E84.9 CF (CYSTIC FIBROSIS) (H): ICD-10-CM

## 2022-12-22 RX ORDER — ELEXACAFTOR, TEZACAFTOR, AND IVACAFTOR 100-50-75
KIT ORAL
Qty: 84 TABLET | Refills: 5 | Status: SHIPPED | OUTPATIENT
Start: 2022-12-22 | End: 2023-06-06

## 2023-01-01 NOTE — ED TRIAGE NOTES
Pt presents with c/o intermittent chest pain, LLQ abd pain, nausea today. Has been feeling generally unwell x2 weeks. Started levofloxacin last week. Hx CF. States last BM yesterday. Vomited this AM. Has never had a bowel obstruction in past.    no

## 2023-01-19 DIAGNOSIS — E84.0 CYSTIC FIBROSIS WITH PULMONARY MANIFESTATIONS (H): ICD-10-CM

## 2023-01-19 DIAGNOSIS — E84.9 CF (CYSTIC FIBROSIS) (H): ICD-10-CM

## 2023-01-19 RX ORDER — PANCRELIPASE 24000; 76000; 120000 [USP'U]/1; [USP'U]/1; [USP'U]/1
CAPSULE, DELAYED RELEASE PELLETS ORAL
Qty: 1200 CAPSULE | Refills: 11 | Status: SHIPPED | OUTPATIENT
Start: 2023-01-19 | End: 2023-11-27

## 2023-01-19 RX ORDER — PEDIATRIC MULTIVIT 61/D3/VIT K 1500-800
CAPSULE ORAL
Qty: 60 CAPSULE | Refills: 11 | Status: SHIPPED | OUTPATIENT
Start: 2023-01-19 | End: 2023-11-27

## 2023-02-06 ENCOUNTER — TELEPHONE (OUTPATIENT)
Dept: PULMONOLOGY | Facility: CLINIC | Age: 43
End: 2023-02-06
Payer: COMMERCIAL

## 2023-02-06 NOTE — TELEPHONE ENCOUNTER
PA Initiation    Medication: Tobramycin PA pending  Insurance Company: [employer] - Phone [phone] Fax [phone]  Pharmacy Filling the Rx:    Filling Pharmacy Phone:    Filling Pharmacy Fax:    Start Date: 2/6/2023    Key: [id number] - waiting on question set

## 2023-02-08 NOTE — TELEPHONE ENCOUNTER
Prior Authorization Approval    Authorization Effective Date: 2/7/2023  Authorization Expiration Date: 2/7/2024  Medication: Tobramycin PA approved   Approved Dose/Quantity: 300mg / 280ml for 56 ds  Reference #:     Insurance Company: [employer] - Phone [phone] Fax [phone]  Expected CoPay:       CoPay Card Available:      Foundation Assistance Needed:    Which Pharmacy is filling the prescription (Not needed for infusion/clinic administered): [facility] - [place]  Pharmacy Notified:    Patient Notified:

## 2023-03-14 DIAGNOSIS — E53.8 VITAMIN B12 DEFICIENCY DISEASE: ICD-10-CM

## 2023-03-14 DIAGNOSIS — E84.0 CYSTIC FIBROSIS WITH PULMONARY MANIFESTATIONS (H): ICD-10-CM

## 2023-03-14 RX ORDER — ACETYLCYSTEINE 200 MG/ML
SOLUTION ORAL; RESPIRATORY (INHALATION)
Qty: 180 ML | Refills: 12 | Status: SHIPPED | OUTPATIENT
Start: 2023-03-14 | End: 2024-05-06

## 2023-03-20 ENCOUNTER — PHARMACY VISIT (OUTPATIENT)
Dept: ADMINISTRATIVE | Facility: CLINIC | Age: 43
End: 2023-03-20
Payer: COMMERCIAL

## 2023-03-20 NOTE — PROGRESS NOTES
Cystic Fibrosis Clinical Follow Up Assessment   Completed on 2023/03/20 16:41:06 [facility] by Reyna Hoffman      Activity Date 2023/03/17     Activity Medications    TRIKAFTA        Care Details    What are the patient's goals for this therapy?   ? 03/17/2023: Pt did not respond; 3/17/2022: Maintaining his health      Did you identify any patient barriers to access and successful treatment?   ? No barriers to access identified      Is it appropriate to collect a PDC at this time? [QA Metric] (An MPR or PDC would not be appropriate for cycled medications or if the patient is on therapy   ? Yes      Document the date of the last refill of PDC   ? 2023-02-17      Document PDC   ? 1      Has the patient missed doses inappropriately?   ? No      Please select CURRENT side effect(s) for monitoring:   ? None          Summary Notes  I had the pleasure of speaking to pt via text for TM.  States everything has been going great. No side effects. Doses: states he has been great taking them.  No health, allergy or medication changes. No questions or concerns.    - Will continue biannual TM       Angela HOFFMAN, PharmD, CSP  Therapy Management Pharmacist  [address] [address]   [email or web address]  [email or web address]   Specialty: [phone]  Mail Order: [phone]

## 2023-03-28 ENCOUNTER — OFFICE VISIT (OUTPATIENT)
Dept: PULMONOLOGY | Facility: CLINIC | Age: 43
End: 2023-03-28
Attending: INTERNAL MEDICINE
Payer: COMMERCIAL

## 2023-03-28 VITALS — OXYGEN SATURATION: 97 % | DIASTOLIC BLOOD PRESSURE: 60 MMHG | HEART RATE: 115 BPM | SYSTOLIC BLOOD PRESSURE: 125 MMHG

## 2023-03-28 DIAGNOSIS — E84.0 CYSTIC FIBROSIS WITH PULMONARY MANIFESTATIONS (H): Primary | ICD-10-CM

## 2023-03-28 DIAGNOSIS — E84.0 CYSTIC FIBROSIS WITH PULMONARY MANIFESTATIONS (H): ICD-10-CM

## 2023-03-28 LAB
EXPTIME-PRE: 9.37 SEC
FEF2575-%PRED-PRE: 39 %
FEF2575-PRE: 1.73 L/SEC
FEF2575-PRED: 4.35 L/SEC
FEFMAX-%PRED-PRE: 90 %
FEFMAX-PRE: 10.2 L/SEC
FEFMAX-PRED: 11.26 L/SEC
FEV1-%PRED-PRE: 75 %
FEV1-PRE: 3.43 L
FEV1FEV6-PRE: 62 %
FEV1FEV6-PRED: 82 %
FEV1FVC-PRE: 60 %
FEV1FVC-PRED: 81 %
FIFMAX-PRE: 9.13 L/SEC
FVC-%PRED-PRE: 100 %
FVC-PRE: 5.7 L
FVC-PRED: 5.65 L

## 2023-03-28 PROCEDURE — 87206 SMEAR FLUORESCENT/ACID STAI: CPT | Performed by: INTERNAL MEDICINE

## 2023-03-28 PROCEDURE — 99214 OFFICE O/P EST MOD 30 MIN: CPT | Performed by: INTERNAL MEDICINE

## 2023-03-28 PROCEDURE — 94375 RESPIRATORY FLOW VOLUME LOOP: CPT | Performed by: INTERNAL MEDICINE

## 2023-03-28 PROCEDURE — 87107 FUNGI IDENTIFICATION MOLD: CPT | Performed by: INTERNAL MEDICINE

## 2023-03-28 PROCEDURE — 99214 OFFICE O/P EST MOD 30 MIN: CPT | Mod: 25 | Performed by: INTERNAL MEDICINE

## 2023-03-28 PROCEDURE — 87070 CULTURE OTHR SPECIMN AEROBIC: CPT | Performed by: INTERNAL MEDICINE

## 2023-03-28 RX ORDER — FLUTICASONE PROPIONATE 50 MCG
2 SPRAY, SUSPENSION (ML) NASAL PRN
Qty: 16 G | Refills: 11 | Status: SHIPPED | OUTPATIENT
Start: 2023-03-28

## 2023-03-28 NOTE — LETTER
3/28/2023     RE: Telly Leal  [address] [id number]    Dear Colleague,    Thank you for referring your patient, Telly Leal, to the [facility] FOR LUNG SCIENCE AND [facility]. Please see a copy of my visit note below.    Reason for Visit  Telly Leal is a 35 year old year old male who is being seen for Cystic Fibrosis (F/u)    CF HPI  The patient was seen and examined by Rocael Miller   At the patient's last clinic visit in late October he had increased respiratory symptoms as well as a significant drop in his pulmonary function tests.  He was having a lot of back pain at the time which could have affected his PFT results.  I treated him with a course of levofloxacin with the plan to resume vest therapy and SOO nebs.  He was to return in 1 month but has been lost to follow-up.  Today, he reports having a good response to the antibiotics.  He has not had any sustained increase in respiratory symptoms since last fall.  He produces sputum a few times per week in small amounts.  His use of vest therapy has been sporadic but my sense is that it is approximately 1-2 times per week on average.  He has not been doing any formal exercise but reports baseline exertional dyspnea including with shoveling snow this winter.  No hemoptysis or chest pain.  Current Outpatient Medications   Medication     acetylcysteine (MUCOMYST) 20 % neb solution     albuterol (PROAIR HFA) 108 (90 BASE) MCG/ACT Inhaler     albuterol (PROVENTIL) (2.5 MG/3ML) 0.083% neb solution     azithromycin (ZITHROMAX) 250 MG tablet     blood glucose monitoring (NO BRAND SPECIFIED) test strip     Cholecalciferol 4000 UNITS CAPS     colistimethate/colistin-base activity (COLYMYCIN) 150 mg/2mL SOLR neb solution     CREON 48544-06460 units CPEP per EC capsule     cromolyn (INTAL) 20 MG/2ML neb solution     elexacaftor-tezacaftor-ivacaftor & ivacaftor (TRIKAFTA) 100-50-75 & 150 MG tablet  pack     famotidine (PEPCID) 20 MG tablet     fluticasone (FLONASE) 50 MCG/ACT nasal spray     Multiple Vitamin (MULTIVITAMINS PO)     mvw complete formulation (SOFTGELS) capsule     Nutritional Supplements (NUTREN 2.0)     oseltamivir (TAMIFLU) 75 MG capsule     phytonadione (MEPHYTON) 5 MG tablet     tobramycin, PF, (SOO) 300 MG/5ML neb solution     water for injection sterile SOLN     No current facility-administered medications for this visit.     No Known Allergies  Past Medical History:   Diagnosis Date     Chronic sinusitis      Cystic fibrosis with pulmonary manifestations (H)      Exocrine pancreatic insufficiency      GERD (gastroesophageal reflux disease)      Hemoptysis      Malnutrition (H)        Past Surgical History:   Procedure Laterality Date     IR MISCELLANEOUS PROCEDURE  2/6/2001     LOBECTOMY      right upper lobe     TUBES         Social History     Socioeconomic History     Marital status:      Spouse name: Not on file     Number of children: Not on file     Years of education: Not on file     Highest education level: Not on file   Occupational History     Occupation:      Employer: [employer]   Tobacco Use     Smoking status: Never     Smokeless tobacco: Never   Substance and Sexual Activity     Alcohol use: Not Currently     Alcohol/week: 0.0 standard drinks     Drug use: Not Currently     Sexual activity: Not on file   Other Topics Concern     Parent/sibling w/ CABG, MI or angioplasty before 65F 55M? Not Asked   Social History Narrative    Patient works for the [employer] as an .  He does not have stable living conditions at this time.  He has a Latvian guillen.     Social Determinants of Health     Financial Resource Strain: Not on file   Food Insecurity: Not on file   Transportation Needs: Not on file   Physical Activity: Not on file   Stress: Not on file   Social Connections: Not on file   Intimate Partner Violence: Not on file   Housing Stability:  Not on file   Update: living with wife Bre in [facility]. Birth of daughter Nieves Rebolledo August, 2017, birth of daughter Josy Felder November, 2019    ROS Pulmonary  Const: No fever chills sweats.  GI: No diarrhea constipation abdominal pain or grease in his stools.  He has not used his tube feeds recently.  Endo: No hypoglycemic symptoms.  ENT: He had transient congestion of his sinuses that resolved promptly with Flonase.  Not needing this on a regular basis.  Musculoskeletal: His low back pain finally improved and he has been symptom free the past 3 months.  Endocrine: No hypoglycemic symptoms.  A complete ROS was otherwise negative except as noted in the HPI.  /60   Pulse 115   SpO2 97% , Weight 89.2 kg, BMI 24.8  Exam:   GENERAL APPEARANCE: Well developed, well nourished, alert, and in no apparent distress.  EYES: anicteric with injected appearing conjunctiva  HENT: No nasal discharge.  Oral mucosa is moist, without any lesions, no tonsillar enlargement, no oropharyngeal exudate.  RESP:  good air flow throughout.  Slightly coarse in posterior lung bases with forced expiration.  Normal chest wall excursion.    CV: Normal S1, S2, regular rhythm, normal rate. No murmur. No gallop. No LE edema.   ABDOMEN:  Bowel sounds normal, soft, nontender, no HSM or masses.   MS: extremities normal. No cyanosis.  SKIN:  PEG site without erythema.  Chest scar-like lesion upper sternum unchanged. Diffuse freckles without overt change.  NEURO: Mentation intact, speech normal, normal gait and stance  PSYCH: mentation appears normal. and affect normal/bright  Results:  Recent Results (from the past 168 hour(s))   General PFT Lab (Please always keep checked)    Collection Time: 03/28/23  2:54 PM   Result Value Ref Range    FVC-Pred 5.65 L    FVC-Pre 5.70 L    FVC-%Pred-Pre 100 %    FEV1-Pre 3.43 L    FEV1-%Pred-Pre 75 %    FEV1FVC-Pred 81 %    FEV1FVC-Pre 60 %    FEFMax-Pred 11.26 L/sec    FEFMax-Pre 10.20  L/sec    FEFMax-%Pred-Pre 90 %    FEF2575-Pred 4.35 L/sec    FEF2575-Pre 1.73 L/sec    CKL2976-%Pred-Pre 39 %    ExpTime-Pre 9.37 sec    FIFMax-Pre 9.13 L/sec    FEV1FEV6-Pred 82 %    FEV1FEV6-Pre 62 %     Spirometry interpretation: personally reviewed. Valid maneuver.  FEV1 improved by over 5% and is now at the lower end of his previous baseline.  FVC up by 5% and remains 5 to 10% below his baseline of 1 year ago.  He has mild obstruction.    Most recent respiratory culture October, 2022 with 2 strains of Pseudomonas and Aspergillus fumigatus    Assessment and plan:  1.  Cystic fibrosis lung disease: Patient doing very well with low burden of symptoms.  His pulmonary function tests are still a bit down from his baseline of 1 year ago.  Low suspicion for uncontrolled infection.  We talked about more consistent performance of airway clearance being the key to sustained PFTs.  He will work on being more consistent with completing vest therapy and/or getting aerobic exercise to augment airway clearance so that he is doing something every day.  Will hold off on scheduled use of SOO nebs.  He is on chronic azithromycin.  1B.  CFTR modulation therapy:  As above, he has had an impressive response to Trikafta.  Most recent liver function tests from August without worsening of his elevated bilirubin and will continue current dosing.  Will get repeat liver function tests in conjunction with his next visit.  2. Impaired glucose tolerance: Last annual study fasting > 100 and peak > 200 and 2 hour value right at 140. A little worse than Jan 2018 but similar to previous. A1C most recently at 6.1%.  Will repeat glucose tolerance test as part of his annual studies next visit.  3.  Pancreatic exocrine insufficiency with a history of malnutrition on tube feeds: Adequate enzyme replacement by history.  Patient infrequently using tube feeds and his BMI has trended up to 24.8.  I do not see a compelling reason to resume tube feeds.   The patient would like to see how the summer goes as he historically has had more unwanted weight loss during hot weather.  4. Chronic CF sinus disease: Improved on modulation therapy. Continue flonase as needed.  5. Chest skin lesions: Seen by outside Dermatologist. Central chest scar could be treated by steroid injections per pt but not actively pursuing.  He is overdue for annual skin exam.  Will work on getting this scheduled.  6. Healthcare maintenance: Patient will complete annual studies in conjunction with his next visit.  He still needs to initiate colon cancer screening now that he is turned 42.  Will readdress this next visit.    Patient will follow up in 3 months with annual studies    Total visit time today 35 minutes.  This is exclusive of time interpreting PFTs.  Spirometry interpretation: personally reviewed. Valid maneuver.  FEV1 improved by over 5% and is now at the lower end of his previous baseline.  FVC up by 5% and remains 5 to 10% below his baseline of 1 year ago.  He has mild obstruction.    Rocael Miller                     Again, thank you for allowing me to participate in the care of your patient.        Sincerely,        Rocael Miller MD

## 2023-03-28 NOTE — NURSING NOTE
"Telly Leal is a 42 year old year old who is being seen for Cystic Fibrosis (F/u)      Medications reviewed and Vital signs taken.    Specimen Collection Type: Sputum    Order(s) placed: AFB (Acid Fast Bacilli) and CF Aerobic Bacterial    *IF AFB order placed - please enter \"PRIORITIZE AFB\" to order comments.       Lab Results   Component Value Date    ACIDFAST No acid fast bacilli seen 11/23/2021         Lab Results   Component Value Date    AFBSMS Negative for acid fast bacteria 06/18/2018    AFBSMS  06/18/2018     Less than 5ml of specimen received.  A minimum of 5 mL of sputum or fluid is recommended for recovery of acid fast bacilli   (AFB).  Volumes less than 5 mL are suboptimal and may compromise recovery of AFB from   culture.      AFBSMS  06/18/2018     Assayed at [facility] [address] [place] [id number] [phone]         Vitals were taken and medications were reconciled.     CHUCK Cagle      "

## 2023-04-02 LAB
BACTERIA SPT CULT: ABNORMAL

## 2023-04-05 ENCOUNTER — TELEPHONE (OUTPATIENT)
Dept: PULMONOLOGY | Facility: CLINIC | Age: 43
End: 2023-04-05
Payer: COMMERCIAL

## 2023-04-05 NOTE — TELEPHONE ENCOUNTER
[facility] Call Center    Phone Message    May a detailed message be left on voicemail: yes     Reason for Call: Medication Question or concern regarding medication   Prescription Clarification  Name of Medication:    fluticasone (FLONASE) 50 MCG/ACT nasal spray       Prescribing Provider: Dr Miller   Pharm[facility]: [facility]   What on the order needs clarification? Pharmacy needs dosing info          Action Taken: Other: pulm    Travel Screening: Not Applicable

## 2023-04-08 ENCOUNTER — HEALTH MAINTENANCE LETTER (OUTPATIENT)
Age: 43
End: 2023-04-08

## 2023-06-06 DIAGNOSIS — E84.9 CF (CYSTIC FIBROSIS) (H): ICD-10-CM

## 2023-06-06 RX ORDER — ELEXACAFTOR, TEZACAFTOR, AND IVACAFTOR 100-50-75
KIT ORAL
Qty: 84 TABLET | Refills: 5 | Status: SHIPPED | OUTPATIENT
Start: 2023-06-06 | End: 2023-11-27

## 2023-06-26 ENCOUNTER — INFUSION THERAPY VISIT (OUTPATIENT)
Dept: INFUSION THERAPY | Facility: CLINIC | Age: 43
End: 2023-06-26
Attending: INTERNAL MEDICINE
Payer: COMMERCIAL

## 2023-06-26 ENCOUNTER — ANCILLARY PROCEDURE (OUTPATIENT)
Dept: GENERAL RADIOLOGY | Facility: CLINIC | Age: 43
End: 2023-06-26
Attending: INTERNAL MEDICINE
Payer: COMMERCIAL

## 2023-06-26 ENCOUNTER — LAB (OUTPATIENT)
Dept: LAB | Facility: CLINIC | Age: 43
End: 2023-06-26
Attending: INTERNAL MEDICINE
Payer: COMMERCIAL

## 2023-06-26 ENCOUNTER — OFFICE VISIT (OUTPATIENT)
Dept: PHARMACY | Facility: CLINIC | Age: 43
End: 2023-06-26
Payer: COMMERCIAL

## 2023-06-26 ENCOUNTER — OFFICE VISIT (OUTPATIENT)
Dept: PULMONOLOGY | Facility: CLINIC | Age: 43
End: 2023-06-26
Attending: INTERNAL MEDICINE
Payer: COMMERCIAL

## 2023-06-26 ENCOUNTER — ALLIED HEALTH/NURSE VISIT (OUTPATIENT)
Dept: CARE COORDINATION | Facility: CLINIC | Age: 43
End: 2023-06-26

## 2023-06-26 VITALS
SYSTOLIC BLOOD PRESSURE: 124 MMHG | OXYGEN SATURATION: 95 % | DIASTOLIC BLOOD PRESSURE: 77 MMHG | RESPIRATION RATE: 18 BRPM | HEART RATE: 94 BPM | BODY MASS INDEX: 24.06 KG/M2 | WEIGHT: 192.5 LBS | TEMPERATURE: 98.2 F

## 2023-06-26 VITALS
OXYGEN SATURATION: 95 % | HEIGHT: 75 IN | DIASTOLIC BLOOD PRESSURE: 77 MMHG | SYSTOLIC BLOOD PRESSURE: 124 MMHG | WEIGHT: 192 LBS | RESPIRATION RATE: 18 BRPM | BODY MASS INDEX: 23.87 KG/M2 | HEART RATE: 94 BPM

## 2023-06-26 DIAGNOSIS — K86.89 PANCREATIC INSUFFICIENCY: ICD-10-CM

## 2023-06-26 DIAGNOSIS — E84.9 CF (CYSTIC FIBROSIS) (H): Primary | ICD-10-CM

## 2023-06-26 DIAGNOSIS — Z13.9 RISK AND FUNCTIONAL ASSESSMENT: Primary | ICD-10-CM

## 2023-06-26 DIAGNOSIS — Z78.9 ON ENTERAL NUTRITION: ICD-10-CM

## 2023-06-26 DIAGNOSIS — R73.02 IMPAIRED GLUCOSE TOLERANCE (ORAL): ICD-10-CM

## 2023-06-26 DIAGNOSIS — E84.0 CYSTIC FIBROSIS WITH PULMONARY MANIFESTATIONS (H): ICD-10-CM

## 2023-06-26 DIAGNOSIS — E84.0 CYSTIC FIBROSIS WITH PULMONARY MANIFESTATIONS (H): Primary | ICD-10-CM

## 2023-06-26 DIAGNOSIS — E84.9 CF (CYSTIC FIBROSIS) (H): ICD-10-CM

## 2023-06-26 DIAGNOSIS — R73.09 IMPAIRED GLUCOSE TOLERANCE TEST: ICD-10-CM

## 2023-06-26 DIAGNOSIS — K86.81 EXOCRINE PANCREATIC INSUFFICIENCY: ICD-10-CM

## 2023-06-26 LAB
ALBUMIN SERPL BCG-MCNC: 4.3 G/DL (ref 3.5–5.2)
ALP SERPL-CCNC: 84 U/L (ref 40–129)
ALT SERPL W P-5'-P-CCNC: 27 U/L (ref 0–70)
ANION GAP SERPL CALCULATED.3IONS-SCNC: 8 MMOL/L (ref 7–15)
AST SERPL W P-5'-P-CCNC: 38 U/L (ref 0–45)
BASOPHILS # BLD AUTO: 0 10E3/UL (ref 0–0.2)
BASOPHILS NFR BLD AUTO: 0 %
BILIRUB DIRECT SERPL-MCNC: 0.22 MG/DL (ref 0–0.3)
BILIRUB SERPL-MCNC: 1.1 MG/DL
BUN SERPL-MCNC: 20.9 MG/DL (ref 6–20)
CALCIUM SERPL-MCNC: 9.2 MG/DL (ref 8.6–10)
CHLORIDE SERPL-SCNC: 107 MMOL/L (ref 98–107)
CHOLEST SERPL-MCNC: 145 MG/DL
CK SERPL-CCNC: 119 U/L (ref 39–308)
CREAT SERPL-MCNC: 0.89 MG/DL (ref 0.67–1.17)
DEPRECATED CALCIDIOL+CALCIFEROL SERPL-MC: 40 UG/L (ref 20–75)
DEPRECATED HCO3 PLAS-SCNC: 25 MMOL/L (ref 22–29)
EOSINOPHIL # BLD AUTO: 0.1 10E3/UL (ref 0–0.7)
EOSINOPHIL NFR BLD AUTO: 2 %
ERYTHROCYTE [DISTWIDTH] IN BLOOD BY AUTOMATED COUNT: 13.1 % (ref 10–15)
EXPTIME-PRE: 11.32 SEC
FEF2575-%PRED-PRE: 32 %
FEF2575-PRE: 1.43 L/SEC
FEF2575-PRED: 4.36 L/SEC
FEFMAX-%PRED-PRE: 85 %
FEFMAX-PRE: 9.67 L/SEC
FEFMAX-PRED: 11.29 L/SEC
FEV1-%PRED-PRE: 74 %
FEV1-PRE: 3.39 L
FEV1FEV6-PRE: 60 %
FEV1FEV6-PRED: 82 %
FEV1FVC-PRE: 56 %
FEV1FVC-PRED: 81 %
FIFMAX-PRE: 10.46 L/SEC
FVC-%PRED-PRE: 107 %
FVC-PRE: 6.1 L
FVC-PRED: 5.68 L
GFR SERPL CREATININE-BSD FRML MDRD: >90 ML/MIN/1.73M2
GLUCOSE BLDC GLUCOMTR-MCNC: 129 MG/DL (ref 70–99)
GLUCOSE SERPL-MCNC: 114 MG/DL (ref 70–99)
GLUCOSE SERPL-MCNC: 120 MG/DL (ref 70–99)
GLUCOSE SERPL-MCNC: 158 MG/DL (ref 70–99)
GLUCOSE SERPL-MCNC: 188 MG/DL (ref 70–99)
GLUCOSE SERPL-MCNC: 253 MG/DL (ref 70–99)
GLUCOSE SERPL-MCNC: 259 MG/DL (ref 70–99)
HBA1C MFR BLD: 5.8 %
HCT VFR BLD AUTO: 43.6 % (ref 40–53)
HDLC SERPL-MCNC: 34 MG/DL
HGB BLD-MCNC: 14.5 G/DL (ref 13.3–17.7)
IGE SERPL-ACNC: 3 KU/L (ref 0–114)
IMM GRANULOCYTES # BLD: 0 10E3/UL
IMM GRANULOCYTES NFR BLD: 0 %
INR PPP: 1 (ref 0.85–1.15)
INSULIN SERPL-ACNC: 24.8 UU/ML (ref 2.6–24.9)
INSULIN SERPL-ACNC: 49.1 UU/ML (ref 2.6–24.9)
INSULIN SERPL-ACNC: 76.3 UU/ML (ref 2.6–24.9)
INSULIN SERPL-ACNC: 85 UU/ML (ref 2.6–24.9)
INSULIN SERPL-ACNC: 9.6 UU/ML (ref 2.6–24.9)
IRON SERPL-MCNC: 59 UG/DL (ref 61–157)
LDLC SERPL CALC-MCNC: 93 MG/DL
LYMPHOCYTES # BLD AUTO: 1.8 10E3/UL (ref 0.8–5.3)
LYMPHOCYTES NFR BLD AUTO: 38 %
MAGNESIUM SERPL-MCNC: 2.1 MG/DL (ref 1.7–2.3)
MCH RBC QN AUTO: 30.1 PG (ref 26.5–33)
MCHC RBC AUTO-ENTMCNC: 33.3 G/DL (ref 31.5–36.5)
MCV RBC AUTO: 91 FL (ref 78–100)
MONOCYTES # BLD AUTO: 0.4 10E3/UL (ref 0–1.3)
MONOCYTES NFR BLD AUTO: 9 %
NEUTROPHILS # BLD AUTO: 2.4 10E3/UL (ref 1.6–8.3)
NEUTROPHILS NFR BLD AUTO: 51 %
NONHDLC SERPL-MCNC: 111 MG/DL
NRBC # BLD AUTO: 0 10E3/UL
NRBC BLD AUTO-RTO: 0 /100
PHOSPHATE SERPL-MCNC: 3.5 MG/DL (ref 2.5–4.5)
PLATELET # BLD AUTO: 289 10E3/UL (ref 150–450)
POTASSIUM SERPL-SCNC: 3.8 MMOL/L (ref 3.4–5.3)
PROT SERPL-MCNC: 6.9 G/DL (ref 6.4–8.3)
RBC # BLD AUTO: 4.81 10E6/UL (ref 4.4–5.9)
SODIUM SERPL-SCNC: 140 MMOL/L (ref 136–145)
TRIGL SERPL-MCNC: 88 MG/DL
WBC # BLD AUTO: 4.8 10E3/UL (ref 4–11)

## 2023-06-26 PROCEDURE — 85025 COMPLETE CBC W/AUTO DIFF WBC: CPT

## 2023-06-26 PROCEDURE — 80061 LIPID PANEL: CPT

## 2023-06-26 PROCEDURE — 94375 RESPIRATORY FLOW VOLUME LOOP: CPT | Performed by: INTERNAL MEDICINE

## 2023-06-26 PROCEDURE — 87070 CULTURE OTHR SPECIMN AEROBIC: CPT | Performed by: INTERNAL MEDICINE

## 2023-06-26 PROCEDURE — 83540 ASSAY OF IRON: CPT

## 2023-06-26 PROCEDURE — 87077 CULTURE AEROBIC IDENTIFY: CPT | Performed by: INTERNAL MEDICINE

## 2023-06-26 PROCEDURE — 83036 HEMOGLOBIN GLYCOSYLATED A1C: CPT

## 2023-06-26 PROCEDURE — 84446 ASSAY OF VITAMIN E: CPT

## 2023-06-26 PROCEDURE — 80053 COMPREHEN METABOLIC PANEL: CPT

## 2023-06-26 PROCEDURE — 250N000009 HC RX 250: Performed by: INTERNAL MEDICINE

## 2023-06-26 PROCEDURE — 83525 ASSAY OF INSULIN: CPT | Mod: 91 | Performed by: INTERNAL MEDICINE

## 2023-06-26 PROCEDURE — 82248 BILIRUBIN DIRECT: CPT

## 2023-06-26 PROCEDURE — 97803 MED NUTRITION INDIV SUBSEQ: CPT | Performed by: DIETITIAN, REGISTERED

## 2023-06-26 PROCEDURE — 36415 COLL VENOUS BLD VENIPUNCTURE: CPT | Performed by: INTERNAL MEDICINE

## 2023-06-26 PROCEDURE — 99207 PR NO CHARGE LOS: CPT | Performed by: PHARMACIST

## 2023-06-26 PROCEDURE — 82947 ASSAY GLUCOSE BLOOD QUANT: CPT | Performed by: INTERNAL MEDICINE

## 2023-06-26 PROCEDURE — 36415 COLL VENOUS BLD VENIPUNCTURE: CPT

## 2023-06-26 PROCEDURE — 84403 ASSAY OF TOTAL TESTOSTERONE: CPT

## 2023-06-26 PROCEDURE — 83735 ASSAY OF MAGNESIUM: CPT

## 2023-06-26 PROCEDURE — 82785 ASSAY OF IGE: CPT

## 2023-06-26 PROCEDURE — 84100 ASSAY OF PHOSPHORUS: CPT

## 2023-06-26 PROCEDURE — 99215 OFFICE O/P EST HI 40 MIN: CPT | Mod: 25 | Performed by: INTERNAL MEDICINE

## 2023-06-26 PROCEDURE — 82306 VITAMIN D 25 HYDROXY: CPT

## 2023-06-26 PROCEDURE — 82550 ASSAY OF CK (CPK): CPT

## 2023-06-26 PROCEDURE — 84590 ASSAY OF VITAMIN A: CPT

## 2023-06-26 PROCEDURE — 99213 OFFICE O/P EST LOW 20 MIN: CPT | Performed by: INTERNAL MEDICINE

## 2023-06-26 PROCEDURE — 85610 PROTHROMBIN TIME: CPT

## 2023-06-26 PROCEDURE — 82962 GLUCOSE BLOOD TEST: CPT

## 2023-06-26 PROCEDURE — 71046 X-RAY EXAM CHEST 2 VIEWS: CPT | Mod: GC | Performed by: RADIOLOGY

## 2023-06-26 RX ORDER — SODIUM CHLORIDE FOR INHALATION 7 %
4 VIAL, NEBULIZER (ML) INHALATION DAILY
Qty: 120 ML | Refills: 11 | Status: SHIPPED | OUTPATIENT
Start: 2023-06-26 | End: 2024-05-14

## 2023-06-26 RX ORDER — BLOOD SUGAR DIAGNOSTIC
STRIP MISCELLANEOUS
Qty: 100 STRIP | Refills: 3 | Status: SHIPPED | OUTPATIENT
Start: 2023-06-26

## 2023-06-26 RX ORDER — LANCETS
EACH MISCELLANEOUS
Qty: 100 EACH | Refills: 3 | Status: SHIPPED | OUTPATIENT
Start: 2023-06-26

## 2023-06-26 RX ORDER — BLOOD-GLUCOSE METER
1 EACH MISCELLANEOUS ONCE
Qty: 1 KIT | Refills: 0 | Status: SHIPPED | OUTPATIENT
Start: 2023-06-26 | End: 2023-06-26

## 2023-06-26 RX ADMIN — ALCOHOL 75 G: 65 GEL TOPICAL at 09:15

## 2023-06-26 ASSESSMENT — ANXIETY QUESTIONNAIRES
2. NOT BEING ABLE TO STOP OR CONTROL WORRYING: NOT AT ALL
5. BEING SO RESTLESS THAT IT IS HARD TO SIT STILL: NOT AT ALL
1. FEELING NERVOUS, ANXIOUS, OR ON EDGE: NOT AT ALL
GAD7 TOTAL SCORE: 0
3. WORRYING TOO MUCH ABOUT DIFFERENT THINGS: NOT AT ALL
GAD7 TOTAL SCORE: 0
7. FEELING AFRAID AS IF SOMETHING AWFUL MIGHT HAPPEN: NOT AT ALL
6. BECOMING EASILY ANNOYED OR IRRITABLE: NOT AT ALL

## 2023-06-26 ASSESSMENT — PATIENT HEALTH QUESTIONNAIRE - PHQ9
5. POOR APPETITE OR OVEREATING: NOT AT ALL
SUM OF ALL RESPONSES TO PHQ QUESTIONS 1-9: 0

## 2023-06-26 ASSESSMENT — PAIN SCALES - GENERAL: PAINLEVEL: NO PAIN (0)

## 2023-06-26 NOTE — PROGRESS NOTES
"Adult Cystic Fibrosis Program  Annual Psychosocial Assessment    Presenting Information:  TELLY \"Yobany\" is a 42-year-old man with cystic fibrosis, presenting in CF clinic for a regular follow up with primary CF provider, Dr Hung Miller. Met with Telly for annual psychosocial assessment.     Living situation:   Telly is currently living with his wife, Bre, and his two daughters, Nieves Rebolledo (almost 6 yrs old) and Josy (3.5 years old). They recently bought a home in [place], and are still in the process of selling their home in [place]. They do not have any pets. Yobany denies concerns about his living situation.      Family Constellation:   Telly was initially raised by both biological parents, who  when he was around 14 years old. He subsequently lived with his mother but maintained regular contact with his father. Telly has an older brother and older sister. His mother has a house in [place], [place] and [place] in [place]. His father, brother, and sister live in [place]. His siblings are both  and Yobany has several nieces and nephews. Yobany's extended family also lives in the [place] area and operate a large local [employer]. Bre's parents live in [place]. Yobany reports all of his family members are doing well.    Yobany and his wife have two daughters, Nieves Rebolledo and Josy. They were conceived via IVF. They attempted to have another but his wife experienced some complications, and they are out of embryo's now. They are not pursuing IVF again and are happy with just having two children. Nieves and Josy are doing well and keeping busy with many sports and activities throughout the year.      Support Network:   Telly describes his social support as \"really good\". Both Yobany and Bre's families are very involved and supportive. He draws additional support from friends and co-workers. He describes a close relationship with Bre. They have been " " since 2015. Yobany does not stay in contact with anyone who has CF, but he knew other people with CF when he was growing up.      Adjustment to Illness:   Yobany was diagnosed with CF during infancy. He remembers struggling with weight issues as a child. He notes significant past health events in 2000, when he got a G-tube, but continued to struggle with gaining weight, and in 2003, when he experienced a pneumothorax and lobectomy. Since that event, he notes that he has successfully gained weight.      Yobany describes his current health status as \"good, can't complain\". Yobany continues to take trikafta which is going well. In the past he has mentioned that since starting trikafta his treatment burden is much less. Clinically, he has mild lung disease, a history of malnutrition and glucose intolerance. He remains on TF. He denies any physical health symptoms that interfere with his daily activities. He does not regularly vest. He has not been going to the gym or working out but reports being fairly active outside.     Advance Directive:   This  reviewed Advance Directive education. Yobany requested  e-mail another blank copy today. He is comfortable with his default decision maker (his wife) in the absence of a directive.     Education:   Yobany completed a Bachelor's Degree in Accounting and Business Administration at [employer] in 2007. He recently got a business leadership certificate through work but does not currently plan to pursue further education.     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 He works from home mostly but goes into the office a few days a week. He reports great benefits through the state. He enjoys his job overall and reports a supportive boss and co-workers, he is also part of a union. They are in negotiations currently to get a pay raise which he is hopeful " "about.    Yobany's wife Bre works full time for the [place] [employer]. Neglected to discuss Bre's employment today, but Yobany has previously mentioned Bre was getting burned out in her role.      Mental Health/Coping:   Yobany coped with depressive/anxious symptoms in 2013, due to stressful transitions. He did brief counseling, which he did not find especially helpful.  The issues then resolved.    Yobany reports his current mood as \"good, no concerns\". He denies any current or past symptoms indicative of mood, anxiety, or other mental health disorders.     ARCHANA-7: score of 0, indicating an absence of anxiety symptoms.   PHQ-9: score of 0, indicating an absence of depression symptoms as well.     Yobany describes his current stress level as manageable. He reports minimal work related stress.  He generally ayana with stressors through working out, walking, going for a drive or distracting himself, and getting a \"change of scenery\".    Yobany was raised Religious and describes ariana as important to him, noting that his brother in law is a .      Chemical Health   Yobany denies use of tobacco, vaping, illicit drugs, or alcohol. He does not have any history of chemical dependency or psychosocial impairment related to substance use.      Finances:   Yobany is able to afford his daily living expenses and denies any financial concerns. 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    Insurance:   Yobany has employer-based insurance (BCBS). He notes that costs are affordable for him and he denies related concerns.      Recreation/Leisure Interests:   Telly enjoys playing hockey, golfing, spending time with family/friends, and traveling.     Intervention:  -Psychosocial Assessment  -Health Care Directive education  -Supportive counseling    Assessment:  Yobany was pleasant and receptive to SW visit. He appeared to be open in his responses. He and his family " recently moved to a new home in [place] and Yobany reports everyone is adjusting well to this. He continues to work for the [employer] which he enjoys. He reports stable health and mental health. No insurance or financial concerns. He has adequate social support.     Yobany seems to be psychosocially stable overall, with access to relevant resources and supports.  No concerns expressed/noted.    Plan:  Send HCD via e-mail.  Re-consult for any psychosocial needs that may arise.    Complete psychosocial assessment annually.  Continue to follow for regular clinic consult.    Marybeth Bourgeois [facility]  Adult Cystic Fibrosis   Ph: [phone], Pager: [phone]

## 2023-06-26 NOTE — PROGRESS NOTES
Disease State Management Encounter:                          Telly Leal is a 42 year old male coming in for a follow-up visit. Today's visit is a co-visit with Dr. Rocael Miller. Today's visit is a follow-up visit from 6/28/2021     Reason for visit: Annual CF Medication Review.    Medication Access: Patient fills medication at [facility] and [facility] in [place]. Patient expresses no concern regarding cost of medications.   Medication Administration: Per patient, misses medication 1 time per month (evening dose of Trikafta).     CF: Patient is currently taking the following medications:  Bronchodilator: albuterol nebs 0.083% as needed and albuterol HFA inhaler as needed  Mucolytic: Mucomyst 20% nebs 2 mL as needed  Antibiotic: Tobramycin nebs as needed, reports not taking consistently (last dose a few months ago)  Azithromycin: 250 mg daily  CFTR modulator: Trikafta (full dose). No reported side effects today.  Other: Cromolyn 20 mg/2 mL as needed, fluticasone nasal spray 2 sprays both nostrils as needed  Patient denies concerns or side effects with current CF medication regimen.  Pulmonary symptoms are stable  Cultures (last growth): sputum cultures grow PSA (3/28/23), aspergillus (3/28/23)  Genotype: [id number] del 2,3    Lab Results   Component Value Date    ALT 27 06/26/2023    AST 38 06/26/2023    BILITOTAL 1.1 06/26/2023    DBIL 0.22 06/26/2023     06/26/2023           Latest Ref Rng & Units 6/26/2023     1:01 PM   PFT   FVC L 6.10  P   FEV1 L 3.39  P   FVC% % 107  P   FEV1% % 74  P      P Preliminary result     Pancreatic Insufficiency/Nutrition: Pancreatic enzyme replacement with Creon 24,000 units. Patient is taking up to 8 capsules with meals and up to 6 capsules with snacks. Patient is not experiencing sign/symptoms of malabsorption.  Acid reducer: Pepcid (famotidine) 20 mg twice daily as needed  Bowel regimen: none  Vitamins include: MVW complete  softgels daily  Tube Feeding: Nutren 2.0 supplement - 2 cans at bedtime as needed. No using tube feeding consistently at this time.    Lab Results   Component Value Date    SARANYA 0.72 10/08/2021    KELSY 8.8 10/08/2021    VITDT 40 06/26/2023     Impaired OGTT: CFRD:  Patient is not currently taking medications for management of blood sugars. OGTT results show impaired glucose tolerance test.  SMBG: none  Per visit with dietician, patient is experiencing symptoms of hypoglycemia.   Recent symptoms of high blood sugar? none  Most recent HgA1C:   Lab Results   Component Value Date    A1C 5.8 06/26/2023    A1C 5.6 10/08/2021    A1C 5.6 04/20/2020    A1C 6.1 04/22/2019    A1C 5.9 01/22/2018    A1C 6.2 12/16/2016    A1C 5.8 12/11/2015     Assessment/Plan:    1. Trikafta labs within normal limits. Continue Trikafta (full dose). Repeat labs in 6 months.   2. Sent prescriptions for albuterol inhaler and azithromycin to [facility]  3. Per discussion with dietician, sent blood glucose testing supplies to [facility] pharmacy given impaired OGTT test. Pending results, Dr. Miller will consider endocrinology referral.  4. Discussed potential for antibiotic resistance with incorrect administration of tobramycin. If using for exacerbations, should continue for entire 28 days. Per discussion with Dr. Rocael Miller MD, will consider using tobramycin nebs in future for exacerbations. Will remove from medication list at this point given infrequent use, can prescribe again in future if needed.     Follow-up: December for Trikafta labs, sooner if needed    I spent 12 minutes with this patient today. All changes were made via verbal approval with Dr. Rocael Miller MD. A copy of the visit note was provided to the patient's provider(s).    A summary of these recommendations was given to the patient (see AVS from today's appointment with Dr. Rocael Miller MD).    Kathryn Cristina, PharmD   Medication Therapy  Management   Cystic Fibrosis Pharmacist     Medication Therapy Recommendations  CF (cystic fibrosis) (H)    Current Medication: tobramycin, PF, (SOO) 300 MG/5ML neb solution (Discontinued)   Rationale: Incorrect administration - Dosage too low - Effectiveness   Recommendation: Increase Frequency   Status: Accepted per Provider

## 2023-06-26 NOTE — PATIENT INSTRUCTIONS
Dear Telly Leal    Thank you for choosing [facility] ([facility]) for your procedure.  The following information is a summary of our appointment as well as important reminders.      We look forward in seeing you on your next appointment here at [facility] ([facility]).  Please don t hesitate to call us at [phone] to reschedule any of your appointments or to speak with one of the [facility] registered nurses.  It was a pleasure taking care of you today.    Sincerely,    [facility]  [facility]  [address] [place] [address]  [address]  Phone:  [phone]

## 2023-06-26 NOTE — PROGRESS NOTES
Infusion Nursing Note:  Telly Leal presents today for OGTT.    Patient seen by provider today: Yes, patient has annual CF with Dr. Miller   present during visit today: Not Applicable.    Note:   Patient drank glucola within 10 minutes.  Labs drawn 30, 60, 90 and 120 minutes post glucola.    Intravenous Access:  Labs drawn without difficulty.  Peripheral IV placed by lab.    Treatment Conditions:  Patient NPO for 8 hours.    Administrations This Visit     glucose tolerence test (GLUCOLA) 25% oral liquid 75 g     Admin Date  06/26/2023 Action  $Given Dose  75 g Route  Oral Administered By  Latonya Schroeder, RN              /77   Pulse 94   Temp 98.2  F (36.8  C) (Oral)   Resp 18   Wt 87.3 kg (192 lb 8 oz)   SpO2 95%   BMI 24.06 kg/m      Post Infusion Assessment:  Patient tolerated procedure without incident.  Blood return noted pre and post infusion.  Site patent and intact, free from redness, edema or discomfort.  No evidence of extravasations.  Access discontinued per protocol.   Post procedure POCT glucose = 129 mg/dL    Discharge Plan:   Discharge instructions reviewed with: Patient.  Patient and/or family verbalized understanding of discharge instructions and all questions answered.  AVS to patient via Golgi[employer].  Patient will return annually for next appointment.   Patient discharged in stable condition accompanied by: self.  Departure Mode: Ambulatory.      Latonya Schroeder RN

## 2023-06-26 NOTE — LETTER
6/26/2023         RE: Telly Leal  [address] [facility] [address]        Dear Colleague,    Thank you for referring your patient, Telly Leal, to the [facility]G [facility] AND [facility] [place]. Please see a copy of my visit note below.    Reason for Visit  Telly Leal is a 42 year old year old male who is being seen for RECHECK (Return Cystic Fibrosis )    CF HPI  The patient was seen and examined by Rocael Miller   The patient reports overall doing well since his last clinic visit from a respiratory standpoint.  This is despite not doing any vest therapy.  The drop-off in therapy is related to being busy with an ongoing move to a new residence.  There is also a lot of packing being done at his workplace.  Overall his level of activity has increased substantially.  He reports baseline exercise tolerance.  He has isolated cough and mucus production but infrequent.  No chest pain or hemoptysis.  He has not been on SOO nebs in at least a couple months.  Current Outpatient Medications   Medication    acetylcysteine (MUCOMYST) 20 % neb solution    albuterol (PROAIR HFA) 108 (90 BASE) MCG/ACT Inhaler    albuterol (PROVENTIL) (2.5 MG/3ML) 0.083% neb solution    azithromycin (ZITHROMAX) 250 MG tablet    blood glucose (ACCU-CHEK GUIDE) test strip    blood glucose monitoring (NO BRAND SPECIFIED) test strip    blood glucose monitoring (SOFTCLIX) lancets    Blood Glucose Monitoring Suppl (ACCU-CHEK GUIDE) w/Device KIT    CREON 96095-49801 units CPEP per EC capsule    cromolyn (INTAL) 20 MG/2ML neb solution    famotidine (PEPCID) 20 MG tablet    fluticasone (FLONASE) 50 MCG/ACT nasal spray    mvw complete formulation (SOFTGELS) capsule    Nutritional Supplements (NUTREN 2.0)    sodium chloride inhalant 7 % NEBU neb solution    TRIKAFTA 100-50-75 & 150 MG tablet pack     No current facility-administered medications for this visit.     No Known Allergies  Past  Medical History:   Diagnosis Date    Chronic sinusitis     Cystic fibrosis with pulmonary manifestations (H)     Exocrine pancreatic insufficiency     GERD (gastroesophageal reflux disease)     Hemoptysis     Malnutrition (H)        Past Surgical History:   Procedure Laterality Date    IR MISCELLANEOUS PROCEDURE  2/6/2001    LOBECTOMY      right upper lobe    TUBES         Social History     Socioeconomic History    Marital status:      Spouse name: Not on file    Number of children: Not on file    Years of education: Not on file    Highest education level: Not on file   Occupational History    Occupation:      Employer: [employer]   Tobacco Use    Smoking status: Never    Smokeless tobacco: Never   Substance and Sexual Activity    Alcohol use: Not Currently     Alcohol/week: 0.0 standard drinks of alcohol    Drug use: Not Currently    Sexual activity: Not on file   Other Topics Concern    Parent/sibling w/ CABG, MI or angioplasty before 65F 55M? Not Asked   Social History Narrative    Patient works for the [employer] as an .  He does not have stable living conditions at this time.  He has a Hungarian guillen.     Social Determinants of Health     Financial Resource Strain: Not on file   Food Insecurity: Not on file   Transportation Needs: Not on file   Physical Activity: Not on file   Stress: Not on file   Social Connections: Not on file   Intimate Partner Violence: Not on file   Housing Stability: Not on file   Update: living with wife Bre in [facility]. Birth of daughter Nieves Rebolledo August, 2017, birth of daughter Josy Felder November, 2019    ROS Pulmonary  Const: No fever chills sweats.  GI: No diarrhea constipation or grease in his stools.  No recent use of tube feeds.  Some irritation again developing around his feeding tube site.  Endocrine: No recent hypoglycemic symptoms.  No polyuria.  A complete ROS was otherwise negative except as noted in the HPI.  BP  "124/77   Pulse 94   Resp 18   Ht 1.905 m (6' 3\")   Wt 87.1 kg (192 lb)   SpO2 95%   BMI 24.00 kg/m  ,   Exam:   GENERAL APPEARANCE: Well developed, well nourished, alert, and in no apparent distress.  EYES: anicteric with injected appearing conjunctiva  HENT: No nasal discharge.  Oral mucosa is moist, without any lesions, no tonsillar enlargement, no oropharyngeal exudate.  RESP:  good air flow throughout.  Clear to auscultation bilaterally.  Normal chest wall excursion.    CV: Normal S1, S2, regular rhythm, normal rate. No murmur. No gallop. No LE edema.   ABDOMEN:  Bowel sounds normal, soft, nontender, no HSM or masses.   MS: extremities normal. No cyanosis.  SKIN:  PEG site with small interval increase in granulation tissue.  Chest scar-like lesion upper sternum unchanged. Diffuse freckles without overt change.  NEURO: Mentation intact, speech normal, normal gait and stance  PSYCH: mentation appears normal. and affect normal/bright  Results:  Recent Results (from the past 168 hour(s))   Basic metabolic panel    Collection Time: 06/26/23  8:57 AM   Result Value Ref Range    Sodium 140 136 - 145 mmol/L    Potassium 3.8 3.4 - 5.3 mmol/L    Chloride 107 98 - 107 mmol/L    Carbon Dioxide (CO2) 25 22 - 29 mmol/L    Anion Gap 8 7 - 15 mmol/L    Urea Nitrogen 20.9 (H) 6.0 - 20.0 mg/dL    Creatinine 0.89 0.67 - 1.17 mg/dL    Calcium 9.2 8.6 - 10.0 mg/dL    Glucose 114 (H) 70 - 99 mg/dL    GFR Estimate >90 >60 mL/min/1.73m2   Lipid Profile    Collection Time: 06/26/23  8:57 AM   Result Value Ref Range    Cholesterol 145 <200 mg/dL    Triglycerides 88 <150 mg/dL    Direct Measure HDL 34 (L) >=40 mg/dL    LDL Cholesterol Calculated 93 <=100 mg/dL    Non HDL Cholesterol 111 <130 mg/dL   Iron    Collection Time: 06/26/23  8:57 AM   Result Value Ref Range    Iron 59 (L) 61 - 157 ug/dL   Hemoglobin A1c    Collection Time: 06/26/23  8:57 AM   Result Value Ref Range    Hemoglobin A1C 5.8 (H) <5.7 %   IgE    Collection Time: " 06/26/23  8:57 AM   Result Value Ref Range    Immunoglobulin E 3 0 - 114 kU/L   INR    Collection Time: 06/26/23  8:57 AM   Result Value Ref Range    INR 1.00 0.85 - 1.15   Magnesium    Collection Time: 06/26/23  8:57 AM   Result Value Ref Range    Magnesium 2.1 1.7 - 2.3 mg/dL   Phosphorus    Collection Time: 06/26/23  8:57 AM   Result Value Ref Range    Phosphorus 3.5 2.5 - 4.5 mg/dL   Vitamin D Deficiency    Collection Time: 06/26/23  8:57 AM   Result Value Ref Range    Vitamin D, Total (25-Hydroxy) 40 20 - 75 ug/L   Hepatic panel    Collection Time: 06/26/23  8:57 AM   Result Value Ref Range    Protein Total 6.9 6.4 - 8.3 g/dL    Albumin 4.3 3.5 - 5.2 g/dL    Bilirubin Total 1.1 <=1.2 mg/dL    Alkaline Phosphatase 84 40 - 129 U/L    AST 38 0 - 45 U/L    ALT 27 0 - 70 U/L    Bilirubin Direct 0.22 0.00 - 0.30 mg/dL   CK total    Collection Time: 06/26/23  8:57 AM   Result Value Ref Range     39 - 308 U/L   CBC with platelets and differential    Collection Time: 06/26/23  8:57 AM   Result Value Ref Range    WBC Count 4.8 4.0 - 11.0 10e3/uL    RBC Count 4.81 4.40 - 5.90 10e6/uL    Hemoglobin 14.5 13.3 - 17.7 g/dL    Hematocrit 43.6 40.0 - 53.0 %    MCV 91 78 - 100 fL    MCH 30.1 26.5 - 33.0 pg    MCHC 33.3 31.5 - 36.5 g/dL    RDW 13.1 10.0 - 15.0 %    Platelet Count 289 150 - 450 10e3/uL    % Neutrophils 51 %    % Lymphocytes 38 %    % Monocytes 9 %    % Eosinophils 2 %    % Basophils 0 %    % Immature Granulocytes 0 %    NRBCs per 100 WBC 0 <1 /100    Absolute Neutrophils 2.4 1.6 - 8.3 10e3/uL    Absolute Lymphocytes 1.8 0.8 - 5.3 10e3/uL    Absolute Monocytes 0.4 0.0 - 1.3 10e3/uL    Absolute Eosinophils 0.1 0.0 - 0.7 10e3/uL    Absolute Basophils 0.0 0.0 - 0.2 10e3/uL    Absolute Immature Granulocytes 0.0 <=0.4 10e3/uL    Absolute NRBCs 0.0 10e3/uL   Glucose in a Series: Draw Time Zero    Collection Time: 06/26/23  8:57 AM   Result Value Ref Range    Glucose 120 (H) 70 - 99 mg/dL   Insulin in a Series: Draw  Time Zero    Collection Time: 06/26/23  8:57 AM   Result Value Ref Range    Insulin 9.6 2.6 - 24.9 uU/mL   Glucose in a Series: Draw +30 minutes    Collection Time: 06/26/23  9:50 AM   Result Value Ref Range    Glucose 188 (H) 70 - 99 mg/dL   Insulin in a Series: Draw +30 minutes    Collection Time: 06/26/23  9:50 AM   Result Value Ref Range    Insulin 24.8 2.6 - 24.9 uU/mL   Glucose in a Series: Draw +60 minutes    Collection Time: 06/26/23 10:17 AM   Result Value Ref Range    Glucose 259 (H) 70 - 99 mg/dL   Insulin in a Series: Draw +60 minutes    Collection Time: 06/26/23 10:17 AM   Result Value Ref Range    Insulin 49.1 (H) 2.6 - 24.9 uU/mL   Glucose in a Series: Draw +90 minutes    Collection Time: 06/26/23 10:47 AM   Result Value Ref Range    Glucose 253 (H) 70 - 99 mg/dL   Insulin in a Series: Draw +90 minutes    Collection Time: 06/26/23 10:47 AM   Result Value Ref Range    Insulin 76.3 (H) 2.6 - 24.9 uU/mL   Glucose by meter    Collection Time: 06/26/23 11:19 AM   Result Value Ref Range    GLUCOSE BY METER POCT 129 (H) 70 - 99 mg/dL   Glucose in a Series: Draw +120 minutes    Collection Time: 06/26/23 11:22 AM   Result Value Ref Range    Glucose 158 (H) 70 - 99 mg/dL   Insulin in a Series: Draw +120 minutes    Collection Time: 06/26/23 11:22 AM   Result Value Ref Range    Insulin 85.0 (H) 2.6 - 24.9 uU/mL   General PFT Lab (Please always keep checked)    Collection Time: 06/26/23  1:01 PM   Result Value Ref Range    FVC-Pred 5.68 L    FVC-Pre 6.10 L    FVC-%Pred-Pre 107 %    FEV1-Pre 3.39 L    FEV1-%Pred-Pre 74 %    FEV1FVC-Pred 81 %    FEV1FVC-Pre 56 %    FEFMax-Pred 11.29 L/sec    FEFMax-Pre 9.67 L/sec    FEFMax-%Pred-Pre 85 %    FEF2575-Pred 4.36 L/sec    FEF2575-Pre 1.43 L/sec    FSJ6097-%Pred-Pre 32 %    ExpTime-Pre 11.32 sec    FIFMax-Pre 10.46 L/sec    FEV1FEV6-Pred 82 %    FEV1FEV6-Pre 60 %     Spirometry interpretation: personally reviewed. Valid maneuver.  Mild obstruction.  FEV1 within recent  baseline.  FVC up by over 5% and now back to baseline.    Chest x-ray from today personally reviewed.  No interval change in mild bronchiectatic changes and minimal mucus plugging.  Chronic blunting of right costophrenic angle consistent with prior resection.    Most recent respiratory culture March, 2023 with 2 strains of Pseudomonas and Aspergillus fumigatus    Assessment and plan:  1.  Cystic fibrosis lung disease: He continues to have good symptom control despite infrequent prophylactic airway clearance.  Pulmonary function tests have overall been improving the past couple visits although still not as good as 36 months ago.  He anticipates being done moving homes and specifically requested he focus on reestablishing consistent airway clearance in his new residence.  Also encouraged him to resume some aerobic exercise once he has more time available.  The plan will be to use SOO nebs for exacerbations.  Will consider resuming every other month scheduled dosing if recurrent trend down in PFTs.  He is on chronic azithromycin.  1B.  CFTR modulation therapy:  As above, he has had an impressive response to Trikafta.  Liver function test from today normal and will be due for repeat in December, 2023.  2. Impaired glucose tolerance: 2-hour glucose greater than 140 on today's study.  Hemoglobin A1c down to 5.8%.  He is not having recurrence of hypoglycemia symptoms.  Plan is for him to resume home glucose monitoring.  Karissa, CF nutritionist, assisting with reestablishment of monitoring.  Will consider referral to diabetes clinic depending on clinical course.  3.  Pancreatic exocrine insufficiency with a history of malnutrition on tube feeds: Adequate enzyme replacement by history.  He has lost about 4 pounds since last visit.  Suspect it is due to time constraints and increased activity with moving homes.  No recent use of feeding tube.  He will continue monitoring weight at home.  4. Chronic CF sinus disease: Improved  on modulation therapy. Continue flonase as needed.  5. Chest skin lesions: Seen by outside Dermatologist. Central chest scar could be treated by steroid injections per pt but not actively pursuing.  He is overdue for skin exam and encouraged him to schedule this.  6. Healthcare maintenance: Annual studies due in .  He still needs to initiate colon cancer screening now that he is turned 42 and can address this next visit.     Patient will follow up in 3 month    Total visit time today 40 minutes including detailed review of annual studies with the patient.  This is exclusive of time interpreting PFTs.    Spirometry interpretation: personally reviewed. Valid maneuver.  Mild obstruction.  FEV1 within recent baseline.  FVC up by over 5% and now back to baseline.  Rocael Miller                   CF Annual Nutrition Assessment     Reason for Assessment  Assessed during CF clinic visit with Dr. Miller r/t increased nutrition risk with diagnosis of CF per protocol.     Nutrition Significant PMH  Mild Lung Disease - taking Trikafta  Pancreatic Insufficient  G-tube for nutrition support  Glucose intolerance     Anthropometric Assessment  Height: 189.7 cm  Weight: 87.1 kg (actual weight)  Ideal body weight based on BMI 22 for females and 23 for males per [facility] recs: 82.8 kg (182 lbs)  Body mass index is 24 kg/m .    Wt Readings from Last 5 Encounters:   23 87.1 kg (192 lb)   23 87.3 kg (192 lb 8 oz)   10/31/22 87.6 kg (193 lb 2 oz)   22 85.7 kg (189 lb)   21 89.4 kg (197 lb)     Comments: Good weight stability over the past year, pt slightly concerned that his weight is still a bit down from  however.  Normal BMI, good/stable body composition based on brief nutrition-focused physical assessment.     Pancreatic Enzymes  Brand:  Creon [id number]  PO Dosin with meals, 6 with snacks = 2204 units lipase/kg/meal  TF Dosing:  3-4 caps w/ tube feeding (1 can)   Estimated Daily Intake:  40 caps = 27506 units/kg/day - slightly above recommendation but reduced from previous total of 50 caps.    Signs of Malabsorption: No - hx increased stool burden but not currently needing Miralax.   Enzyme Program:  None - information provided during previous visits     Nutrition-Related Lab & Vitamin/Mineral Supplements  Annual studies completed today.    Vitamin A- pending  Vitamin D- 40 wnl  Vitamin E- pending  Iron- 59, borderline low  Lipid Panel- wnl outside of low HDL (34)     OGTT- today, results indicating impaired glucose tolerance, slightly worsened from last test in 2019  DEXA - 2021, lowest z-score 0.1      Current Vitamin/Mineral Supplements: MVW Complete 1 capsule daily. Reports taking daily. Obtains from [facility].     Diet History and Assessment  Diet Preferences/Allergies/Intolerances: Regular    Intake Recall/Comments: Appetite only fair at baseline but makes an effort to eat consistently. Consumes 2-3 meals and snacks. Historically has not been able to maintain weight without use of TF but has been able to do this more consistently over the past year. He monitors his weight closely.      Diet Recall - per previous  Breakfast- skips or may have eggs, toast  Lunch- sandwich or eats out, fast food  Dinner- protein with starch and vegetable; pasta or steak/chicken with veggies  HS snack- ice cream    Calcium: gallon whole milk/week + yogurt, string cheese, adequate  Salt: likely adequate from foods, no sx of deficiency per pt  Hydration: Water, Gatorade, some soda  Supplements: No   Tube Feeding: Currently not using regularly, only as backup in case of wt loss or illness --   Type of Feeding Tube: G-tube (button)  Formula: Nutren 2.0  Rate/Frequency: 135 ml/hr x 1 can + water added (~4 hrs infusion time)  Nutrition: 250 mls, 500 kcals, 21 g protein, 23 g fat, 54 g CHO  Enzymes: taking 9-10 caps before infusion along with HS snack (accounting for ~3-4 caps per 1 can TF) provides ~4000 units lipase/g fat in  feeds.      NUTRITION DIAGNOSIS  Impaired nutrient utilization related to CF pancreatic insufficiency as evidenced by requires pancreatic enzyme replacement therapy, vitamin/mineral supplementation, and higher kcal/protein diet and nutrition support in order to maintain ideal body weight and nutrition status.     INTERVENTIONS/RECOMMENDATIONS   1) Oral Intake/Weight:   BEE: 2100   9958-3364 kcals/day =  150-175% BEE  110-140 g protein/day = 1.2-1.5 g/kg    Discussed current nutrition status including weight trends, PO, and maintenance of nutrition status while holding TF infusions.  Congratulated pt on successful weight maintenance, discussed maintaining balanced, high quality nutrition intake and using TF as a back-up supplement for nutrition in the case of weight loss, appetite loss, or illness. Pt has some skin overgrowth around FT - he usually manages this with his dermatology team. Plans to visit with them at his next visit to the [facility] for silver nitrate treatment.     Regarding OGTT results (impaired glucose tolerance) we talked about managing glycemia and beginning to use a glucometer.  New glucometer and strips will be prescribed today by pharmacist, pt will consult with retail pharmacist for meter tutorial.  Advised him to use glucometer to check glucoses when he suspects he is low or high, due to symptoms/intake, or in the mornings and evenings.  Referred to provider for further diagnostics and education.     2) Enzymes:  No GI concerns noted today and will continue with current enzyme dose.      3) Vitamin/Mineral Supplementation:  Annual studies done today. Will contact pt if any changes are needed to regimen pending updated vitamin levels.      GOALS:  1) Weight maintenance with BMI >23 kg/m2  2) Take enzymes and vitamins as recommended     FOLLOW-UP/MONITORING:  Visit patient within 12 months for annual nutrition reassessment.     Time Spent In Face-to-Face Patient Interactions: 15 minutes      Karissa  Andriy CHANEY, RD, LD (pager [phone])  Cystic Fibrosis/Lung Transplant Dietitian      -Available Mon-Thurs 8 AM-4:30 PM. On Fridays please contact pager [phone] (Leona Marie RD) and on weekends/holidays contact coverage dietitian at pager [phone] (inpatient use only).       Respiratory Therapist Note:         Vest                Brand: Hill-Rom - traditional Hill Rom: Frequencies 8, 9, 10 at pressure 10 then frequencies 18, 19, 20 at pressure 6.                Cough Pause: Cough Pause; No                Vest Garment Size: Adult Medium                Last Fitting Date: Due as needed                Frequency of therapy: 0 times per week                Concerns: Vests as needed with illness         Exercise (purposeful and aerobic for >20 minutes each session): NO.                Does this qualify as additional airway clearance: No         Alternative Airway Clearance:          Nebulized Medications                Bronchodilators: Albuterol                Mucolytic: Mucomyst                Antibiotics:                 Additional Inhaled Medications:                Spacer Use:          Review Cleaning:          Education and Transition Information                Correct order of inhaled medications: Not Applicable                Mechanism of Action of inhaled medications: Not Applicable                Frequency of inhaled medications: Not Applicable                Dosage of inhaled medications: Not Applicable                Other: Only uses nebulizers with vest therapy as needed         Home Care:                Nebulizer Cups (Brand/Type): Mare                Nebulizer Compressor                            Year Purchased: Works                            [facility], Phone: [phone], Fax: [phone]                Nebulizer Supply Company:                            [facility], Phone: [phone], Fax: [phone]         Oxygen: N/A       Pulmonary Rehab                Site:                  Date Completed:          Plan of Care and Goals for next visit: only uses vest/nebs as needed with illness. Has everything to do vest therapy when needed.      Rocael Miller MD     Olumiant Pregnancy And Lactation Text: Based on animal studies, Olumiant may cause embryo-fetal harm when administered to pregnant women.  The medication should not be used in pregnancy.  Breastfeeding is not recommended during treatment.

## 2023-06-26 NOTE — NURSING NOTE
Chief Complaint   Patient presents with     Blood Draw     Vitals, blood drawn and PIV placed by LPN. Pt checked into appt.      Pt unable to leave urine, sent supplies with to infusion.    GRACIE Soria LPN

## 2023-06-26 NOTE — PROGRESS NOTES
Reason for Visit  Telly Leal is a 42 year old year old male who is being seen for RECHECK (Return Cystic Fibrosis )    CF HPI  The patient was seen and examined by Rocael Miller   The patient reports overall doing well since his last clinic visit from a respiratory standpoint.  This is despite not doing any vest therapy.  The drop-off in therapy is related to being busy with an ongoing move to a new residence.  There is also a lot of packing being done at his workplace.  Overall his level of activity has increased substantially.  He reports baseline exercise tolerance.  He has isolated cough and mucus production but infrequent.  No chest pain or hemoptysis.  He has not been on SOO nebs in at least a couple months.  Current Outpatient Medications   Medication     acetylcysteine (MUCOMYST) 20 % neb solution     albuterol (PROAIR HFA) 108 (90 BASE) MCG/ACT Inhaler     albuterol (PROVENTIL) (2.5 MG/3ML) 0.083% neb solution     azithromycin (ZITHROMAX) 250 MG tablet     blood glucose (ACCU-CHEK GUIDE) test strip     blood glucose monitoring (NO BRAND SPECIFIED) test strip     blood glucose monitoring (SOFTCLIX) lancets     Blood Glucose Monitoring Suppl (ACCU-CHEK GUIDE) w/Device KIT     CREON 72566-62400 units CPEP per EC capsule     cromolyn (INTAL) 20 MG/2ML neb solution     famotidine (PEPCID) 20 MG tablet     fluticasone (FLONASE) 50 MCG/ACT nasal spray     mvw complete formulation (SOFTGELS) capsule     Nutritional Supplements (NUTREN 2.0)     sodium chloride inhalant 7 % NEBU neb solution     TRIKAFTA 100-50-75 & 150 MG tablet pack     No current facility-administered medications for this visit.     No Known Allergies  Past Medical History:   Diagnosis Date     Chronic sinusitis      Cystic fibrosis with pulmonary manifestations (H)      Exocrine pancreatic insufficiency      GERD (gastroesophageal reflux disease)      Hemoptysis      Malnutrition (H)        Past Surgical History:   Procedure  "Laterality Date     IR MISCELLANEOUS PROCEDURE  2/6/2001     LOBECTOMY      right upper lobe     TUBES         Social History     Socioeconomic History     Marital status:      Spouse name: Not on file     Number of children: Not on file     Years of education: Not on file     Highest education level: Not on file   Occupational History     Occupation:      Employer: [employer]   Tobacco Use     Smoking status: Never     Smokeless tobacco: Never   Substance and Sexual Activity     Alcohol use: Not Currently     Alcohol/week: 0.0 standard drinks of alcohol     Drug use: Not Currently     Sexual activity: Not on file   Other Topics Concern     Parent/sibling w/ CABG, MI or angioplasty before 65F 55M? Not Asked   Social History Narrative    Patient works for the [employer] as an .  He does not have stable living conditions at this time.  He has a [place] guillen.     Social Determinants of Health     Financial Resource Strain: Not on file   Food Insecurity: Not on file   Transportation Needs: Not on file   Physical Activity: Not on file   Stress: Not on file   Social Connections: Not on file   Intimate Partner Violence: Not on file   Housing Stability: Not on file   Update: living with wife Bre in [place]. Birth of daughter Nieves Rebolledo August, 2017, birth of daughter Josy Felder November, 2019    ROS Pulmonary  Const: No fever chills sweats.  GI: No diarrhea constipation or grease in his stools.  No recent use of tube feeds.  Some irritation again developing around his feeding tube site.  Endocrine: No recent hypoglycemic symptoms.  No polyuria.  A complete ROS was otherwise negative except as noted in the HPI.  /77   Pulse 94   Resp 18   Ht 1.905 m (6' 3\")   Wt 87.1 kg (192 lb)   SpO2 95%   BMI 24.00 kg/m  ,   Exam:   GENERAL APPEARANCE: Well developed, well nourished, alert, and in no apparent distress.  EYES: anicteric with injected appearing " conjunctiva  HENT: No nasal discharge.  Oral mucosa is moist, without any lesions, no tonsillar enlargement, no oropharyngeal exudate.  RESP:  good air flow throughout.  Clear to auscultation bilaterally.  Normal chest wall excursion.    CV: Normal S1, S2, regular rhythm, normal rate. No murmur. No gallop. No LE edema.   ABDOMEN:  Bowel sounds normal, soft, nontender, no HSM or masses.   MS: extremities normal. No cyanosis.  SKIN:  PEG site with small interval increase in granulation tissue.  Chest scar-like lesion upper sternum unchanged. Diffuse freckles without overt change.  NEURO: Mentation intact, speech normal, normal gait and stance  PSYCH: mentation appears normal. and affect normal/bright  Results:  Recent Results (from the past 168 hour(s))   Basic metabolic panel    Collection Time: 06/26/23  8:57 AM   Result Value Ref Range    Sodium 140 136 - 145 mmol/L    Potassium 3.8 3.4 - 5.3 mmol/L    Chloride 107 98 - 107 mmol/L    Carbon Dioxide (CO2) 25 22 - 29 mmol/L    Anion Gap 8 7 - 15 mmol/L    Urea Nitrogen 20.9 (H) 6.0 - 20.0 mg/dL    Creatinine 0.89 0.67 - 1.17 mg/dL    Calcium 9.2 8.6 - 10.0 mg/dL    Glucose 114 (H) 70 - 99 mg/dL    GFR Estimate >90 >60 mL/min/1.73m2   Lipid Profile    Collection Time: 06/26/23  8:57 AM   Result Value Ref Range    Cholesterol 145 <200 mg/dL    Triglycerides 88 <150 mg/dL    Direct Measure HDL 34 (L) >=40 mg/dL    LDL Cholesterol Calculated 93 <=100 mg/dL    Non HDL Cholesterol 111 <130 mg/dL   Iron    Collection Time: 06/26/23  8:57 AM   Result Value Ref Range    Iron 59 (L) 61 - 157 ug/dL   Hemoglobin A1c    Collection Time: 06/26/23  8:57 AM   Result Value Ref Range    Hemoglobin A1C 5.8 (H) <5.7 %   IgE    Collection Time: 06/26/23  8:57 AM   Result Value Ref Range    Immunoglobulin E 3 0 - 114 kU/L   INR    Collection Time: 06/26/23  8:57 AM   Result Value Ref Range    INR 1.00 0.85 - 1.15   Magnesium    Collection Time: 06/26/23  8:57 AM   Result Value Ref Range     Magnesium 2.1 1.7 - 2.3 mg/dL   Phosphorus    Collection Time: 06/26/23  8:57 AM   Result Value Ref Range    Phosphorus 3.5 2.5 - 4.5 mg/dL   Vitamin D Deficiency    Collection Time: 06/26/23  8:57 AM   Result Value Ref Range    Vitamin D, Total (25-Hydroxy) 40 20 - 75 ug/L   Hepatic panel    Collection Time: 06/26/23  8:57 AM   Result Value Ref Range    Protein Total 6.9 6.4 - 8.3 g/dL    Albumin 4.3 3.5 - 5.2 g/dL    Bilirubin Total 1.1 <=1.2 mg/dL    Alkaline Phosphatase 84 40 - 129 U/L    AST 38 0 - 45 U/L    ALT 27 0 - 70 U/L    Bilirubin Direct 0.22 0.00 - 0.30 mg/dL   CK total    Collection Time: 06/26/23  8:57 AM   Result Value Ref Range     39 - 308 U/L   CBC with platelets and differential    Collection Time: 06/26/23  8:57 AM   Result Value Ref Range    WBC Count 4.8 4.0 - 11.0 10e3/uL    RBC Count 4.81 4.40 - 5.90 10e6/uL    Hemoglobin 14.5 13.3 - 17.7 g/dL    Hematocrit 43.6 40.0 - 53.0 %    MCV 91 78 - 100 fL    MCH 30.1 26.5 - 33.0 pg    MCHC 33.3 31.5 - 36.5 g/dL    RDW 13.1 10.0 - 15.0 %    Platelet Count 289 150 - 450 10e3/uL    % Neutrophils 51 %    % Lymphocytes 38 %    % Monocytes 9 %    % Eosinophils 2 %    % Basophils 0 %    % Immature Granulocytes 0 %    NRBCs per 100 WBC 0 <1 /100    Absolute Neutrophils 2.4 1.6 - 8.3 10e3/uL    Absolute Lymphocytes 1.8 0.8 - 5.3 10e3/uL    Absolute Monocytes 0.4 0.0 - 1.3 10e3/uL    Absolute Eosinophils 0.1 0.0 - 0.7 10e3/uL    Absolute Basophils 0.0 0.0 - 0.2 10e3/uL    Absolute Immature Granulocytes 0.0 <=0.4 10e3/uL    Absolute NRBCs 0.0 10e3/uL   Glucose in a Series: Draw Time Zero    Collection Time: 06/26/23  8:57 AM   Result Value Ref Range    Glucose 120 (H) 70 - 99 mg/dL   Insulin in a Series: Draw Time Zero    Collection Time: 06/26/23  8:57 AM   Result Value Ref Range    Insulin 9.6 2.6 - 24.9 uU/mL   Glucose in a Series: Draw +30 minutes    Collection Time: 06/26/23  9:50 AM   Result Value Ref Range    Glucose 188 (H) 70 - 99 mg/dL    Insulin in a Series: Draw +30 minutes    Collection Time: 06/26/23  9:50 AM   Result Value Ref Range    Insulin 24.8 2.6 - 24.9 uU/mL   Glucose in a Series: Draw +60 minutes    Collection Time: 06/26/23 10:17 AM   Result Value Ref Range    Glucose 259 (H) 70 - 99 mg/dL   Insulin in a Series: Draw +60 minutes    Collection Time: 06/26/23 10:17 AM   Result Value Ref Range    Insulin 49.1 (H) 2.6 - 24.9 uU/mL   Glucose in a Series: Draw +90 minutes    Collection Time: 06/26/23 10:47 AM   Result Value Ref Range    Glucose 253 (H) 70 - 99 mg/dL   Insulin in a Series: Draw +90 minutes    Collection Time: 06/26/23 10:47 AM   Result Value Ref Range    Insulin 76.3 (H) 2.6 - 24.9 uU/mL   Glucose by meter    Collection Time: 06/26/23 11:19 AM   Result Value Ref Range    GLUCOSE BY METER POCT 129 (H) 70 - 99 mg/dL   Glucose in a Series: Draw +120 minutes    Collection Time: 06/26/23 11:22 AM   Result Value Ref Range    Glucose 158 (H) 70 - 99 mg/dL   Insulin in a Series: Draw +120 minutes    Collection Time: 06/26/23 11:22 AM   Result Value Ref Range    Insulin 85.0 (H) 2.6 - 24.9 uU/mL   General PFT Lab (Please always keep checked)    Collection Time: 06/26/23  1:01 PM   Result Value Ref Range    FVC-Pred 5.68 L    FVC-Pre 6.10 L    FVC-%Pred-Pre 107 %    FEV1-Pre 3.39 L    FEV1-%Pred-Pre 74 %    FEV1FVC-Pred 81 %    FEV1FVC-Pre 56 %    FEFMax-Pred 11.29 L/sec    FEFMax-Pre 9.67 L/sec    FEFMax-%Pred-Pre 85 %    FEF2575-Pred 4.36 L/sec    FEF2575-Pre 1.43 L/sec    HUE1294-%Pred-Pre 32 %    ExpTime-Pre 11.32 sec    FIFMax-Pre 10.46 L/sec    FEV1FEV6-Pred 82 %    FEV1FEV6-Pre 60 %     Spirometry interpretation: personally reviewed. Valid maneuver.  Mild obstruction.  FEV1 within recent baseline.  FVC up by over 5% and now back to baseline.    Chest x-ray from today personally reviewed.  No interval change in mild bronchiectatic changes and minimal mucus plugging.  Chronic blunting of right costophrenic angle consistent with  prior resection.    Most recent respiratory culture March, 2023 with 2 strains of Pseudomonas and Aspergillus fumigatus    Assessment and plan:  1.  Cystic fibrosis lung disease: He continues to have good symptom control despite infrequent prophylactic airway clearance.  Pulmonary function tests have overall been improving the past couple visits although still not as good as 36 months ago.  He anticipates being done moving homes and specifically requested he focus on reestablishing consistent airway clearance in his new residence.  Also encouraged him to resume some aerobic exercise once he has more time available.  The plan will be to use SOO nebs for exacerbations.  Will consider resuming every other month scheduled dosing if recurrent trend down in PFTs.  He is on chronic azithromycin.  1B.  CFTR modulation therapy:  As above, he has had an impressive response to Trikafta.  Liver function test from today normal and will be due for repeat in December, 2023.  2. Impaired glucose tolerance: 2-hour glucose greater than 140 on today's study.  Hemoglobin A1c down to 5.8%.  He is not having recurrence of hypoglycemia symptoms.  Plan is for him to resume home glucose monitoring.  Karissa, CF nutritionist, assisting with reestablishment of monitoring.  Will consider referral to diabetes clinic depending on clinical course.  3.  Pancreatic exocrine insufficiency with a history of malnutrition on tube feeds: Adequate enzyme replacement by history.  He has lost about 4 pounds since last visit.  Suspect it is due to time constraints and increased activity with moving homes.  No recent use of feeding tube.  He will continue monitoring weight at home.  4. Chronic CF sinus disease: Improved on modulation therapy. Continue flonase as needed.  5. Chest skin lesions: Seen by outside Dermatologist. Central chest scar could be treated by steroid injections per pt but not actively pursuing.  He is overdue for skin exam and encouraged  him to schedule this.  6. Healthcare maintenance: Annual studies due in June, 2024.  He still needs to initiate colon cancer screening now that he is turned 42 and can address this next visit.     Patient will follow up in 3 month    Total visit time today 40 minutes including detailed review of annual studies with the patient.  This is exclusive of time interpreting PFTs.    Spirometry interpretation: personally reviewed. Valid maneuver.  Mild obstruction.  FEV1 within recent baseline.  FVC up by over 5% and now back to baseline.  Rocael Miller

## 2023-06-26 NOTE — NURSING NOTE
"Telly Leal is a 42 year old year old who is being seen for RECHECK (Return Cystic Fibrosis )      Medications reviewed and Vital signs taken.    Specimen Collection Type: Sputum    Order(s) placed: CF Aerobic Bacterial    *IF AFB order placed - please enter \"PRIORITIZE AFB\" to order comments.       Lab Results   Component Value Date    ACIDFAST No acid fast bacilli seen 11/23/2021         Lab Results   Component Value Date    AFBSMS Negative for acid fast bacteria 06/18/2018    AFBSMS  06/18/2018     Less than 5ml of specimen received.  A minimum of 5 mL of sputum or fluid is recommended for recovery of acid fast bacilli   (AFB).  Volumes less than 5 mL are suboptimal and may compromise recovery of AFB from   culture.      AFBSMS  06/18/2018     Assayed at [facility] [address] [place] [phone]         Medications reviewed and vital signs taken.   Mya Julian CMA    "

## 2023-06-27 NOTE — PROGRESS NOTES
CF Annual Nutrition Assessment     Reason for Assessment  Assessed during CF clinic visit with Dr. Mliler r/t increased nutrition risk with diagnosis of CF per protocol.     Nutrition Significant PMH  Mild Lung Disease - taking Trikafta  Pancreatic Insufficient  G-tube for nutrition support  Glucose intolerance     Anthropometric Assessment  Height: 189.7 cm  Weight: 87.1 kg (actual weight)  Ideal body weight based on BMI 22 for females and 23 for males per CF Foundation recs: 82.8 kg (182 lbs)  Body mass index is 24 kg/m .    Wt Readings from Last 5 Encounters:   23 87.1 kg (192 lb)   23 87.3 kg (192 lb 8 oz)   10/31/22 87.6 kg (193 lb 2 oz)   22 85.7 kg (189 lb)   21 89.4 kg (197 lb)     Comments: Good weight stability over the past year, pt slightly concerned that his weight is still a bit down from  however.  Normal BMI, good/stable body composition based on brief nutrition-focused physical assessment.     Pancreatic Enzymes  Brand:  Creon [id number]  PO Dosin with meals, 6 with snacks = 2204 units lipase/kg/meal  TF Dosing:  3-4 caps w/ tube feeding (1 can)   Estimated Daily Intake: 40 caps = 33357 units/kg/day - slightly above recommendation but reduced from previous total of 50 caps.    Signs of Malabsorption: No - hx increased stool burden but not currently needing Miralax.   Enzyme Program:  None - information provided during previous visits     Nutrition-Related Lab & Vitamin/Mineral Supplements  Annual studies completed today.    Vitamin A- pending  Vitamin D- 40 wnl  Vitamin E- pending  Iron- 59, borderline low  Lipid Panel- wnl outside of low HDL (34)     OGTT- today, results indicating impaired glucose tolerance, slightly worsened from last test in 2019  DEXA - , lowest z-score 0.1      Current Vitamin/Mineral Supplements: MVW Complete 1 capsule daily. Reports taking daily. Obtains from [facility].     Diet History and Assessment  Diet Preferences/Allergies/Intolerances:  Regular    Intake Recall/Comments: Appetite only fair at baseline but makes an effort to eat consistently. Consumes 2-3 meals and snacks. Historically has not been able to maintain weight without use of TF but has been able to do this more consistently over the past year. He monitors his weight closely.      Diet Recall - per previous  Breakfast- skips or may have eggs, toast  Lunch- sandwich or eats out, fast food  Dinner- protein with starch and vegetable; pasta or steak/chicken with veggies  HS snack- ice cream    Calcium: gallon whole milk/week + yogurt, string cheese, adequate  Salt: likely adequate from foods, no sx of deficiency per pt  Hydration: Water, Gatorade, some soda  Supplements: No   Tube Feeding: Currently not using regularly, only as backup in case of wt loss or illness --   Type of Feeding Tube: G-tube (button)  Formula: Nutren 2.0  Rate/Frequency: 135 ml/hr x 1 can + water added (~4 hrs infusion time)  Nutrition: 250 mls, 500 kcals, 21 g protein, 23 g fat, 54 g CHO  Enzymes: taking 9-10 caps before infusion along with HS snack (accounting for ~3-4 caps per 1 can TF) provides ~4000 units lipase/g fat in feeds.      NUTRITION DIAGNOSIS  Impaired nutrient utilization related to CF pancreatic insufficiency as evidenced by requires pancreatic enzyme replacement therapy, vitamin/mineral supplementation, and higher kcal/protein diet and nutrition support in order to maintain ideal body weight and nutrition status.     INTERVENTIONS/RECOMMENDATIONS   1) Oral Intake/Weight:   BEE: 2100   4494-8066 kcals/day =  150-175% BEE  110-140 g protein/day = 1.2-1.5 g/kg    Discussed current nutrition status including weight trends, PO, and maintenance of nutrition status while holding TF infusions.  Congratulated pt on successful weight maintenance, discussed maintaining balanced, high quality nutrition intake and using TF as a back-up supplement for nutrition in the case of weight loss, appetite loss, or illness.  Pt has some skin overgrowth around FT - he usually manages this with his dermatology team. Plans to visit with them at his next visit to the [facility] for silver nitrate treatment.     Regarding OGTT results (impaired glucose tolerance) we talked about managing glycemia and beginning to use a glucometer.  New glucometer and strips will be prescribed today by pharmacist, pt will consult with retail pharmacist for meter tutorial.  Advised him to use glucometer to check glucoses when he suspects he is low or high, due to symptoms/intake, or in the mornings and evenings.  Referred to provider for further diagnostics and education.     2) Enzymes:  No GI concerns noted today and will continue with current enzyme dose.      3) Vitamin/Mineral Supplementation:  Annual studies done today. Will contact pt if any changes are needed to regimen pending updated vitamin levels.      GOALS:  1) Weight maintenance with BMI >23 kg/m2  2) Take enzymes and vitamins as recommended     FOLLOW-UP/MONITORING:  Visit patient within 12 months for annual nutrition reassessment.     Time Spent In Face-to-Face Patient Interactions: 15 minutes      Karissa Ashraf MS, RD, LD (pager [phone])  Cystic Fibrosis/Lung Transplant Dietitian      -Available Mon-Thurs 8 AM-4:30 PM. On Fridays please contact pager [phone] (Leona Marie RD) and on weekends/holidays contact coverage dietitian at pager [phone] (inpatient use only).

## 2023-06-28 LAB — TESTOST SERPL-MCNC: 385 NG/DL (ref 240–950)

## 2023-06-29 DIAGNOSIS — E84.9 CF (CYSTIC FIBROSIS) (H): ICD-10-CM

## 2023-06-29 DIAGNOSIS — E53.8 VITAMIN B12 DEFICIENCY DISEASE: ICD-10-CM

## 2023-06-29 DIAGNOSIS — E84.0 CYSTIC FIBROSIS WITH PULMONARY MANIFESTATIONS (H): ICD-10-CM

## 2023-06-29 LAB
A-TOCOPHEROL VIT E SERPL-MCNC: 12.5 MG/L
ANNOTATION COMMENT IMP: NORMAL
BETA+GAMMA TOCOPHEROL SERPL-MCNC: 0.2 MG/L
RETINYL PALMITATE SERPL-MCNC: 0.03 MG/L
VIT A SERPL-MCNC: 0.66 MG/L

## 2023-06-29 RX ORDER — AZITHROMYCIN 250 MG/1
250 TABLET, FILM COATED ORAL DAILY
Qty: 30 TABLET | Refills: 11 | Status: SHIPPED | OUTPATIENT
Start: 2023-06-29 | End: 2024-06-05

## 2023-06-29 RX ORDER — ALBUTEROL SULFATE 90 UG/1
1-2 AEROSOL, METERED RESPIRATORY (INHALATION) EVERY 6 HOURS PRN
Qty: 8.5 G | Refills: 3 | Status: SHIPPED | OUTPATIENT
Start: 2023-06-29

## 2023-06-29 NOTE — PROGRESS NOTES
Respiratory Therapist Note:         Vest                Brand: Hill-Rom - traditional Hill Rom: Frequencies 8, 9, 10 at pressure 10 then frequencies 18, 19, 20 at pressure 6.                Cough Pause: Cough Pause; No                Vest Garment Size: Adult Medium                Last Fitting Date: Due as needed                Frequency of therapy: 0 times per week                Concerns: Vests as needed with illness         Exercise (purposeful and aerobic for >20 minutes each session): NO.                Does this qualify as additional airway clearance: No         Alternative Airway Clearance:          Nebulized Medications                Bronchodilators: Albuterol                Mucolytic: Mucomyst                Antibiotics:                 Additional Inhaled Medications:                Spacer Use:          Review Cleaning:          Education and Transition Information                Correct order of inhaled medications: Not Applicable                Mechanism of Action of inhaled medications: Not Applicable                Frequency of inhaled medications: Not Applicable                Dosage of inhaled medications: Not Applicable                Other: Only uses nebulizers with vest therapy as needed         Home Care:                Nebulizer Cups (Brand/Type): [employer]                Nebulizer Compressor                            Year Purchased: Works                            [facility], Phone: [phone], Fax: [phone]                Nebulizer Supply Company:                            [facility], Phone: [phone], Fax: [phone]         Oxygen: N/A       Pulmonary Rehab                Site:                 Date Completed:          Plan of Care and Goals for next visit: only uses vest/nebs as needed with illness. Has everything to do vest therapy when needed.

## 2023-06-29 NOTE — PATIENT INSTRUCTIONS
See provider AVS for a summary of recommendations from today's visit.  Kathryn Cristina, PharmD  Cystic Fibrosis MTM Pharmacist  [facility] [place]

## 2023-07-01 LAB
BACTERIA SPT CULT: ABNORMAL

## 2023-09-05 ENCOUNTER — PHARMACY VISIT (OUTPATIENT)
Dept: ADMINISTRATIVE | Facility: CLINIC | Age: 43
End: 2023-09-05
Payer: COMMERCIAL

## 2023-09-05 NOTE — PROGRESS NOTES
Cystic Fibrosis Clinical Follow Up Assessment   Completed on 2023/09/05 19:51:16 [facility], by Reyna Hoffman      Activity Date 2023/09/01     Activity Medications    CREON    TRIKAFTA        Care Details    What are the patient's goals for this therapy?   ? 03/17/2023: Pt did not respond; 3/17/2022: Maintaining his health1/14/2021: Pt not respond      Did you identify any patient barriers to access and successful treatment?   ? No barriers to access identified      Is it appropriate to collect a PDC at this time? [QA Metric] (An MPR or PDC would not be appropriate for cycled medications or if the patient is on therapy   ? Yes      Document PDC   ? 0.98      Has the patient missed doses inappropriately?   ? No      Please select CURRENT side effect(s) for monitoring:   ? None          Summary Notes  I had the pleasure of speaking to pt for TM. States everything has been going great with his medications. No side effects. Remembering his doses: things are going smoothly. No health, allergy or medication changes. No questions or concerns.   - Will continue biannual TM.       Angela HOFFMAN, PharmD, CSP  Therapy Management Pharmacist  [address] [address]   [email or web address]  [email or web address]   Specialty: [phone]  Mail Order: [phone]

## 2023-10-24 ENCOUNTER — TELEPHONE (OUTPATIENT)
Dept: PULMONOLOGY | Facility: CLINIC | Age: 43
End: 2023-10-24
Payer: COMMERCIAL

## 2023-10-24 NOTE — TELEPHONE ENCOUNTER
PA Initiation    Medication: TRIKAFTA 100-50-75 & 150 MG PO TBPK  Insurance Company: [employer] - Phone [phone] Fax [phone]  Pharmacy Filling the Rx:    Filling Pharmacy Phone:    Filling Pharmacy Fax:    Start Date: 10/24/2023    Faxed PA renewal form and notes to [phone] / PA number [phone]

## 2023-10-27 NOTE — TELEPHONE ENCOUNTER
Prior Authorization Approval    Medication: TRIKAFTA 100-50-75 & 150 MG PO TBPK  Authorization Effective Date: 10/25/2023  Authorization Expiration Date: 10/24/2024  Approved Dose/Quantity: 84 for 28 ds   Reference #: PA number [id number]   Insurance Company: [employer] - Phone [phone] Fax [phone]  Expected CoPay: $ 0  CoPay Card Available: Other (see comments)    Financial Assistance Needed: no, Telly already has copay card   [facility] - [place], [place] - [address]  Pharmacy Notified:   Patient Notified:

## 2023-11-14 DIAGNOSIS — E84.9 CYSTIC FIBROSIS (H): Primary | ICD-10-CM

## 2023-11-27 ENCOUNTER — OFFICE VISIT (OUTPATIENT)
Dept: PULMONOLOGY | Facility: CLINIC | Age: 43
End: 2023-11-27
Attending: INTERNAL MEDICINE
Payer: COMMERCIAL

## 2023-11-27 ENCOUNTER — LAB (OUTPATIENT)
Dept: LAB | Facility: CLINIC | Age: 43
End: 2023-11-27
Payer: COMMERCIAL

## 2023-11-27 VITALS — OXYGEN SATURATION: 96 % | SYSTOLIC BLOOD PRESSURE: 99 MMHG | DIASTOLIC BLOOD PRESSURE: 70 MMHG | HEART RATE: 111 BPM

## 2023-11-27 DIAGNOSIS — E84.9 CYSTIC FIBROSIS (H): ICD-10-CM

## 2023-11-27 DIAGNOSIS — E84.0 CYSTIC FIBROSIS WITH PULMONARY MANIFESTATIONS (H): Primary | ICD-10-CM

## 2023-11-27 DIAGNOSIS — E84.9 CF (CYSTIC FIBROSIS) (H): ICD-10-CM

## 2023-11-27 DIAGNOSIS — Z12.11 ENCOUNTER FOR SCREENING COLONOSCOPY: ICD-10-CM

## 2023-11-27 LAB
ALBUMIN SERPL BCG-MCNC: 4.4 G/DL (ref 3.5–5.2)
ALP SERPL-CCNC: 82 U/L (ref 40–150)
ALT SERPL W P-5'-P-CCNC: 25 U/L (ref 0–70)
AST SERPL W P-5'-P-CCNC: 33 U/L (ref 0–45)
BILIRUB DIRECT SERPL-MCNC: 0.44 MG/DL (ref 0–0.3)
BILIRUB SERPL-MCNC: 2.4 MG/DL
CK SERPL-CCNC: 115 U/L (ref 39–308)
EXPTIME-PRE: 13.5 SEC
FEF2575-%PRED-PRE: 33 %
FEF2575-PRE: 1.43 L/SEC
FEF2575-PRED: 4.29 L/SEC
FEFMAX-%PRED-PRE: 89 %
FEFMAX-PRE: 10.07 L/SEC
FEFMAX-PRED: 11.26 L/SEC
FEV1-%PRED-PRE: 77 %
FEV1-PRE: 3.5 L
FEV1FEV6-PRE: 60 %
FEV1FEV6-PRED: 82 %
FEV1FVC-PRE: 54 %
FEV1FVC-PRED: 80 %
FIFMAX-PRE: 8.03 L/SEC
FVC-%PRED-PRE: 114 %
FVC-PRE: 6.46 L
FVC-PRED: 5.64 L
PROT SERPL-MCNC: 7.1 G/DL (ref 6.4–8.3)

## 2023-11-27 PROCEDURE — 99211 OFF/OP EST MAY X REQ PHY/QHP: CPT | Performed by: INTERNAL MEDICINE

## 2023-11-27 PROCEDURE — 87077 CULTURE AEROBIC IDENTIFY: CPT | Performed by: INTERNAL MEDICINE

## 2023-11-27 PROCEDURE — 36415 COLL VENOUS BLD VENIPUNCTURE: CPT | Performed by: PATHOLOGY

## 2023-11-27 PROCEDURE — 87107 FUNGI IDENTIFICATION MOLD: CPT | Performed by: INTERNAL MEDICINE

## 2023-11-27 PROCEDURE — 82550 ASSAY OF CK (CPK): CPT | Performed by: PATHOLOGY

## 2023-11-27 PROCEDURE — 80076 HEPATIC FUNCTION PANEL: CPT | Performed by: PATHOLOGY

## 2023-11-27 PROCEDURE — 99215 OFFICE O/P EST HI 40 MIN: CPT | Mod: 25 | Performed by: INTERNAL MEDICINE

## 2023-11-27 PROCEDURE — 94375 RESPIRATORY FLOW VOLUME LOOP: CPT | Performed by: INTERNAL MEDICINE

## 2023-11-27 RX ORDER — ALBUTEROL SULFATE 0.83 MG/ML
2.5 SOLUTION RESPIRATORY (INHALATION) 3 TIMES DAILY PRN
Qty: 360 ML | Refills: 11 | Status: SHIPPED | OUTPATIENT
Start: 2023-11-27

## 2023-11-27 RX ORDER — CROMOLYN SODIUM 20 MG/2ML
20 INHALANT INTRABRONCHIAL 3 TIMES DAILY
Qty: 180 ML | Refills: 12 | Status: SHIPPED | OUTPATIENT
Start: 2023-11-27

## 2023-11-27 RX ORDER — ELEXACAFTOR, TEZACAFTOR, AND IVACAFTOR 100-50-75
KIT ORAL
Qty: 84 TABLET | Refills: 5 | Status: SHIPPED | OUTPATIENT
Start: 2023-11-27 | End: 2024-05-06

## 2023-11-27 RX ORDER — PEDIATRIC MULTIVIT 61/D3/VIT K 1500-800
CAPSULE ORAL
Qty: 60 CAPSULE | Refills: 11 | Status: SHIPPED | OUTPATIENT
Start: 2023-11-27

## 2023-11-27 NOTE — PROGRESS NOTES
Reason for Visit  Telly Leal is being seen for Cystic Fibrosis (F/u)    CF HPI  The patient was seen and examined by Rocael Miller   The patient reports overall doing well since his last clinic visit from a respiratory standpoint.  He has not had any sustained increase in respiratory symptoms.  He has not felt the need to do vest or nebs since June.  He produces minimal amounts of mucus and relatively infrequent cough.  No chest pain or hemoptysis.  No formal aerobic exercise but is having baseline degree of exertional dyspnea with activities of daily living.  Current Outpatient Medications   Medication    acetylcysteine (MUCOMYST) 20 % neb solution    albuterol (PROAIR HFA) 108 (90 Base) MCG/ACT inhaler    albuterol (PROVENTIL) (2.5 MG/3ML) 0.083% neb solution    azithromycin (ZITHROMAX) 250 MG tablet    blood glucose (ACCU-CHEK GUIDE) test strip    blood glucose monitoring (SOFTCLIX) lancets    CREON 64916-26570 units CPEP per EC capsule    cromolyn (INTAL) 20 MG/2ML neb solution    famotidine (PEPCID) 20 MG tablet    fluticasone (FLONASE) 50 MCG/ACT nasal spray    mvw complete formulation (SOFTGELS) capsule    Nutritional Supplements (NUTREN 2.0)    sodium chloride inhalant 7 % NEBU neb solution    sodium chloride inhalant 7 % NEBU neb solution    TRIKAFTA 100-50-75 & 150 MG tablet pack     No current facility-administered medications for this visit.     No Known Allergies  Past Medical History:   Diagnosis Date    Chronic sinusitis     Cystic fibrosis with pulmonary manifestations (H)     Exocrine pancreatic insufficiency     GERD (gastroesophageal reflux disease)     Hemoptysis     Malnutrition (H)        Past Surgical History:   Procedure Laterality Date    IR MISCELLANEOUS PROCEDURE  2/6/2001    LOBECTOMY      right upper lobe    TUBES         Social History     Socioeconomic History    Marital status:      Spouse name: Not on file    Number of children: Not on file    Years of education:  Not on file    Highest education level: Not on file   Occupational History    Occupation:      Employer: [employer]   Tobacco Use    Smoking status: Never    Smokeless tobacco: Never   Substance and Sexual Activity    Alcohol use: Not Currently     Alcohol/week: 0.0 standard drinks of alcohol    Drug use: Not Currently    Sexual activity: Not on file   Other Topics Concern    Parent/sibling w/ CABG, MI or angioplasty before 65F 55M? Not Asked   Social History Narrative    Patient works for the [employer] as an .  He does not have stable living conditions at this time.  He has a Lao guillen.     Social Determinants of Health     Financial Resource Strain: Not on file   Food Insecurity: Not on file   Transportation Needs: Not on file   Physical Activity: Not on file   Stress: Not on file   Social Connections: Not on file   Interpersonal Safety: Not on file   Housing Stability: Not on file   Update: living with wife Bre in [facility]. Birth of daughter Nieves Rebolledo August, 2017, birth of daughter Josy Felder November, 2019    ROS Pulmonary  Const: No fever chills sweats.  GI: No diarrhea constipation or grease in his stools.  No recent use of tube feeds.  Reports increased appetite lately.  Some irritation again developing around his feeding tube site.  Endocrine: Patient ended up not getting started on glucose monitoring as planned at his last visit.  A complete ROS was otherwise negative except as noted in the HPI.  BP 99/70   Pulse 111   SpO2 96% , weight 88.2 kg (up 1.1 kg), BMI 24.4  Exam:   GENERAL APPEARANCE: Well developed, well nourished, alert, and in no apparent distress.  EYES: anicteric with injected appearing conjunctiva  HENT: No nasal discharge.  Oral mucosa is moist, without any lesions, no tonsillar enlargement, no oropharyngeal exudate.  RESP:  good air flow throughout.  Clear to auscultation bilaterally.  Normal chest wall excursion.    CV: Normal  S1, S2, regular rhythm, normal rate. No murmur. No gallop. No LE edema.   ABDOMEN:  Bowel sounds normal, soft, nontender, no HSM or masses.   MS: extremities normal. No cyanosis.  SKIN:  PEG site with small amount of granulation tissue similar to previous.  Chest scar-like lesion upper sternum unchanged. Diffuse freckles without overt change.  NEURO: Mentation intact, speech normal, normal gait and stance  PSYCH: mentation appears normal. and affect normal/bright  Results:  Recent Results (from the past 168 hour(s))   General PFT Lab (Please always keep checked)    Collection Time: 11/27/23  3:02 PM   Result Value Ref Range    FVC-Pred 5.64 L    FVC-Pre 6.46 L    FVC-%Pred-Pre 114 %    FEV1-Pre 3.50 L    FEV1-%Pred-Pre 77 %    FEV1FVC-Pred 80 %    FEV1FVC-Pre 54 %    FEFMax-Pred 11.26 L/sec    FEFMax-Pre 10.07 L/sec    FEFMax-%Pred-Pre 89 %    FEF2575-Pred 4.29 L/sec    FEF2575-Pre 1.43 L/sec    QJS7901-%Pred-Pre 33 %    ExpTime-Pre 13.50 sec    FIFMax-Pre 8.03 L/sec    FEV1FEV6-Pred 82 %    FEV1FEV6-Pre 60 %   CK total    Collection Time: 11/27/23  4:46 PM   Result Value Ref Range     39 - 308 U/L   Hepatic function panel    Collection Time: 11/27/23  4:46 PM   Result Value Ref Range    Protein Total 7.1 6.4 - 8.3 g/dL    Albumin 4.4 3.5 - 5.2 g/dL    Bilirubin Total 2.4 (H) <=1.2 mg/dL    Alkaline Phosphatase 82 40 - 150 U/L    AST 33 0 - 45 U/L    ALT 25 0 - 70 U/L    Bilirubin Direct 0.44 (H) 0.00 - 0.30 mg/dL     Spirometry interpretation: personally reviewed. Valid maneuver.  Mild obstruction.  Values up slightly from previous.  He is at his baseline.    Most recent respiratory culture June, 2023 with 1 strain of Pseudomonas and Aspergillus fumigatus    Assessment and plan:  1.  Cystic fibrosis lung disease: He continues to have good symptom control and overall trend upward in pulmonary function tests in recent months despite infrequent prophylactic airway clearance.  Encouraged patient to determine a  set placed for doing airway clearance within his new home and to see how he tolerates 7% saline instead of Mucomyst, as this may be less of a barrier to using airway clearance in terms of order.  Encouraged patient to remain physically active as able.  Still needs to be determined to what extent he can continue to do well without prophylactic airway clearance but did reinforce the importance of promptly resuming vest therapy twice a day minimum if he becomes symptomatic.  Favor continuing chronic azithromycin for now.  Currently not on prophylactic inhaled antibiotic.  1B.  CFTR modulation therapy:  As above, he has had an impressive response to Trikafta.  No transaminitis on LFTs today but total bilirubin again elevated.  Degree of elevation similar to previous and will defer additional workup for this.  Risk versus benefit favors continued full dose.  2. Impaired glucose tolerance: 2-hour glucose greater than 140 on June, 2023 study.  Hemoglobin A1c down to 5.8%.  He is not having recurrence of hypoglycemia symptoms.  We have reordered supplies to initiate home glucose monitoring today.  3.  Pancreatic exocrine insufficiency with a history of malnutrition on tube feeds: Adequate enzyme replacement by history.  His weight has improved with BMI up to 24.4, which is at target.  He has not used tube feeds in over 1 year.  He prefers to keep his feeding tube in place as a precaution for now.  Will revisit this over time, particularly if having any complications related to the tube.  4. Chronic CF sinus disease: Improved on modulation therapy. Continue flonase as needed.  5. Chest skin lesions: Seen by outside Dermatologist. Central chest scar could be treated by steroid injections per pt but not actively pursuing.  He is overdue for skin exam and again encouraged him to schedule this.  6. Healthcare maintenance: Annual studies due in June, 2024.  He already received flu vaccine through his employer last month.  He  would rather hold off on COVID-19 vaccination.  He is due for colon cancer screening having cystic fibrosis and age greater than 40.  Explained rationale for this.  He is amenable to pursuing this.  CF team has provided contact information for him to schedule this.    Patient will follow up in 3 month    Total visit time today 40 minutes.  This is exclusive of time interpreting PFTs.    Rocael Miller

## 2023-11-27 NOTE — NURSING NOTE
"Telly Leal is a 43 year old year old who is being seen for Cystic Fibrosis (F/u)      Medications reviewed and Vital signs taken.    Specimen Collection Type: Sputum, Less than 1ml of sputum, and Mostly spit    Order(s) placed: CF Aerobic Bacterial    *IF AFB order placed - please enter \"PRIORITIZE AFB\" to order comments.       Lab Results   Component Value Date    ACIDFAST No acid fast bacilli seen 11/23/2021         Lab Results   Component Value Date    AFBSMS Negative for acid fast bacteria 06/18/2018    AFBSMS  06/18/2018     Less than 5ml of specimen received.  A minimum of 5 mL of sputum or fluid is recommended for recovery of acid fast bacilli   (AFB).  Volumes less than 5 mL are suboptimal and may compromise recovery of AFB from   culture.      AFBSMS  06/18/2018     Assayed at [facility] [address] [place] [id number] [phone]           "

## 2023-11-28 NOTE — ADDENDUM NOTE
Addended by: LISANDRO HOFFMAN on: 11/28/2023 03:27 PM     Modules accepted: Orders     Lot #: [id number] Lot #: [id number]

## 2023-11-29 ENCOUNTER — TELEPHONE (OUTPATIENT)
Dept: PULMONOLOGY | Facility: CLINIC | Age: 43
End: 2023-11-29
Payer: COMMERCIAL

## 2023-11-29 NOTE — TELEPHONE ENCOUNTER
Left Voicemail (1st Attempt) for the patient to call back and schedule the following:    Appointment type: F  Provider: YANET  Return date: 02/27/24  Specialty phone number: [phone]  Additional appointment(s) needed: CF LOOP  Additonal Notes: NA

## 2023-12-01 ENCOUNTER — TELEPHONE (OUTPATIENT)
Dept: PULMONOLOGY | Facility: CLINIC | Age: 43
End: 2023-12-01
Payer: COMMERCIAL

## 2023-12-01 NOTE — TELEPHONE ENCOUNTER
Left Voicemail (2nd Attempt) for the patient to call back and schedule the following:    Appointment type: F  Provider: YANET  Return date: 02/27/24  Specialty phone number: [phone]  Additional appointment(s) needed: CF LOOP  Additonal Notes: NA

## 2023-12-02 LAB
BACTERIA SPT CULT: ABNORMAL

## 2023-12-11 ENCOUNTER — CLINICAL UPDATE (OUTPATIENT)
Dept: PHARMACY | Facility: CLINIC | Age: 43
End: 2023-12-11
Payer: COMMERCIAL

## 2023-12-11 DIAGNOSIS — E84.9 CF (CYSTIC FIBROSIS) (H): Primary | ICD-10-CM

## 2023-12-11 PROCEDURE — 99207 PR NO CHARGE LOS: CPT | Performed by: PHARMACIST

## 2023-12-11 NOTE — PROGRESS NOTES
Clinical Update:                                                    A chart review was conducted for Telly Leal.    Reason for Chart Review: Trikafta 6 Month Lab Follow Up     Discussion: Telly has been on Trikafta since 12/3/19. Telly has had previous elevations in bilirubin which has required closer monitoring. Per chart review, patient continues full dose Trikafta .    Labs were reviewed from  11/27/23  at [facility] . Bilirubin is elevated at 2x the upper limit of normal which is elevated from previous lab draw.    Lab Results   Component Value Date    ALT 25 11/27/2023    AST 33 11/27/2023    BILITOTAL 2.4 (H) 11/27/2023    DBIL 0.44 (H) 11/27/2023     11/27/2023     Per discussion with Dr. Rocael Miller MD, consider repeat hepatic panel in 2 - 4 weeks given recent change in elevations. If bilirubin remains elevated, consider abdominal ultrasound. Pending ultrasound results, would consider GI/hepatology work-up.     Plan:  1. Continue Trikafta   2. Recheck hepatic panel in 1 month. If bilirubin remains elevated, pursue abdominal ultrasound.       Kathryn Cristina, PharmD   Medication Therapy Management   Cystic Fibrosis Pharmacist

## 2024-01-25 NOTE — LETTER
11/27/2023         RE: Telly Leal  [address] [facility] [address]        Dear Colleague,    Thank you for referring your patient, Telly Leal, to the [facility] FOR LUNG SCIENCE AND [facility] [place]. Please see a copy of my visit note below.    Reason for Visit  Telly Leal is being seen for Cystic Fibrosis (F/u)    CF HPI  The patient was seen and examined by Rocael Miller   The patient reports overall doing well since his last clinic visit from a respiratory standpoint.  He has not had any sustained increase in respiratory symptoms.  He has not felt the need to do vest or nebs since June.  He produces minimal amounts of mucus and relatively infrequent cough.  No chest pain or hemoptysis.  No formal aerobic exercise but is having baseline degree of exertional dyspnea with activities of daily living.  Current Outpatient Medications   Medication     acetylcysteine (MUCOMYST) 20 % neb solution     albuterol (PROAIR HFA) 108 (90 Base) MCG/ACT inhaler     albuterol (PROVENTIL) (2.5 MG/3ML) 0.083% neb solution     azithromycin (ZITHROMAX) 250 MG tablet     blood glucose (ACCU-CHEK GUIDE) test strip     blood glucose monitoring (SOFTCLIX) lancets     CREON 31589-30336 units CPEP per EC capsule     cromolyn (INTAL) 20 MG/2ML neb solution     famotidine (PEPCID) 20 MG tablet     fluticasone (FLONASE) 50 MCG/ACT nasal spray     mvw complete formulation (SOFTGELS) capsule     Nutritional Supplements (NUTREN 2.0)     sodium chloride inhalant 7 % NEBU neb solution     sodium chloride inhalant 7 % NEBU neb solution     TRIKAFTA 100-50-75 & 150 MG tablet pack     No current facility-administered medications for this visit.     No Known Allergies  Past Medical History:   Diagnosis Date     Chronic sinusitis      Cystic fibrosis with pulmonary manifestations (H)      Exocrine pancreatic insufficiency      GERD (gastroesophageal reflux disease)      Hemoptysis       Malnutrition (H)        Past Surgical History:   Procedure Laterality Date     IR MISCELLANEOUS PROCEDURE  2/6/2001     LOBECTOMY      right upper lobe     TUBES         Social History     Socioeconomic History     Marital status:      Spouse name: Not on file     Number of children: Not on file     Years of education: Not on file     Highest education level: Not on file   Occupational History     Occupation:      Employer: [employer]   Tobacco Use     Smoking status: Never     Smokeless tobacco: Never   Substance and Sexual Activity     Alcohol use: Not Currently     Alcohol/week: 0.0 standard drinks of alcohol     Drug use: Not Currently     Sexual activity: Not on file   Other Topics Concern     Parent/sibling w/ CABG, MI or angioplasty before 65F 55M? Not Asked   Social History Narrative    Patient works for the [employer] as an .  He does not have stable living conditions at this time.  He has a Chinese guillen.     Social Determinants of Health     Financial Resource Strain: Not on file   Food Insecurity: Not on file   Transportation Needs: Not on file   Physical Activity: Not on file   Stress: Not on file   Social Connections: Not on file   Interpersonal Safety: Not on file   Housing Stability: Not on file   Update: living with wife Bre in [facility]. Birth of daughter Nieves Rebolledo August, 2017, birth of daughter Josy Felder November, 2019    ROS Pulmonary  Const: No fever chills sweats.  GI: No diarrhea constipation or grease in his stools.  No recent use of tube feeds.  Reports increased appetite lately.  Some irritation again developing around his feeding tube site.  Endocrine: Patient ended up not getting started on glucose monitoring as planned at his last visit.  A complete ROS was otherwise negative except as noted in the HPI.  BP 99/70   Pulse 111   SpO2 96% , weight 88.2 kg (up 1.1 kg), BMI 24.4  Exam:   GENERAL APPEARANCE: Well developed, well  nourished, alert, and in no apparent distress.  EYES: anicteric with injected appearing conjunctiva  HENT: No nasal discharge.  Oral mucosa is moist, without any lesions, no tonsillar enlargement, no oropharyngeal exudate.  RESP:  good air flow throughout.  Clear to auscultation bilaterally.  Normal chest wall excursion.    CV: Normal S1, S2, regular rhythm, normal rate. No murmur. No gallop. No LE edema.   ABDOMEN:  Bowel sounds normal, soft, nontender, no HSM or masses.   MS: extremities normal. No cyanosis.  SKIN:  PEG site with small amount of granulation tissue similar to previous.  Chest scar-like lesion upper sternum unchanged. Diffuse freckles without overt change.  NEURO: Mentation intact, speech normal, normal gait and stance  PSYCH: mentation appears normal. and affect normal/bright  Results:  Recent Results (from the past 168 hour(s))   General PFT Lab (Please always keep checked)    Collection Time: 11/27/23  3:02 PM   Result Value Ref Range    FVC-Pred 5.64 L    FVC-Pre 6.46 L    FVC-%Pred-Pre 114 %    FEV1-Pre 3.50 L    FEV1-%Pred-Pre 77 %    FEV1FVC-Pred 80 %    FEV1FVC-Pre 54 %    FEFMax-Pred 11.26 L/sec    FEFMax-Pre 10.07 L/sec    FEFMax-%Pred-Pre 89 %    FEF2575-Pred 4.29 L/sec    FEF2575-Pre 1.43 L/sec    [id number]-%Pred-Pre 33 %    ExpTime-Pre 13.50 sec    FIFMax-Pre 8.03 L/sec    FEV1FEV6-Pred 82 %    FEV1FEV6-Pre 60 %   CK total    Collection Time: 11/27/23  4:46 PM   Result Value Ref Range     39 - 308 U/L   Hepatic function panel    Collection Time: 11/27/23  4:46 PM   Result Value Ref Range    Protein Total 7.1 6.4 - 8.3 g/dL    Albumin 4.4 3.5 - 5.2 g/dL    Bilirubin Total 2.4 (H) <=1.2 mg/dL    Alkaline Phosphatase 82 40 - 150 U/L    AST 33 0 - 45 U/L    ALT 25 0 - 70 U/L    Bilirubin Direct 0.44 (H) 0.00 - 0.30 mg/dL     Spirometry interpretation: personally reviewed. Valid maneuver.  Mild obstruction.  Values up slightly from previous.  He is at his baseline.    Most recent  respiratory culture June, 2023 with 1 strain of Pseudomonas and Aspergillus fumigatus    Assessment and plan:  1.  Cystic fibrosis lung disease: He continues to have good symptom control and overall trend upward in pulmonary function tests in recent months despite infrequent prophylactic airway clearance.  Encouraged patient to determine a set placed for doing airway clearance within his new home and to see how he tolerates 7% saline instead of Mucomyst, as this may be less of a barrier to using airway clearance in terms of order.  Encouraged patient to remain physically active as able.  Still needs to be determined to what extent he can continue to do well without prophylactic airway clearance but did reinforce the importance of promptly resuming vest therapy twice a day minimum if he becomes symptomatic.  Favor continuing chronic azithromycin for now.  Currently not on prophylactic inhaled antibiotic.  1B.  CFTR modulation therapy:  As above, he has had an impressive response to Trikafta.  No transaminitis on LFTs today but total bilirubin again elevated.  Degree of elevation similar to previous and will defer additional workup for this.  Risk versus benefit favors continued full dose.  2. Impaired glucose tolerance: 2-hour glucose greater than 140 on June, 2023 study.  Hemoglobin A1c down to 5.8%.  He is not having recurrence of hypoglycemia symptoms.  We have reordered supplies to initiate home glucose monitoring today.  3.  Pancreatic exocrine insufficiency with a history of malnutrition on tube feeds: Adequate enzyme replacement by history.  His weight has improved with BMI up to 24.4, which is at target.  He has not used tube feeds in over 1 year.  He prefers to keep his feeding tube in place as a precaution for now.  Will revisit this over time, particularly if having any complications related to the tube.  4. Chronic CF sinus disease: Improved on modulation therapy. Continue flonase as needed.  5. Chest  skin lesions: Seen by outside Dermatologist. Central chest scar could be treated by steroid injections per pt but not actively pursuing.  He is overdue for skin exam and again encouraged him to schedule this.  6. Healthcare maintenance: Annual studies due in June, 2024.  He already received flu vaccine through his employer last month.  He would rather hold off on COVID-19 vaccination.  He is due for colon cancer screening having cystic fibrosis and age greater than 40.  Explained rationale for this.  He is amenable to pursuing this.  CF team has provided contact information for him to schedule this.    Patient will follow up in 3 month    Total visit time today 40 minutes.  This is exclusive of time interpreting PFTs.    Rocael Miller                   Again, thank you for allowing me to participate in the care of your patient.        Sincerely,        Rocael Miller MD   [id number]

## 2024-03-18 ENCOUNTER — PHARMACY VISIT (OUTPATIENT)
Dept: ADMINISTRATIVE | Facility: CLINIC | Age: 44
End: 2024-03-18
Payer: COMMERCIAL

## 2024-03-18 NOTE — PROGRESS NOTES
Cystic Fibrosis Clinical Follow Up Assessment   Completed on 2024/03/18 16:54:33 [facility], by Reyna Hoffman      Activity Date 2024/03/18     Activity Medications    CREON    TRIKAFTA        Care Details    What are the patient's goals for this therapy?   ? 03/17/2023: Pt did not respond3/17/2022: Maintaining his health1/14/2021: Pt not respond      Did you identify any patient barriers to access and successful treatment?   ? No barriers to access identified      Is it appropriate to collect a PDC at this time? [QA Metric] (An MPR or PDC would not be appropriate for cycled medications or if the patient is on therapy   ? Yes      Document PDC   ? 0.97      Has the patient missed doses inappropriately?   ? No      Please select CURRENT side effect(s) for monitoring:   ? None          Summary Notes  I had the pleasure of speaking to pt for TM.   States everything has been going great. No side effects. Doses: states he has been consistent in taking every dose.   - Pt did not respond to further TM questions.   - Will continue biannual TM.       Angela HOFFMAN, PharmD, CSP  Therapy Management Pharmacist  [address] [address]   [email or web address]  [email or web address]   Specialty: [phone]  Mail Order: [phone]

## 2024-03-26 ENCOUNTER — MYC MEDICAL ADVICE (OUTPATIENT)
Dept: PHARMACY | Facility: CLINIC | Age: 44
End: 2024-03-26
Payer: COMMERCIAL

## 2024-04-04 NOTE — TELEPHONE ENCOUNTER
Called regarding 3/26/24 myc message, no answer, LVM.    Cyndi Naranjo, PharmD  Medication Therapy Management Pharmacist

## 2024-05-06 DIAGNOSIS — E84.9 CF (CYSTIC FIBROSIS) (H): ICD-10-CM

## 2024-05-06 DIAGNOSIS — E53.8 VITAMIN B12 DEFICIENCY DISEASE: ICD-10-CM

## 2024-05-06 DIAGNOSIS — E84.0 CYSTIC FIBROSIS WITH PULMONARY MANIFESTATIONS (H): ICD-10-CM

## 2024-05-06 RX ORDER — ELEXACAFTOR, TEZACAFTOR, AND IVACAFTOR 100-50-75
KIT ORAL
Qty: 84 TABLET | Refills: 0 | Status: SHIPPED | OUTPATIENT
Start: 2024-05-06 | End: 2024-05-14

## 2024-05-07 ENCOUNTER — MYC MEDICAL ADVICE (OUTPATIENT)
Dept: PULMONOLOGY | Facility: CLINIC | Age: 44
End: 2024-05-07
Payer: COMMERCIAL

## 2024-05-07 RX ORDER — ACETYLCYSTEINE 200 MG/ML
2 SOLUTION ORAL; RESPIRATORY (INHALATION) 2 TIMES DAILY
Qty: 180 ML | Refills: 12 | Status: SHIPPED | OUTPATIENT
Start: 2024-05-07

## 2024-05-13 ENCOUNTER — LAB (OUTPATIENT)
Dept: LAB | Facility: CLINIC | Age: 44
End: 2024-05-13
Payer: COMMERCIAL

## 2024-05-13 DIAGNOSIS — E84.9 CYSTIC FIBROSIS (H): ICD-10-CM

## 2024-05-13 LAB
ALBUMIN SERPL BCG-MCNC: 4.4 G/DL (ref 3.5–5.2)
ALP SERPL-CCNC: 88 U/L (ref 40–150)
ALT SERPL W P-5'-P-CCNC: 33 U/L (ref 0–70)
AST SERPL W P-5'-P-CCNC: 33 U/L (ref 0–45)
BILIRUB DIRECT SERPL-MCNC: 0.37 MG/DL (ref 0–0.3)
BILIRUB SERPL-MCNC: 2.2 MG/DL
CK SERPL-CCNC: 122 U/L (ref 39–308)
PROT SERPL-MCNC: 7 G/DL (ref 6.4–8.3)

## 2024-05-13 PROCEDURE — 36415 COLL VENOUS BLD VENIPUNCTURE: CPT

## 2024-05-13 PROCEDURE — 80076 HEPATIC FUNCTION PANEL: CPT

## 2024-05-13 PROCEDURE — 82550 ASSAY OF CK (CPK): CPT

## 2024-05-14 ENCOUNTER — ALLIED HEALTH/NURSE VISIT (OUTPATIENT)
Dept: CARE COORDINATION | Facility: CLINIC | Age: 44
End: 2024-05-14

## 2024-05-14 ENCOUNTER — OFFICE VISIT (OUTPATIENT)
Dept: PULMONOLOGY | Facility: CLINIC | Age: 44
End: 2024-05-14
Attending: INTERNAL MEDICINE
Payer: COMMERCIAL

## 2024-05-14 ENCOUNTER — CLINICAL UPDATE (OUTPATIENT)
Dept: PHARMACY | Facility: CLINIC | Age: 44
End: 2024-05-14
Payer: COMMERCIAL

## 2024-05-14 VITALS
BODY MASS INDEX: 24.92 KG/M2 | WEIGHT: 200.4 LBS | HEART RATE: 106 BPM | HEIGHT: 75 IN | SYSTOLIC BLOOD PRESSURE: 119 MMHG | DIASTOLIC BLOOD PRESSURE: 85 MMHG | OXYGEN SATURATION: 94 %

## 2024-05-14 DIAGNOSIS — E84.0 CYSTIC FIBROSIS WITH PULMONARY MANIFESTATIONS (H): ICD-10-CM

## 2024-05-14 DIAGNOSIS — E84.9 CYSTIC FIBROSIS (H): Primary | ICD-10-CM

## 2024-05-14 DIAGNOSIS — Z13.9 RISK AND FUNCTIONAL ASSESSMENT: Primary | ICD-10-CM

## 2024-05-14 DIAGNOSIS — E84.9 CF (CYSTIC FIBROSIS) (H): ICD-10-CM

## 2024-05-14 DIAGNOSIS — L98.9 SKIN LESION OF CHEST WALL: Primary | ICD-10-CM

## 2024-05-14 PROCEDURE — 99215 OFFICE O/P EST HI 40 MIN: CPT | Mod: 25 | Performed by: INTERNAL MEDICINE

## 2024-05-14 PROCEDURE — 99207 PR NO CHARGE LOS: CPT | Performed by: PHARMACIST

## 2024-05-14 PROCEDURE — 87186 SC STD MICRODIL/AGAR DIL: CPT | Performed by: INTERNAL MEDICINE

## 2024-05-14 PROCEDURE — 87206 SMEAR FLUORESCENT/ACID STAI: CPT | Performed by: INTERNAL MEDICINE

## 2024-05-14 PROCEDURE — 94375 RESPIRATORY FLOW VOLUME LOOP: CPT | Performed by: INTERNAL MEDICINE

## 2024-05-14 PROCEDURE — 99213 OFFICE O/P EST LOW 20 MIN: CPT | Performed by: INTERNAL MEDICINE

## 2024-05-14 RX ORDER — ELEXACAFTOR, TEZACAFTOR, AND IVACAFTOR 100-50-75
KIT ORAL
Qty: 84 TABLET | Refills: 2 | OUTPATIENT
Start: 2024-05-14 | End: 2024-05-14

## 2024-05-14 RX ORDER — ELEXACAFTOR, TEZACAFTOR, AND IVACAFTOR 100-50-75
KIT ORAL
Qty: 84 TABLET | Refills: 2 | Status: SHIPPED | OUTPATIENT
Start: 2024-05-14 | End: 2024-08-23

## 2024-05-14 ASSESSMENT — ANXIETY QUESTIONNAIRES
5. BEING SO RESTLESS THAT IT IS HARD TO SIT STILL: NOT AT ALL
7. FEELING AFRAID AS IF SOMETHING AWFUL MIGHT HAPPEN: NOT AT ALL
GAD7 TOTAL SCORE: 0
3. WORRYING TOO MUCH ABOUT DIFFERENT THINGS: NOT AT ALL
1. FEELING NERVOUS, ANXIOUS, OR ON EDGE: NOT AT ALL
GAD7 TOTAL SCORE: 0
2. NOT BEING ABLE TO STOP OR CONTROL WORRYING: NOT AT ALL
6. BECOMING EASILY ANNOYED OR IRRITABLE: NOT AT ALL

## 2024-05-14 ASSESSMENT — PAIN SCALES - GENERAL: PAINLEVEL: NO PAIN (0)

## 2024-05-14 ASSESSMENT — PATIENT HEALTH QUESTIONNAIRE - PHQ9
SUM OF ALL RESPONSES TO PHQ QUESTIONS 1-9: 0
5. POOR APPETITE OR OVEREATING: NOT AT ALL

## 2024-05-14 NOTE — PROGRESS NOTES
"Adult Cystic Fibrosis Program  Annual Psychosocial Assessment    Presenting Information:  TELLY \"Yobany\" is a 43-year-old man with cystic fibrosis, presenting in CF clinic for a regular follow up with primary CF provider, Dr Hung Miller. Met with Telly for annual psychosocial assessment.     Living situation:   Telly is currently living with his wife, Bre, and his two daughters, in a home they own in [place]. They do not have any pets. Yobany denies concerns about his living situation.      Family Constellation:   Telly was initially raised by both biological parents, who  when he was around 14 years old. He subsequently lived with his mother but maintained regular contact with his father. Telly has an older brother and older sister. His mother has a house in [place], [place] and [place] in [place]. His father, brother, and sister live in [place]. His siblings are both  and Yobany has several nieces and nephews. Yobany's extended family also lives in the [place] area and operate a large local [employer]. Bre's parents live in [place]. Yobany reports all of his family members are doing well.    Yobany and his wife have two daughters, Nieves Rebolledo (6 years old) and Josy (3.5 years old). They were conceived via IVF. They attempted to have another but his wife experienced some complications, and they are out of embryo's now. They are not pursuing IVF again. Nieves and Josy are doing well and keeping busy with many sports and activities throughout the year. They attend [employer].     Support Network:   Telly describes his social support as \"really good\". Both Yobany and Bre's families are very involved and supportive. He draws additional support from friends, co-workers, and their ariana community. He describes a close relationship with Bre. They have been  since 2015. Yobany does not stay in contact with anyone who has CF, but he knew other " "people with CF when he was growing up.      Adjustment to Illness:   Yobany was diagnosed with CF during infancy. He remembers struggling with weight issues as a child. He notes significant past health events in 2000, when he got a G-tube, but continued to struggle with gaining weight, and in 2003, when he experienced a pneumothorax and lobectomy. Since that event, he notes that he has successfully gained weight.      Yobany describes his current health status as \"good, no complaints\". Yobany continues to take trikafta which is going well. In the past he has mentioned that since starting trikafta his treatment burden is much less. Clinically, he has mild lung disease, a history of malnutrition and glucose intolerance. He uses TF in case of weight loss or illness. He denies any physical health symptoms that interfere with his daily activities. He typically vests 3x a week. He has not been going to the gym or working out but reports being fairly active outside.     Advance Directive:   This SW reviewed Advance Directive education. He is comfortable with his default decision maker (his wife) in the absence of a directive. He did not request additional forms today.    Education:   Yobany completed a Bachelor's Degree in Accounting and Business Administration at [employer] in 2007. He also has a business leadership certificate through work but does not currently plan to pursue further education.    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 He works from home 3 days a week and goes into the office the other 2 days. He reports great benefits through the state. He enjoys his job overall and reports a supportive boss and co-workers, he is also part of a union. He denies any employment concerns other than a rodent infestation in his office which he discussed with the team today.    Yobany's wife Bre works full time for " "the [place] [employer] as well.    Mental Health/Coping:   Yobany coped with depressive/anxious symptoms in 2013, due to stressful transitions. He did brief counseling, which he did not find especially helpful. The issues then resolved.    Yobany reports his current mood as \"good, no concerns\". He denies any current or past symptoms indicative of mood, anxiety, or other mental health disorders.     ARCHANA-7: score of 0, indicating an absence of anxiety symptoms.   PHQ-9: score of 0, indicating an absence of depression symptoms as well.     Yobany describes his current stress level as manageable. He reports minimal work related stress. He generally ayana with stressors through working out, walking, going for a drive or distracting himself, and getting a \"change of scenery\".    Yobany was raised Congregational and describes ariana as important to him, noting that his brother in law is a . They belong to [employer] where their children attend school.     Chemical Health   Yobany denies use of tobacco, vaping, illicit drugs, or alcohol. He does not have any history of chemical dependency or psychosocial impairment related to substance use.      Finances:   Yobany is able to afford his daily living expenses and denies any financial concerns. 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    Insurance:   Yobany has employer-based insurance (BCBS). He notes that costs are affordable for him and he denies related concerns.     Yobany was wondering about the likelihood of getting life insurance with his CF diagnosis.  will send him information to reach out to [facility] with the [facility].     Recreation/Leisure Interests:   Telly enjoys playing hockey, golfing, spending time with family/friends, and traveling.     Intervention:  -Psychosocial Assessment  -Resource education/referral ([employer])  -Supportive counseling    Assessment:  Yobany was friendly and " receptive to SW visit. He appeared to be open in his responses. He has stable employment, finances, and insurance. He appears to have adequate social support. No mental health or chemical health concerns. He reports his health is also stable.     Yobany seems to be psychosocially stable overall, with access to relevant resources and supports.  No concerns expressed/noted.    Plan:  Re-consult for any psychosocial needs that may arise.    Complete psychosocial assessment annually.  Continue to follow for regular clinic consult.    Marybeth Bourgeois, [facility]  Adult Cystic Fibrosis   Ph: [phone]

## 2024-05-14 NOTE — NURSING NOTE
"Telly Leal is a 43 year old year old who is being seen for Cystic Fibrosis (CF Follow up )      Medications reviewed and Vital signs taken.    Specimen Collection Type: Sputum    Order(s) placed: AFB (Acid Fast Bacilli) and CF Aerobic Bacterial    *IF AFB order placed - please enter \"PRIORITIZE AFB\" to order comments.       Lab Results   Component Value Date    ACIDFAST No acid fast bacilli seen 11/23/2021         Lab Results   Component Value Date    AFBSMS Negative for acid fast bacteria 06/18/2018    AFBSMS  06/18/2018     Less than 5ml of specimen received.  A minimum of 5 mL of sputum or fluid is recommended for recovery of acid fast bacilli   (AFB).  Volumes less than 5 mL are suboptimal and may compromise recovery of AFB from   culture.      AFBSMS  06/18/2018     Assayed at [facility] [address] [place] [id number] [phone]       Vitals were taken and medications were reconciled.     Carrie XAVIERA  2:56 PM      "

## 2024-05-14 NOTE — LETTER
5/14/2024         RE: Telly Leal  [address] [address]        Dear Colleague,    Thank you for referring your patient, Telly Leal, to the [facility]G [facility] AND [facility] [place]. Please see a copy of my visit note below.    Reason for Visit  Telly Leal is being seen for Cystic Fibrosis (CF Follow up )    CF HPI  The patient was seen and examined by Rocael Miller   Since I last saw the patient in November, 2023, he reports overall doing well from a respiratory standpoint.  Approximately 1 month ago he did develop URI symptoms after household contact with infection.  He developed nasal congestion with prominent postnasal drip.  He had increased cough with mucus production but suspected mucus was emanating from his upper airway.  He had not been doing vest much previously but did start doing this consistently while feeling ill.  During this time he did noticed increased chest tightness in conjunction with using hypertonic saline and he has since switched back to Mucomyst and doing well.  His increased respiratory symptoms subsided after a few days without antibiotics.  He has been feeling back to baseline the past couple weeks.  He has not had any hemoptysis.  He currently is doing vest a couple times per week.  Current Outpatient Medications   Medication Sig Dispense Refill     elexacaftor-tezacaftor-ivacaftor & ivacaftor (TRIKAFTA) 100-50-75 & 150 MG tablet pack TAKE TWO ORANGE TABLETS BY MOUTH IN THE MORNING AND ONE BLUE TABLET IN THE EVENING AS DIRECTED ON PACKAGE.  TAKE WITH FAT CONTAINING FOOD. 84 tablet 2     acetylcysteine (MUCOMYST) 20 % neb solution Inhale 2 mLs into the lungs 2 times daily INHALE 2ML INTO THE LUNGS VIA NEB THREE TIMES A DAY. STORE UNUSED PORTION IN FRIG FOR UP TO 96 HOURS 180 mL 12     albuterol (PROAIR HFA) 108 (90 Base) MCG/ACT inhaler Inhale 1-2 puffs into the lungs every 6 hours as needed for shortness of  breath 8.5 g 3     albuterol (PROVENTIL) (2.5 MG/3ML) 0.083% neb solution Take 1 vial (2.5 mg) by nebulization 3 times daily as needed for shortness of breath, wheezing or cough 360 mL 11     azithromycin (ZITHROMAX) 250 MG tablet Take 1 tablet (250 mg) by mouth daily 30 tablet 11     blood glucose (ACCU-CHEK GUIDE) test strip Use to test blood sugar 2 times daily as needed. 100 strip 3     blood glucose monitoring (SOFTCLIX) lancets Use to test blood sugar 2 times daily as needed. 100 each 3     cromolyn (INTAL) 20 MG/2ML neb solution Take 2 mLs (20 mg) by nebulization 3 times daily 1 vial three times daily 180 mL 12     famotidine (PEPCID) 20 MG tablet Take 1 tablet (20 mg) by mouth 2 times daily as needed (heartburn) 20 tablet 3     fluticasone (FLONASE) 50 MCG/ACT nasal spray Spray 2 sprays into both nostrils as needed for rhinitis 16 g 11     lipase-protease-amylase (CREON) 54759-41694-740655 units CPEP per EC capsule TAKE 5 TO 6 CAPSULES BY MOUTH WITH MEALS, 1 TO 5 CAPSULES WITH SNACKS, AND 7 TO 8 CAPSULES WITH TUBE FEEDINGS 1200 capsule 11     mvw complete formulation (SOFTGELS) capsule TAKE 1 CAPSULE BY MOUTH ONCE DAILY 60 capsule 11     Nutritional Supplements (NUTREN 2.0) Take 2 Cans by mouth At Bedtime 168/ml/hr 7 days/week 60 Can 11     No current facility-administered medications for this visit.     No Known Allergies  Past Medical History:   Diagnosis Date     Chronic sinusitis      Cystic fibrosis with pulmonary manifestations (H)      Exocrine pancreatic insufficiency      GERD (gastroesophageal reflux disease)      Hemoptysis      Malnutrition (H24)        Past Surgical History:   Procedure Laterality Date     IR MISCELLANEOUS PROCEDURE  2/6/2001     LOBECTOMY      right upper lobe     TUBES         Social History     Socioeconomic History     Marital status:      Spouse name: Not on file     Number of children: Not on file     Years of education: Not on file     Highest education level: Not  "on file   Occupational History     Occupation:      Employer: [employer]   Tobacco Use     Smoking status: Never     Smokeless tobacco: Never   Substance and Sexual Activity     Alcohol use: Not Currently     Alcohol/week: 0.0 standard drinks of alcohol     Drug use: Not Currently     Sexual activity: Not on file   Other Topics Concern     Parent/sibling w/ CABG, MI or angioplasty before 65F 55M? Not Asked   Social History Narrative    Patient works for the [employer] as an .  He does not have stable living conditions at this time.  He has a Barb[place] guillen.     Social Determinants of Health     Financial Resource Strain: Not on file   Food Insecurity: Not on file   Transportation Needs: Not on file   Physical Activity: Not on file   Stress: Not on file   Social Connections: Not on file   Interpersonal Safety: Not on file   Housing Stability: Not on file   Update: living with wife Bre in [facility]. Birth of daughter Nieves Rebolledo August, 2017, birth of daughter Josy Felder November, 2019    ROS Pulmonary  Const: No fever chills sweats.  GI: No diarrhea constipation or grease in his stools.  No abdominal pain.  No recent use of tube feeds.  Some irritation again developing around his feeding tube site.  Endocrine: Patient ended up not getting started on glucose monitoring as planned at his last visit.  No polyuria or polydipsia or weight loss or hypoglycemic symptoms.  ENT: No sinus pain pressure or drainage currently.  A complete ROS was otherwise negative except as noted in the HPI.  /85   Pulse 106   Ht 1.9 m (6' 2.8\")   Wt 90.9 kg (200 lb 6.4 oz)   SpO2 94%   BMI 25.18 kg/m    Exam:   GENERAL APPEARANCE: Well developed, well nourished, alert, and in no apparent distress.  EYES: anicteric with injected appearing conjunctiva  HENT: No nasal discharge.  Oral mucosa is moist, without any lesions, no tonsillar enlargement, no oropharyngeal exudate.  RESP:  good " air flow throughout.  Clear to auscultation bilaterally.  Normal chest wall excursion.    CV: Normal S1, S2, regular rhythm, normal rate. No murmur. No gallop. No LE edema.   ABDOMEN:  Bowel sounds normal, soft, nontender, no HSM or masses.   MS: extremities normal. No cyanosis.  SKIN:  PEG site with small amount of granulation tissue similar to previous.  Chest scar-like lesion upper sternum unchanged. Diffuse freckles without overt change.  NEURO: Mentation intact, speech normal, normal gait and stance  PSYCH: mentation appears normal. and affect normal/bright  Results:  Recent Results (from the past 168 hour(s))   Hepatic function panel    Collection Time: 05/13/24  3:38 PM   Result Value Ref Range    Protein Total 7.0 6.4 - 8.3 g/dL    Albumin 4.4 3.5 - 5.2 g/dL    Bilirubin Total 2.2 (H) <=1.2 mg/dL    Alkaline Phosphatase 88 40 - 150 U/L    AST 33 0 - 45 U/L    ALT 33 0 - 70 U/L    Bilirubin Direct 0.37 (H) 0.00 - 0.30 mg/dL   CK total    Collection Time: 05/13/24  3:38 PM   Result Value Ref Range     39 - 308 U/L   General PFT Lab (Please always keep checked)    Collection Time: 05/14/24  2:03 PM   Result Value Ref Range    FVC-Pred 5.63 L    FVC-Pre 6.35 L    FVC-%Pred-Pre 112 %    FEV1-Pre 3.29 L    FEV1-%Pred-Pre 72 %    FEV1FVC-Pred 80 %    FEV1FVC-Pre 52 %    FEFMax-Pred 11.25 L/sec    FEFMax-Pre 9.42 L/sec    FEFMax-%Pred-Pre 83 %    FEF2575-Pred 4.26 L/sec    FEF2575-Pre 1.21 L/sec    JDT6881-%Pred-Pre 28 %    ExpTime-Pre 16.39 sec    FIFMax-Pre 10.13 L/sec    FEV1FEV6-Pred 82 %    FEV1FEV6-Pre 59 %     Spirometry interpretation: personally reviewed. Valid maneuver.  Mild obstruction.  Values down slightly from previous.  He is at his baseline.    Most recent respiratory culture November, 2023 with 1 strain of Pseudomonas and Aspergillus fumigatus    Assessment and plan:  1.  Cystic fibrosis lung disease: Patient overall doing well without significant exacerbations, good day-to-day symptom  control, and overall stability in his pulmonary function tests.  Consistent prophylactic use of vest therapy has been difficult to achieve but consensus is today for him to target of completing this approximately every other day on average.  He will continue on chronic azithromycin as he continues to grow Pseudomonas.  Given issues with consistent adherence and overall good clinical course, have deferred resumption of inhaled antibiotics and consensus today is to defer this unless he has a more difficult clinical course.  1B.  CFTR modulation therapy:  As above, he has had an impressive response to Trikafta.  No transaminitis on LFTs and yesterday again with slight elevation of his bilirubin mostly indirect.  Bilirubin has been mostly elevated since starting Trikafta.  Last imaging of the abdomen was in 2019 with normal-appearing liver on CT.  2. Impaired glucose tolerance: 2-hour glucose greater than 140 on June, 2023 study.  Hemoglobin A1c down to 5.8% last summer.  Patient has had difficulty following through on plan for glucose monitoring.  Favor glucose monitoring if patient does not wish to be evaluated in diabetes clinic.  An alternative would be repeating his glucose tolerance test but think less likely that his study will have normalized and result less likely to .  Karissa, CF nutritionist, will review options with patient.  3.  Pancreatic exocrine insufficiency with a history of malnutrition on tube feeds: Adequate enzyme replacement by history.  His weight has improved with BMI up to 25.2.  He has not used tube feeds in nearly 2 years.  He typically has more difficulty maintaining weight during the summer months.  He would like to keep his tube in place over the summer and we can revisit the possibility of removal this fall depending on clinical course.  4. Chronic CF sinus disease: Improved on modulation therapy. Continue flonase as needed.  5. Chest skin lesions: Seen by outside  Dermatologist. Central chest scar could be treated by steroid injections per pt but not actively pursuing.  He is overdue for skin exam and I have referred him to dermatology clinic.  6. Healthcare maintenance: Annual studies due in June, 2024 and ordered for next visit.  He already received flu vaccine through his employer last month.  He would rather hold off on COVID-19 vaccination.  He is due for colon cancer screening and previously referred to GI to schedule.  Patient has yet to do so but plans on contacting him this summer.    Patient will follow up in 3 month    Total visit time today 40 minutes.  This is exclusive of time interpreting PFTs.    Rocael Miller                   Again, thank you for allowing me to participate in the care of your patient.        Sincerely,        Rocael Miller MD

## 2024-05-14 NOTE — PROGRESS NOTES
Reason for Visit  Telly Leal is being seen for Cystic Fibrosis (CF Follow up )    CF HPI  The patient was seen and examined by Rocael Miller   Since I last saw the patient in November, 2023, he reports overall doing well from a respiratory standpoint.  Approximately 1 month ago he did develop URI symptoms after household contact with infection.  He developed nasal congestion with prominent postnasal drip.  He had increased cough with mucus production but suspected mucus was emanating from his upper airway.  He had not been doing vest much previously but did start doing this consistently while feeling ill.  During this time he did noticed increased chest tightness in conjunction with using hypertonic saline and he has since switched back to Mucomyst and doing well.  His increased respiratory symptoms subsided after a few days without antibiotics.  He has been feeling back to baseline the past couple weeks.  He has not had any hemoptysis.  He currently is doing vest a couple times per week.  Current Outpatient Medications   Medication Sig Dispense Refill    elexacaftor-tezacaftor-ivacaftor & ivacaftor (TRIKAFTA) 100-50-75 & 150 MG tablet pack TAKE TWO ORANGE TABLETS BY MOUTH IN THE MORNING AND ONE BLUE TABLET IN THE EVENING AS DIRECTED ON PACKAGE.  TAKE WITH FAT CONTAINING FOOD. 84 tablet 2    acetylcysteine (MUCOMYST) 20 % neb solution Inhale 2 mLs into the lungs 2 times daily INHALE 2ML INTO THE LUNGS VIA NEB THREE TIMES A DAY. STORE UNUSED PORTION IN FRIG FOR UP TO 96 HOURS 180 mL 12    albuterol (PROAIR HFA) 108 (90 Base) MCG/ACT inhaler Inhale 1-2 puffs into the lungs every 6 hours as needed for shortness of breath 8.5 g 3    albuterol (PROVENTIL) (2.5 MG/3ML) 0.083% neb solution Take 1 vial (2.5 mg) by nebulization 3 times daily as needed for shortness of breath, wheezing or cough 360 mL 11    azithromycin (ZITHROMAX) 250 MG tablet Take 1 tablet (250 mg) by mouth daily 30 tablet 11    blood glucose  (ACCU-CHEK GUIDE) test strip Use to test blood sugar 2 times daily as needed. 100 strip 3    blood glucose monitoring (SOFTCLIX) lancets Use to test blood sugar 2 times daily as needed. 100 each 3    cromolyn (INTAL) 20 MG/2ML neb solution Take 2 mLs (20 mg) by nebulization 3 times daily 1 vial three times daily 180 mL 12    famotidine (PEPCID) 20 MG tablet Take 1 tablet (20 mg) by mouth 2 times daily as needed (heartburn) 20 tablet 3    fluticasone (FLONASE) 50 MCG/ACT nasal spray Spray 2 sprays into both nostrils as needed for rhinitis 16 g 11    lipase-protease-amylase (CREON) 95180-77832-462187 units CPEP per EC capsule TAKE 5 TO 6 CAPSULES BY MOUTH WITH MEALS, 1 TO 5 CAPSULES WITH SNACKS, AND 7 TO 8 CAPSULES WITH TUBE FEEDINGS 1200 capsule 11    mvw complete formulation (SOFTGELS) capsule TAKE 1 CAPSULE BY MOUTH ONCE DAILY 60 capsule 11    Nutritional Supplements (NUTREN 2.0) Take 2 Cans by mouth At Bedtime 168/ml/hr 7 days/week 60 Can 11     No current facility-administered medications for this visit.     No Known Allergies  Past Medical History:   Diagnosis Date    Chronic sinusitis     Cystic fibrosis with pulmonary manifestations (H)     Exocrine pancreatic insufficiency     GERD (gastroesophageal reflux disease)     Hemoptysis     Malnutrition (H24)        Past Surgical History:   Procedure Laterality Date    IR MISCELLANEOUS PROCEDURE  2/6/2001    LOBECTOMY      right upper lobe    TUBES         Social History     Socioeconomic History    Marital status:      Spouse name: Not on file    Number of children: Not on file    Years of education: Not on file    Highest education level: Not on file   Occupational History    Occupation:      Employer: [employer]   Tobacco Use    Smoking status: Never    Smokeless tobacco: Never   Substance and Sexual Activity    Alcohol use: Not Currently     Alcohol/week: 0.0 standard drinks of alcohol    Drug use: Not Currently    Sexual activity: Not  "on file   Other Topics Concern    Parent/sibling w/ CABG, MI or angioplasty before 65F 55M? Not Asked   Social History Narrative    Patient works for the [employer] as an .  He does not have stable living conditions at this time.  He has a [place] guillen.     Social Determinants of Health     Financial Resource Strain: Not on file   Food Insecurity: Not on file   Transportation Needs: Not on file   Physical Activity: Not on file   Stress: Not on file   Social Connections: Not on file   Interpersonal Safety: Not on file   Housing Stability: Not on file   Update: living with wife Bre in [facility]. Birth of daughter Nieves Rebolledo August, 2017, birth of daughter Josy Felder November, 2019    ROS Pulmonary  Const: No fever chills sweats.  GI: No diarrhea constipation or grease in his stools.  No abdominal pain.  No recent use of tube feeds.  Some irritation again developing around his feeding tube site.  Endocrine: Patient ended up not getting started on glucose monitoring as planned at his last visit.  No polyuria or polydipsia or weight loss or hypoglycemic symptoms.  ENT: No sinus pain pressure or drainage currently.  A complete ROS was otherwise negative except as noted in the HPI.  /85   Pulse 106   Ht 1.9 m (6' 2.8\")   Wt 90.9 kg (200 lb 6.4 oz)   SpO2 94%   BMI 25.18 kg/m    Exam:   GENERAL APPEARANCE: Well developed, well nourished, alert, and in no apparent distress.  EYES: anicteric with injected appearing conjunctiva  HENT: No nasal discharge.  Oral mucosa is moist, without any lesions, no tonsillar enlargement, no oropharyngeal exudate.  RESP:  good air flow throughout.  Clear to auscultation bilaterally.  Normal chest wall excursion.    CV: Normal S1, S2, regular rhythm, normal rate. No murmur. No gallop. No LE edema.   ABDOMEN:  Bowel sounds normal, soft, nontender, no HSM or masses.   MS: extremities normal. No cyanosis.  SKIN:  PEG site with small amount of " granulation tissue similar to previous.  Chest scar-like lesion upper sternum unchanged. Diffuse freckles without overt change.  NEURO: Mentation intact, speech normal, normal gait and stance  PSYCH: mentation appears normal. and affect normal/bright  Results:  Recent Results (from the past 168 hour(s))   Hepatic function panel    Collection Time: 05/13/24  3:38 PM   Result Value Ref Range    Protein Total 7.0 6.4 - 8.3 g/dL    Albumin 4.4 3.5 - 5.2 g/dL    Bilirubin Total 2.2 (H) <=1.2 mg/dL    Alkaline Phosphatase 88 40 - 150 U/L    AST 33 0 - 45 U/L    ALT 33 0 - 70 U/L    Bilirubin Direct 0.37 (H) 0.00 - 0.30 mg/dL   CK total    Collection Time: 05/13/24  3:38 PM   Result Value Ref Range     39 - 308 U/L   General PFT Lab (Please always keep checked)    Collection Time: 05/14/24  2:03 PM   Result Value Ref Range    FVC-Pred 5.63 L    FVC-Pre 6.35 L    FVC-%Pred-Pre 112 %    FEV1-Pre 3.29 L    FEV1-%Pred-Pre 72 %    FEV1FVC-Pred 80 %    FEV1FVC-Pre 52 %    FEFMax-Pred 11.25 L/sec    FEFMax-Pre 9.42 L/sec    FEFMax-%Pred-Pre 83 %    FEF2575-Pred 4.26 L/sec    FEF2575-Pre 1.21 L/sec    DXU1768-%Pred-Pre 28 %    ExpTime-Pre 16.39 sec    FIFMax-Pre 10.13 L/sec    FEV1FEV6-Pred 82 %    FEV1FEV6-Pre 59 %     Spirometry interpretation: personally reviewed. Valid maneuver.  Mild obstruction.  Values down slightly from previous.  He is at his baseline.    Most recent respiratory culture November, 2023 with 1 strain of Pseudomonas and Aspergillus fumigatus    Assessment and plan:  1.  Cystic fibrosis lung disease: Patient overall doing well without significant exacerbations, good day-to-day symptom control, and overall stability in his pulmonary function tests.  Consistent prophylactic use of vest therapy has been difficult to achieve but consensus is today for him to target of completing this approximately every other day on average.  He will continue on chronic azithromycin as he continues to grow Pseudomonas.   Given issues with consistent adherence and overall good clinical course, have deferred resumption of inhaled antibiotics and consensus today is to defer this unless he has a more difficult clinical course.  1B.  CFTR modulation therapy:  As above, he has had an impressive response to Trikafta.  No transaminitis on LFTs and yesterday again with slight elevation of his bilirubin mostly indirect.  Bilirubin has been mostly elevated since starting Trikafta.  Last imaging of the abdomen was in 2019 with normal-appearing liver on CT.  2. Impaired glucose tolerance: 2-hour glucose greater than 140 on June, 2023 study.  Hemoglobin A1c down to 5.8% last summer.  Patient has had difficulty following through on plan for glucose monitoring.  Favor glucose monitoring if patient does not wish to be evaluated in diabetes clinic.  An alternative would be repeating his glucose tolerance test but think less likely that his study will have normalized and result less likely to .  Karissa, CF nutritionist, will review options with patient.  3.  Pancreatic exocrine insufficiency with a history of malnutrition on tube feeds: Adequate enzyme replacement by history.  His weight has improved with BMI up to 25.2.  He has not used tube feeds in nearly 2 years.  He typically has more difficulty maintaining weight during the summer months.  He would like to keep his tube in place over the summer and we can revisit the possibility of removal this fall depending on clinical course.  4. Chronic CF sinus disease: Improved on modulation therapy. Continue flonase as needed.  5. Chest skin lesions: Seen by outside Dermatologist. Central chest scar could be treated by steroid injections per pt but not actively pursuing.  He is overdue for skin exam and I have referred him to dermatology clinic.  6. Healthcare maintenance: Annual studies due in June, 2024 and ordered for next visit.  He already received flu vaccine through his employer last  month.  He would rather hold off on COVID-19 vaccination.  He is due for colon cancer screening and previously referred to GI to schedule.  Patient has yet to do so but plans on contacting him this summer.    Patient will follow up in 3 month    Total visit time today 40 minutes.  This is exclusive of time interpreting PFTs.    Rocael Miller

## 2024-05-14 NOTE — PROGRESS NOTES
Clinical Update:                                                    A chart review was conducted for Telly Leal.    Reason for Chart Review: Trikafta Lab Monitoring     Discussion: Telly has been on Trikafta since 12/3/19. Telly has had previous elevations in bilirubin which has required closer monitoring. Per chart review, patient continues full dose Trikafta.      Labs were reviewed from 5/13/2024 at [facility] . bilirubin is elevated at 1.8 x the upper limit of normal, majority is still indirect bilirubin which can be caused by ZFOM7O6/3 inhibition by elexacaftor and does not typically require intervention.       Lab Results   Component Value Date    ALT 33 05/13/2024    AST 33 05/13/2024    BILITOTAL 2.2 (H) 05/13/2024    DBIL 0.37 (H) 05/13/2024     05/13/2024     Plan:  1. Continue Trikafta   2. Recheck hepatic panel and CK in  3 months with annuals, per discussion with Dr. Miller.     Cyndi Naranjo, PharmD  Medication Therapy Management Pharmacist

## 2024-05-15 DIAGNOSIS — E84.9 CF (CYSTIC FIBROSIS) (H): ICD-10-CM

## 2024-05-15 LAB
EXPTIME-PRE: 16.39 SEC
FEF2575-%PRED-PRE: 28 %
FEF2575-PRE: 1.21 L/SEC
FEF2575-PRED: 4.26 L/SEC
FEFMAX-%PRED-PRE: 83 %
FEFMAX-PRE: 9.42 L/SEC
FEFMAX-PRED: 11.25 L/SEC
FEV1-%PRED-PRE: 72 %
FEV1-PRE: 3.29 L
FEV1FEV6-PRE: 59 %
FEV1FEV6-PRED: 82 %
FEV1FVC-PRE: 52 %
FEV1FVC-PRED: 80 %
FIFMAX-PRE: 10.13 L/SEC
FVC-%PRED-PRE: 112 %
FVC-PRE: 6.35 L
FVC-PRED: 5.63 L

## 2024-05-16 NOTE — PROGRESS NOTES
CF Annual Nutrition Assessment     Reason for Assessment  Assessed during CF clinic visit with Dr. Miller r/t increased nutrition risk with diagnosis of CF per protocol.     Nutrition Significant PMH  Mild Lung Disease - taking Trikafta  Pancreatic Insufficient  G-tube for nutrition support - not currently using  Glucose intolerance     Anthropometric Assessment  Height: 189.7 cm  Weight: 90.9 kg (actual weight)  Ideal body weight based on BMI 22 for females and 23 for males per CF Foundation recs: 82.8 kg (182 lbs)  Body mass index is 25.18 kg/m .    Wt Readings from Last 5 Encounters:   24 90.9 kg (200 lb 6.4 oz)   23 87.1 kg (192 lb)   23 87.3 kg (192 lb 8 oz)   10/31/22 87.6 kg (193 lb 2 oz)   22 85.7 kg (189 lb)     Comments: Normal BMI, good/stable body composition based on brief nutrition-focused physical assessment.     Pancreatic Enzymes  Brand:  Creon [id number]  Dosin with meals, 6 with snacks = 2112 units lipase/kg/meal  Estimated Daily Intake: 40 caps = 37651 units/kg/day - slightly above recommendation but reduced from previous total of 50 caps.    Signs of Malabsorption: No - hx increased stool burden but not currently needing Miralax.   Enzyme Program:  None - information provided during previous visits     Nutrition-Related Lab & Vitamin/Mineral Supplements  Annual studies 23    Vitamin A- 0.66 wnl  Vitamin D- 40 wnl  Vitamin E- 12.5 wnl  Iron- 59, borderline low  Lipid Panel- wnl outside of low HDL (34)     OGTT- (23) results indicating impaired glucose tolerance, slightly worsened from last test in 2019  DEXA - , lowest z-score 0.1      Current Vitamin/Mineral Supplements: MVW Complete 1 capsule daily. Reports taking daily. Obtains from [employer].     Diet History and Assessment  Diet Preferences/Allergies/Intolerances: Regular    Intake Recall/Comments: Appetite only fair at baseline but makes an effort to eat consistently. Consumes 2-3 meals and snacks.  Historically has not been able to maintain weight without use of TF but has been able to do this more consistently over the past year. He monitors his weight closely.      Diet Recall - per previous  Breakfast- skips or may have eggs, toast  Lunch- sandwich or eats out, fast food  Dinner- protein with starch and vegetable; pasta or steak/chicken with veggies  HS snack- ice cream    Calcium: gallon whole milk/week + yogurt, string cheese, adequate  Salt: likely adequate from foods, no sx of deficiency per pt  Hydration: Water, Gatorade, some soda  Supplements: No   Tube Feeding: Currently not using regularly, only as backup in case of wt loss or illness --   Type of Feeding Tube: G-tube (button)  Formula: Nutren 2.0  Rate/Frequency: 135 ml/hr x 1 can + water added (~4 hrs infusion time)  Nutrition: 250 mls, 500 kcals, 21 g protein, 23 g fat, 54 g CHO  Enzymes: 9-10 caps before infusion along with HS snack (accounting for ~3-4 caps per 1 can TF) provides ~4000 units lipase/g fat in feeds.      NUTRITION DIAGNOSIS  Impaired nutrient utilization related to CF pancreatic insufficiency as evidenced by requires pancreatic enzyme replacement therapy, vitamin/mineral supplementation, and higher kcal/protein diet and nutrition support in order to maintain ideal body weight and nutrition status.     INTERVENTIONS/RECOMMENDATIONS   1) Oral Intake/Weight:   BEE: 2100   8201-4282 kcals/day =  150-175% BEE  110-140 g protein/day = 1.2-1.5 g/kg    Discussed current nutrition status including weight trends, PO, and maintenance of nutrition status while holding TF infusions.  Congratulated pt on successful weight maintenance/gains, discussed maintaining balanced, high quality nutrition intake and using TF as a back-up supplement for nutrition in the case of weight loss, appetite loss, or illness.      Regarding glycemic monitoring agreed with provider that pt should be seen by endocrine team for possible diagnostic CGM/other  interventions. Communicated to pt/reminder via [employer].    2) Enzymes:  No GI concerns noted today and will continue with current enzyme dose.      3) Vitamin/Mineral Supplementation:  Annual studies next visit. Will contact pt if any changes are needed to regimen pending updated vitamin/mineral levels.      GOALS:  1) Weight maintenance with BMI >23 kg/m2  2) Take enzymes and vitamins as recommended     FOLLOW-UP/MONITORING:  Visit patient within 12 months for annual nutrition reassessment.     Time Spent In Face-to-Face Patient Interactions: 15 minutes      Karissa Ashraf MS, RD, LD (pager [phone])  Cystic Fibrosis/Lung Transplant Dietitian      -Available Mon-Thurs 8 AM-4:30 PM. On Fridays please contact pager [phone] (Leona Marie RD) and on weekends/holidays contact coverage dietitian at pager [phone] (inpatient use only).

## 2024-05-20 ENCOUNTER — TELEPHONE (OUTPATIENT)
Dept: PULMONOLOGY | Facility: CLINIC | Age: 44
End: 2024-05-20
Payer: COMMERCIAL

## 2024-05-20 LAB
BACTERIA SPT CULT: ABNORMAL
BACTERIA SPT CULT: ABNORMAL

## 2024-05-20 NOTE — TELEPHONE ENCOUNTER
Left Voicemail (1st Attempt) and Sent Mychart (1st Attempt) for the patient to call back and schedule the following:    Appointment type: Return CF  Provider: Angela  Return date: 8/14/2024  Specialty phone number: [phone]  Additional appointment(s) needed: cf loop, labs, cxr, dexa  Additonal Notes: na

## 2024-05-23 NOTE — TELEPHONE ENCOUNTER
Left Voicemail (2nd Attempt) for the patient to call back and schedule the following:    Appointment type: Return CF  Provider: Angela  Return date: 8/14/2024  Specialty phone number: [phone]  Additional appointment(s) needed: cf loop, labs, cxr, dexa  Additonal Notes: na

## 2024-06-05 DIAGNOSIS — E84.9 CF (CYSTIC FIBROSIS) (H): ICD-10-CM

## 2024-06-05 RX ORDER — AZITHROMYCIN 250 MG/1
250 TABLET, FILM COATED ORAL DAILY
Qty: 30 TABLET | Refills: 11 | Status: SHIPPED | OUTPATIENT
Start: 2024-06-05

## 2024-06-08 ENCOUNTER — HEALTH MAINTENANCE LETTER (OUTPATIENT)
Age: 44
End: 2024-06-08

## 2024-07-03 ENCOUNTER — TELEPHONE (OUTPATIENT)
Dept: GASTROENTEROLOGY | Facility: CLINIC | Age: 44
End: 2024-07-03
Payer: COMMERCIAL

## 2024-07-03 NOTE — TELEPHONE ENCOUNTER
"Endoscopy Scheduling Screen    Have you had a positive Covid test in the last 14 days?  No    What is your communication preference for Instructions and/or Bowel Prep?   MyChart    What insurance is in the chart?  Other:  BP    Ordering/Referring Provider:   EASTON HOLT    (If ordering provider performs procedure, schedule with ordering provider unless otherwise instructed. )    BMI: Estimated body mass index is 25.18 kg/m  as calculated from the following:    Height as of 5/14/24: 1.9 m (6' 2.8\").    Weight as of 5/14/24: 90.9 kg (200 lb 6.4 oz).     Sedation Ordered  moderate sedation.   If patient BMI > 50 do not schedule in ASC.    If patient BMI > 45 do not schedule at [facility].    Are you taking methadone or Suboxone?  No    Have you had difficulties, pain, or discomfort during past endoscopy procedures?  No    Are you taking any prescription medications for pain 3 or more times per week?   NO, No RN review required.    Do you have a history of malignant hyperthermia?  No    (Females) Are you currently pregnant?   No     Have you been diagnosed or told you have pulmonary hypertension?   No    Do you have an LVAD?  No    Have you been told you have moderate to severe sleep apnea?  No    Have you been told you have COPD, asthma, or any other lung disease?  Yes     What breathing problems do you have?  Cystic fibrosis      Do you use home oxygen?  No    Have your breathing problems required an ED visit or hospitalization in the last year?  No    Do you have any heart conditions?  No     Have you ever had or are you waiting for an organ transplant?  No. Continue scheduling, no site restrictions.    Have you had a stroke or transient ischemic attack (TIA aka \"mini stroke\" in the last 6 months?   No    Have you been diagnosed with or been told you have cirrhosis of the liver?   No    Are you currently on dialysis?   No    Do you need assistance transferring?   No    BMI: Estimated body mass index is 25.18 kg/m  " "as calculated from the following:    Height as of 5/14/24: 1.9 m (6' 2.8\").    Weight as of 5/14/24: 90.9 kg (200 lb 6.4 oz).     Is patients BMI > 40 and scheduling location [facility]?  No    Do you take an injectable medication for weight loss or diabetes (excluding insulin)?  No    Do you take the medication Naltrexone?  No    Do you take blood thinners?  No       Prep   Are you currently on dialysis or do you have chronic kidney disease?  No    Do you have a diagnosis of diabetes?  No    Do you have a diagnosis of cystic fibrosis (CF)?  Yes (Extended Prep)    On a regular basis do you go 3 -5 days between bowel movements?  No    BMI > 40?  No    Preferred Pharmacy:        [id number] - [place], [place] - [facility] [address] AT [address] [address]  Phone: [phone] Fax: [phone]      Final Scheduling Details     Procedure scheduled  Colonoscopy    Surgeon:  Boom     Date of procedure:  8/12     Pre-OP / PAC:   No - Not required for this site.    Location  [facility] - Patient preference.    Sedation   Moderate Sedation - Per order.      Patient Reminders:   You will receive a call from a Nurse to review instructions and health history.  This assessment must be completed prior to your procedure.  Failure to complete the Nurse assessment may result in the procedure being cancelled.      On the day of your procedure, please designate an adult(s) who can drive you home stay with you for the next 24 hours. The medicines used in the exam will make you sleepy. You will not be able to drive.      You cannot take public transportation, ride share services, or non-medical taxi service without a responsible caregiver.  Medical transport services are allowed with the requirement that a responsible caregiver will receive you at your destination.  We require that drivers and caregivers are confirmed prior to your procedure.  "

## 2024-08-01 ENCOUNTER — TELEPHONE (OUTPATIENT)
Dept: GASTROENTEROLOGY | Facility: CLINIC | Age: 44
End: 2024-08-01
Payer: COMMERCIAL

## 2024-08-01 DIAGNOSIS — E84.9 CF (CYSTIC FIBROSIS) (H): Primary | ICD-10-CM

## 2024-08-01 RX ORDER — BISACODYL 5 MG/1
TABLET, DELAYED RELEASE ORAL
Qty: 4 TABLET | Refills: 0 | Status: SHIPPED | OUTPATIENT
Start: 2024-08-01

## 2024-08-01 NOTE — TELEPHONE ENCOUNTER
Pre visit planning completed.      Procedure details:    Patient scheduled for Colonoscopy on 8/12/2024.     Arrival time: 1200. Procedure time 1300    Facility location: [facility]; [address] [place] [address], [address] Floor, [place] [address]. Check in location: Main entrance at registration desk.    Sedation type: Conscious sedation     Pre op exam needed? No.    Indication for procedure:   CF (cystic fibrosis) (H) [E84.9]      Encounter for screening colonoscopy [Z12.11]            Chart review:     Electronic implanted devices? No    Recent diagnosis of diverticulitis within the last 6 weeks? No      Medication review:    Diabetic? Yes. Not on diabetic medication    Anticoagulants? No    Weight loss medication/injectable? No.    NSAIDS? No NSAID medications per patient's medication list.  RN will verify with pre-assessment call.    Other medication HOLDING recommendations:  N/A      Prep for procedure:     Bowel prep recommendation: Extended Golytely. Bowel prep prescription sent to    [id number] [place] [address] [address] [facility] [address]   Due to: cystic fibrosis (CF).    Prep instructions sent via [employer] Alfred RN  Endoscopy Procedure Pre Assessment RN  [phone] option 4

## 2024-08-01 NOTE — TELEPHONE ENCOUNTER
Attempted to contact patient in order to complete pre assessment questions.     No answer. Left message to return call to [phone] option 4    Callback required communication sent via [employer].    Umu Alfred RN  Endoscopy Procedure Pre Assessment

## 2024-08-05 NOTE — TELEPHONE ENCOUNTER
Second call attempt to complete pre assessment.     No answer.  Left message to return call to [phone] #4 by next business day prior to 4PM or procedure will be sent to cancel.     Callback required communication sent via [employer].      Umu Alfred RN  Endoscopy Procedure Pre Assessment

## 2024-08-07 ENCOUNTER — TELEPHONE (OUTPATIENT)
Dept: GASTROENTEROLOGY | Facility: CLINIC | Age: 44
End: 2024-08-07
Payer: COMMERCIAL

## 2024-08-07 NOTE — TELEPHONE ENCOUNTER
Caller: No Call Made    Reason for Reschedule/Cancellation   (please be detailed, any staff messages or encounters to note?): Pt did not complete pre-assessment       Prior to reschedule please review:  Ordering Provider: Rocael Miller MD  Sedation Determined: Moderate  Does patient have any ASC Exclusions, please identify?: Yes, CF      Notes on Cancelled Procedure:  Procedure: Lower Endoscopy [Colonoscopy]   Date: 08/12/2024  Location: [facility]; [address] [place] [address], [address] Floor, [place] [phone]   Surgeon: GARCIA      Rescheduled: No, CASE IN DEPOT       Did you cancel or rescheduled an EUS procedure? No.

## 2024-08-07 NOTE — TELEPHONE ENCOUNTER
Caller: Telly      Rescheduled: Yes,   Procedure: Lower Endoscopy [Colonoscopy]    Date: 9/16/24   Location: [facility]; [address], [address]rd Floor, [place] [address]    Surgeon: GARCIA   Sedation Level Scheduled  moderate ,  Reason for Sedation Level per order   Instructions updated and sent: yes, mychart     Does patient need PAC or Pre -Op Rescheduled? : no       Did you cancel or rescheduled an EUS procedure? No.

## 2024-08-07 NOTE — TELEPHONE ENCOUNTER
No return call received.   Pre assessment was not completed for upcoming scheduled procedure.     Staff message sent to endoscopy scheduling to cancel procedure per policy.       Umu Alfred RN   Endoscopy Procedure Pre Assessment

## 2024-08-17 ENCOUNTER — HEALTH MAINTENANCE LETTER (OUTPATIENT)
Age: 44
End: 2024-08-17

## 2024-08-23 DIAGNOSIS — E84.9 CF (CYSTIC FIBROSIS) (H): ICD-10-CM

## 2024-08-23 RX ORDER — ELEXACAFTOR, TEZACAFTOR, AND IVACAFTOR 100-50-75
KIT ORAL
Qty: 84 TABLET | Refills: 0 | Status: SHIPPED | OUTPATIENT
Start: 2024-08-23

## 2024-09-04 ENCOUNTER — PHARMACY VISIT (OUTPATIENT)
Dept: ADMINISTRATIVE | Facility: CLINIC | Age: 44
End: 2024-09-04
Payer: COMMERCIAL

## 2024-09-04 NOTE — PROGRESS NOTES
Cystic Fibrosis Clinical Follow Up Assessment   Completed on 2024/09/04 20:02:54 [facility], by Angela Hoffman        Activity Date 2024/09/03     Activity Medications    CREON    TRIKAFTA        Care Details    What are the patient's goals for this therapy?   ? 03/17/2023: Pt did not respond; 3/17/2022: Maintaining his health; 1/14/2021: Pt not respond      Did you identify any patient barriers to access and successful treatment?   ? No barriers to access identified      Is it appropriate to collect a PDC at this time? [QA Metric] (An MPR or PDC would not be appropriate for cycled medications or if the patient is on therapy   ? Yes      Document PDC   ? 0.97      Has the patient missed doses inappropriately?   ? No      Please select CURRENT side effect(s) for monitoring:   ? None          Summary Notes  I had the pleasure of speaking to pt for TM.   States everything has been going great. No side effects.   Doses: states he has been great taking the medications with no missed doses.   Pt did not respond to further TM questions.   Will continue biannual TM.       Angela HOFFMAN, PharmD, CSP  Therapy Management Pharmacist  [address] [address]   [email or web address]  [email or web address]   Specialty: [phone]  Mail Order: [phone]

## 2024-09-09 ENCOUNTER — TELEPHONE (OUTPATIENT)
Dept: GASTROENTEROLOGY | Facility: CLINIC | Age: 44
End: 2024-09-09
Payer: COMMERCIAL

## 2024-09-09 NOTE — TELEPHONE ENCOUNTER
Caller:     Reason for Reschedule/Cancellation   (please be detailed, any staff messages or encounters to note?): SICK      Prior to reschedule please review:  Ordering Provider:     EASTON HOLT     Sedation Determined: CS  Does patient have any ASC Exclusions, please identify?:       Notes on Cancelled Procedure:  Procedure: Lower Endoscopy [Colonoscopy]   Date: 9/16 MAGDA  Location: [facility]; [address] [place] [address]   Surgeon: MAGDA      Rescheduled: Yes,   Procedure: Lower Endoscopy [Colonoscopy]    Date: 10/28   Location: [facility]; [address], [address], [place] [address]    Surgeon: MAGDA   Sedation Level Scheduled  CS ,  Reason for Sedation Level ORDER   Instructions updated and sent: Y     Does patient need PAC or Pre -Op Rescheduled? : N       Did you cancel or rescheduled an EUS procedure? No.

## 2024-09-24 ENCOUNTER — TELEPHONE (OUTPATIENT)
Dept: PHARMACY | Facility: CLINIC | Age: 44
End: 2024-09-24
Payer: COMMERCIAL

## 2024-09-24 NOTE — TELEPHONE ENCOUNTER
PA Initiation    Medication: TRIKAFTA 100-50-75 & 150 MG PO TBPK  Insurance Company: [employer] - Phone [phone] Fax [phone]  Pharmacy Filling the Rx:    Filling Pharmacy Phone:    Filling Pharmacy Fax:    Start Date: 9/24/2024    Faxed PA form and notes

## 2024-09-25 NOTE — TELEPHONE ENCOUNTER
Prior Authorization Approval    Medication: TRIKAFTA 100-50-75 & 150 MG PO TBPK  Authorization Effective Date: 9/24/2024  Authorization Expiration Date: 9/24/2025  Approved Dose/Quantity: 84 tabs per 28 days  Reference #:     Insurance Company: [employer] - Phone [phone] Fax [phone]  Expected CoPay: $ 0  CoPay Card Available: Yes    Financial Assistance Needed: Enrolled  Which Pharmacy is filling the prescription: [facility] - [address]   Pharmacy Notified: Not needed  Patient Notified: Not needed      COPAY CARD OBTAINED    Medication: TRIKAFTA 100-50-75 & 150 MG PO TBPK  Sponsor: [employer]  Member ID: [id number]  BIN: [id number]  PCN: [id number]  Group: [id number]  Expected Copay: $ 0  Copay card Monthly Max Amount: $    Copay card Annual Amount: $ 20,000

## 2024-09-26 DIAGNOSIS — E84.9 CF (CYSTIC FIBROSIS) (H): ICD-10-CM

## 2024-09-26 RX ORDER — ELEXACAFTOR, TEZACAFTOR, AND IVACAFTOR 100-50-75
KIT ORAL
Qty: 84 TABLET | Refills: 0 | OUTPATIENT
Start: 2024-09-26

## 2024-09-26 NOTE — TELEPHONE ENCOUNTER
Refill request for Trikafta received.     Per chart review:    Patient last seen in clinic 5/14/24. Overdue for follow up visit (3 month).    Plan for repeat Trikafta labs in 3 months along with clinic visit given previous lab elevations.    8/13/24--Pharmacist reached out to patient with request for patient to schedule lab appointment and follow up visit.    RN called patient to advise to schedule in order to continue to safely prescribe. Per chart review, patient already had 1 month bridge supply on 8/23 with instruction to schedule follow up visit for further refills.    MADIHA Paulson

## 2024-10-11 ENCOUNTER — TELEPHONE (OUTPATIENT)
Dept: ENDOCRINOLOGY | Facility: CLINIC | Age: 44
End: 2024-10-11
Payer: COMMERCIAL

## 2024-10-11 DIAGNOSIS — E84.9 CF (CYSTIC FIBROSIS) (H): ICD-10-CM

## 2024-10-11 RX ORDER — ELEXACAFTOR, TEZACAFTOR, AND IVACAFTOR 100-50-75
KIT ORAL
Qty: 84 TABLET | Refills: 0 | Status: SHIPPED | OUTPATIENT
Start: 2024-10-11 | End: 2024-11-04

## 2024-10-11 NOTE — TELEPHONE ENCOUNTER
Called patient and left voicemail. Patient has appointment on  10/15/24 . Need to collect 14 days of blood sugar readings if patient is using meter, or if patient is using CGM, need to get patients device uploaded to retrieve report for provider to review.  Rukhsana Lynch MA

## 2024-10-11 NOTE — PROGRESS NOTES
Outcome for 10/11/24 10:30 AM: Mychart message sent  Rukhsana Lynch MA  Outcome for 10/11/24 12:12 PM: Left Voicemail   Rukhsana Lynch MA  Outcome for 10/14/24 10:11 AM: Patient is not checking blood sugars  Rukhsana Lynch MA

## 2024-10-11 NOTE — TELEPHONE ENCOUNTER
Future Appointments   Date Time Provider [address]   9/29/2023  8:30 PM [place], [facility]   11/13/2023  9:00 AM LUIS Burnett CNP AND PULM [facility]   2/27/2024  8:00 AM Blenda Méndez, APRN - CNP [facility] [place] - DYD Pre assessment completed for upcoming procedure.   (Please see previous telephone encounter notes for complete details)      Procedure details:    Arrival time and facility location reviewed.    Pre op exam needed? No.    Designated  policy reviewed. Instructed to have someone stay 6  hours post procedure.       Medication review:    Medications reviewed. Please see supporting documentation below. Holding recommendations discussed (if applicable).   N/A      Prep for procedure:     Procedure prep instructions reviewed.        Any additional information needed:  N/A      Patient  verbalized understanding and had no questions or concerns at this time.      Umu Alfred RN  Endoscopy Procedure Pre Assessment   [phone] option 2

## 2024-10-11 NOTE — TELEPHONE ENCOUNTER
Pre visit planning completed.      Procedure details:    Patient scheduled for Colonoscopy on 10/28/2024.     Arrival time: 1200. Procedure time 1300    Facility location: [facility]; [address] [place] [address], 3rd Floor, [place] [address]. Check in location: Main entrance at registration desk.    Sedation type: Conscious sedation     Pre op exam needed? No.    Indication for procedure:   CF (cystic fibrosis) (H) [E84.9]      Encounter for screening colonoscopy [Z12.11]            Chart review:     Electronic implanted devices? No    Recent diagnosis of diverticulitis within the last 6 weeks? No      Medication review:    Diabetic? Yes. Not on diabetic medication    Anticoagulants? No    Weight loss medication/injectable? No GLP-1 medication per patient's medication list.  RN will verify with pre-assessment call.    Other medication HOLDING recommendations:  N/A      Prep for procedure:     Bowel prep recommendation: Extended Golytely. Bowel prep prescription sent to    [id number] [place] [address] [address] [facility] [address]    Due to: cystic fibrosis (CF).    Prep instructions sent via [employer]         Umu Alfred RN  Endoscopy Procedure Pre Assessment RN  [phone] option 2

## 2024-10-14 NOTE — TELEPHONE ENCOUNTER
Spoke with patient. Pt is not checking his blood sugars at home. Rukhsana Lynch CMA on 10/14/2024 at 10:12 AM

## 2024-10-15 ENCOUNTER — VIRTUAL VISIT (OUTPATIENT)
Dept: ENDOCRINOLOGY | Facility: CLINIC | Age: 44
End: 2024-10-15
Attending: INTERNAL MEDICINE
Payer: COMMERCIAL

## 2024-10-15 DIAGNOSIS — E84.8 DIABETES MELLITUS RELATED TO CF (CYSTIC FIBROSIS) (H): Primary | ICD-10-CM

## 2024-10-15 DIAGNOSIS — E08.9 DIABETES MELLITUS RELATED TO CF (CYSTIC FIBROSIS) (H): Primary | ICD-10-CM

## 2024-10-15 PROCEDURE — 99204 OFFICE O/P NEW MOD 45 MIN: CPT | Mod: 95 | Performed by: INTERNAL MEDICINE

## 2024-10-15 ASSESSMENT — PAIN SCALES - GENERAL: PAINLEVEL: NO PAIN (0)

## 2024-10-15 NOTE — NURSING NOTE
Is the patient currently in the state of [place]? YES    Visit mode:VIDEO    If the visit is dropped, the patient can be reconnected by: VIDEO VISIT: Send to e-mail at: [email or web address]    Will anyone else be joining the visit? NO  (If patient encounters technical issues they should call [phone]    How would you like to obtain your AVS? MyChart    Are changes needed to the allergy or medication list? No    Are refills needed on medications prescribed by this physician? NO    Reason for visit: Consult    Flor GERBER

## 2024-10-15 NOTE — LETTER
10/15/2024       RE: Telly Leal  [address] [address]     Dear Colleague,    Thank you for referring your patient, Telly Leal, to the [facility] [place] at [facility]. Please see a copy of my visit note below.    Outcome for 10/11/24 10:30 AM: Monte Cristot message sent  Rukhsana Lynch MA  Outcome for 10/11/24 12:12 PM: Left Voicemail   Rukhsana Lynch MA  Outcome for 10/14/24 10:11 AM: Patient is not checking blood sugars  Rukhsana Lynch MA      CF Endocrinology Return Consultation:  Diabetes  :   Patient: Telly Leal MRN# [id number]   YOB: 1980 Age: 44 year old   Date of Visit: 10/15/2024     Dear Dr. Rocael Miller:    I had the pleasure of seeing your patient, Telly Leal in the [facility] Endocrinology Clinic, [facility], for a return consultation regarding CFRD.           Problem list:     Patient Active Problem List    Diagnosis Date Noted     CF (cystic fibrosis) (H) 10/12/2015     Priority: Medium     Congenital absence of vas deferens, bilateral 10/12/2015     Priority: Medium     Cystic fibrosis with pulmonary manifestations (H) 11/15/2011     Priority: Medium     SWEAT TEST:  Date: 1980          Laboratory: [facility]  Sample #1  102 mg           100 mmol/L Cl  Sample #2  66 mg           100 mmol/L Cl     GENOTYPING:  Date: 6/08/2006         Laboratory: [facility]  Genotype: [id number],3  Poly T Variant:  7T/9T       Type 1 diabetes mellitus (H) 11/09/2011     Priority: Medium     updating diagnosis code for icd10 cutover              HPI:   Telly is a 44 year old male with Cystic Fibrosis Related Diabetes Mellitus (CFRD).    I have reviewed the available past laboratory evaluations, imaging studies, and medical records available to me at this visit.   History was obtained from the patient and the medical record.  I have reviewed the notes of the  pulmonary care team entered into the medical record.    Patient has remote history of diagnosis of CFRD based on OGTT.  This was around 15 years ago and at that time he was seen by Dr. Nieves.  He was on tube feeds at that time and was prescribed insulin with tube feeds but patient never started insulin due to concern about hypoglycemia.    In the recent years OGTT glucose readings have been in the impaired glucose tolerance range.  Patient here is today to discuss OGTT results and also is interested in wearing a continuous glucose monitor  He was provided glucose meter but finds it difficult to do fingerstick glucose monitoring and would rather prefer CGM    He denies any acute complaints  His BMI is above 25 and has been able to maintain it.  He has a feeding tube but has not used tube feeding for the last couple of years  Has had good response to Trikafta in terms of pulmonary function  Patient has pancreatic insufficiency     Latest Reference Range & Units 06/26/23 08:57 06/26/23 09:50 06/26/23 10:17 06/26/23 10:47 06/26/23 11:22   Glucose 70 - 99 mg/dL 120 (H)  114 (H) 188 (H) 259 (H) 253 (H) 158 (H)   Hemoglobin A1C <5.7 % 5.8 (H)       IGE 0 - 114 kU/L 3       Insulin 2.6 - 24.9 uU/mL 9.6 24.8 49.1 (H) 76.3 (H) 85.0 (H)   (H): Data is abnormally high  (L): Data is abnormally low    A1c:  Previous two HbA1c results:   Lab Results   Component Value Date    A1C 5.8 06/26/2023    A1C 5.6 10/08/2021    A1C 5.6 04/20/2020    A1C 6.1 04/22/2019     Current insulin regimen:   None            Past Medical History:     Past Medical History:   Diagnosis Date     Chronic sinusitis      Cystic fibrosis with pulmonary manifestations (H)      Exocrine pancreatic insufficiency      GERD (gastroesophageal reflux disease)      Hemoptysis      Malnutrition (H)             Past Surgical History:     Past Surgical History:   Procedure Laterality Date     IR MISCELLANEOUS PROCEDURE  2/6/2001     LOBECTOMY      right upper lobe      TUBES                 Social History:     Social History     Social History Narrative    Patient works for the [employer] as an .  He does not have stable living conditions at this time.  He has a Pashto gulilen.      , 2 daughters  Work as  for [employer]          Family History:     Family History   Problem Relation Age of Onset     Diabetes Other         grandparent     Cancer Other         grandparent, skin, breast, prostate     Hypertension Mother      High cholesterol Other         grandparent     Depression Sister             Allergies:   No Known Allergies          Medications:     Current Outpatient Rx   Medication Sig Dispense Refill     acetylcysteine (MUCOMYST) 20 % neb solution Inhale 2 mLs into the lungs 2 times daily INHALE 2ML INTO THE LUNGS VIA NEB THREE TIMES A DAY. STORE UNUSED PORTION IN FRIG FOR UP TO 96 HOURS 180 mL 12     albuterol (PROAIR HFA) 108 (90 Base) MCG/ACT inhaler Inhale 1-2 puffs into the lungs every 6 hours as needed for shortness of breath 8.5 g 3     albuterol (PROVENTIL) (2.5 MG/3ML) 0.083% neb solution Take 1 vial (2.5 mg) by nebulization 3 times daily as needed for shortness of breath, wheezing or cough 360 mL 11     azithromycin (ZITHROMAX) 250 MG tablet TAKE ONE TABLET BY MOUTH ONCE DAILY 30 tablet 11     bisacodyl (DULCOLAX) 5 MG EC tablet Two days prior to exam take two (2) tablets at 4pm. One day prior to exam take two (2) tablets at 4pm 4 tablet 0     blood glucose (ACCU-CHEK GUIDE) test strip Use to test blood sugar 2 times daily as needed. 100 strip 3     blood glucose monitoring (SOFTCLIX) lancets Use to test blood sugar 2 times daily as needed. 100 each 3     cromolyn (INTAL) 20 MG/2ML neb solution Take 2 mLs (20 mg) by nebulization 3 times daily 1 vial three times daily 180 mL 12     elexacaftor-tezacaftor-ivacaftor & ivacaftor (TRIKAFTA) 100-50-75 & 150 MG tablet pack TAKE TWO ORANGE TABLETS BY MOUTH IN THE MORNING AND ONE BLUE  TABLET IN THE EVENING.  TAKE WITH FAT CONTAINING FOOD. 10/11: must attend 11/12 appt for additional refills, thanks! 84 tablet 0     famotidine (PEPCID) 20 MG tablet Take 1 tablet (20 mg) by mouth 2 times daily as needed (heartburn) 20 tablet 3     fluticasone (FLONASE) 50 MCG/ACT nasal spray Spray 2 sprays into both nostrils as needed for rhinitis 16 g 11     lipase-protease-amylase (CREON) 82169-64967-442416 units CPEP per EC capsule TAKE 5 TO 6 CAPSULES BY MOUTH WITH MEALS, 1 TO 5 CAPSULES WITH SNACKS, AND 7 TO 8 CAPSULES WITH TUBE FEEDINGS 1200 capsule 11     mvw complete formulation (SOFTGELS) capsule TAKE 1 CAPSULE BY MOUTH ONCE DAILY 60 capsule 11     Nutritional Supplements (NUTREN 2.0) Take 2 Cans by mouth At Bedtime 168/ml/hr 7 days/week 60 Can 11     polyethylene glycol (GOLYTELY) 236 g suspension Two days before procedure at 5PM fill first container with water. Mix and drink an 8 oz glass every 10-15 minutes until HALF of the container is gone. Place the remainder in the refrigerator. One day before procedure at 5PM drink second half of bowel prep. Drink an 8 oz glass every 10-15 minutes until it is gone. Day of procedure 6 hours before arrival time fill the 2nd container with water. Mix and drink an 8 oz glass every 10-15 minutes until HALF of the container is gone. Discard the remaining solution. 8000 mL 0             Review of Systems:             Physical Exam:   There were no vitals taken for this visit.  Growth %ile SmartLinks can only be used for patients less than 20 years old.  Height: Data Unavailable, Facility age limit for growth %petra is 20 years.  Weight: 0 lbs 0 oz, Facility age limit for growth %petra is 20 years.  BMI: There is no height or weight on file to calculate BMI., No height and weight on file for this encounter.      GENERAL: alert and no distress  EYES: Eyes grossly normal to inspection.  No discharge or erythema, or obvious scleral/conjunctival abnormalities.  RESP: No audible  wheeze, cough, or visible cyanosis.    NEURO: Cranial nerves grossly intact.  Mentation and speech appropriate for age.  PSYCH: Appropriate affect, tone, and pace of words         Laboratory results:     TSH   Date Value Ref Range Status   10/26/2009 3.10 0.4 - 5.0 mU/L Final     Testosterone Total   Date Value Ref Range Status   06/26/2023 385 240 - 950 ng/dL Final   04/20/2020 368 240 - 950 ng/dL Final     Comment:     This test was developed and its performance characteristics determined by the   [facility].   It has not been cleared or approved by the FDA. The laboratory is regulated   under CLIA as qualified to perform high-complexity testing. This test is used   for clinical purposes. It should not be regarded as investigational or for   research.       Cholesterol   Date Value Ref Range Status   06/26/2023 145 <200 mg/dL Final   04/20/2020 147 <200 mg/dL Final     Albumin Urine mg/L   Date Value Ref Range Status   04/20/2020 9 mg/L Final     Triglycerides   Date Value Ref Range Status   06/26/2023 88 <150 mg/dL Final   04/20/2020 141 <150 mg/dL Final     Comment:     Non Fasting     HDL Cholesterol   Date Value Ref Range Status   04/20/2020 40 >39 mg/dL Final     Direct Measure HDL   Date Value Ref Range Status   06/26/2023 34 (L) >=40 mg/dL Final     LDL Cholesterol Calculated   Date Value Ref Range Status   06/26/2023 93 <=100 mg/dL Final   04/20/2020 79 <100 mg/dL Final     Comment:     Desirable:       <100 mg/dl     Cholesterol/HDL Ratio   Date Value Ref Range Status   12/23/2014 3.9 0.0 - 5.0 Final     Non HDL Cholesterol   Date Value Ref Range Status   06/26/2023 111 <130 mg/dL Final   04/20/2020 107 <130 mg/dL Final     Lab Results   Component Value Date    A1C 5.8 06/26/2023    A1C 5.6 10/08/2021    A1C 5.6 04/20/2020    A1C 6.1 04/22/2019    A1C 5.9 01/22/2018    A1C 6.2 12/16/2016    A1C 5.8 12/11/2015      Lab Results   Component  "Value Date    HEMOGLOBINA1 5.6 04/13/2009             Diabetes Health Maintenance    Date of Diabetes Diagnosis: Impaired glucose tolerance on recent OGTT, remote history of CFRD on OGTT 2009    Special Notes (if any):     Date Last Eye Exam:      Date Last Dental Appointment:     Dates of Episodes Severe* Hypoglycemia (month/year, cumulative, ongoing, assess each visit):    *Severe=patient unconscious, seizure, unable to help self    Last 25-Vitamin D (every year):     Last DXA, lowest Z-score (every 2 years):        ?Bisphosphonates (yes/no):     Last Urine Microalbumin (every year):      No results found for: \"MICROALBUMIN\"    Last Testosterone:   Testosterone Total   Date Value Ref Range Status   06/26/2023 385 240 - 950 ng/dL Final   04/20/2020 368 240 - 950 ng/dL Final     Comment:     This test was developed and its performance characteristics determined by the   [facility].   It has not been cleared or approved by the FDA. The laboratory is regulated   under CLIA as qualified to perform high-complexity testing. This test is used   for clinical purposes. It should not be regarded as investigational or for   research.                Assessment and Plan:   Telly is a 44 year old male with impaired glucose tolerance, pancreatic insufficiency     Impaired glucose tolerance:  Last OGTT was >1 year ago.  Patient is scheduled to undergo OGTT next month  A1c 5.8%  He is interested in utilizing CGM to get glucose data in the home setting.  Will place a diabetes educator consult to have diagnostic CGM placed.  Patient was counseled about diagnosis of CFRD and also counseled about use of CGM    Return to clinic in 3 months to review OGTT and CGM data    JASON Tam    Note: Chart documentation done in part with Dragon Voice Recognition software. Although reviewed after completion, some word and grammatical errors may remain.  Please consider " this when interpreting information in this chart     Virtual Visit Details    Type of service:  Video Visit   Video visit start time 10:10 AM  Video visit end time 10:25 AM  Originating Location (pt. Location): Home  Distant Location (provider location):  On-site  Platform used for Video Visit: Positron      Again, thank you for allowing me to participate in the care of your patient.      Sincerely,    Tianna Yoo MD

## 2024-10-15 NOTE — PROGRESS NOTES
CF Endocrinology Return Consultation:  Diabetes  :   Patient: Telly Leal MRN# [id number]   YOB: 1980 Age: 44 year old   Date of Visit: 10/15/2024     Dear Dr. Rocael Miller:    I had the pleasure of seeing your patient, Telly Leal in the [facility], [facility], for a return consultation regarding CFRD.           Problem list:     Patient Active Problem List    Diagnosis Date Noted    CF (cystic fibrosis) (H) 10/12/2015     Priority: Medium    Congenital absence of vas deferens, bilateral 10/12/2015     Priority: Medium    Cystic fibrosis with pulmonary manifestations (H) 11/15/2011     Priority: Medium     SWEAT TEST:  Date: 1980          Laboratory: [facility]  Sample #1  102 mg           100 mmol/L Cl  Sample #2  66 mg           100 mmol/L Cl     GENOTYPING:  Date: 6/08/2006         Laboratory: [employer]  Genotype: [id number]  Poly T Variant:  7T/9T      Type 1 diabetes mellitus (H) 11/09/2011     Priority: Medium     updating diagnosis code for icd10 cutover              HPI:   Telly is a 44 year old male with Cystic Fibrosis Related Diabetes Mellitus (CFRD).    I have reviewed the available past laboratory evaluations, imaging studies, and medical records available to me at this visit.   History was obtained from the patient and the medical record.  I have reviewed the notes of the pulmonary care team entered into the medical record.    Patient has remote history of diagnosis of CFRD based on OGTT.  This was around 15 years ago and at that time he was seen by Dr. Nieves.  He was on tube feeds at that time and was prescribed insulin with tube feeds but patient never started insulin due to concern about hypoglycemia.    In the recent years OGTT glucose readings have been in the impaired glucose tolerance range.  Patient here is today to discuss OGTT results and also is interested in wearing a continuous glucose monitor  He was provided  glucose meter but finds it difficult to do fingerstick glucose monitoring and would rather prefer CGM    He denies any acute complaints  His BMI is above 25 and has been able to maintain it.  He has a feeding tube but has not used tube feeding for the last couple of years  Has had good response to Trikafta in terms of pulmonary function  Patient has pancreatic insufficiency     Latest Reference Range & Units 06/26/23 08:57 06/26/23 09:50 06/26/23 10:17 06/26/23 10:47 06/26/23 11:22   Glucose 70 - 99 mg/dL 120 (H)  114 (H) 188 (H) 259 (H) 253 (H) 158 (H)   Hemoglobin A1C <5.7 % 5.8 (H)       IGE 0 - 114 kU/L 3       Insulin 2.6 - 24.9 uU/mL 9.6 24.8 49.1 (H) 76.3 (H) 85.0 (H)   (H): Data is abnormally high  (L): Data is abnormally low    A1c:  Previous two HbA1c results:   Lab Results   Component Value Date    A1C 5.8 06/26/2023    A1C 5.6 10/08/2021    A1C 5.6 04/20/2020    A1C 6.1 04/22/2019     Current insulin regimen:   None            Past Medical History:     Past Medical History:   Diagnosis Date    Chronic sinusitis     Cystic fibrosis with pulmonary manifestations (H)     Exocrine pancreatic insufficiency     GERD (gastroesophageal reflux disease)     Hemoptysis     Malnutrition (H)             Past Surgical History:     Past Surgical History:   Procedure Laterality Date    IR MISCELLANEOUS PROCEDURE  2/6/2001    LOBECTOMY      right upper lobe    TUBES                 Social History:     Social History     Social History Narrative    Patient works for the [employer] as an .  He does not have stable living conditions at this time.  He has a Iranian guillen.      , 2 daughters  Work as  for [employer]          Family History:     Family History   Problem Relation Age of Onset    Diabetes Other         grandparent    Cancer Other         grandparent, skin, breast, prostate    Hypertension Mother     High cholesterol Other         grandparent    Depression Sister              Allergies:   No Known Allergies          Medications:     Current Outpatient Rx   Medication Sig Dispense Refill    acetylcysteine (MUCOMYST) 20 % neb solution Inhale 2 mLs into the lungs 2 times daily INHALE 2ML INTO THE LUNGS VIA NEB THREE TIMES A DAY. STORE UNUSED PORTION IN FRIG FOR UP TO 96 HOURS 180 mL 12    albuterol (PROAIR HFA) 108 (90 Base) MCG/ACT inhaler Inhale 1-2 puffs into the lungs every 6 hours as needed for shortness of breath 8.5 g 3    albuterol (PROVENTIL) (2.5 MG/3ML) 0.083% neb solution Take 1 vial (2.5 mg) by nebulization 3 times daily as needed for shortness of breath, wheezing or cough 360 mL 11    azithromycin (ZITHROMAX) 250 MG tablet TAKE ONE TABLET BY MOUTH ONCE DAILY 30 tablet 11    bisacodyl (DULCOLAX) 5 MG EC tablet Two days prior to exam take two (2) tablets at 4pm. One day prior to exam take two (2) tablets at 4pm 4 tablet 0    blood glucose (ACCU-CHEK GUIDE) test strip Use to test blood sugar 2 times daily as needed. 100 strip 3    blood glucose monitoring (SOFTCLIX) lancets Use to test blood sugar 2 times daily as needed. 100 each 3    cromolyn (INTAL) 20 MG/2ML neb solution Take 2 mLs (20 mg) by nebulization 3 times daily 1 vial three times daily 180 mL 12    elexacaftor-tezacaftor-ivacaftor & ivacaftor (TRIKAFTA) 100-50-75 & 150 MG tablet pack TAKE TWO ORANGE TABLETS BY MOUTH IN THE MORNING AND ONE BLUE TABLET IN THE EVENING.  TAKE WITH FAT CONTAINING FOOD. 10/11: must attend 11/12 appt for additional refills, thanks! 84 tablet 0    famotidine (PEPCID) 20 MG tablet Take 1 tablet (20 mg) by mouth 2 times daily as needed (heartburn) 20 tablet 3    fluticasone (FLONASE) 50 MCG/ACT nasal spray Spray 2 sprays into both nostrils as needed for rhinitis 16 g 11    lipase-protease-amylase (CREON) 89057-14625-571772 units CPEP per EC capsule TAKE 5 TO 6 CAPSULES BY MOUTH WITH MEALS, 1 TO 5 CAPSULES WITH SNACKS, AND 7 TO 8 CAPSULES WITH TUBE FEEDINGS 1200 capsule 11    mvw complete  formulation (SOFTGELS) capsule TAKE 1 CAPSULE BY MOUTH ONCE DAILY 60 capsule 11    Nutritional Supplements (NUTREN 2.0) Take 2 Cans by mouth At Bedtime 168/ml/hr 7 days/week 60 Can 11    polyethylene glycol (GOLYTELY) 236 g suspension Two days before procedure at 5PM fill first container with water. Mix and drink an 8 oz glass every 10-15 minutes until HALF of the container is gone. Place the remainder in the refrigerator. One day before procedure at 5PM drink second half of bowel prep. Drink an 8 oz glass every 10-15 minutes until it is gone. Day of procedure 6 hours before arrival time fill the 2nd container with water. Mix and drink an 8 oz glass every 10-15 minutes until HALF of the container is gone. Discard the remaining solution. 8000 mL 0             Review of Systems:             Physical Exam:   There were no vitals taken for this visit.  Growth %ile SmartLinks can only be used for patients less than 20 years old.  Height: Data Unavailable, Facility age limit for growth %petra is 20 years.  Weight: 0 lbs 0 oz, Facility age limit for growth %petra is 20 years.  BMI: There is no height or weight on file to calculate BMI., No height and weight on file for this encounter.      GENERAL: alert and no distress  EYES: Eyes grossly normal to inspection.  No discharge or erythema, or obvious scleral/conjunctival abnormalities.  RESP: No audible wheeze, cough, or visible cyanosis.    NEURO: Cranial nerves grossly intact.  Mentation and speech appropriate for age.  PSYCH: Appropriate affect, tone, and pace of words         Laboratory results:     TSH   Date Value Ref Range Status   10/26/2009 3.10 0.4 - 5.0 mU/L Final     Testosterone Total   Date Value Ref Range Status   06/26/2023 385 240 - 950 ng/dL Final   04/20/2020 368 240 - 950 ng/dL Final     Comment:     This test was developed and its performance characteristics determined by the   [facility]  Laboratory.   It has not been cleared or approved by the FDA. The laboratory is regulated   under CLIA as qualified to perform high-complexity testing. This test is used   for clinical purposes. It should not be regarded as investigational or for   research.       Cholesterol   Date Value Ref Range Status   06/26/2023 145 <200 mg/dL Final   04/20/2020 147 <200 mg/dL Final     Albumin Urine mg/L   Date Value Ref Range Status   04/20/2020 9 mg/L Final     Triglycerides   Date Value Ref Range Status   06/26/2023 88 <150 mg/dL Final   04/20/2020 141 <150 mg/dL Final     Comment:     Non Fasting     HDL Cholesterol   Date Value Ref Range Status   04/20/2020 40 >39 mg/dL Final     Direct Measure HDL   Date Value Ref Range Status   06/26/2023 34 (L) >=40 mg/dL Final     LDL Cholesterol Calculated   Date Value Ref Range Status   06/26/2023 93 <=100 mg/dL Final   04/20/2020 79 <100 mg/dL Final     Comment:     Desirable:       <100 mg/dl     Cholesterol/HDL Ratio   Date Value Ref Range Status   12/23/2014 3.9 0.0 - 5.0 Final     Non HDL Cholesterol   Date Value Ref Range Status   06/26/2023 111 <130 mg/dL Final   04/20/2020 107 <130 mg/dL Final     Lab Results   Component Value Date    A1C 5.8 06/26/2023    A1C 5.6 10/08/2021    A1C 5.6 04/20/2020    A1C 6.1 04/22/2019    A1C 5.9 01/22/2018    A1C 6.2 12/16/2016    A1C 5.8 12/11/2015      Lab Results   Component Value Date    HEMOGLOBINA1 5.6 04/13/2009             Diabetes Health Maintenance    Date of Diabetes Diagnosis: Impaired glucose tolerance on recent OGTT, remote history of CFRD on OGTT 2009    Special Notes (if any):     Date Last Eye Exam:      Date Last Dental Appointment:     Dates of Episodes Severe* Hypoglycemia (month/year, cumulative, ongoing, assess each visit):    *Severe=patient unconscious, seizure, unable to help self    Last 25-Vitamin D (every year):     Last DXA, lowest Z-score (every 2 years):        ?Bisphosphonates (yes/no):     Last Urine  "Microalbumin (every year):      No results found for: \"MICROALBUMIN\"    Last Testosterone:   Testosterone Total   Date Value Ref Range Status   06/26/2023 385 240 - 950 ng/dL Final   04/20/2020 368 240 - 950 ng/dL Final     Comment:     This test was developed and its performance characteristics determined by the   [facility] Laboratory.   It has not been cleared or approved by the FDA. The laboratory is regulated   under CLIA as qualified to perform high-complexity testing. This test is used   for clinical purposes. It should not be regarded as investigational or for   research.                Assessment and Plan:   Telly is a 44 year old male with impaired glucose tolerance, pancreatic insufficiency     Impaired glucose tolerance:  Last OGTT was >1 year ago.  Patient is scheduled to undergo OGTT next month  A1c 5.8%  He is interested in utilizing CGM to get glucose data in the home setting.  Will place a diabetes educator consult to have diagnostic CGM placed.  Patient was counseled about diagnosis of CFRD and also counseled about use of CGM    Return to clinic in 3 months to review OGTT and CGM data    JASON Tam    Note: Chart documentation done in part with Dragon Voice Recognition software. Although reviewed after completion, some word and grammatical errors may remain.  Please consider this when interpreting information in this chart     Virtual Visit Details    Type of service:  Video Visit   Video visit start time 10:10 AM  Video visit end time 10:25 AM  Originating Location (pt. Location): Home  Distant Location (provider location):  On-site  Platform used for Video Visit: Kwasi    "

## 2024-10-24 ENCOUNTER — TELEPHONE (OUTPATIENT)
Dept: GASTROENTEROLOGY | Facility: CLINIC | Age: 44
End: 2024-10-24
Payer: COMMERCIAL

## 2024-10-24 NOTE — TELEPHONE ENCOUNTER
Caller: Telly Leal      Reason for Reschedule/Cancellation   (please be detailed, any staff messages or encounters to note?): schedule conflict      Prior to reschedule please review:  Ordering Provider: EASTON HOLT  Sedation Determined: CS  Does patient have any ASC Exclusions, please identify?:       Notes on Cancelled Procedure:  Procedure: Lower Endoscopy [Colonoscopy]   Date: 10/28/24  Location: [facility]; [address] [place] [address]   Surgeon: GARCIA      Rescheduled: Yes,   Procedure: Lower Endoscopy [Colonoscopy]    Date: 12/16/24   Location: [facility]; [address] [place] [address]    Surgeon: GARCIA   Sedation Level Scheduled  CS ,  Reason for Sedation Level PER ORDER   Instructions updated and sent: Y     Does patient need PAC or Pre -Op Rescheduled? : N       Did you cancel or rescheduled an EUS procedure? No.

## 2024-11-04 DIAGNOSIS — E84.9 CF (CYSTIC FIBROSIS) (H): ICD-10-CM

## 2024-11-04 RX ORDER — ELEXACAFTOR, TEZACAFTOR, AND IVACAFTOR 100-50-75
KIT ORAL
Qty: 84 TABLET | Refills: 0 | Status: SHIPPED | OUTPATIENT
Start: 2024-11-04 | End: 2024-11-12

## 2024-11-11 ENCOUNTER — MYC MEDICAL ADVICE (OUTPATIENT)
Dept: PULMONOLOGY | Facility: CLINIC | Age: 44
End: 2024-11-11
Payer: COMMERCIAL

## 2024-11-12 ENCOUNTER — ANCILLARY PROCEDURE (OUTPATIENT)
Dept: BONE DENSITY | Facility: CLINIC | Age: 44
End: 2024-11-12
Attending: INTERNAL MEDICINE
Payer: COMMERCIAL

## 2024-11-12 ENCOUNTER — OFFICE VISIT (OUTPATIENT)
Dept: PULMONOLOGY | Facility: CLINIC | Age: 44
End: 2024-11-12
Attending: INTERNAL MEDICINE
Payer: COMMERCIAL

## 2024-11-12 ENCOUNTER — ANCILLARY PROCEDURE (OUTPATIENT)
Dept: GENERAL RADIOLOGY | Facility: CLINIC | Age: 44
End: 2024-11-12
Attending: INTERNAL MEDICINE
Payer: COMMERCIAL

## 2024-11-12 ENCOUNTER — OFFICE VISIT (OUTPATIENT)
Dept: PHARMACY | Facility: CLINIC | Age: 44
End: 2024-11-12
Payer: COMMERCIAL

## 2024-11-12 ENCOUNTER — LAB (OUTPATIENT)
Dept: LAB | Facility: CLINIC | Age: 44
End: 2024-11-12
Attending: INTERNAL MEDICINE
Payer: COMMERCIAL

## 2024-11-12 VITALS
RESPIRATION RATE: 16 BRPM | WEIGHT: 198.41 LBS | HEIGHT: 75 IN | OXYGEN SATURATION: 97 % | SYSTOLIC BLOOD PRESSURE: 118 MMHG | DIASTOLIC BLOOD PRESSURE: 86 MMHG | BODY MASS INDEX: 24.67 KG/M2 | HEART RATE: 111 BPM

## 2024-11-12 DIAGNOSIS — E84.0 CYSTIC FIBROSIS WITH PULMONARY MANIFESTATIONS (H): ICD-10-CM

## 2024-11-12 DIAGNOSIS — E84.9 CF (CYSTIC FIBROSIS) (H): ICD-10-CM

## 2024-11-12 DIAGNOSIS — K86.81 EXOCRINE PANCREATIC INSUFFICIENCY: ICD-10-CM

## 2024-11-12 DIAGNOSIS — E84.9 CYSTIC FIBROSIS (H): Primary | ICD-10-CM

## 2024-11-12 DIAGNOSIS — E84.9 CYSTIC FIBROSIS (H): ICD-10-CM

## 2024-11-12 LAB
ALBUMIN SERPL BCG-MCNC: 4.3 G/DL (ref 3.5–5.2)
ALBUMIN UR-MCNC: NEGATIVE MG/DL
ALP SERPL-CCNC: 84 U/L (ref 40–150)
ALT SERPL W P-5'-P-CCNC: 30 U/L (ref 0–70)
ANION GAP SERPL CALCULATED.3IONS-SCNC: 12 MMOL/L (ref 7–15)
APPEARANCE UR: CLEAR
AST SERPL W P-5'-P-CCNC: 29 U/L (ref 0–45)
BASOPHILS # BLD AUTO: 0 10E3/UL (ref 0–0.2)
BASOPHILS NFR BLD AUTO: 0 %
BILIRUB DIRECT SERPL-MCNC: 0.32 MG/DL (ref 0–0.3)
BILIRUB SERPL-MCNC: 1.5 MG/DL
BILIRUB UR QL STRIP: NEGATIVE
BUN SERPL-MCNC: 17.9 MG/DL (ref 6–20)
CALCIUM SERPL-MCNC: 9.1 MG/DL (ref 8.8–10.4)
CHLORIDE SERPL-SCNC: 105 MMOL/L (ref 98–107)
CHOLEST SERPL-MCNC: 153 MG/DL
CK SERPL-CCNC: 95 U/L (ref 39–308)
COLOR UR AUTO: YELLOW
CREAT SERPL-MCNC: 0.95 MG/DL (ref 0.67–1.17)
CREAT UR-MCNC: 177 MG/DL
EGFRCR SERPLBLD CKD-EPI 2021: >90 ML/MIN/1.73M2
EOSINOPHIL # BLD AUTO: 0.1 10E3/UL (ref 0–0.7)
EOSINOPHIL NFR BLD AUTO: 1 %
ERYTHROCYTE [DISTWIDTH] IN BLOOD BY AUTOMATED COUNT: 13 % (ref 10–15)
EST. AVERAGE GLUCOSE BLD GHB EST-MCNC: 126 MG/DL
EXPTIME-PRE: 13.88 SEC
FASTING STATUS PATIENT QL REPORTED: ABNORMAL
FASTING STATUS PATIENT QL REPORTED: ABNORMAL
FEF2575-%PRED-PRE: 34 %
FEF2575-PRE: 1.48 L/SEC
FEF2575-PRED: 4.23 L/SEC
FEFMAX-%PRED-PRE: 86 %
FEFMAX-PRE: 9.74 L/SEC
FEFMAX-PRED: 11.23 L/SEC
FEV1-%PRED-PRE: 80 %
FEV1-PRE: 3.59 L
FEV1FEV6-PRE: 62 %
FEV1FEV6-PRED: 81 %
FEV1FVC-PRE: 55 %
FEV1FVC-PRED: 80 %
FIFMAX-PRE: 11.04 L/SEC
FVC-%PRED-PRE: 116 %
FVC-PRE: 6.55 L
FVC-PRED: 5.62 L
GLUCOSE SERPL-MCNC: 159 MG/DL (ref 70–99)
GLUCOSE UR STRIP-MCNC: NEGATIVE MG/DL
HBA1C MFR BLD: 6 %
HCO3 SERPL-SCNC: 25 MMOL/L (ref 22–29)
HCT VFR BLD AUTO: 44.9 % (ref 40–53)
HDLC SERPL-MCNC: 38 MG/DL
HGB BLD-MCNC: 15.1 G/DL (ref 13.3–17.7)
HGB UR QL STRIP: NEGATIVE
IMM GRANULOCYTES # BLD: 0 10E3/UL
IMM GRANULOCYTES NFR BLD: 0 %
INR PPP: 1.04 (ref 0.85–1.15)
IRON SERPL-MCNC: 65 UG/DL (ref 61–157)
KETONES UR STRIP-MCNC: NEGATIVE MG/DL
LDLC SERPL CALC-MCNC: 99 MG/DL
LEUKOCYTE ESTERASE UR QL STRIP: NEGATIVE
LYMPHOCYTES # BLD AUTO: 1.6 10E3/UL (ref 0.8–5.3)
LYMPHOCYTES NFR BLD AUTO: 31 %
MAGNESIUM SERPL-MCNC: 2 MG/DL (ref 1.7–2.3)
MCH RBC QN AUTO: 30.6 PG (ref 26.5–33)
MCHC RBC AUTO-ENTMCNC: 33.6 G/DL (ref 31.5–36.5)
MCV RBC AUTO: 91 FL (ref 78–100)
MICROALBUMIN UR-MCNC: <12 MG/L
MICROALBUMIN/CREAT UR: NORMAL MG/G{CREAT}
MONOCYTES # BLD AUTO: 0.4 10E3/UL (ref 0–1.3)
MONOCYTES NFR BLD AUTO: 7 %
MUCOUS THREADS #/AREA URNS LPF: PRESENT /LPF
NEUTROPHILS # BLD AUTO: 3.1 10E3/UL (ref 1.6–8.3)
NEUTROPHILS NFR BLD AUTO: 60 %
NITRATE UR QL: NEGATIVE
NONHDLC SERPL-MCNC: 115 MG/DL
NRBC # BLD AUTO: 0 10E3/UL
NRBC BLD AUTO-RTO: 0 /100
PH UR STRIP: 5.5 [PH] (ref 5–7)
PHOSPHATE SERPL-MCNC: 3.4 MG/DL (ref 2.5–4.5)
PLATELET # BLD AUTO: 273 10E3/UL (ref 150–450)
POTASSIUM SERPL-SCNC: 3.5 MMOL/L (ref 3.4–5.3)
PROT SERPL-MCNC: 7 G/DL (ref 6.4–8.3)
RBC # BLD AUTO: 4.94 10E6/UL (ref 4.4–5.9)
RBC URINE: <1 /HPF
SODIUM SERPL-SCNC: 142 MMOL/L (ref 135–145)
SP GR UR STRIP: 1.02 (ref 1–1.03)
T4 FREE SERPL-MCNC: 1.35 NG/DL (ref 0.9–1.7)
TRIGL SERPL-MCNC: 78 MG/DL
TSH SERPL DL<=0.005 MIU/L-ACNC: 4.27 UIU/ML (ref 0.3–4.2)
UROBILINOGEN UR STRIP-MCNC: NORMAL MG/DL
VIT D+METAB SERPL-MCNC: 35 NG/ML (ref 20–50)
WBC # BLD AUTO: 5.2 10E3/UL (ref 4–11)
WBC URINE: <1 /HPF

## 2024-11-12 PROCEDURE — 81001 URINALYSIS AUTO W/SCOPE: CPT | Performed by: PATHOLOGY

## 2024-11-12 PROCEDURE — 83735 ASSAY OF MAGNESIUM: CPT | Performed by: PATHOLOGY

## 2024-11-12 PROCEDURE — 80061 LIPID PANEL: CPT | Performed by: PATHOLOGY

## 2024-11-12 PROCEDURE — 84446 ASSAY OF VITAMIN E: CPT | Mod: 90 | Performed by: PATHOLOGY

## 2024-11-12 PROCEDURE — 84100 ASSAY OF PHOSPHORUS: CPT | Performed by: PATHOLOGY

## 2024-11-12 PROCEDURE — 80053 COMPREHEN METABOLIC PANEL: CPT | Performed by: PATHOLOGY

## 2024-11-12 PROCEDURE — 84439 ASSAY OF FREE THYROXINE: CPT | Performed by: PATHOLOGY

## 2024-11-12 PROCEDURE — 94375 RESPIRATORY FLOW VOLUME LOOP: CPT | Performed by: INTERNAL MEDICINE

## 2024-11-12 PROCEDURE — 84403 ASSAY OF TOTAL TESTOSTERONE: CPT | Performed by: INTERNAL MEDICINE

## 2024-11-12 PROCEDURE — 82306 VITAMIN D 25 HYDROXY: CPT | Performed by: INTERNAL MEDICINE

## 2024-11-12 PROCEDURE — 83540 ASSAY OF IRON: CPT | Performed by: PATHOLOGY

## 2024-11-12 PROCEDURE — 77080 DXA BONE DENSITY AXIAL: CPT | Performed by: INTERNAL MEDICINE

## 2024-11-12 PROCEDURE — 87070 CULTURE OTHR SPECIMN AEROBIC: CPT | Performed by: INTERNAL MEDICINE

## 2024-11-12 PROCEDURE — 82248 BILIRUBIN DIRECT: CPT | Performed by: PATHOLOGY

## 2024-11-12 PROCEDURE — 99207 PR NO CHARGE LOS: CPT | Performed by: PHARMACIST

## 2024-11-12 PROCEDURE — 36415 COLL VENOUS BLD VENIPUNCTURE: CPT | Performed by: PATHOLOGY

## 2024-11-12 PROCEDURE — 84590 ASSAY OF VITAMIN A: CPT | Mod: 90 | Performed by: PATHOLOGY

## 2024-11-12 PROCEDURE — 83036 HEMOGLOBIN GLYCOSYLATED A1C: CPT | Performed by: INTERNAL MEDICINE

## 2024-11-12 PROCEDURE — 99215 OFFICE O/P EST HI 40 MIN: CPT | Mod: 25 | Performed by: INTERNAL MEDICINE

## 2024-11-12 PROCEDURE — 82043 UR ALBUMIN QUANTITATIVE: CPT | Performed by: INTERNAL MEDICINE

## 2024-11-12 PROCEDURE — 99000 SPECIMEN HANDLING OFFICE-LAB: CPT | Performed by: PATHOLOGY

## 2024-11-12 PROCEDURE — 82550 ASSAY OF CK (CPK): CPT | Performed by: PATHOLOGY

## 2024-11-12 PROCEDURE — 84443 ASSAY THYROID STIM HORMONE: CPT | Performed by: PATHOLOGY

## 2024-11-12 PROCEDURE — 85610 PROTHROMBIN TIME: CPT | Performed by: PATHOLOGY

## 2024-11-12 PROCEDURE — 71046 X-RAY EXAM CHEST 2 VIEWS: CPT | Mod: GC | Performed by: RADIOLOGY

## 2024-11-12 PROCEDURE — 99213 OFFICE O/P EST LOW 20 MIN: CPT | Performed by: INTERNAL MEDICINE

## 2024-11-12 PROCEDURE — 85025 COMPLETE CBC W/AUTO DIFF WBC: CPT | Performed by: PATHOLOGY

## 2024-11-12 PROCEDURE — 82785 ASSAY OF IGE: CPT | Performed by: INTERNAL MEDICINE

## 2024-11-12 RX ORDER — PEDIATRIC MULTIVIT 61/D3/VIT K 1500-800
CAPSULE ORAL
Qty: 60 CAPSULE | Refills: 11 | Status: SHIPPED | OUTPATIENT
Start: 2024-11-12

## 2024-11-12 RX ORDER — ELEXACAFTOR, TEZACAFTOR, AND IVACAFTOR 100-50-75
KIT ORAL
Qty: 84 TABLET | Refills: 4 | Status: SHIPPED | OUTPATIENT
Start: 2024-11-12

## 2024-11-12 RX ORDER — CROMOLYN SODIUM 20 MG/2ML
20 INHALANT INTRABRONCHIAL 3 TIMES DAILY
Qty: 180 ML | Refills: 12 | Status: SHIPPED | OUTPATIENT
Start: 2024-11-12

## 2024-11-12 RX ORDER — FLUTICASONE PROPIONATE 50 MCG
2 SPRAY, SUSPENSION (ML) NASAL PRN
Qty: 16 G | Refills: 11 | Status: SHIPPED | OUTPATIENT
Start: 2024-11-12

## 2024-11-12 RX ORDER — ALBUTEROL SULFATE 90 UG/1
1-2 INHALANT RESPIRATORY (INHALATION) EVERY 6 HOURS PRN
Qty: 8.5 G | Refills: 3 | Status: SHIPPED | OUTPATIENT
Start: 2024-11-12

## 2024-11-12 RX ORDER — ALBUTEROL SULFATE 0.83 MG/ML
2.5 SOLUTION RESPIRATORY (INHALATION) 3 TIMES DAILY PRN
Qty: 360 ML | Refills: 11 | Status: SHIPPED | OUTPATIENT
Start: 2024-11-12

## 2024-11-12 RX ORDER — SODIUM CHLORIDE FOR INHALATION 3 %
3 VIAL, NEBULIZER (ML) INHALATION 3 TIMES DAILY
Qty: 720 ML | Refills: 11 | Status: SHIPPED | OUTPATIENT
Start: 2024-11-12

## 2024-11-12 ASSESSMENT — PAIN SCALES - GENERAL: PAINLEVEL_OUTOF10: NO PAIN (0)

## 2024-11-12 NOTE — PROGRESS NOTES
Medication Therapy Management (MTM) Encounter    ASSESSMENT:                            Medication Adherence/Access: will discuss Trikafta refills with  specialty pharmacy.    CF:   Bilirubin 1.25x upper limit of normal, trending down from previous. All other modulator labs within normal limits. Per Dr. Rocael Miller MD, move Trikafta labs to 6 month monitoring. Given bilirubin elevations since starting Trikafta, would recommend liver ultrasound. Per discussion with RT and Dr. Rocael Miller MD, could consider Telly use hypertonic saline 3% nebs for exacerbations, monitor for chest tightness. Mucomyst can be used as back-up if hypertonic saline not tolerated.    Pancreatic Insufficiency/Nutrition:   Creon at max recommend dosing. Continue following with CF dietician.    PLAN:                            Continue full dose Trikafta, recheck hepatic panel and CK in 6 months. [facility] will fill Trikafta this month and are working on a plan for future refills. The pharmacy will be in contact with you.   Switch Mucomyst nebs to hypertonic saline 3% for exacerbations.     Follow-up: 6 months for Trikafta labs    SUBJECTIVE/OBJECTIVE:                          Telly Leal is a 44 year old male seen for a follow-up visit. Today's visit is a co-visit with Dr. Rocael Miller MD.      Reason for visit: comprehensive medication review.    Allergies/ADRs: Reviewed in chart  Past Medical History: Reviewed in chart  Tobacco: He reports that he has never smoked. He has never used smokeless tobacco.  Alcohol: none    Medication Access: Patient fills medication at [facility]. He received a letter that he will need to switch Trikafta to be filled with [facility] pharmacy. He would prefer to keep medications at [facility], if possible. Patient expresses no concern regarding cost of medications.     CF:    Inhaled medications:  Bronchodilator: albuterol nebs 0.083% as needed for  exacerbations and albuterol HFA inhaler as needed  Mucolytic: Mucomyst 20% nebs 2 mL as needed for exacerbations  Antibiotic: no longer taking Tobramycin nebs  Other: Cromolyn 20 mg/2 mL as needed, fluticasone nasal spray 2 sprays both nostrils as needed  Oral medications:  Azithromycin: 250 mg daily  CFTR modulator: Trikafta (full dose).   Notes unpleasant smell with Mucomyst, interested in alternative agent if possible. Previously noted chest tightness with hypertonic saline 7% nebs.  Genotype: [id number]/[id number] del 2,3   Cultures (last growth): sputum cultures grow PSA (5/4/24)  Lab Results   Component Value Date    ALT 30 11/12/2024    AST 29 11/12/2024    BILITOTAL 1.5 (H) 11/12/2024    DBIL 0.32 (H) 11/12/2024    CKT 95 11/12/2024     Pancreatic Insufficiency/Nutrition:   Creon 24,000 taking 5-6 capsules with meals and 1-5 capsules with snacks. He takes up to 35-40 capsules/day.  Bowel prep: Golytely and bisacodyl on hand for colonoscopy bowel prep. Procedure scheduled 12/16/24.  Vitamins/Supplements: MVW complete daily  Nutritional supplement 2 cans at bedtime  Patient is not experiencing sign/symptoms of malabsorption. Notes Creon formulation/taste has changed, otherwise tolerating well.  ----------------  I spent 15 minutes with this patient today. All changes were made via verbal approval with Dr. Rocael Miller MD. A copy of the visit note was provided to the patient's provider(s).    A summary of these recommendations was given to the patient (see AVS from today's appointment with Dr. Rocael Miller MD).    Kathryn Cristina, PharmD   Cystic Fibrosis MTM Pharmacist  [facility]     Medication Therapy Recommendations  Cystic fibrosis (H)   1 Current Medication: acetylcysteine (MUCOMYST) 20 % neb solution   Current Medication Sig: Inhale 2 mLs into the lungs 2 times daily INHALE 2ML INTO THE LUNGS VIA NEB THREE TIMES A DAY. STORE UNUSED PORTION IN FRIG FOR UP TO 96 HOURS   Rationale:  Undesirable effect - Adverse medication event - Safety   Recommendation: Change Medication - sodium chloride 3 % neb solution   Status: Accepted per Provider   Identified Date: 11/12/2024 Completed Date: 11/15/2024

## 2024-11-12 NOTE — NURSING NOTE
Chief Complaint   Patient presents with    Cystic Fibrosis     Follow up visit with Telly Iglesias, KIRSTIN CMA at 1:04 PM on 11/12/2024

## 2024-11-12 NOTE — LETTER
11/12/2024      Telly Leal  [address] [facility] [address]      Dear Colleague,    Thank you for referring your patient, Telly Leal, to the [facility] [place]. Please see a copy of my visit note below.    Reason for Visit  Telly Leal is being seen for Cystic Fibrosis (Follow up visit with Telly)    CF HPI  The patient was seen and examined by Rocael Miller   Interval history: The patient reports doing well from a respiratory standpoint since his last visit.  He has not had any sustained period of increased cough or dyspnea.  Most days he brings up a small amount of yellow mucus.  He has not felt the need to do any vest therapy or nebs since his last visit.  He is getting a bit more exercise walking as he is out with his children.  He has not had any hemoptysis or chest pain.  Current Outpatient Medications   Medication Sig Dispense Refill     acetylcysteine (MUCOMYST) 20 % neb solution Inhale 2 mLs into the lungs 2 times daily INHALE 2ML INTO THE LUNGS VIA NEB THREE TIMES A DAY. STORE UNUSED PORTION IN FRIG FOR UP TO 96 HOURS 180 mL 12     albuterol (PROAIR HFA) 108 (90 Base) MCG/ACT inhaler Inhale 1-2 puffs into the lungs every 6 hours as needed for shortness of breath. 8.5 g 3     albuterol (PROVENTIL) (2.5 MG/3ML) 0.083% neb solution Take 1 vial (2.5 mg) by nebulization 3 times daily as needed for shortness of breath, wheezing or cough. 360 mL 11     azithromycin (ZITHROMAX) 250 MG tablet TAKE ONE TABLET BY MOUTH ONCE DAILY 30 tablet 11     bisacodyl (DULCOLAX) 5 MG EC tablet Two days prior to exam take two (2) tablets at 4pm. One day prior to exam take two (2) tablets at 4pm 4 tablet 0     blood glucose (ACCU-CHEK GUIDE) test strip Use to test blood sugar 2 times daily as needed. 100 strip 3     blood glucose monitoring (SOFTCLIX) lancets Use to test blood sugar 2 times daily as needed. 100 each 3     cromolyn (INTAL) 20  MG/2ML neb solution Take 2 mLs (20 mg) by nebulization 3 times daily. 1 vial three times daily 180 mL 12     elexacaftor-tezacaftor-ivacaftor & ivacaftor (TRIKAFTA) 100-50-75 & 150 MG tablet pack TAKE TWO ORANGE TABLETS BY MOUTH IN THE MORNING AND ONE BLUE TABLET IN THE EVENING.  TAKE WITH FAT CONTAINING FOOD. 84 tablet 4     fluticasone (FLONASE) 50 MCG/ACT nasal spray Spray 2 sprays into both nostrils as needed for rhinitis. 16 g 11     lipase-protease-amylase (CREON) 63951-42129-196302 units CPEP per EC capsule TAKE 5 TO 6 CAPSULES BY MOUTH WITH MEALS, 1 TO 5 CAPSULES WITH SNACKS, AND 7 TO 8 CAPSULES WITH TUBE FEEDINGS 1200 capsule 11     mvw complete formulation (SOFTGELS) capsule TAKE 1 CAPSULE BY MOUTH ONCE DAILY 60 capsule 11     Nutritional Supplements (NUTREN 2.0) Take 2 Cans by mouth At Bedtime 168/ml/hr 7 days/week 60 Can 11     polyethylene glycol (GOLYTELY) 236 g suspension Two days before procedure at 5PM fill first container with water. Mix and drink an 8 oz glass every 10-15 minutes until HALF of the container is gone. Place the remainder in the refrigerator. One day before procedure at 5PM drink second half of bowel prep. Drink an 8 oz glass every 10-15 minutes until it is gone. Day of procedure 6 hours before arrival time fill the 2nd container with water. Mix and drink an 8 oz glass every 10-15 minutes until HALF of the container is gone. Discard the remaining solution. 8000 mL 0     sodium chloride (NEBUSAL) 3 % neb solution Take 3 mLs by nebulization 3 times daily. as needed for exacerbations 720 mL 11     No current facility-administered medications for this visit.     No Known Allergies  Past Medical History:   Diagnosis Date     Chronic sinusitis      Cystic fibrosis with pulmonary manifestations (H)      Exocrine pancreatic insufficiency      GERD (gastroesophageal reflux disease)      Hemoptysis      Malnutrition (H)        Past Surgical History:   Procedure Laterality Date     IR  "MISCELLANEOUS PROCEDURE  2/6/2001     LOBECTOMY      right upper lobe     TUBES         Social History     Socioeconomic History     Marital status:      Spouse name: Not on file     Number of children: Not on file     Years of education: Not on file     Highest education level: Not on file   Occupational History     Occupation:      Employer: [employer]   Tobacco Use     Smoking status: Never     Smokeless tobacco: Never   Substance and Sexual Activity     Alcohol use: Not Currently     Alcohol/week: 0.0 standard drinks of alcohol     Drug use: Not Currently     Sexual activity: Not on file   Other Topics Concern     Parent/sibling w/ CABG, MI or angioplasty before 65F 55M? Not Asked   Social History Narrative    Patient works for the [employer] as an .  He does not have stable living conditions at this time.  He has a Greek guillen.     Social Drivers of Health     Financial Resource Strain: Not on file   Food Insecurity: Not on file   Transportation Needs: Not on file   Physical Activity: Not on file   Stress: Not on file   Social Connections: Not on file   Interpersonal Safety: Not on file   Housing Stability: Not on file   Update: living with wife Bre in [facility]. Birth of daughter Nieves Rebolledo August, 2017, birth of daughter Josy Felder November, 2019    ROS Pulmonary  Const: No fever chills sweats.  GI: No diarrhea constipation or grease in his stools.  No abdominal pain.  No recent use of tube feeds.   Endocrine: Patient ended up not getting started on glucose monitoring as planned at his last visit.  No polyuria or polydipsia or weight loss or hypoglycemic symptoms.  ENT: No sinus pain pressure or drainage currently.  A complete ROS was otherwise negative except as noted in the HPI.  /86   Pulse 111   Resp 16   Ht 1.9 m (6' 2.8\")   Wt 90 kg (198 lb 6.6 oz)   SpO2 97%   BMI 24.93 kg/m    Exam:   GENERAL APPEARANCE: Well developed, well " nourished, alert, and in no apparent distress.  EYES: anicteric with injected appearing conjunctiva  HENT: No nasal discharge.  Oral mucosa is moist, without any lesions, no tonsillar enlargement, no oropharyngeal exudate.  RESP:  good air flow throughout.  Clear to auscultation bilaterally.  Normal chest wall excursion.    CV: Normal S1, S2, regular rhythm, normal rate. No murmur. No gallop. No LE edema.   ABDOMEN:  Bowel sounds normal, soft, nontender, no HSM or masses.   MS: extremities normal. No cyanosis.  SKIN:  PEG site with small amount of granulation tissue similar to previous.  Chest scar-like lesion upper sternum unchanged. Diffuse freckles without overt change.  NEURO: Mentation intact, speech normal, normal gait and stance  PSYCH: mentation appears normal. and affect normal/bright  Results:  Recent Results (from the past week)   Hepatic function panel    Collection Time: 11/12/24 12:22 PM   Result Value Ref Range    Protein Total 7.0 6.4 - 8.3 g/dL    Albumin 4.3 3.5 - 5.2 g/dL    Bilirubin Total 1.5 (H) <=1.2 mg/dL    Alkaline Phosphatase 84 40 - 150 U/L    AST 29 0 - 45 U/L    ALT 30 0 - 70 U/L    Bilirubin Direct 0.32 (H) 0.00 - 0.30 mg/dL   CK total    Collection Time: 11/12/24 12:22 PM   Result Value Ref Range    CK 95 39 - 308 U/L   Basic metabolic panel    Collection Time: 11/12/24 12:22 PM   Result Value Ref Range    Sodium 142 135 - 145 mmol/L    Potassium 3.5 3.4 - 5.3 mmol/L    Chloride 105 98 - 107 mmol/L    Carbon Dioxide (CO2) 25 22 - 29 mmol/L    Anion Gap 12 7 - 15 mmol/L    Urea Nitrogen 17.9 6.0 - 20.0 mg/dL    Creatinine 0.95 0.67 - 1.17 mg/dL    GFR Estimate >90 >60 mL/min/1.73m2    Calcium 9.1 8.8 - 10.4 mg/dL    Glucose 159 (H) 70 - 99 mg/dL    Patient Fasting > 8hrs? Unknown    Lipid Profile    Collection Time: 11/12/24 12:22 PM   Result Value Ref Range    Cholesterol 153 <200 mg/dL    Triglycerides 78 <150 mg/dL    Direct Measure HDL 38 (L) >=40 mg/dL    LDL Cholesterol  Calculated 99 <100 mg/dL    Non HDL Cholesterol 115 <130 mg/dL    Patient Fasting > 8hrs? Unknown    Iron    Collection Time: 11/12/24 12:22 PM   Result Value Ref Range    Iron 65 61 - 157 ug/dL   Hemoglobin A1c    Collection Time: 11/12/24 12:22 PM   Result Value Ref Range    Estimated Average Glucose 126 (H) <117 mg/dL    Hemoglobin A1C 6.0 (H) <5.7 %   INR    Collection Time: 11/12/24 12:22 PM   Result Value Ref Range    INR 1.04 0.85 - 1.15   Magnesium    Collection Time: 11/12/24 12:22 PM   Result Value Ref Range    Magnesium 2.0 1.7 - 2.3 mg/dL   Phosphorus    Collection Time: 11/12/24 12:22 PM   Result Value Ref Range    Phosphorus 3.4 2.5 - 4.5 mg/dL   TSH with free T4 reflex    Collection Time: 11/12/24 12:22 PM   Result Value Ref Range    TSH 4.27 (H) 0.30 - 4.20 uIU/mL   CBC with platelets and differential    Collection Time: 11/12/24 12:22 PM   Result Value Ref Range    WBC Count 5.2 4.0 - 11.0 10e3/uL    RBC Count 4.94 4.40 - 5.90 10e6/uL    Hemoglobin 15.1 13.3 - 17.7 g/dL    Hematocrit 44.9 40.0 - 53.0 %    MCV 91 78 - 100 fL    MCH 30.6 26.5 - 33.0 pg    MCHC 33.6 31.5 - 36.5 g/dL    RDW 13.0 10.0 - 15.0 %    Platelet Count 273 150 - 450 10e3/uL    % Neutrophils 60 %    % Lymphocytes 31 %    % Monocytes 7 %    % Eosinophils 1 %    % Basophils 0 %    % Immature Granulocytes 0 %    NRBCs per 100 WBC 0 <1 /100    Absolute Neutrophils 3.1 1.6 - 8.3 10e3/uL    Absolute Lymphocytes 1.6 0.8 - 5.3 10e3/uL    Absolute Monocytes 0.4 0.0 - 1.3 10e3/uL    Absolute Eosinophils 0.1 0.0 - 0.7 10e3/uL    Absolute Basophils 0.0 0.0 - 0.2 10e3/uL    Absolute Immature Granulocytes 0.0 <=0.4 10e3/uL    Absolute NRBCs 0.0 10e3/uL   T4 free    Collection Time: 11/12/24 12:22 PM   Result Value Ref Range    Free T4 1.35 0.90 - 1.70 ng/dL   Routine UA with microscopic    Collection Time: 11/12/24 12:27 PM   Result Value Ref Range    Color Urine Yellow Colorless, Straw, Light Yellow, Yellow    Appearance Urine Clear Clear     Glucose Urine Negative Negative mg/dL    Bilirubin Urine Negative Negative    Ketones Urine Negative Negative mg/dL    Specific Gravity Urine 1.025 1.003 - 1.035    Blood Urine Negative Negative    pH Urine 5.5 5.0 - 7.0    Protein Albumin Urine Negative Negative mg/dL    Urobilinogen Urine Normal Normal, 2.0 mg/dL    Nitrite Urine Negative Negative    Leukocyte Esterase Urine Negative Negative    Mucus Urine Present (A) None Seen /LPF    RBC Urine <1 <=2 /HPF    WBC Urine <1 <=5 /HPF   General PFT Lab (Please always keep checked)    Collection Time: 11/12/24 12:35 PM   Result Value Ref Range    FVC-Pred 5.62 L    FVC-Pre 6.55 L    FVC-%Pred-Pre 116 %    FEV1-Pre 3.59 L    FEV1-%Pred-Pre 80 %    FEV1FVC-Pred 80 %    FEV1FVC-Pre 55 %    FEFMax-Pred 11.23 L/sec    FEFMax-Pre 9.74 L/sec    FEFMax-%Pred-Pre 86 %    FEF2575-Pred 4.23 L/sec    FEF2575-Pre 1.48 L/sec    NTW9064-%Pred-Pre 34 %    ExpTime-Pre 13.88 sec    FIFMax-Pre 11.04 L/sec    FEV1FEV6-Pred 81 %    FEV1FEV6-Pre 62 %     Spirometry interpretation: personally reviewed. Valid maneuver.  Mild obstruction.  Values improved from last visit and he is at the upper end of his baseline.    Most recent respiratory culture May, 2024 with 1 strain of Pseudomonas aeruginosa.    Chest x-ray from today personally reviewed.  He has mild bronchiectatic changes consistent with his cystic fibrosis without significant mucous plugging and without interval change.  There is chronic blunting of his right costophrenic angle consistent with his prior lobectomy.    DEXA scan from today with density within normal limits.  There is slight interval decrease in density at the left total hip (-4.9%).    Assessment and plan:  1.  Cystic fibrosis lung disease: Patient doing well from a symptom control standpoint and also with trend up in his pulmonary function test despite no use of prophylactic airway clearance since his last visit.  Unclear what accounts for improvement in PFTs but  likely just normal variation.  We have considered vest therapy a few days per week for prophylactic purposes but that patient has struggled to initiate this and appears to be doing well regardless so we will limit airway clearance regimen to as needed.  I did encourage the patient to look for ways to remain physically active over the winter to augment airway clearance.  1B.  CFTR modulation therapy: Excellent clinical response as above.  Today's LFTs reassuring.  His bilirubin was again elevated but less so than previously.  Bilirubin has been mostly elevated since starting Trikafta.  Last imaging of the abdomen was in 2019 with normal-appearing liver on CT. we will consider liver ultrasound per guidelines.  2. Impaired glucose tolerance: There is again a delay in getting his continuous glucose monitoring completed.  Hemoglobin A1c up slightly to 6.0%.  Encouraged patient to get this completed.  3.  Pancreatic exocrine insufficiency with a history of malnutrition on tube feeds: Adequate enzyme replacement by history.  His weight is stable with BMI 24.9.  He has not used tube feeds in approximately 2-1/2 years.  Reviewed how I think it safe for him to have this removed with low likelihood of needing replacement in the future.  He will consider this.  4. Chronic CF sinus disease: Improved on modulation therapy. Continue flonase as needed.  5. Chest skin lesions: Seen by outside Dermatologist. Central chest scar could be treated by steroid injections per pt but not actively pursuing.  He is overdue for skin exam and I have referred him to dermatology clinic.  6. Healthcare maintenance: Annual studies due in November, 2025.  He already received flu vaccine through his employer last month.  He would rather hold off on COVID-19 vaccination.  He is scheduled for screening colonoscopy later this year.      Patient will follow up in 3 month    Total visit time today 40 minutes including detailed review of annual studies with  the patient.  This is exclusive of time interpreting PFTs.    Rocael Miller                   Again, thank you for allowing me to participate in the care of your patient.        Sincerely,        Rocael Miller MD

## 2024-11-13 LAB — IGE SERPL-ACNC: 4 KU/L (ref 0–114)

## 2024-11-13 NOTE — PROGRESS NOTES
Reason for Visit  Telly Leal is being seen for Cystic Fibrosis (Follow up visit with Telly)    CF HPI  The patient was seen and examined by Rocael Miller   Interval history: The patient reports doing well from a respiratory standpoint since his last visit.  He has not had any sustained period of increased cough or dyspnea.  Most days he brings up a small amount of yellow mucus.  He has not felt the need to do any vest therapy or nebs since his last visit.  He is getting a bit more exercise walking as he is out with his children.  He has not had any hemoptysis or chest pain.  Current Outpatient Medications   Medication Sig Dispense Refill    acetylcysteine (MUCOMYST) 20 % neb solution Inhale 2 mLs into the lungs 2 times daily INHALE 2ML INTO THE LUNGS VIA NEB THREE TIMES A DAY. STORE UNUSED PORTION IN FRIG FOR UP TO 96 HOURS 180 mL 12    albuterol (PROAIR HFA) 108 (90 Base) MCG/ACT inhaler Inhale 1-2 puffs into the lungs every 6 hours as needed for shortness of breath. 8.5 g 3    albuterol (PROVENTIL) (2.5 MG/3ML) 0.083% neb solution Take 1 vial (2.5 mg) by nebulization 3 times daily as needed for shortness of breath, wheezing or cough. 360 mL 11    azithromycin (ZITHROMAX) 250 MG tablet TAKE ONE TABLET BY MOUTH ONCE DAILY 30 tablet 11    bisacodyl (DULCOLAX) 5 MG EC tablet Two days prior to exam take two (2) tablets at 4pm. One day prior to exam take two (2) tablets at 4pm 4 tablet 0    blood glucose (ACCU-CHEK GUIDE) test strip Use to test blood sugar 2 times daily as needed. 100 strip 3    blood glucose monitoring (SOFTCLIX) lancets Use to test blood sugar 2 times daily as needed. 100 each 3    cromolyn (INTAL) 20 MG/2ML neb solution Take 2 mLs (20 mg) by nebulization 3 times daily. 1 vial three times daily 180 mL 12    elexacaftor-tezacaftor-ivacaftor & ivacaftor (TRIKAFTA) 100-50-75 & 150 MG tablet pack TAKE TWO ORANGE TABLETS BY MOUTH IN THE MORNING AND ONE BLUE TABLET IN THE EVENING.  TAKE WITH  FAT CONTAINING FOOD. 84 tablet 4    fluticasone (FLONASE) 50 MCG/ACT nasal spray Spray 2 sprays into both nostrils as needed for rhinitis. 16 g 11    lipase-protease-amylase (CREON) 26350-60700-525341 units CPEP per EC capsule TAKE 5 TO 6 CAPSULES BY MOUTH WITH MEALS, 1 TO 5 CAPSULES WITH SNACKS, AND 7 TO 8 CAPSULES WITH TUBE FEEDINGS 1200 capsule 11    mvw complete formulation (SOFTGELS) capsule TAKE 1 CAPSULE BY MOUTH ONCE DAILY 60 capsule 11    Nutritional Supplements (NUTREN 2.0) Take 2 Cans by mouth At Bedtime 168/ml/hr 7 days/week 60 Can 11    polyethylene glycol (GOLYTELY) 236 g suspension Two days before procedure at 5PM fill first container with water. Mix and drink an 8 oz glass every 10-15 minutes until HALF of the container is gone. Place the remainder in the refrigerator. One day before procedure at 5PM drink second half of bowel prep. Drink an 8 oz glass every 10-15 minutes until it is gone. Day of procedure 6 hours before arrival time fill the 2nd container with water. Mix and drink an 8 oz glass every 10-15 minutes until HALF of the container is gone. Discard the remaining solution. 8000 mL 0    sodium chloride (NEBUSAL) 3 % neb solution Take 3 mLs by nebulization 3 times daily. as needed for exacerbations 720 mL 11     No current facility-administered medications for this visit.     No Known Allergies  Past Medical History:   Diagnosis Date    Chronic sinusitis     Cystic fibrosis with pulmonary manifestations (H)     Exocrine pancreatic insufficiency     GERD (gastroesophageal reflux disease)     Hemoptysis     Malnutrition (H)        Past Surgical History:   Procedure Laterality Date    IR MISCELLANEOUS PROCEDURE  2/6/2001    LOBECTOMY      right upper lobe    TUBES         Social History     Socioeconomic History    Marital status:      Spouse name: Not on file    Number of children: Not on file    Years of education: Not on file    Highest education level: Not on file   Occupational  "History    Occupation:      Employer: [employer]   Tobacco Use    Smoking status: Never    Smokeless tobacco: Never   Substance and Sexual Activity    Alcohol use: Not Currently     Alcohol/week: 0.0 standard drinks of alcohol    Drug use: Not Currently    Sexual activity: Not on file   Other Topics Concern    Parent/sibling w/ CABG, MI or angioplasty before 65F 55M? Not Asked   Social History Narrative    Patient works for the [employer] as an .  He does not have stable living conditions at this time.  He has a Georgian guillen.     Social Drivers of Health     Financial Resource Strain: Not on file   Food Insecurity: Not on file   Transportation Needs: Not on file   Physical Activity: Not on file   Stress: Not on file   Social Connections: Not on file   Interpersonal Safety: Not on file   Housing Stability: Not on file   Update: living with wife Bre in [facility]. Birth of daughter Nieves Rebolledo August, 2017, birth of daughter Josy Felder November, 2019    ROS Pulmonary  Const: No fever chills sweats.  GI: No diarrhea constipation or grease in his stools.  No abdominal pain.  No recent use of tube feeds.   Endocrine: Patient ended up not getting started on glucose monitoring as planned at his last visit.  No polyuria or polydipsia or weight loss or hypoglycemic symptoms.  ENT: No sinus pain pressure or drainage currently.  A complete ROS was otherwise negative except as noted in the HPI.  /86   Pulse 111   Resp 16   Ht 1.9 m (6' 2.8\")   Wt 90 kg (198 lb 6.6 oz)   SpO2 97%   BMI 24.93 kg/m    Exam:   GENERAL APPEARANCE: Well developed, well nourished, alert, and in no apparent distress.  EYES: anicteric with injected appearing conjunctiva  HENT: No nasal discharge.  Oral mucosa is moist, without any lesions, no tonsillar enlargement, no oropharyngeal exudate.  RESP:  good air flow throughout.  Clear to auscultation bilaterally.  Normal chest wall excursion.  "   CV: Normal S1, S2, regular rhythm, normal rate. No murmur. No gallop. No LE edema.   ABDOMEN:  Bowel sounds normal, soft, nontender, no HSM or masses.   MS: extremities normal. No cyanosis.  SKIN:  PEG site with small amount of granulation tissue similar to previous.  Chest scar-like lesion upper sternum unchanged. Diffuse freckles without overt change.  NEURO: Mentation intact, speech normal, normal gait and stance  PSYCH: mentation appears normal. and affect normal/bright  Results:  Recent Results (from the past week)   Hepatic function panel    Collection Time: 11/12/24 12:22 PM   Result Value Ref Range    Protein Total 7.0 6.4 - 8.3 g/dL    Albumin 4.3 3.5 - 5.2 g/dL    Bilirubin Total 1.5 (H) <=1.2 mg/dL    Alkaline Phosphatase 84 40 - 150 U/L    AST 29 0 - 45 U/L    ALT 30 0 - 70 U/L    Bilirubin Direct 0.32 (H) 0.00 - 0.30 mg/dL   CK total    Collection Time: 11/12/24 12:22 PM   Result Value Ref Range    CK 95 39 - 308 U/L   Basic metabolic panel    Collection Time: 11/12/24 12:22 PM   Result Value Ref Range    Sodium 142 135 - 145 mmol/L    Potassium 3.5 3.4 - 5.3 mmol/L    Chloride 105 98 - 107 mmol/L    Carbon Dioxide (CO2) 25 22 - 29 mmol/L    Anion Gap 12 7 - 15 mmol/L    Urea Nitrogen 17.9 6.0 - 20.0 mg/dL    Creatinine 0.95 0.67 - 1.17 mg/dL    GFR Estimate >90 >60 mL/min/1.73m2    Calcium 9.1 8.8 - 10.4 mg/dL    Glucose 159 (H) 70 - 99 mg/dL    Patient Fasting > 8hrs? Unknown    Lipid Profile    Collection Time: 11/12/24 12:22 PM   Result Value Ref Range    Cholesterol 153 <200 mg/dL    Triglycerides 78 <150 mg/dL    Direct Measure HDL 38 (L) >=40 mg/dL    LDL Cholesterol Calculated 99 <100 mg/dL    Non HDL Cholesterol 115 <130 mg/dL    Patient Fasting > 8hrs? Unknown    Iron    Collection Time: 11/12/24 12:22 PM   Result Value Ref Range    Iron 65 61 - 157 ug/dL   Hemoglobin A1c    Collection Time: 11/12/24 12:22 PM   Result Value Ref Range    Estimated Average Glucose 126 (H) <117 mg/dL     Hemoglobin A1C 6.0 (H) <5.7 %   INR    Collection Time: 11/12/24 12:22 PM   Result Value Ref Range    INR 1.04 0.85 - 1.15   Magnesium    Collection Time: 11/12/24 12:22 PM   Result Value Ref Range    Magnesium 2.0 1.7 - 2.3 mg/dL   Phosphorus    Collection Time: 11/12/24 12:22 PM   Result Value Ref Range    Phosphorus 3.4 2.5 - 4.5 mg/dL   TSH with free T4 reflex    Collection Time: 11/12/24 12:22 PM   Result Value Ref Range    TSH 4.27 (H) 0.30 - 4.20 uIU/mL   CBC with platelets and differential    Collection Time: 11/12/24 12:22 PM   Result Value Ref Range    WBC Count 5.2 4.0 - 11.0 10e3/uL    RBC Count 4.94 4.40 - 5.90 10e6/uL    Hemoglobin 15.1 13.3 - 17.7 g/dL    Hematocrit 44.9 40.0 - 53.0 %    MCV 91 78 - 100 fL    MCH 30.6 26.5 - 33.0 pg    MCHC 33.6 31.5 - 36.5 g/dL    RDW 13.0 10.0 - 15.0 %    Platelet Count 273 150 - 450 10e3/uL    % Neutrophils 60 %    % Lymphocytes 31 %    % Monocytes 7 %    % Eosinophils 1 %    % Basophils 0 %    % Immature Granulocytes 0 %    NRBCs per 100 WBC 0 <1 /100    Absolute Neutrophils 3.1 1.6 - 8.3 10e3/uL    Absolute Lymphocytes 1.6 0.8 - 5.3 10e3/uL    Absolute Monocytes 0.4 0.0 - 1.3 10e3/uL    Absolute Eosinophils 0.1 0.0 - 0.7 10e3/uL    Absolute Basophils 0.0 0.0 - 0.2 10e3/uL    Absolute Immature Granulocytes 0.0 <=0.4 10e3/uL    Absolute NRBCs 0.0 10e3/uL   T4 free    Collection Time: 11/12/24 12:22 PM   Result Value Ref Range    Free T4 1.35 0.90 - 1.70 ng/dL   Routine UA with microscopic    Collection Time: 11/12/24 12:27 PM   Result Value Ref Range    Color Urine Yellow Colorless, Straw, Light Yellow, Yellow    Appearance Urine Clear Clear    Glucose Urine Negative Negative mg/dL    Bilirubin Urine Negative Negative    Ketones Urine Negative Negative mg/dL    Specific Gravity Urine 1.025 1.003 - 1.035    Blood Urine Negative Negative    pH Urine 5.5 5.0 - 7.0    Protein Albumin Urine Negative Negative mg/dL    Urobilinogen Urine Normal Normal, 2.0 mg/dL     Nitrite Urine Negative Negative    Leukocyte Esterase Urine Negative Negative    Mucus Urine Present (A) None Seen /LPF    RBC Urine <1 <=2 /HPF    WBC Urine <1 <=5 /HPF   General PFT Lab (Please always keep checked)    Collection Time: 11/12/24 12:35 PM   Result Value Ref Range    FVC-Pred 5.62 L    FVC-Pre 6.55 L    FVC-%Pred-Pre 116 %    FEV1-Pre 3.59 L    FEV1-%Pred-Pre 80 %    FEV1FVC-Pred 80 %    FEV1FVC-Pre 55 %    FEFMax-Pred 11.23 L/sec    FEFMax-Pre 9.74 L/sec    FEFMax-%Pred-Pre 86 %    FEF2575-Pred 4.23 L/sec    FEF2575-Pre 1.48 L/sec    IDN9985-%Pred-Pre 34 %    ExpTime-Pre 13.88 sec    FIFMax-Pre 11.04 L/sec    FEV1FEV6-Pred 81 %    FEV1FEV6-Pre 62 %     Spirometry interpretation: personally reviewed. Valid maneuver.  Mild obstruction.  Values improved from last visit and he is at the upper end of his baseline.    Most recent respiratory culture May, 2024 with 1 strain of Pseudomonas aeruginosa.    Chest x-ray from today personally reviewed.  He has mild bronchiectatic changes consistent with his cystic fibrosis without significant mucous plugging and without interval change.  There is chronic blunting of his right costophrenic angle consistent with his prior lobectomy.    DEXA scan from today with density within normal limits.  There is slight interval decrease in density at the left total hip (-4.9%).    Assessment and plan:  1.  Cystic fibrosis lung disease: Patient doing well from a symptom control standpoint and also with trend up in his pulmonary function test despite no use of prophylactic airway clearance since his last visit.  Unclear what accounts for improvement in PFTs but likely just normal variation.  We have considered vest therapy a few days per week for prophylactic purposes but that patient has struggled to initiate this and appears to be doing well regardless so we will limit airway clearance regimen to as needed.  I did encourage the patient to look for ways to remain physically  active over the winter to augment airway clearance.  1B.  CFTR modulation therapy: Excellent clinical response as above.  Today's LFTs reassuring.  His bilirubin was again elevated but less so than previously.  Bilirubin has been mostly elevated since starting Trikafta.  Last imaging of the abdomen was in 2019 with normal-appearing liver on CT. we will consider liver ultrasound per guidelines.  2. Impaired glucose tolerance: There is again a delay in getting his continuous glucose monitoring completed.  Hemoglobin A1c up slightly to 6.0%.  Encouraged patient to get this completed.  3.  Pancreatic exocrine insufficiency with a history of malnutrition on tube feeds: Adequate enzyme replacement by history.  His weight is stable with BMI 24.9.  He has not used tube feeds in approximately 2-1/2 years.  Reviewed how I think it safe for him to have this removed with low likelihood of needing replacement in the future.  He will consider this.  4. Chronic CF sinus disease: Improved on modulation therapy. Continue flonase as needed.  5. Chest skin lesions: Seen by outside Dermatologist. Central chest scar could be treated by steroid injections per pt but not actively pursuing.  He is overdue for skin exam and I have referred him to dermatology clinic.  6. Healthcare maintenance: Annual studies due in November, 2025.  He already received flu vaccine through his employer last month.  He would rather hold off on COVID-19 vaccination.  He is scheduled for screening colonoscopy later this year.      Patient will follow up in 3 month    Total visit time today 40 minutes including detailed review of annual studies with the patient.  This is exclusive of time interpreting PFTs.    Rocael Miller

## 2024-11-14 LAB — TESTOST SERPL-MCNC: 286 NG/DL (ref 240–950)

## 2024-11-15 LAB
A-TOCOPHEROL VIT E SERPL-MCNC: 8.5 MG/L
ANNOTATION COMMENT IMP: NORMAL
BETA+GAMMA TOCOPHEROL SERPL-MCNC: <0.2 MG/L
RETINYL PALMITATE SERPL-MCNC: <0.02 MG/L
VIT A SERPL-MCNC: 0.58 MG/L

## 2024-11-15 NOTE — PATIENT INSTRUCTIONS
See provider AVS for a summary of recommendations from today's visit.  Kathryn Cristina, PharmD  Cystic Fibrosis MTM Pharmacist  [facility]

## 2024-11-17 LAB
BACTERIA SPT CULT: ABNORMAL

## 2024-11-19 ENCOUNTER — RESULT FOLLOW UP (OUTPATIENT)
Dept: NUTRITION | Facility: CLINIC | Age: 44
End: 2024-11-19
Payer: COMMERCIAL

## 2024-11-19 NOTE — PROGRESS NOTES
Nutrition Note    Annual studies nutrition results sent by provider for RD review, testing done 11/12/24.    Vitamin A - 0.58 wnl  Vitamin D - 35 wnl  Vitamin E - 8.5 wnl  Iron - 65 wnl  Lipid Panel - HDL 38   OGTT - n/a, pt with CFRD.  HbA1c 6.0  DEXA - normal bone density for age    Interventions/Recommendations:   Nutrition parameters noted to be WNL, no changes made to baseline supplementation regimen.  Recheck lipid panel/HDL with next annuals to monitor.       Karissa Ashraf MS, RD, LD, CACFD   Cystic Fibrosis/CF Lung Transplant Dietitian      -Available Mon-Thurs 8 AM-4:30 PM. Contact via Symtext or Epic Inbasket.      -On Fridays please contact Leona Marie RD and on weekends/holidays contact coverage dietitian via Symtext (inpatient use only).

## 2024-11-27 ENCOUNTER — TELEPHONE (OUTPATIENT)
Dept: PHARMACY | Facility: CLINIC | Age: 44
End: 2024-11-27
Payer: COMMERCIAL

## 2024-11-27 NOTE — TELEPHONE ENCOUNTER
Call received from [facility] to transfer Trikafta. Per discussion with Leona with [facility], Telly will be continuing to fill Trikafta with current pharmacy. Advised pharmacy to discontinue transfer request at this time.      Kathryn Cristina, PharmD   Cystic Fibrosis MTM Pharmacist  [facility]

## 2024-12-16 ENCOUNTER — HOSPITAL ENCOUNTER (OUTPATIENT)
Facility: CLINIC | Age: 44
Discharge: HOME | End: 2024-12-16
Attending: INTERNAL MEDICINE | Admitting: INTERNAL MEDICINE
Payer: COMMERCIAL

## 2024-12-16 VITALS
SYSTOLIC BLOOD PRESSURE: 106 MMHG | OXYGEN SATURATION: 95 % | HEART RATE: 83 BPM | RESPIRATION RATE: 16 BRPM | DIASTOLIC BLOOD PRESSURE: 78 MMHG

## 2024-12-16 LAB — COLONOSCOPY: NORMAL

## 2024-12-16 PROCEDURE — 88305 TISSUE EXAM BY PATHOLOGIST: CPT | Mod: TC | Performed by: INTERNAL MEDICINE

## 2024-12-16 PROCEDURE — G0500 MOD SEDAT ENDO SERVICE >5YRS: HCPCS | Performed by: INTERNAL MEDICINE

## 2024-12-16 PROCEDURE — 45385 COLONOSCOPY W/LESION REMOVAL: CPT | Performed by: INTERNAL MEDICINE

## 2024-12-16 PROCEDURE — 250N000011 HC RX IP 250 OP 636: Performed by: INTERNAL MEDICINE

## 2024-12-16 RX ORDER — ONDANSETRON 2 MG/ML
4 INJECTION INTRAMUSCULAR; INTRAVENOUS
Status: DISCONTINUED | OUTPATIENT
Start: 2024-12-16 | End: 2024-12-16 | Stop reason: HOSPADM

## 2024-12-16 RX ORDER — ONDANSETRON 2 MG/ML
4 INJECTION INTRAMUSCULAR; INTRAVENOUS EVERY 6 HOURS PRN
Status: DISCONTINUED | OUTPATIENT
Start: 2024-12-16 | End: 2024-12-16 | Stop reason: HOSPADM

## 2024-12-16 RX ORDER — FLUMAZENIL 0.1 MG/ML
0.2 INJECTION, SOLUTION INTRAVENOUS
Status: DISCONTINUED | OUTPATIENT
Start: 2024-12-16 | End: 2024-12-16 | Stop reason: HOSPADM

## 2024-12-16 RX ORDER — NALOXONE HYDROCHLORIDE 0.4 MG/ML
0.2 INJECTION, SOLUTION INTRAMUSCULAR; INTRAVENOUS; SUBCUTANEOUS
Status: DISCONTINUED | OUTPATIENT
Start: 2024-12-16 | End: 2024-12-16 | Stop reason: HOSPADM

## 2024-12-16 RX ORDER — FENTANYL CITRATE 50 UG/ML
INJECTION, SOLUTION INTRAMUSCULAR; INTRAVENOUS PRN
Status: DISCONTINUED | OUTPATIENT
Start: 2024-12-16 | End: 2024-12-16 | Stop reason: HOSPADM

## 2024-12-16 RX ORDER — LIDOCAINE 40 MG/G
CREAM TOPICAL
Status: DISCONTINUED | OUTPATIENT
Start: 2024-12-16 | End: 2024-12-16 | Stop reason: HOSPADM

## 2024-12-16 RX ORDER — NALOXONE HYDROCHLORIDE 0.4 MG/ML
0.4 INJECTION, SOLUTION INTRAMUSCULAR; INTRAVENOUS; SUBCUTANEOUS
Status: DISCONTINUED | OUTPATIENT
Start: 2024-12-16 | End: 2024-12-16 | Stop reason: HOSPADM

## 2024-12-16 RX ORDER — PROCHLORPERAZINE MALEATE 5 MG/1
10 TABLET ORAL EVERY 6 HOURS PRN
Status: DISCONTINUED | OUTPATIENT
Start: 2024-12-16 | End: 2024-12-16 | Stop reason: HOSPADM

## 2024-12-16 RX ORDER — ONDANSETRON 4 MG/1
4 TABLET, ORALLY DISINTEGRATING ORAL EVERY 6 HOURS PRN
Status: DISCONTINUED | OUTPATIENT
Start: 2024-12-16 | End: 2024-12-16 | Stop reason: HOSPADM

## 2024-12-16 ASSESSMENT — ACTIVITIES OF DAILY LIVING (ADL)
ADLS_ACUITY_SCORE: 41

## 2024-12-16 NOTE — OR NURSING
Patient had colonoscopy with polypectomy x1 Patient tolerated procedure under conscious sedation and 2 liters nasal cannula.

## 2024-12-16 NOTE — H&P
[facility] Anesthesia Pre-op History and Physical    Telly Leal MRN# [id number]   Age: 44 year old YOB: 1980      Date of Surgery: 12/16/2024 [facility]      Date of Exam 12/16/2024 Facility (In hospital)       Home clinic: [facility]  Primary care provider: Rocael Miller         Chief Complaint and/or Reason for Procedure:   No chief complaint on file.           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  No     Current Facility-Administered Medications   Medication Dose Route Frequency Provider Last Rate Last Admin    lidocaine (LMX4) cream   Topical Q1H PRN Chuckie Nur MD        lidocaine 1 % 0.1-1 mL  0.1-1 mL Other Q1H PRN Chuckie Nur MD        ondansetron (ZOFRAN) injection 4 mg  4 mg Intravenous Once PRN Chuckie Nur MD        sodium chloride (PF) 0.9% PF flush 3 mL  3 mL Intracatheter Q8H Chuckie Nur MD        sodium chloride (PF) 0.9% PF flush 3 mL  3 mL Intracatheter q1 min prn Chuckie Nur MD                 Allergies:    No Known Allergies  Allergy to Latex? Yes  Allergy to tape?   Yes  Intolerances: None            Physical Exam:   All  vitals have been reviewed  Patient Vitals for the past 8 hrs:   BP Resp SpO2   12/16/24 1205 (!) 119/90 16 94 %     No intake/output data recorded.            Lab / Radiology Results:            Anesthetic risk and/or ASA classification:       Chuckie Nur MD

## 2024-12-17 LAB
PATH REPORT.COMMENTS IMP SPEC: NORMAL
PATH REPORT.COMMENTS IMP SPEC: NORMAL
PATH REPORT.FINAL DX SPEC: NORMAL
PATH REPORT.GROSS SPEC: NORMAL
PATH REPORT.MICROSCOPIC SPEC OTHER STN: NORMAL
PATH REPORT.RELEVANT HX SPEC: NORMAL
PHOTO IMAGE: NORMAL

## 2025-02-05 ENCOUNTER — TELEPHONE (OUTPATIENT)
Dept: ENDOCRINOLOGY | Facility: CLINIC | Age: 45
End: 2025-02-05
Payer: COMMERCIAL

## 2025-02-05 NOTE — TELEPHONE ENCOUNTER
Left Voicemail (1st Attempt) for the patient to call back and schedule the following:    Appointment type: RCF  Provider: ANA  Return date: overdue follow-up 1/15/25  Specialty phone number: [phone]  Additional appointment(s) needed: N/A  Additonal Notes: N/A

## 2025-03-03 ENCOUNTER — ALLIED HEALTH/NURSE VISIT (OUTPATIENT)
Dept: CARE COORDINATION | Facility: CLINIC | Age: 45
End: 2025-03-03

## 2025-03-03 ENCOUNTER — OFFICE VISIT (OUTPATIENT)
Dept: PULMONOLOGY | Facility: CLINIC | Age: 45
End: 2025-03-03
Attending: INTERNAL MEDICINE
Payer: COMMERCIAL

## 2025-03-03 VITALS
SYSTOLIC BLOOD PRESSURE: 115 MMHG | DIASTOLIC BLOOD PRESSURE: 79 MMHG | OXYGEN SATURATION: 99 % | HEIGHT: 75 IN | WEIGHT: 201.4 LBS | HEART RATE: 114 BPM | BODY MASS INDEX: 25.04 KG/M2

## 2025-03-03 DIAGNOSIS — E84.9 CF (CYSTIC FIBROSIS) (H): ICD-10-CM

## 2025-03-03 DIAGNOSIS — Z13.9 RISK AND FUNCTIONAL ASSESSMENT: Primary | ICD-10-CM

## 2025-03-03 DIAGNOSIS — E84.0 CYSTIC FIBROSIS WITH PULMONARY MANIFESTATIONS (H): Primary | ICD-10-CM

## 2025-03-03 DIAGNOSIS — E84.0 CYSTIC FIBROSIS WITH PULMONARY MANIFESTATIONS (H): ICD-10-CM

## 2025-03-03 DIAGNOSIS — R79.89 ABNORMAL LFTS: Primary | ICD-10-CM

## 2025-03-03 DIAGNOSIS — E53.8 VITAMIN B12 DEFICIENCY DISEASE: ICD-10-CM

## 2025-03-03 DIAGNOSIS — K86.89 PANCREATIC INSUFFICIENCY: ICD-10-CM

## 2025-03-03 LAB
EXPTIME-PRE: 14.13 SEC
FEF2575-%PRED-PRE: 32 %
FEF2575-PRE: 1.39 L/SEC
FEF2575-PRED: 4.22 L/SEC
FEFMAX-%PRED-PRE: 83 %
FEFMAX-PRE: 9.37 L/SEC
FEFMAX-PRED: 11.22 L/SEC
FEV1-%PRED-PRE: 79 %
FEV1-PRE: 3.55 L
FEV1FEV6-PRE: 61 %
FEV1FEV6-PRED: 81 %
FEV1FVC-PRE: 56 %
FEV1FVC-PRED: 80 %
FIFMAX-PRE: 10.88 L/SEC
FVC-%PRED-PRE: 113 %
FVC-PRE: 6.39 L
FVC-PRED: 5.61 L

## 2025-03-03 PROCEDURE — 87070 CULTURE OTHR SPECIMN AEROBIC: CPT | Performed by: INTERNAL MEDICINE

## 2025-03-03 PROCEDURE — 97803 MED NUTRITION INDIV SUBSEQ: CPT | Performed by: DIETITIAN, REGISTERED

## 2025-03-03 PROCEDURE — 99215 OFFICE O/P EST HI 40 MIN: CPT | Mod: 25 | Performed by: INTERNAL MEDICINE

## 2025-03-03 PROCEDURE — 99213 OFFICE O/P EST LOW 20 MIN: CPT | Performed by: INTERNAL MEDICINE

## 2025-03-03 PROCEDURE — 94375 RESPIRATORY FLOW VOLUME LOOP: CPT | Performed by: INTERNAL MEDICINE

## 2025-03-03 RX ORDER — ACETYLCYSTEINE 200 MG/ML
2 SOLUTION ORAL; RESPIRATORY (INHALATION) 2 TIMES DAILY
Qty: 180 ML | Refills: 12 | Status: SHIPPED | OUTPATIENT
Start: 2025-03-03 | End: 2025-03-04

## 2025-03-03 ASSESSMENT — ANXIETY QUESTIONNAIRES
5. BEING SO RESTLESS THAT IT IS HARD TO SIT STILL: NOT AT ALL
6. BECOMING EASILY ANNOYED OR IRRITABLE: NOT AT ALL
3. WORRYING TOO MUCH ABOUT DIFFERENT THINGS: NOT AT ALL
7. FEELING AFRAID AS IF SOMETHING AWFUL MIGHT HAPPEN: NOT AT ALL
GAD7 TOTAL SCORE: 0
GAD7 TOTAL SCORE: 0
1. FEELING NERVOUS, ANXIOUS, OR ON EDGE: NOT AT ALL
2. NOT BEING ABLE TO STOP OR CONTROL WORRYING: NOT AT ALL

## 2025-03-03 ASSESSMENT — PATIENT HEALTH QUESTIONNAIRE - PHQ9
SUM OF ALL RESPONSES TO PHQ QUESTIONS 1-9: 0
5. POOR APPETITE OR OVEREATING: NOT AT ALL

## 2025-03-03 ASSESSMENT — PAIN SCALES - GENERAL: PAINLEVEL_OUTOF10: NO PAIN (0)

## 2025-03-03 NOTE — NURSING NOTE
Chief Complaint   Patient presents with    RECHECK     Return Cystic Fibrosis     Medications reviewed and vital signs taken.   Mya Julian, CMA

## 2025-03-03 NOTE — LETTER
3/3/2025      Telly Leal  [address] [facility] [address]      Dear Colleague,    Thank you for referring your patient, Telly Leal, to the [facility] FOR LUNG SCIENCE AND [facility]. Please see a copy of my visit note below.    Respiratory Therapy Note: annual airway clearance review    Patient doesn't regularly use inhaled medications or airway clearance therapies. Patient does have supplies at home for nebulized medications and airway clearance (Vest, AerobiKa) to use as needed with illness, additional nebulizer supplies ordered today from [facility]. Patient is instructed to confirm the durable medical equipment is in working order and to check expiration dates of any inhaled medications to assure they are available for use if needed in the future.     RT will continue to be available to see patient annually and more as needed.       CF Annual Nutrition Visit    Recs/Interventions:  1) Having GI symptoms with new formulation of Creon. Plan to increase Creon by 1 capsule with meals x 2-3 weeks. If continues to experience issues, rec switch to new enzyme brand such as Zenpep or Pertzye.     2) Plan for G-tube removal at home. Pt given instructions and supplies.     Face to Face Time: 15 minutes    Leona Marie RD, LD, CACFD  Cystic Fibrosis/Lung Transplant Dietitian  Available via travelmob             Reason for Visit  Telly Leal is being seen for RECHECK (Return Cystic Fibrosis )    CF HPI  The patient was seen and examined by Rocael Miller   Interval history: The patient reports doing well from a respiratory standpoint since his last clinic visit in November.  More recently he has had a cough with some sputum production which is out of the ordinary for him but he has not having increased dyspnea or fatigue.  He also has had some mild sinus congestion for the past week.  He has not felt the need to use vest or nebs thus far.  Current Outpatient Medications    Medication Sig Dispense Refill     acetylcysteine (MUCOMYST) 20 % neb solution Inhale 2 mLs into the lungs 2 times daily. INHALE 2ML INTO THE LUNGS VIA NEB THREE TIMES A DAY. STORE UNUSED PORTION IN FRIG FOR UP TO 96 HOURS 180 mL 12     albuterol (PROAIR HFA) 108 (90 Base) MCG/ACT inhaler Inhale 1-2 puffs into the lungs every 6 hours as needed for shortness of breath. 8.5 g 3     albuterol (PROVENTIL) (2.5 MG/3ML) 0.083% neb solution Take 1 vial (2.5 mg) by nebulization 3 times daily as needed for shortness of breath, wheezing or cough. 360 mL 11     azithromycin (ZITHROMAX) 250 MG tablet TAKE ONE TABLET BY MOUTH ONCE DAILY 30 tablet 11     bisacodyl (DULCOLAX) 5 MG EC tablet Two days prior to exam take two (2) tablets at 4pm. One day prior to exam take two (2) tablets at 4pm (Patient not taking: Reported on 3/3/2025) 4 tablet 0     bisacodyl (DULCOLAX) 5 MG EC tablet Two days prior to exam take two (2) tablets at 4pm. One day prior to exam take two (2) tablets at 4pm (Patient not taking: Reported on 3/3/2025) 4 tablet 0     blood glucose (ACCU-CHEK GUIDE) test strip Use to test blood sugar 2 times daily as needed. 100 strip 3     blood glucose monitoring (SOFTCLIX) lancets Use to test blood sugar 2 times daily as needed. 100 each 3     cromolyn (INTAL) 20 MG/2ML neb solution Take 2 mLs (20 mg) by nebulization 3 times daily. 1 vial three times daily 180 mL 12     elexacaftor-tezacaftor-ivacaftor & ivacaftor (TRIKAFTA) 100-50-75 & 150 MG tablet pack TAKE TWO ORANGE TABLETS BY MOUTH IN THE MORNING AND ONE BLUE TABLET IN THE EVENING.  TAKE WITH FAT CONTAINING FOOD. 84 tablet 4     fluticasone (FLONASE) 50 MCG/ACT nasal spray Spray 2 sprays into both nostrils as needed for rhinitis. 16 g 11     lipase-protease-amylase (CREON) 42432-10521-851920 units CPEP per EC capsule TAKE 5 TO 6 CAPSULES BY MOUTH WITH MEALS, 1 TO 5 CAPSULES WITH SNACKS, AND 7 TO 8 CAPSULES WITH TUBE FEEDINGS 1200 capsule 11     mvw complete  formulation (SOFTGELS) capsule TAKE 1 CAPSULE BY MOUTH ONCE DAILY 60 capsule 11     Nutritional Supplements (NUTREN 2.0) Take 2 Cans by mouth At Bedtime 168/ml/hr 7 days/week 60 Can 11     polyethylene glycol (GOLYTELY) 236 g suspension Two days before procedure at 5PM fill first container with water. Mix and drink an 8 oz glass every 15 minutes until HALF of the container is gone. Place the remainder in the refrigerator. One day before procedure at 5PM drink second half of bowel prep. Drink an 8 oz glass every 15 minutes until it is gone. Day of procedure 6 hours before arrival time fill the 2nd container with water. Mix and drink an 8 oz glass every 15 minutes until HALF of the container is gone. Discard the remaining solution. (Patient not taking: Reported on 3/3/2025) 8000 mL 0     polyethylene glycol (GOLYTELY) 236 g suspension Two days before procedure at 5PM fill first container with water. Mix and drink an 8 oz glass every 10-15 minutes until HALF of the container is gone. Place the remainder in the refrigerator. One day before procedure at 5PM drink second half of bowel prep. Drink an 8 oz glass every 10-15 minutes until it is gone. Day of procedure 6 hours before arrival time fill the 2nd container with water. Mix and drink an 8 oz glass every 10-15 minutes until HALF of the container is gone. Discard the remaining solution. (Patient not taking: Reported on 3/3/2025) 8000 mL 0     sodium chloride (NEBUSAL) 3 % neb solution Take 3 mLs by nebulization 3 times daily. as needed for exacerbations 720 mL 11     No current facility-administered medications for this visit.     No Known Allergies  Past Medical History:   Diagnosis Date     Chronic sinusitis      Cystic fibrosis with pulmonary manifestations (H)      Exocrine pancreatic insufficiency      GERD (gastroesophageal reflux disease)      Hemoptysis      Malnutrition        Past Surgical History:   Procedure Laterality Date     COLONOSCOPY N/A 12/16/2024     Procedure: COLONOSCOPY, FLEXIBLE, WITH LESION REMOVAL USING SNARE;  Surgeon: Chuckie Nur MD;  Location: [facility]     IR MISCELLANEOUS PROCEDURE  2/6/2001     LOBECTOMY      right upper lobe     TUBES         Social History     Socioeconomic History     Marital status:      Spouse name: Not on file     Number of children: Not on file     Years of education: Not on file     Highest education level: Not on file   Occupational History     Occupation:      Employer: [employer]   Tobacco Use     Smoking status: Never     Smokeless tobacco: Never   Substance and Sexual Activity     Alcohol use: Not Currently     Alcohol/week: 0.0 standard drinks of alcohol     Drug use: Not Currently     Sexual activity: Not on file   Other Topics Concern     Parent/sibling w/ CABG, MI or angioplasty before 65F 55M? Not Asked   Social History Narrative    Patient works for the [employer] as an .  He does not have stable living conditions at this time.  He has a [place] guillen.     Social Drivers of Health     Financial Resource Strain: Not on file   Food Insecurity: Not on file   Transportation Needs: Not on file   Physical Activity: Not on file   Stress: Not on file   Social Connections: Not on file   Interpersonal Safety: Not on file   Housing Stability: Not on file   Update: living with wife Bre in [facility]. Birth of daughter Nieves Rebolledo August, 2017, birth of daughter Josy Felder November, 2019    ROS Pulmonary  Const: No fever chills sweats.  GI: Patient notes softer stools with some grease since transitioning to the new formulation of Creon.  No constipation and no abdominal pain.  No recent use of tube feeds.  He had transient small amount of bright red blood per rectum since his last visit in the context of having a harder stool.  Endocrine: Patient ended up not getting started on glucose monitoring as planned at his last visit.  No polyuria or polydipsia or weight loss  "or hypoglycemic symptoms.  ENT: No sinus pain pressure or drainage currently.  A complete ROS was otherwise negative except as noted in the HPI.  /79   Pulse 114   Ht 1.9 m (6' 2.8\")   Wt 91.4 kg (201 lb 6.4 oz)   SpO2 99%   BMI 25.31 kg/m    Exam:   GENERAL APPEARANCE: Well developed, well nourished, alert, and in no apparent distress.  EYES: anicteric with injected appearing conjunctiva  HENT: No nasal discharge.  Oral mucosa is moist, without any lesions, no tonsillar enlargement, no oropharyngeal exudate.  RESP:  good air flow throughout.  Clear to auscultation bilaterally.  Normal chest wall excursion.    CV: Normal S1, S2, regular rhythm, normal rate. No murmur. No gallop. No LE edema.   ABDOMEN:  Bowel sounds normal, soft, nontender, no HSM or masses.   MS: extremities normal. No cyanosis.  SKIN:  PEG site with small amount of granulation tissue similar to previous.    NEURO: Mentation intact, speech normal, normal gait and stance  PSYCH: mentation appears normal. and affect normal/bright  Results:  Recent Results (from the past week)   General PFT Lab (Please always keep checked)    Collection Time: 03/03/25  2:14 PM   Result Value Ref Range    FVC-Pred 5.61 L    FVC-Pre 6.39 L    FVC-%Pred-Pre 113 %    FEV1-Pre 3.55 L    FEV1-%Pred-Pre 79 %    FEV1FVC-Pred 80 %    FEV1FVC-Pre 56 %    FEFMax-Pred 11.22 L/sec    FEFMax-Pre 9.37 L/sec    FEFMax-%Pred-Pre 83 %    FEF2575-Pred 4.22 L/sec    FEF2575-Pre 1.39 L/sec    CIO1554-%Pred-Pre 32 %    ExpTime-Pre 14.13 sec    FIFMax-Pre 10.88 L/sec    FEV1FEV6-Pred 81 %    FEV1FEV6-Pre 61 %     Spirometry interpretation: personally reviewed. Valid maneuver.  Mild obstruction.  Values unchanged from last visit and he is at the upper end of his baseline.    Most recent respiratory culture November, 2024 with 2 strains of Pseudomonas aeruginosa, Aspergillus fumigatus    Screening colonoscopy December, 2024 with one 4 mm polyp which was benign.  Recommendation is " follow-up in 5 years    Assessment and plan:  1.  Cystic fibrosis lung disease: Patient with modest increase in respiratory symptoms in the past week but he overall feels well and his pulmonary function test today are reassuring and will defer antibiotics with the plan for the patient to contact our office if his symptoms fail to improve or worsen.  Will follow-up on today's respiratory culture.  He will continue using vest and nebs on a as needed basis.  Annual check-in today with Leatha, our respiratory therapist.   1B.  CFTR modulation therapy: Excellent clinical response as above.  In November, bilirubin was again elevated but less so than previously.  Bilirubin has been mostly elevated since starting Trikafta.  Last imaging of the abdomen was in 2019 with normal-appearing liver on CT. today, ordered liver ultrasound per cystic fibrosis guidelines.  He is also due for repeat LFT monitoring in conjunction with his next visit and ordered that today.  2. Impaired glucose tolerance: There is again a delay in getting his continuous glucose monitoring completed.  Hemoglobin A1c up slightly to 6.0% in November.  Encouraged patient to contact endocrine clinic get this completed.  3.  Pancreatic exocrine insufficiency with a history of malnutrition on tube feeds: Adequate enzyme replacement by history.  His weight is stable with BMI 25.3.  He has not used tube feeds in approximately 3 years.  Today, again reviewed how I think it safe for him to have this removed with low likelihood of needing replacement in the future.  At this point he is comfortable with pursuing this.  He would like dermatology to treat any granulation tissue at the site prior to removal.  We have provided him with instructions and supplies for home removal.  He will contact us if he needs another referral to dermatology.  4. Chronic CF sinus disease: Improved on modulation therapy. Continue flonase as needed.  5. Chest skin lesions: Seen by outside  Dermatologist. Central chest scar could be treated by steroid injections per pt but not actively pursuing.  He is overdue for skin exam and will encourage him to follow-up next visit.  6. Healthcare maintenance: Annual studies due in November, 2025.  He already received flu vaccine through his employer last month.  He would rather hold off on COVID-19 vaccination.  He is due for repeat screening colonoscopy in December, 2029.  7.  Bright red blood per rectum: No abnormalities noted at the time of his screening colonoscopy in December.  Temporally associated with hard stools.  He will focus on staying hydrated.  We can trial Colace if recurrent.  Patient will follow up in 3 months    Total visit time today 40 minutes.  This is exclusive of time interpreting PFTs.    Rocael Miller                   Again, thank you for allowing me to participate in the care of your patient.        Sincerely,        Rocael Miller MD    Electronically signed

## 2025-03-03 NOTE — PROGRESS NOTES
Reason for Visit  Telly Leal is being seen for RECHECK (Return Cystic Fibrosis )    CF HPI  The patient was seen and examined by Rocael Miller   Interval history: The patient reports doing well from a respiratory standpoint since his last clinic visit in November.  More recently he has had a cough with some sputum production which is out of the ordinary for him but he has not having increased dyspnea or fatigue.  He also has had some mild sinus congestion for the past week.  He has not felt the need to use vest or nebs thus far.  Current Outpatient Medications   Medication Sig Dispense Refill    acetylcysteine (MUCOMYST) 20 % neb solution Inhale 2 mLs into the lungs 2 times daily. INHALE 2ML INTO THE LUNGS VIA NEB THREE TIMES A DAY. STORE UNUSED PORTION IN FRIG FOR UP TO 96 HOURS 180 mL 12    albuterol (PROAIR HFA) 108 (90 Base) MCG/ACT inhaler Inhale 1-2 puffs into the lungs every 6 hours as needed for shortness of breath. 8.5 g 3    albuterol (PROVENTIL) (2.5 MG/3ML) 0.083% neb solution Take 1 vial (2.5 mg) by nebulization 3 times daily as needed for shortness of breath, wheezing or cough. 360 mL 11    azithromycin (ZITHROMAX) 250 MG tablet TAKE ONE TABLET BY MOUTH ONCE DAILY 30 tablet 11    bisacodyl (DULCOLAX) 5 MG EC tablet Two days prior to exam take two (2) tablets at 4pm. One day prior to exam take two (2) tablets at 4pm (Patient not taking: Reported on 3/3/2025) 4 tablet 0    bisacodyl (DULCOLAX) 5 MG EC tablet Two days prior to exam take two (2) tablets at 4pm. One day prior to exam take two (2) tablets at 4pm (Patient not taking: Reported on 3/3/2025) 4 tablet 0    blood glucose (ACCU-CHEK GUIDE) test strip Use to test blood sugar 2 times daily as needed. 100 strip 3    blood glucose monitoring (SOFTCLIX) lancets Use to test blood sugar 2 times daily as needed. 100 each 3    cromolyn (INTAL) 20 MG/2ML neb solution Take 2 mLs (20 mg) by nebulization 3 times daily. 1 vial three times daily 180  mL 12    elexacaftor-tezacaftor-ivacaftor & ivacaftor (TRIKAFTA) 100-50-75 & 150 MG tablet pack TAKE TWO ORANGE TABLETS BY MOUTH IN THE MORNING AND ONE BLUE TABLET IN THE EVENING.  TAKE WITH FAT CONTAINING FOOD. 84 tablet 4    fluticasone (FLONASE) 50 MCG/ACT nasal spray Spray 2 sprays into both nostrils as needed for rhinitis. 16 g 11    lipase-protease-amylase (CREON) 71960-06081-380253 units CPEP per EC capsule TAKE 5 TO 6 CAPSULES BY MOUTH WITH MEALS, 1 TO 5 CAPSULES WITH SNACKS, AND 7 TO 8 CAPSULES WITH TUBE FEEDINGS 1200 capsule 11    mvw complete formulation (SOFTGELS) capsule TAKE 1 CAPSULE BY MOUTH ONCE DAILY 60 capsule 11    Nutritional Supplements (NUTREN 2.0) Take 2 Cans by mouth At Bedtime 168/ml/hr 7 days/week 60 Can 11    polyethylene glycol (GOLYTELY) 236 g suspension Two days before procedure at 5PM fill first container with water. Mix and drink an 8 oz glass every 15 minutes until HALF of the container is gone. Place the remainder in the refrigerator. One day before procedure at 5PM drink second half of bowel prep. Drink an 8 oz glass every 15 minutes until it is gone. Day of procedure 6 hours before arrival time fill the 2nd container with water. Mix and drink an 8 oz glass every 15 minutes until HALF of the container is gone. Discard the remaining solution. (Patient not taking: Reported on 3/3/2025) 8000 mL 0    polyethylene glycol (GOLYTELY) 236 g suspension Two days before procedure at 5PM fill first container with water. Mix and drink an 8 oz glass every 10-15 minutes until HALF of the container is gone. Place the remainder in the refrigerator. One day before procedure at 5PM drink second half of bowel prep. Drink an 8 oz glass every 10-15 minutes until it is gone. Day of procedure 6 hours before arrival time fill the 2nd container with water. Mix and drink an 8 oz glass every 10-15 minutes until HALF of the container is gone. Discard the remaining solution. (Patient not taking: Reported on  3/3/2025) 8000 mL 0    sodium chloride (NEBUSAL) 3 % neb solution Take 3 mLs by nebulization 3 times daily. as needed for exacerbations 720 mL 11     No current facility-administered medications for this visit.     No Known Allergies  Past Medical History:   Diagnosis Date    Chronic sinusitis     Cystic fibrosis with pulmonary manifestations (H)     Exocrine pancreatic insufficiency     GERD (gastroesophageal reflux disease)     Hemoptysis     Malnutrition        Past Surgical History:   Procedure Laterality Date    COLONOSCOPY N/A 12/16/2024    Procedure: COLONOSCOPY, FLEXIBLE, WITH LESION REMOVAL USING SNARE;  Surgeon: Chuckie Nur MD;  Location:  GI    IR MISCELLANEOUS PROCEDURE  2/6/2001    LOBECTOMY      right upper lobe    TUBES         Social History     Socioeconomic History    Marital status:      Spouse name: Not on file    Number of children: Not on file    Years of education: Not on file    Highest education level: Not on file   Occupational History    Occupation:      Employer: [employer]   Tobacco Use    Smoking status: Never    Smokeless tobacco: Never   Substance and Sexual Activity    Alcohol use: Not Currently     Alcohol/week: 0.0 standard drinks of alcohol    Drug use: Not Currently    Sexual activity: Not on file   Other Topics Concern    Parent/sibling w/ CABG, MI or angioplasty before 65F 55M? Not Asked   Social History Narrative    Patient works for the [employer] as an .  He does not have stable living conditions at this time.  He has a [place] guillen.     Social Drivers of Health     Financial Resource Strain: Not on file   Food Insecurity: Not on file   Transportation Needs: Not on file   Physical Activity: Not on file   Stress: Not on file   Social Connections: Not on file   Interpersonal Safety: Not on file   Housing Stability: Not on file   Update: living with wife Bre in [facility]. Birth of daughter Nieves Rebolledo August, 2017,  "birth of daughter Josy Felder November, 2019    ROS Pulmonary  Const: No fever chills sweats.  GI: Patient notes softer stools with some grease since transitioning to the new formulation of Creon.  No constipation and no abdominal pain.  No recent use of tube feeds.  He had transient small amount of bright red blood per rectum since his last visit in the context of having a harder stool.  Endocrine: Patient ended up not getting started on glucose monitoring as planned at his last visit.  No polyuria or polydipsia or weight loss or hypoglycemic symptoms.  ENT: No sinus pain pressure or drainage currently.  A complete ROS was otherwise negative except as noted in the HPI.  /79   Pulse 114   Ht 1.9 m (6' 2.8\")   Wt 91.4 kg (201 lb 6.4 oz)   SpO2 99%   BMI 25.31 kg/m    Exam:   GENERAL APPEARANCE: Well developed, well nourished, alert, and in no apparent distress.  EYES: anicteric with injected appearing conjunctiva  HENT: No nasal discharge.  Oral mucosa is moist, without any lesions, no tonsillar enlargement, no oropharyngeal exudate.  RESP:  good air flow throughout.  Clear to auscultation bilaterally.  Normal chest wall excursion.    CV: Normal S1, S2, regular rhythm, normal rate. No murmur. No gallop. No LE edema.   ABDOMEN:  Bowel sounds normal, soft, nontender, no HSM or masses.   MS: extremities normal. No cyanosis.  SKIN:  PEG site with small amount of granulation tissue similar to previous.    NEURO: Mentation intact, speech normal, normal gait and stance  PSYCH: mentation appears normal. and affect normal/bright  Results:  Recent Results (from the past week)   General PFT Lab (Please always keep checked)    Collection Time: 03/03/25  2:14 PM   Result Value Ref Range    FVC-Pred 5.61 L    FVC-Pre 6.39 L    FVC-%Pred-Pre 113 %    FEV1-Pre 3.55 L    FEV1-%Pred-Pre 79 %    FEV1FVC-Pred 80 %    FEV1FVC-Pre 56 %    FEFMax-Pred 11.22 L/sec    FEFMax-Pre 9.37 L/sec    FEFMax-%Pred-Pre 83 %    " FEF2575-Pred 4.22 L/sec    FEF2575-Pre 1.39 L/sec    VUJ8036-%Pred-Pre 32 %    ExpTime-Pre 14.13 sec    FIFMax-Pre 10.88 L/sec    FEV1FEV6-Pred 81 %    FEV1FEV6-Pre 61 %     Spirometry interpretation: personally reviewed. Valid maneuver.  Mild obstruction.  Values unchanged from last visit and he is at the upper end of his baseline.    Most recent respiratory culture November, 2024 with 2 strains of Pseudomonas aeruginosa, Aspergillus fumigatus    Screening colonoscopy December, 2024 with one 4 mm polyp which was benign.  Recommendation is follow-up in 5 years    Assessment and plan:  1.  Cystic fibrosis lung disease: Patient with modest increase in respiratory symptoms in the past week but he overall feels well and his pulmonary function test today are reassuring and will defer antibiotics with the plan for the patient to contact our office if his symptoms fail to improve or worsen.  Will follow-up on today's respiratory culture.  He will continue using vest and nebs on a as needed basis.  Annual check-in today with Leatha, our respiratory therapist.   1B.  CFTR modulation therapy: Excellent clinical response as above.  In November, bilirubin was again elevated but less so than previously.  Bilirubin has been mostly elevated since starting Trikafta.  Last imaging of the abdomen was in 2019 with normal-appearing liver on CT. today, ordered liver ultrasound per cystic fibrosis guidelines.  He is also due for repeat LFT monitoring in conjunction with his next visit and ordered that today.  2. Impaired glucose tolerance: There is again a delay in getting his continuous glucose monitoring completed.  Hemoglobin A1c up slightly to 6.0% in November.  Encouraged patient to contact endocrine clinic get this completed.  3.  Pancreatic exocrine insufficiency with a history of malnutrition on tube feeds: His weight is stable with BMI 25.3.  He has not used tube feeds in approximately 3 years.  Today, again reviewed how I think it  safe for him to have this removed with low likelihood of needing replacement in the future.  At this point he is comfortable with pursuing this.  He would like dermatology to treat any granulation tissue at the site prior to removal.  We have provided him with instructions and supplies for home removal.  He will contact us if he needs another referral to dermatology.  In terms of his enzyme replacement, concerned that new formulation of Creon is not sufficient.  Patient will bump up his dose of mealtime Creon.  Patient advised to alert our office if still having breakthrough signs or symptoms of insufficiency in which case we will trial alternative enzyme.  4. Chronic CF sinus disease: Improved on modulation therapy. Continue flonase as needed.  5. Chest skin lesions: Seen by outside Dermatologist. Central chest scar could be treated by steroid injections per pt but not actively pursuing.  He is overdue for skin exam and will encourage him to follow-up next visit.  6. Healthcare maintenance: Annual studies due in November, 2025.  He already received flu vaccine through his employer last month.  He would rather hold off on COVID-19 vaccination.  He is due for repeat screening colonoscopy in December, 2029.  7.  Bright red blood per rectum: No abnormalities noted at the time of his screening colonoscopy in December.  Temporally associated with hard stools.  He will focus on staying hydrated.  We can trial Colace if recurrent.  Patient will follow up in 3 months    Total visit time today 40 minutes.  This is exclusive of time interpreting PFTs.    Roceal Miller

## 2025-03-03 NOTE — PROGRESS NOTES
CF Annual Nutrition Assessment     Reason for Assessment  Assessed during CF clinic visit with Dr. Miller r/t increased nutrition risk with diagnosis of CF per protocol.     Nutrition Significant PMH  Mild Lung Disease - taking Trikafta  Pancreatic Insufficient  G-tube for nutrition support - not using x 3 years  Glucose intolerance     Anthropometric Assessment  Height: 189.7 cm  Weight: 91.4 kg (actual weight)  Ideal body weight based on BMI 22 for females and 23 for males per CF Foundation recs: 82.8 kg (182 lbs)  Body mass index is 25.31 kg/m .    Wt Readings from Last 5 Encounters:   25 91.4 kg (201 lb 6.4 oz)   24 90 kg (198 lb 6.6 oz)   24 90.9 kg (200 lb 6.4 oz)   23 87.1 kg (192 lb)   23 87.3 kg (192 lb 8 oz)     Comments: Normal BMI, good/stable body composition based on brief nutrition-focused physical assessment.     Pancreatic Enzymes  Brand: Creon [id number]  Dosin-9 with meals, 6 with snacks = 2760-0208 units lipase/kg/meal  Estimated Daily Intake: 40 caps = 66750 units/kg/day - slightly above recommendation but reduced from previous total of 50 caps.    Signs of Malabsorption: Yes- reports GI issues since switching to new Creon formulation, feels like he has less symptom control and stool quality was better with previous formulation.    Enzyme Program:  None - information provided during previous visits     Nutrition-Related Lab & Vitamin/Mineral Supplements  Annual studies 24  Vitamin A - 0.58 wnl  Vitamin D - 35 wnl  Vitamin E - 8.5 wnl  Iron - 65 wnl  Lipid Panel - HDL 38     OGTT - CFRD vs impaired glucose tolerance.  HbA1c 6.0   - previous referral to endocrine for CGM monitoring. Seen by Dr. Yoo 10/15/24.   DEXA - normal bone density for age     Current Vitamin/Mineral Supplements: MVW Complete 1 capsule daily. Reports taking daily. Obtains from [facility].     Diet History and Assessment  Diet Preferences/Allergies/Intolerances: Regular    Intake  Recall/Comments: Appetite only fair at baseline but makes an effort to eat consistently. Consumes 2-3 meals and snacks. He monitors his weight closely and has been able to remain off TFs x 3 years.      Diet Recall - per previous  Breakfast- skips or may have eggs, toast  Lunch- sandwich or eats out, fast food  Dinner- protein with starch and vegetable; pasta or steak/chicken with veggies  HS snack- ice cream    Calcium: gallon whole milk/week + yogurt, string cheese, adequate  Salt: likely adequate from foods, no sx of deficiency per pt  Hydration: Water, Gatorade, some soda  Supplements: No   Tube Feeding: Currently not using regularly, only as backup in case of wt loss or illness --   Type of Feeding Tube: G-tube (button)  Formula: Nutren 2.0  Rate/Frequency: 135 ml/hr x 1 can + water added (~4 hrs infusion time)  Nutrition: 250 mls, 500 kcals, 21 g protein, 23 g fat, 54 g CHO  Enzymes: 9-10 caps before infusion along with HS snack (accounting for ~3-4 caps per 1 can TF) provides ~4000 units lipase/g fat in feeds.      NUTRITION DIAGNOSIS  Impaired nutrient utilization related to CF pancreatic insufficiency as evidenced by requires pancreatic enzyme replacement therapy, vitamin/mineral supplementation, and higher kcal/protein diet and nutrition support in order to maintain ideal body weight and nutrition status.     INTERVENTIONS/RECOMMENDATIONS   1) Oral Intake/Weight:   BEE: 2100   3821-9438 kcals/day =  150-175% BEE  110-140 g protein/day = 1.2-1.5 g/kg    Discussed current nutrition status including weight trends, PO, and maintenance of nutrition status while holding TF infusions. Congratulated pt on successful weight maintenance/gains, discussed maintaining balanced, high quality nutrition intake. Pt wondering about GT removal -- discussed with provider and plan for pt to remove at home. Pt given instructions and supplies. Can message team if issues with closure or leaking following removal.    2) Enzymes:    Loss of EPI symptom control with new Creon formulation. Plan for pt to trial increasing dose by 1 capsule x 2-3 weeks. If he continues to experience concerns while on high lipase dose, rec switch to new enzyme brand such as Zenpep or Pertzye.     3) Vitamin/Mineral Supplementation:  Most recent annual studies wnl. Continue with current vitamin/mineral regimen.     GOALS:  1) Weight maintenance with BMI >23 kg/m2  2) Take enzymes and vitamins as recommended     FOLLOW-UP/MONITORING:  Visit patient within 12 months for annual nutrition reassessment.     Time Spent In Face-to-Face Patient Interactions: 15 minutes    Leona Marie RD, DONTRELL, CACFD  Cystic Fibrosis/Lung Transplant Dietitian  Available via [employer]

## 2025-03-03 NOTE — PATIENT INSTRUCTIONS
Post G tube removal:  Do not eat or drink for the next 3 hours. When you do eat/drink please remain sitting straight for at least 1 hour so allow movement past the stomach. Do not eat/drink too close to bedtime.    Your site will drain some brownish, yellow - green contents for a couple days after the tube removal.  You will need to change your dressing more often the first few days, especially if you are active, up and walking /moving around.  It may take several weeks for it to dry and scab over.  You may shower without covering the site, just dry off nicely and cover after your shower.    Change that dressing when it appear visibly soiled (typically needs frequent changing the first few days after removal), keep a dressing in place for about 2 weeks and avoid getting the dressing wet.    Clean area around tube with water and non-perfumed body soap, like Dove or Neutrogena.  Cover the site with a small 2x2 then multiple 4x4 dressings the first couple of days.      Once the drainage slows down, you can eventually just cover the site with a 2x2 bandage until scabbed over (generally takes a week or two).  Seal in and compress with wide paper tape.     Please call anytime with any questions or concerns at all.

## 2025-03-03 NOTE — PROGRESS NOTES
Respiratory Therapy Note: annual airway clearance review    Patient doesn't regularly use inhaled medications or airway clearance therapies. Patient does have supplies at home for nebulized medications and airway clearance (Vest, AerobiKa) to use as needed with illness, additional nebulizer supplies ordered today from [facility]. Patient is instructed to confirm the durable medical equipment is in working order and to check expiration dates of any inhaled medications to assure they are available for use if needed in the future.     RT will continue to be available to see patient annually and more as needed.

## 2025-03-03 NOTE — PROGRESS NOTES
"Adult Cystic Fibrosis Program  Annual Psychosocial Assessment    Presenting Information:  TELLY \"Yobany\" is a 44-year-old man with cystic fibrosis, presenting in CF clinic for a regular follow up with primary CF provider, Dr Hung Miller. Met with Telly for annual psychosocial assessment.     Living situation:   Telly is currently living with his wife, Bre, and his two daughters, in a home they own in [place]. They do not have any pets. Yobany denies concerns about his living situation.      Family Constellation:   Telly was initially raised by both biological parents, who  when he was around 14 years old. He subsequently lived with his mother but maintained regular contact with his father. Telly has an older brother and older sister. His mother has a house in [place], [place] and [place] in [place]. His father, brother, and sister live in [place]. His siblings are both  and Yobany has several nieces and nephews. Yobany's extended family also lives in the [place] area and operate a large local [employer]. Bre's parents live in [place]. Yobany reports all of his family members are doing well.    Yobany and his wife have two daughters, Nieves Rebolledo (7 years old- 1st grade) and Josy (4 years old- pre K). They were conceived via IVF. They attempted to have another but his wife experienced some complications, and they are out of embryo's now. They are not pursuing IVF again. Nieves and Josy are doing well and keeping busy with many sports and activities throughout the year. They attend [employer].     Support Network:   Telly describes his social support as \"really good\". Both Yobany and Bre's families are very involved and supportive. He draws additional support from friends, co-workers, and their ariana community. He describes a close relationship with Bre. They have been  since 2015. Yobany does not stay in contact with anyone who has CF, but he " "knew other people with CF when he was growing up.      Adjustment to Illness:   Yobany was diagnosed with CF during infancy. He remembers struggling with weight issues as a child. He notes significant past health events in 2000, when he got a G-tube, but continued to struggle with gaining weight, and in 2003, when he experienced a pneumothorax and lobectomy. Since that event, he notes that he has successfully gained weight.      Yobany describes his current health status as \"good\". Yobany continues to take trikafta which is going well. In the past he has mentioned that since starting trikafta his treatment burden is much less. Clinically, he has mild lung disease, a history of malnutrition and glucose intolerance. He uses TF in case of weight loss or illness. He denies any physical health symptoms that interfere with his daily activities. He typically vests 3x a week. He has not been going to the gym or working out but reports being fairly active outside.     Advance Directive:   This SW reviewed Advance Directive education. He is comfortable with his default decision maker (his wife) in the absence of a directive. He did not request additional forms today.    Education:   Yobany completed a Bachelor's Degree in Accounting and Business Administration at [employer] in 2007. He also has a business leadership certificate through work but does not currently plan to pursue further education.    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 He works from home 3 days a week and goes into the office the other 2 days. He reports great benefits through the state. He enjoys his job overall and reports a supportive boss and co-workers, he is also part of a union.     Yobany's wife Bre works full time for the [place] [employer] as well.    Mental Health/Coping:   Yobany coped with depressive/anxious symptoms in 2013, due to " "stressful transitions. He did brief counseling, which he did not find especially helpful. The issues then resolved.    Yobany reports his current mood as \"good, no concerns\". He denies any current or past symptoms indicative of mood, anxiety, or other mental health disorders.     ARCHANA-7: score of 0, indicating an absence of anxiety symptoms.   PHQ-9: score of 0, indicating an absence of depression symptoms as well.     Yobany describes his current stress level as manageable. He reports minimal work related stress. He generally ayana with stressors through walking, going for a drive or distracting himself, and getting a \"change of scenery\". He also tries hard to not let things get to him.    Yobany was raised [employer] and describes ariana as important to him, noting that his brother in law is a . They belong to [employer] where their children attend school.     Chemical Health   Yobany denies use of tobacco, vaping, illicit drugs, or alcohol. He does not have any history of chemical dependency or psychosocial impairment related to substance use.      Finances:   Yobany is able to afford his daily living expenses and denies any financial concerns. 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    Insurance:   Yobany has employer-based insurance (BCBS). He notes that costs are affordable for him and he denies related concerns.     Yobany has been wondering about the likelihood of getting life insurance with his CF diagnosis.  re-directed him to reach out to [facility] to discuss if they have any more information regarding this.     Recreation/Leisure Interests:   Telly enjoys playing hockey, golfing, spending time with family/friends, and traveling.     Intervention:  -Psychosocial Assessment  -Resource education/referral ([facility])  -Supportive counseling    Assessment:  Yobany was friendly and receptive to  visit. He appeared to be open in his " responses. He continues to have stable employment, insurance and finances. He reports his health has been stable overall. No mental or chemical health concerns. He appears to have adequate social support.     Yobany seems to be psychosocially stable overall, with access to relevant resources and supports. No concerns expressed/noted.      Plan:  Re-consult for any psychosocial needs that may arise.    Complete psychosocial assessment annually.  Continue to follow for regular clinic consult.    Marybeth Bourgeois [facility]  Adult Cystic Fibrosis   Ph: [phone]

## 2025-03-04 ENCOUNTER — PHARMACY VISIT (OUTPATIENT)
Dept: ADMINISTRATIVE | Facility: CLINIC | Age: 45
End: 2025-03-04
Payer: COMMERCIAL

## 2025-03-04 DIAGNOSIS — E53.8 VITAMIN B12 DEFICIENCY DISEASE: ICD-10-CM

## 2025-03-04 DIAGNOSIS — E84.0 CYSTIC FIBROSIS WITH PULMONARY MANIFESTATIONS (H): ICD-10-CM

## 2025-03-04 RX ORDER — ACETYLCYSTEINE 200 MG/ML
SOLUTION ORAL; RESPIRATORY (INHALATION)
Qty: 180 ML | Refills: 12 | Status: SHIPPED | OUTPATIENT
Start: 2025-03-04

## 2025-03-04 NOTE — PROGRESS NOTES
Cystic Fibrosis Clinical Follow Up Assessment   Completed on 2025/03/04 18:05:33 [facility], by Angela Hoffman       Activity Medications    CREON    TRIKAFTA        Care Details    What are the patient's goals for this therapy?   ? 03/17/2023: Pt did not respond3/17/2022: Maintaining his health1/14/2021: Pt not respond      Did you identify any patient barriers to access and successful treatment?   ? No barriers to access identified      Is it appropriate to collect a PDC at this time? [QA Metric] (An MPR or PDC would not be appropriate for cycled medications or if the patient is on therapy   ? Yes      Document PDC   ? 0.94      Has the patient missed doses inappropriately?   ? No      Please select CURRENT side effect(s) for monitoring:   ? None          Summary Notes  I had the pleasure of speaking to pt for TM via text.  States that everything is going great.   Side effects: None.   Doses: have been going great.   States not much else to say other than everything is going smoothly.   Pt did not respond to further TM questions.   Follow up: Biannual.       Angela HOFFMAN, PharmD, CSP  Therapy Management Pharmacist  [address] [address]   [email or web address]  [email or web address]   Specialty: [phone]  Mail Order: [phone]

## 2025-03-06 LAB
BACTERIA SPT CULT: ABNORMAL

## 2025-03-10 LAB
BACTERIA SPT CULT: ABNORMAL

## 2025-03-26 DIAGNOSIS — E84.9 CF (CYSTIC FIBROSIS) (H): ICD-10-CM

## 2025-03-26 RX ORDER — ELEXACAFTOR, TEZACAFTOR, AND IVACAFTOR 100-50-75
KIT ORAL
Qty: 84 TABLET | Refills: 4 | Status: SHIPPED | OUTPATIENT
Start: 2025-03-26

## 2025-04-03 ENCOUNTER — ANCILLARY PROCEDURE (OUTPATIENT)
Dept: ULTRASOUND IMAGING | Facility: CLINIC | Age: 45
End: 2025-04-03
Attending: INTERNAL MEDICINE
Payer: COMMERCIAL

## 2025-04-03 DIAGNOSIS — R79.89 ABNORMAL LFTS: ICD-10-CM

## 2025-04-03 DIAGNOSIS — E84.9 CF (CYSTIC FIBROSIS) (H): ICD-10-CM

## 2025-04-03 PROCEDURE — 76705 ECHO EXAM OF ABDOMEN: CPT | Mod: GC | Performed by: RADIOLOGY

## 2025-04-09 ENCOUNTER — OFFICE VISIT (OUTPATIENT)
Dept: DERMATOLOGY | Facility: CLINIC | Age: 45
End: 2025-04-09
Payer: COMMERCIAL

## 2025-04-09 DIAGNOSIS — L92.9 GRANULATION TISSUE: Primary | ICD-10-CM

## 2025-04-09 PROCEDURE — 99203 OFFICE O/P NEW LOW 30 MIN: CPT | Performed by: DERMATOLOGY

## 2025-04-09 RX ORDER — LIDOCAINE 40 MG/G
CREAM TOPICAL ONCE
Status: COMPLETED | OUTPATIENT
Start: 2025-04-09 | End: 2025-04-09

## 2025-04-09 RX ADMIN — LIDOCAINE: 40 CREAM TOPICAL at 09:40

## 2025-04-09 ASSESSMENT — PAIN SCALES - GENERAL: PAINLEVEL_OUTOF10: NO PAIN (0)

## 2025-04-09 NOTE — PROGRESS NOTES
[facility] Dermatology Note  Encounter Date: Apr 9, 2025  Office Visit     Dermatology Problem List:  # Pertinent PMH: CF.  # Hypergranulation tissue surrounding G-tube insertion site  - S/p silver nitrate 7/5/22, 4/9/25  _________________________    Assessment & Plan:    # Hypergranulation tissue surrounding G-tube insertion site - had good results with prior silver nitrate   - Silver nitrate applied following topical lidocaine application via sterile gauze  - Wound care with gauze and vaseline encouraged     Follow-up: prn for new or changing lesions, let us know if needs repeat treatment and we can find convenient clinic spot for him      Staff and Scribe:     Scribe Disclosure:   I, Carolina Hair, am serving as a scribe; to document services personally performed by Rita De La O MD -based on data collection and the provider's statements to me.         Electronically signed by:    Ronn Page, MS3    Staff Physician:  I was present with the medical student who participated in the service and in the documentation of the note. I have verified the history and personally performed the physical exam and medical decision making. I agree with the assessment and plan of care as documented in the note.     The procedure(s) was(were) performed by myself.      Rita De La O MD    Department of Dermatology  [facility]: Phone: [phone], Fax: [phone]  4/15/2025      ____________________________________________    CC: Derm Problem (Here today for skin issue around G tube site.)    HPI:  Mr. Telly Leal is a(n) 44 year old male who presents today as a return patient for irritation around his G tube site. He reports similar symptoms to last time such as a nearly circumferential red area of irritation around the skin where the G tube inserts into his abdomen. It will occasionally bleed, and is  irritated with tube changes.         Patient is otherwise feeling well, without additional skin concerns.    Labs Reviewed:  N/A    Physical exam:  Vitals: There were no vitals taken for this visit.  GEN: This is a well developed, well-nourished male in no acute distress, in a pleasant mood.    SKIN: Focused examination of the G tube site was performed.  - a non-epithelialized area of erythematous, pink tissue surrounding the G tube nearly circumferentially.   - No other lesions of concern on areas examined.       Medications:  Current Outpatient Medications   Medication Sig Dispense Refill    acetylcysteine (MUCOMYST) 20 % neb solution INHALE 2ML INTO THE LUNGS VIA NEB THREE TIMES A DAY. STORE UNUSED PORTION IN FRIDGE FOR UP TO 96 HOURS 180 mL 12    albuterol (PROAIR HFA) 108 (90 Base) MCG/ACT inhaler Inhale 1-2 puffs into the lungs every 6 hours as needed for shortness of breath. 8.5 g 3    albuterol (PROVENTIL) (2.5 MG/3ML) 0.083% neb solution Take 1 vial (2.5 mg) by nebulization 3 times daily as needed for shortness of breath, wheezing or cough. 360 mL 11    azithromycin (ZITHROMAX) 250 MG tablet TAKE ONE TABLET BY MOUTH ONCE DAILY 30 tablet 11    bisacodyl (DULCOLAX) 5 MG EC tablet Two days prior to exam take two (2) tablets at 4pm. One day prior to exam take two (2) tablets at 4pm 4 tablet 0    bisacodyl (DULCOLAX) 5 MG EC tablet Two days prior to exam take two (2) tablets at 4pm. One day prior to exam take two (2) tablets at 4pm 4 tablet 0    blood glucose (ACCU-CHEK GUIDE) test strip Use to test blood sugar 2 times daily as needed. 100 strip 3    blood glucose monitoring (SOFTCLIX) lancets Use to test blood sugar 2 times daily as needed. 100 each 3    cromolyn (INTAL) 20 MG/2ML neb solution Take 2 mLs (20 mg) by nebulization 3 times daily. 1 vial three times daily 180 mL 12    elexacaftor-tezacaftor-ivacaftor & ivacaftor (TRIKAFTA) 100-50-75 & 150 MG tablet pack TAKE TWO ORANGE TABLETS BY MOUTH IN THE  MORNING AND ONE BLUE TABLET IN THE EVENING AS DIRECTED ON PACKAGE.  TAKE WITH FAT CONTAINING FOOD. 84 tablet 4    fluticasone (FLONASE) 50 MCG/ACT nasal spray Spray 2 sprays into both nostrils as needed for rhinitis. 16 g 11    lipase-protease-amylase (CREON) 30011-15966-261222 units CPEP per EC capsule TAKE 5 TO 6 CAPSULES BY MOUTH WITH MEALS, 1 TO 5 CAPSULES WITH SNACKS, AND 7 TO 8 CAPSULES WITH TUBE FEEDINGS 1200 capsule 11    mvw complete formulation (SOFTGELS) capsule TAKE 1 CAPSULE BY MOUTH ONCE DAILY 60 capsule 11    Nutritional Supplements (NUTREN 2.0) Take 2 Cans by mouth At Bedtime 168/ml/hr 7 days/week 60 Can 11    polyethylene glycol (GOLYTELY) 236 g suspension Two days before procedure at 5PM fill first container with water. Mix and drink an 8 oz glass every 15 minutes until HALF of the container is gone. Place the remainder in the refrigerator. One day before procedure at 5PM drink second half of bowel prep. Drink an 8 oz glass every 15 minutes until it is gone. Day of procedure 6 hours before arrival time fill the 2nd container with water. Mix and drink an 8 oz glass every 15 minutes until HALF of the container is gone. Discard the remaining solution. 8000 mL 0    polyethylene glycol (GOLYTELY) 236 g suspension Two days before procedure at 5PM fill first container with water. Mix and drink an 8 oz glass every 10-15 minutes until HALF of the container is gone. Place the remainder in the refrigerator. One day before procedure at 5PM drink second half of bowel prep. Drink an 8 oz glass every 10-15 minutes until it is gone. Day of procedure 6 hours before arrival time fill the 2nd container with water. Mix and drink an 8 oz glass every 10-15 minutes until HALF of the container is gone. Discard the remaining solution. 8000 mL 0    sodium chloride (NEBUSAL) 3 % neb solution Take 3 mLs by nebulization 3 times daily. as needed for exacerbations 720 mL 11     No current facility-administered medications for  this visit.      Past Medical History:   Patient Active Problem List   Diagnosis    Type 1 diabetes mellitus (H)    Cystic fibrosis with pulmonary manifestations (H)    CF (cystic fibrosis) (H)    Congenital absence of vas deferens, bilateral     Past Medical History:   Diagnosis Date    Chronic sinusitis     Cystic fibrosis with pulmonary manifestations (H)     Exocrine pancreatic insufficiency     GERD (gastroesophageal reflux disease)     Hemoptysis     Malnutrition         CC Rocael Miller MD  [address] [address] on close of this encounter.

## 2025-04-09 NOTE — LETTER
4/9/2025       RE: Telly Leal  [address] [facility] [address]     Dear Colleague,    Thank you for referring your patient, Telly Leal, to the [facility]ERMAT[facility] [place] at [facility]. Please see a copy of my visit note below.    [facility] Dermatology Note  Encounter Date: Apr 9, 2025  Office Visit     Dermatology Problem List:  # Pertinent PMH: CF.  # Hypergranulation tissue surrounding G-tube insertion site  - S/p silver nitrate 7/5/22, 4/9/25  _________________________    Assessment & Plan:    # Hypergranulation tissue surrounding G-tube insertion site - had good results with prior silver nitrate   - Silver nitrate applied following topical lidocaine application via sterile gauze  - Wound care with gauze and vaseline encouraged     Follow-up: prn for new or changing lesions, let us know if needs repeat treatment and we can find convenient clinic spot for him      Staff and Scribe:     Scribe Disclosure:   I, Carolina Hair, am serving as a scribe; to document services personally performed by Rita De La O MD -based on data collection and the provider's statements to me.         Electronically signed by:    Ronn Page MS3    Staff Physician:  I was present with the medical student who participated in the service and in the documentation of the note. I have verified the history and personally performed the physical exam and medical decision making. I agree with the assessment and plan of care as documented in the note.     The procedure(s) was(were) performed by myself.      Rita De La O MD    Department of Dermatology  [facility]: Phone: [phone], Fax: [phone]  4/15/2025      ____________________________________________    CC: Derm Problem (Here today for skin issue around G tube  site.)    HPI:  Mr. Telly Leal is a(n) 44 year old male who presents today as a return patient for irritation around his G tube site. He reports similar symptoms to last time such as a nearly circumferential red area of irritation around the skin where the G tube inserts into his abdomen. It will occasionally bleed, and is irritated with tube changes.         Patient is otherwise feeling well, without additional skin concerns.    Labs Reviewed:  N/A    Physical exam:  Vitals: There were no vitals taken for this visit.  GEN: This is a well developed, well-nourished male in no acute distress, in a pleasant mood.    SKIN: Focused examination of the G tube site was performed.  - a non-epithelialized area of erythematous, pink tissue surrounding the G tube nearly circumferentially.   - No other lesions of concern on areas examined.       Medications:  Current Outpatient Medications   Medication Sig Dispense Refill     acetylcysteine (MUCOMYST) 20 % neb solution INHALE 2ML INTO THE LUNGS VIA NEB THREE TIMES A DAY. STORE UNUSED PORTION IN FRIDGE FOR UP TO 96 HOURS 180 mL 12     albuterol (PROAIR HFA) 108 (90 Base) MCG/ACT inhaler Inhale 1-2 puffs into the lungs every 6 hours as needed for shortness of breath. 8.5 g 3     albuterol (PROVENTIL) (2.5 MG/3ML) 0.083% neb solution Take 1 vial (2.5 mg) by nebulization 3 times daily as needed for shortness of breath, wheezing or cough. 360 mL 11     azithromycin (ZITHROMAX) 250 MG tablet TAKE ONE TABLET BY MOUTH ONCE DAILY 30 tablet 11     bisacodyl (DULCOLAX) 5 MG EC tablet Two days prior to exam take two (2) tablets at 4pm. One day prior to exam take two (2) tablets at 4pm 4 tablet 0     bisacodyl (DULCOLAX) 5 MG EC tablet Two days prior to exam take two (2) tablets at 4pm. One day prior to exam take two (2) tablets at 4pm 4 tablet 0     blood glucose (ACCU-CHEK GUIDE) test strip Use to test blood sugar 2 times daily as needed. 100 strip 3     blood glucose monitoring  (SOFTCLIX) lancets Use to test blood sugar 2 times daily as needed. 100 each 3     cromolyn (INTAL) 20 MG/2ML neb solution Take 2 mLs (20 mg) by nebulization 3 times daily. 1 vial three times daily 180 mL 12     elexacaftor-tezacaftor-ivacaftor & ivacaftor (TRIKAFTA) 100-50-75 & 150 MG tablet pack TAKE TWO ORANGE TABLETS BY MOUTH IN THE MORNING AND ONE BLUE TABLET IN THE EVENING AS DIRECTED ON PACKAGE.  TAKE WITH FAT CONTAINING FOOD. 84 tablet 4     fluticasone (FLONASE) 50 MCG/ACT nasal spray Spray 2 sprays into both nostrils as needed for rhinitis. 16 g 11     lipase-protease-amylase (CREON) [id number] units CPEP per EC capsule TAKE 5 TO 6 CAPSULES BY MOUTH WITH MEALS, 1 TO 5 CAPSULES WITH SNACKS, AND 7 TO 8 CAPSULES WITH TUBE FEEDINGS 1200 capsule 11     mvw complete formulation (SOFTGELS) capsule TAKE 1 CAPSULE BY MOUTH ONCE DAILY 60 capsule 11     Nutritional Supplements (NUTREN 2.0) Take 2 Cans by mouth At Bedtime 168/ml/hr 7 days/week 60 Can 11     polyethylene glycol (GOLYTELY) 236 g suspension Two days before procedure at 5PM fill first container with water. Mix and drink an 8 oz glass every 15 minutes until HALF of the container is gone. Place the remainder in the refrigerator. One day before procedure at 5PM drink second half of bowel prep. Drink an 8 oz glass every 15 minutes until it is gone. Day of procedure 6 hours before arrival time fill the 2nd container with water. Mix and drink an 8 oz glass every 15 minutes until HALF of the container is gone. Discard the remaining solution. 8000 mL 0     polyethylene glycol (GOLYTELY) 236 g suspension Two days before procedure at 5PM fill first container with water. Mix and drink an 8 oz glass every 10-15 minutes until HALF of the container is gone. Place the remainder in the refrigerator. One day before procedure at 5PM drink second half of bowel prep. Drink an 8 oz glass every 10-15 minutes until it is gone. Day of procedure 6 hours before arrival  time fill the 2nd container with water. Mix and drink an 8 oz glass every 10-15 minutes until HALF of the container is gone. Discard the remaining solution. 8000 mL 0     sodium chloride (NEBUSAL) 3 % neb solution Take 3 mLs by nebulization 3 times daily. as needed for exacerbations 720 mL 11     No current facility-administered medications for this visit.      Past Medical History:   Patient Active Problem List   Diagnosis     Type 1 diabetes mellitus (H)     Cystic fibrosis with pulmonary manifestations (H)     CF (cystic fibrosis) (H)     Congenital absence of vas deferens, bilateral     Past Medical History:   Diagnosis Date     Chronic sinusitis      Cystic fibrosis with pulmonary manifestations (H)      Exocrine pancreatic insufficiency      GERD (gastroesophageal reflux disease)      Hemoptysis      Malnutrition         CC Rocael Miller MD  [address] [place] [address] on close of this encounter.      Again, thank you for allowing me to participate in the care of your patient.      Sincerely,    Rita De La O MD

## 2025-04-09 NOTE — NURSING NOTE
Dermatology Rooming Note    Telly Leal's goals for this visit include:   Chief Complaint   Patient presents with    Derm Problem     Here today for skin issue around G tube site.     Saniya Espinoza RN

## 2025-05-20 DIAGNOSIS — E84.9 CF (CYSTIC FIBROSIS) (H): ICD-10-CM

## 2025-05-20 RX ORDER — AZITHROMYCIN 250 MG/1
250 TABLET, FILM COATED ORAL DAILY
Qty: 30 TABLET | Refills: 11 | Status: SHIPPED | OUTPATIENT
Start: 2025-05-20

## 2025-06-15 ENCOUNTER — HEALTH MAINTENANCE LETTER (OUTPATIENT)
Age: 45
End: 2025-06-15

## 2025-07-07 ENCOUNTER — OFFICE VISIT (OUTPATIENT)
Dept: PHARMACY | Facility: CLINIC | Age: 45
End: 2025-07-07

## 2025-07-07 ENCOUNTER — LAB (OUTPATIENT)
Dept: LAB | Facility: CLINIC | Age: 45
End: 2025-07-07
Attending: INTERNAL MEDICINE
Payer: COMMERCIAL

## 2025-07-07 ENCOUNTER — OFFICE VISIT (OUTPATIENT)
Dept: PULMONOLOGY | Facility: CLINIC | Age: 45
End: 2025-07-07
Attending: INTERNAL MEDICINE
Payer: COMMERCIAL

## 2025-07-07 VITALS
DIASTOLIC BLOOD PRESSURE: 76 MMHG | BODY MASS INDEX: 24.92 KG/M2 | WEIGHT: 200.4 LBS | HEART RATE: 104 BPM | SYSTOLIC BLOOD PRESSURE: 118 MMHG | HEIGHT: 75 IN | OXYGEN SATURATION: 95 %

## 2025-07-07 DIAGNOSIS — E84.0 CYSTIC FIBROSIS WITH PULMONARY MANIFESTATIONS (H): Primary | ICD-10-CM

## 2025-07-07 DIAGNOSIS — E84.9 CF (CYSTIC FIBROSIS) (H): Primary | ICD-10-CM

## 2025-07-07 DIAGNOSIS — E84.0 CYSTIC FIBROSIS WITH PULMONARY MANIFESTATIONS (H): ICD-10-CM

## 2025-07-07 DIAGNOSIS — K86.81 EXOCRINE PANCREATIC INSUFFICIENCY: ICD-10-CM

## 2025-07-07 LAB
ALBUMIN SERPL BCG-MCNC: 4.3 G/DL (ref 3.5–5.2)
ALP SERPL-CCNC: 98 U/L (ref 40–150)
ALT SERPL W P-5'-P-CCNC: 39 U/L (ref 0–70)
AST SERPL W P-5'-P-CCNC: 44 U/L (ref 0–45)
BILIRUB SERPL-MCNC: 2.1 MG/DL
BILIRUBIN DIRECT (ROCHE PRO & PURE): 0.68 MG/DL (ref 0–0.45)
EXPTIME-PRE: 15.17 SEC
FEF2575-%PRED-PRE: 34 %
FEF2575-PRE: 1.43 L/SEC
FEF2575-PRED: 4.19 L/SEC
FEFMAX-%PRED-PRE: 81 %
FEFMAX-PRE: 9.17 L/SEC
FEFMAX-PRED: 11.21 L/SEC
FEV1-%PRED-PRE: 73 %
FEV1-PRE: 3.39 L
FEV1FEV6-PRE: 61 %
FEV1FEV6-PRED: 81 %
FEV1FVC-PRE: 54 %
FEV1FVC-PRED: 79 %
FEV1SVC-PRED: 72 L
FIFMAX-PRE: 9.04 L/SEC
FVC-%PRED-PRE: 109 %
FVC-PRE: 6.33 L
FVC-PRED: 5.8 L
Lab: 67 %
PROT SERPL-MCNC: 7 G/DL (ref 6.4–8.3)

## 2025-07-07 PROCEDURE — 36415 COLL VENOUS BLD VENIPUNCTURE: CPT | Performed by: PATHOLOGY

## 2025-07-07 PROCEDURE — 99207 PR NO CHARGE LOS: CPT | Performed by: PHARMACIST

## 2025-07-07 PROCEDURE — 99213 OFFICE O/P EST LOW 20 MIN: CPT | Performed by: INTERNAL MEDICINE

## 2025-07-07 PROCEDURE — 94375 RESPIRATORY FLOW VOLUME LOOP: CPT | Performed by: INTERNAL MEDICINE

## 2025-07-07 PROCEDURE — 87070 CULTURE OTHR SPECIMN AEROBIC: CPT | Performed by: INTERNAL MEDICINE

## 2025-07-07 PROCEDURE — 99214 OFFICE O/P EST MOD 30 MIN: CPT | Mod: 25 | Performed by: INTERNAL MEDICINE

## 2025-07-07 PROCEDURE — 80076 HEPATIC FUNCTION PANEL: CPT | Performed by: PATHOLOGY

## 2025-07-07 ASSESSMENT — PAIN SCALES - GENERAL: PAINLEVEL_OUTOF10: NO PAIN (0)

## 2025-07-07 NOTE — PROGRESS NOTES
Reason for Visit  Telly Leal is being seen for Follow Up (Return CF )    CF HPI  The patient was seen and examined by Rocael Miller   Interval history: The patient reports feeling well since his last clinic visit.  He has not had any sustained period of increased cough, congestion, or dyspnea.  He did a neb this morning on an elective basis but otherwise has not been using nebs or vest therapy.  He is not doing any formal exercise but does have some activity related to playing sports with his kids.  He reports baseline exercise tolerance.  Current Outpatient Medications   Medication Sig Dispense Refill    acetylcysteine (MUCOMYST) 20 % neb solution INHALE 2ML INTO THE LUNGS VIA NEB THREE TIMES A DAY. STORE UNUSED PORTION IN FRIDGE FOR UP TO 96 HOURS 180 mL 12    albuterol (PROAIR HFA) 108 (90 Base) MCG/ACT inhaler Inhale 1-2 puffs into the lungs every 6 hours as needed for shortness of breath. 8.5 g 3    albuterol (PROVENTIL) (2.5 MG/3ML) 0.083% neb solution Take 1 vial (2.5 mg) by nebulization 3 times daily as needed for shortness of breath, wheezing or cough. 360 mL 11    azithromycin (ZITHROMAX) 250 MG tablet TAKE ONE TABLET BY MOUTH ONCE DAILY 30 tablet 11    cromolyn (INTAL) 20 MG/2ML neb solution Take 2 mLs (20 mg) by nebulization 3 times daily. 1 vial three times daily 180 mL 12    elexacaftor-tezacaftor-ivacaftor & ivacaftor (TRIKAFTA) 100-50-75 & 150 MG tablet pack TAKE TWO ORANGE TABLETS BY MOUTH IN THE MORNING AND ONE BLUE TABLET IN THE EVENING AS DIRECTED ON PACKAGE.  TAKE WITH FAT CONTAINING FOOD. 84 tablet 4    fluticasone (FLONASE) 50 MCG/ACT nasal spray Spray 2 sprays into both nostrils as needed for rhinitis. 16 g 11    lipase-protease-amylase (CREON) 22703-79535-483062 units CPEP per EC capsule TAKE 5 TO 6 CAPSULES BY MOUTH WITH MEALS, 1 TO 5 CAPSULES WITH SNACKS, AND 7 TO 8 CAPSULES WITH TUBE FEEDINGS 1200 capsule 11    mvw complete formulation (SOFTGELS) capsule TAKE 1 CAPSULE BY  MOUTH ONCE DAILY 60 capsule 11    sodium chloride (NEBUSAL) 3 % neb solution Take 3 mLs by nebulization 3 times daily. as needed for exacerbations 720 mL 11    blood glucose (ACCU-CHEK GUIDE) test strip Use to test blood sugar 2 times daily as needed. (Patient not taking: Reported on 7/7/2025) 100 strip 3    blood glucose monitoring (SOFTCLIX) lancets Use to test blood sugar 2 times daily as needed. (Patient not taking: Reported on 7/7/2025) 100 each 3     No current facility-administered medications for this visit.     No Known Allergies  Past Medical History:   Diagnosis Date    Chronic sinusitis     Cystic fibrosis with pulmonary manifestations (H)     Exocrine pancreatic insufficiency     GERD (gastroesophageal reflux disease)     Hemoptysis     Malnutrition        Past Surgical History:   Procedure Laterality Date    COLONOSCOPY N/A 12/16/2024    Procedure: COLONOSCOPY, FLEXIBLE, WITH LESION REMOVAL USING SNARE;  Surgeon: Chuckie Nur MD;  Location: [facility]    IR MISCELLANEOUS PROCEDURE  2/6/2001    LOBECTOMY      right upper lobe    TUBES         Social History     Socioeconomic History    Marital status:      Spouse name: Not on file    Number of children: Not on file    Years of education: Not on file    Highest education level: Not on file   Occupational History    Occupation:      Employer: [employer]   Tobacco Use    Smoking status: Never    Smokeless tobacco: Never   Substance and Sexual Activity    Alcohol use: Not Currently     Alcohol/week: 0.0 standard drinks of alcohol    Drug use: Not Currently    Sexual activity: Not on file   Other Topics Concern    Parent/sibling w/ CABG, MI or angioplasty before 65F 55M? Not Asked   Social History Narrative    Patient works for the [employer] as an .  He does not have stable living conditions at this time.  He has a [place] guillen.     Social Drivers of Health     Financial Resource Strain: Not on file   Food  "Insecurity: Not on file   Transportation Needs: Not on file   Physical Activity: Not on file   Stress: Not on file   Social Connections: Not on file   Interpersonal Safety: Not on file   Housing Stability: Not on file   Update: living with wife Bre in [facility]. Birth of daughter Nieves Rebolledo August, 2017, birth of daughter Josy Felder November, 2019    ROS Pulmonary  Const: No fever chills sweats.  GI: Good appetite.  No diarrhea constipation or grease in his stools.  He has not had any recurrence of blood in his stools.  Endocrine: Patient ended up not getting started on glucose monitoring as planned at his last visit.  No polyuria or polydipsia or weight loss or hypoglycemic symptoms.  ENT: No sinus pain pressure or drainage currently.  A complete ROS was otherwise negative except as noted in the HPI.  /76 (BP Location: Right arm, Patient Position: Chair, Cuff Size: Adult Regular)   Pulse 104   Ht 1.9 m (6' 2.8\")   Wt 90.9 kg (200 lb 6.4 oz)   SpO2 95%   BMI 25.18 kg/m    Exam:   GENERAL APPEARANCE: Well developed, well nourished, alert, and in no apparent distress.  EYES: anicteric   HENT: No nasal discharge.  Oral mucosa is moist, without any lesions, no tonsillar enlargement, no oropharyngeal exudate.  RESP:  good air flow throughout.  Clear to auscultation bilaterally.  Normal chest wall excursion.    CV: Normal S1, S2, regular rhythm, normal rate. No murmur. No gallop. No LE edema.   ABDOMEN:  Bowel sounds normal, soft, nontender, no HSM or masses.   MS: extremities normal. No cyanosis.  SKIN: Previous PEG site healing well with small amount of granulation tissue.  NEURO: Mentation intact, speech normal, normal gait and stance  PSYCH: mentation appears normal. and affect normal/bright  Results:  Recent Results (from the past week)   Hepatic function panel    Collection Time: 07/07/25  1:14 PM   Result Value Ref Range    Protein Total 7.0 6.4 - 8.3 g/dL    Albumin 4.3 3.5 - 5.2 g/dL "    Bilirubin Total 2.1 (H) <=1.2 mg/dL    Alkaline Phosphatase 98 40 - 150 U/L    AST 44 0 - 45 U/L    ALT 39 0 - 70 U/L    Bilirubin Direct 0.68 (H) 0.00 - 0.45 mg/dL   PFT General Lab Testing (Please always keep checked)    Collection Time: 07/07/25  1:23 PM   Result Value Ref Range    FVC-Pred 5.80 L    FEV1SVC-Pred 72 L    FEV1FEV6-Pred 81 %    FEV1FVC-Pred 79 %    FEFMax-Pred 11.21 L/sec    FEF2575-Pred 4.19 L/sec    FVC-Pre 6.33 L    FVC-%Pred-Pre 109 %    FEV1-Pre 3.39 L    FEV1-%Pred-Pre 73 %    FEV1FEV6-Pre 61 %    FEV1FVC-Pre 54 %    WRG0IIM-%Pred-Pre 67 %    FEFMax-Pre 9.17 L/sec    FEFMax-%Pred-Pre 81 %    FEF2575-Pre 1.43 L/sec    TKJ5512-%Pred-Pre 34 %    FIFMax-Pre 9.04 L/sec    ExpTime-Pre 15.17 sec     Spirometry interpretation: personally reviewed. Valid maneuver.  Mild obstruction.  Values down slightly from last visit and he is at the lower end of his baseline.    Most recent respiratory culture November, 2024 with 3 strains of Pseudomonas aeruginosa, Aspergillus fumigatus    Abdominal ultrasound 4/25: Mild increased echogenicity of the hepatic parenchyma, as can be seen  with hepatic steatosis.     Assessment and plan:  1.  Cystic fibrosis lung disease bronchiectasis: Patient overall with good clinical course despite no longer doing prophylactic airway clearance.  Encouraged him to maintain physical activity to  provide some degree of airway clearance.  He consistently grows Pseudomonas but previously did not have a particularly favorable response to inhaled antibiotics.  Anticipate using inhaled antibiotics for exacerbations only for now.  He continues on chronic azithromycin therapy and can consider trial off of this in the future depending on clinical course.  1B.  CFTR modulation therapy: Excellent clinical response as above. Bilirubin has been mostly elevated since starting Trikafta. Abdominal ultrasound with non-specific findings without biliary tract disease.  Suspect related to trikafta.  He has not connected with previous referrals to Hepatology.  Will see if we can get input from GI on need for further evaluation and refer back prn.  In the meantime, risk vs benefit favors continued full dose trikafta.  2. Impaired glucose tolerance: There is again a delay in getting his continuous glucose monitoring completed.  Hemoglobin A1c up slightly to 6.0% in November.  Again encouraged patient to contact endocrine clinic get this completed.  3.  Pancreatic exocrine insufficiency with a history of malnutrition on tube feeds: His weight is stable with BMI 25.2.  He has not used tube feeds in over 3 years and feeding tube now removed spring, 2025. Some concern that new formulation of Creon is not sufficient and patient will contact [facility] nutritionist when current supply of creon exhausted with consideration of alternative formulation.  Currently on higher dose than previous baseline.    4. Chronic CF sinus disease: Improved on modulation therapy. Continue flonase as needed.  5. Chest skin lesions: Seen by outside Dermatologist. Central chest scar could be treated by steroid injections per pt but not actively pursuing.  He is overdue for skin exam and will encourage him to follow-up next visit.  6. Healthcare maintenance: Annual studies due in November, 2025 and ordered for next visit. He is due for repeat screening colonoscopy in December, 2029.  Patient will follow up in 4 months with annual studies    Total visit time today 35 minutes.  This is exclusive of time interpreting PFTs.    Rocael Miller

## 2025-07-07 NOTE — LETTER
7/7/2025      Telly Leal  [address] [address]      Dear Colleague,    Thank you for referring your patient, Telly Leal, to the [facility] FOR LUNG SCIENCE AND [facility] [place]. Please see a copy of my visit note below.    Reason for Visit  Telly Leal is being seen for Follow Up (Return CF )    CF HPI  The patient was seen and examined by Rocael Miller   Interval history: The patient reports feeling well since his last clinic visit.  He has not had any sustained period of increased cough, congestion, or dyspnea.  He did a neb this morning on an elective basis but otherwise has not been using nebs or vest therapy.  He is not doing any formal exercise but does have some activity related to playing sports with his kids.  He reports baseline exercise tolerance.  Current Outpatient Medications   Medication Sig Dispense Refill     acetylcysteine (MUCOMYST) 20 % neb solution INHALE 2ML INTO THE LUNGS VIA NEB THREE TIMES A DAY. STORE UNUSED PORTION IN FRIDGE FOR UP TO 96 HOURS 180 mL 12     albuterol (PROAIR HFA) 108 (90 Base) MCG/ACT inhaler Inhale 1-2 puffs into the lungs every 6 hours as needed for shortness of breath. 8.5 g 3     albuterol (PROVENTIL) (2.5 MG/3ML) 0.083% neb solution Take 1 vial (2.5 mg) by nebulization 3 times daily as needed for shortness of breath, wheezing or cough. 360 mL 11     azithromycin (ZITHROMAX) 250 MG tablet TAKE ONE TABLET BY MOUTH ONCE DAILY 30 tablet 11     cromolyn (INTAL) 20 MG/2ML neb solution Take 2 mLs (20 mg) by nebulization 3 times daily. 1 vial three times daily 180 mL 12     elexacaftor-tezacaftor-ivacaftor & ivacaftor (TRIKAFTA) 100-50-75 & 150 MG tablet pack TAKE TWO ORANGE TABLETS BY MOUTH IN THE MORNING AND ONE BLUE TABLET IN THE EVENING AS DIRECTED ON PACKAGE.  TAKE WITH FAT CONTAINING FOOD. 84 tablet 4     fluticasone (FLONASE) 50 MCG/ACT nasal spray Spray 2 sprays into both nostrils as needed for  rhinitis. 16 g 11     lipase-protease-amylase (CREON) 40797-29036-459497 units CPEP per EC capsule TAKE 5 TO 6 CAPSULES BY MOUTH WITH MEALS, 1 TO 5 CAPSULES WITH SNACKS, AND 7 TO 8 CAPSULES WITH TUBE FEEDINGS 1200 capsule 11     mvw complete formulation (SOFTGELS) capsule TAKE 1 CAPSULE BY MOUTH ONCE DAILY 60 capsule 11     sodium chloride (NEBUSAL) 3 % neb solution Take 3 mLs by nebulization 3 times daily. as needed for exacerbations 720 mL 11     blood glucose (ACCU-CHEK GUIDE) test strip Use to test blood sugar 2 times daily as needed. (Patient not taking: Reported on 7/7/2025) 100 strip 3     blood glucose monitoring (SOFTCLIX) lancets Use to test blood sugar 2 times daily as needed. (Patient not taking: Reported on 7/7/2025) 100 each 3     No current facility-administered medications for this visit.     No Known Allergies  Past Medical History:   Diagnosis Date     Chronic sinusitis      Cystic fibrosis with pulmonary manifestations (H)      Exocrine pancreatic insufficiency      GERD (gastroesophageal reflux disease)      Hemoptysis      Malnutrition        Past Surgical History:   Procedure Laterality Date     COLONOSCOPY N/A 12/16/2024    Procedure: COLONOSCOPY, FLEXIBLE, WITH LESION REMOVAL USING SNARE;  Surgeon: Chuckie Nur MD;  Location:  GI     IR MISCELLANEOUS PROCEDURE  2/6/2001     LOBECTOMY      right upper lobe     TUBES         Social History     Socioeconomic History     Marital status:      Spouse name: Not on file     Number of children: Not on file     Years of education: Not on file     Highest education level: Not on file   Occupational History     Occupation:      Employer: [employer]   Tobacco Use     Smoking status: Never     Smokeless tobacco: Never   Substance and Sexual Activity     Alcohol use: Not Currently     Alcohol/week: 0.0 standard drinks of alcohol     Drug use: Not Currently     Sexual activity: Not on file   Other Topics Concern      "Parent/sibling w/ CABG, MI or angioplasty before 65F 55M? Not Asked   Social History Narrative    Patient works for the [employer] as an .  He does not have stable living conditions at this time.  He has a Nepalese guillen.     Social Drivers of Health     Financial Resource Strain: Not on file   Food Insecurity: Not on file   Transportation Needs: Not on file   Physical Activity: Not on file   Stress: Not on file   Social Connections: Not on file   Interpersonal Safety: Not on file   Housing Stability: Not on file   Update: living with wife Bre in [facility]. Birth of daughter Nieves Rebolledo August, 2017, birth of daughter Josy Felder November, 2019    ROS Pulmonary  Const: No fever chills sweats.  GI: Good appetite.  No diarrhea constipation or grease in his stools.  He has not had any recurrence of blood in his stools.  Endocrine: Patient ended up not getting started on glucose monitoring as planned at his last visit.  No polyuria or polydipsia or weight loss or hypoglycemic symptoms.  ENT: No sinus pain pressure or drainage currently.  A complete ROS was otherwise negative except as noted in the HPI.  /76 (BP Location: Right arm, Patient Position: Chair, Cuff Size: Adult Regular)   Pulse 104   Ht 1.9 m (6' 2.8\")   Wt 90.9 kg (200 lb 6.4 oz)   SpO2 95%   BMI 25.18 kg/m    Exam:   GENERAL APPEARANCE: Well developed, well nourished, alert, and in no apparent distress.  EYES: anicteric   HENT: No nasal discharge.  Oral mucosa is moist, without any lesions, no tonsillar enlargement, no oropharyngeal exudate.  RESP:  good air flow throughout.  Clear to auscultation bilaterally.  Normal chest wall excursion.    CV: Normal S1, S2, regular rhythm, normal rate. No murmur. No gallop. No LE edema.   ABDOMEN:  Bowel sounds normal, soft, nontender, no HSM or masses.   MS: extremities normal. No cyanosis.  SKIN: Previous PEG site healing well with small amount of granulation tissue.  NEURO: " Mentation intact, speech normal, normal gait and stance  PSYCH: mentation appears normal. and affect normal/bright  Results:  Recent Results (from the past week)   Hepatic function panel    Collection Time: 07/07/25  1:14 PM   Result Value Ref Range    Protein Total 7.0 6.4 - 8.3 g/dL    Albumin 4.3 3.5 - 5.2 g/dL    Bilirubin Total 2.1 (H) <=1.2 mg/dL    Alkaline Phosphatase 98 40 - 150 U/L    AST 44 0 - 45 U/L    ALT 39 0 - 70 U/L    Bilirubin Direct 0.68 (H) 0.00 - 0.45 mg/dL   PFT General Lab Testing (Please always keep checked)    Collection Time: 07/07/25  1:23 PM   Result Value Ref Range    FVC-Pred 5.80 L    FEV1SVC-Pred 72 L    FEV1FEV6-Pred 81 %    FEV1FVC-Pred 79 %    FEFMax-Pred 11.21 L/sec    FEF2575-Pred 4.19 L/sec    FVC-Pre 6.33 L    FVC-%Pred-Pre 109 %    FEV1-Pre 3.39 L    FEV1-%Pred-Pre 73 %    FEV1FEV6-Pre 61 %    FEV1FVC-Pre 54 %    ESO1ENR-%Pred-Pre 67 %    FEFMax-Pre 9.17 L/sec    FEFMax-%Pred-Pre 81 %    FEF2575-Pre 1.43 L/sec    KBN2932-%Pred-Pre 34 %    FIFMax-Pre 9.04 L/sec    ExpTime-Pre 15.17 sec     Spirometry interpretation: personally reviewed. Valid maneuver.  Mild obstruction.  Values down slightly from last visit and he is at the lower end of his baseline.    Most recent respiratory culture November, 2024 with 3 strains of Pseudomonas aeruginosa, Aspergillus fumigatus    Abdominal ultrasound 4/25: Mild increased echogenicity of the hepatic parenchyma, as can be seen  with hepatic steatosis.     Assessment and plan:  1.  Cystic fibrosis lung disease bronchiectasis: Patient overall with good clinical course despite no longer doing prophylactic airway clearance.  Encouraged him to maintain physical activity to  provide some degree of airway clearance.  He consistently grows Pseudomonas but previously did not have a particularly favorable response to inhaled antibiotics.  Anticipate using inhaled antibiotics for exacerbations only for now.  He continues on chronic azithromycin therapy  and can consider trial off of this in the future depending on clinical course.  1B.  CFTR modulation therapy: Excellent clinical response as above. Bilirubin has been mostly elevated since starting Trikafta. Abdominal ultrasound with non-specific findings without biliary tract disease.  Suspect related to trikafta. He has not connected with previous referrals to Hepatology.  Will see if we can get input from GI on need for further evaluation and refer back prn.  In the meantime, risk vs benefit favors continued full dose trikafta.  2. Impaired glucose tolerance: There is again a delay in getting his continuous glucose monitoring completed.  Hemoglobin A1c up slightly to 6.0% in November.  Again encouraged patient to contact endocrine clinic get this completed.  3.  Pancreatic exocrine insufficiency with a history of malnutrition on tube feeds: His weight is stable with BMI 25.2.  He has not used tube feeds in over 3 years and feeding tube now removed spring, 2025. Some concern that new formulation of Creon is not sufficient and patient will contact [facility] nutritionist when current supply of creon exhausted with consideration of alternative formulation.  Currently on higher dose than previous baseline.    4. Chronic CF sinus disease: Improved on modulation therapy. Continue flonase as needed.  5. Chest skin lesions: Seen by outside Dermatologist. Central chest scar could be treated by steroid injections per pt but not actively pursuing.  He is overdue for skin exam and will encourage him to follow-up next visit.  6. Healthcare maintenance: Annual studies due in November, 2025 and ordered for next visit. He is due for repeat screening colonoscopy in December, 2029.  Patient will follow up in 4 months with annual studies    Total visit time today 35 minutes.  This is exclusive of time interpreting PFTs.    Rocael Miller                   Again, thank you for allowing me to participate in the care of your patient.         Sincerely,        Rocael Miller MD    Electronically signed

## 2025-07-07 NOTE — NURSING NOTE
Chief Complaint   Patient presents with    Follow Up     Return CF        Vitals were taken, medications reconciled.    Rizwana Sanchez, Clinic Assistant   1:44 PM

## 2025-07-07 NOTE — PATIENT INSTRUCTIONS
See provider AVS for a summary of recommendations from today's visit.    Flaquita Fitzgerald, PharmD  Cystic Fibrosis MTM Pharmacist  [facility]  Voicemail: [phone]

## 2025-07-07 NOTE — PROGRESS NOTES
Disease State Management Encounter:                          Telly Leal is a 45 year old male seen for a co-visit with Dr. Miller and team.Patient was accompanied by daughter.    Reason for visit: CF Annual Medication Review     Medication Adherence/Access:    Medication: Telly manages his own medications  at home  [facility]  No concerns with medication cost or access today      CF  Inhaled medications:  Bronchodilator: albuterol nebs as needed and albuterol HFA as needed  Mucolytic: Mucomyst 20% nebs as needed, hypertonic 3% nebs as needed  Other: Cromolyn 20 mg/2 mL as needed, Flonase as needed  Oral medications:  Azithromycin: 250 mg daily  CFTR modulator: Trikafta (full dose).     Genotype: [id number]/CFTR del 2,3   Telly participates in VEST/neb therapy as needed for illness. Denies medication side effects.    Patient is eligible for new modulator therapy, Alyftrek (ah-LIF-trek) (vanzacaftor/tezacaftor/deutivacaftor ([id number])). Patient is interested in learning more about this medication.  Reviewed the following:    Triple combination therapy, 2 pills once daily (for peds <40kg, 3 pills once daily to reduce pill size)   Studies (RIDGELINE/SKYLINE) showed Alyftrek was non-inferior to Trikafta  May have slightly lower sweats, which some studies have correlated to better long-term clinical outcomes. This was NOT seen in the Alyftrek trials.  Similar side effect profile to Trikafta. LFT elevation was higher in Alyftrek.  New black box warning for DILI (also added to Trikafta)  Requires monthly hepatic panels x 6 months, then quarterly hepatic panel x 1 year, then annually thereafter if normal   Insurance Coverage: so far we have a few approvals, but Alyftrek is more expensive than Trikafta, insurance prior authorization would be completed to confirm coverage and cost      Pancreatic Insufficiency/Nutrition  Creon 24,000 taking 5-6 capsules with meals and 1-5  capsules with snacks. He takes up to 35-40 capsules/day.  Vitamins/Supplements: MVW complete daily    Patient is not experiencing sign/symptoms of malabsorption. Notes Creon formulation/taste has changed, still working through large backstock, otherwise tolerating well.      Assessment/Plan:    Keep up the great work on medications! Refills sent to pharmacy   [facility] labs are slightly elevated today (bilirubin), reviewed with Dr. Miller given normal ultrasound recommend repeat labs in 6 months with annuals  Review of Alyftrek today, shared decision not interested in trialing Alyftrek at this time due to additional lab monitoring and history of liver dysfunction. Patient will reach out if interested in revisiting in the future      Follow-up: per Dr. Miller     I spent 10 minutes with this patient today. I offer these suggestions for consideration by Dr. Miller during covisit today.     A summary of these recommendations was given to the patient (see AVS from today's appointment with Dr. Miller).    Flaquita Fitzgerald, PharmD  Cystic Fibrosis MT Pharmacist  [facility]  Voicemail: [phone]         Medication Therapy Recommendations  No medication therapy recommendations to display

## 2025-07-08 ENCOUNTER — TELEPHONE (OUTPATIENT)
Dept: PULMONOLOGY | Facility: CLINIC | Age: 45
End: 2025-07-08
Payer: COMMERCIAL

## 2025-07-08 NOTE — TELEPHONE ENCOUNTER
Left Voicemail (1st Attempt) and Sent Mychart (1st Attempt) for the patient to call back and schedule the following:    Appointment type: rcf  Provider: romario  Return date: nov 2025  Specialty phone number: [phone]  Additional appointment(s) needed: cf loop, labs  Additonal Notes: na

## 2025-07-10 LAB
BACTERIA SPT CULT: ABNORMAL

## 2025-07-10 NOTE — TELEPHONE ENCOUNTER
Left Voicemail (2nd Attempt) for the patient to call back and schedule the following:    Appointment type: f  Provider: romario  Return date: nov 2025  Specialty phone number: [phone]  Additional appointment(s) needed: cf loop, labs  Additonal Notes: na

## 2025-07-12 LAB
BACTERIA SPT CULT: ABNORMAL

## 2025-07-31 DIAGNOSIS — E84.9 CF (CYSTIC FIBROSIS) (H): ICD-10-CM

## 2025-07-31 RX ORDER — ELEXACAFTOR, TEZACAFTOR, AND IVACAFTOR 100-50-75
KIT ORAL
Qty: 84 TABLET | Refills: 4 | OUTPATIENT
Start: 2025-07-31

## 2025-09-03 ENCOUNTER — TELEPHONE (OUTPATIENT)
Dept: PULMONOLOGY | Facility: CLINIC | Age: 45
End: 2025-09-03
Payer: COMMERCIAL

## (undated) RX ORDER — SIMETHICONE 40MG/0.6ML
SUSPENSION, DROPS(FINAL DOSAGE FORM)(ML) ORAL
Status: DISPENSED
Start: 2024-12-16

## (undated) RX ORDER — LIDOCAINE 40 MG/G
CREAM TOPICAL
Status: DISPENSED
Start: 2025-04-09

## (undated) RX ORDER — FENTANYL CITRATE 50 UG/ML
INJECTION, SOLUTION INTRAMUSCULAR; INTRAVENOUS
Status: DISPENSED
Start: 2024-12-16